# Patient Record
Sex: FEMALE | Race: WHITE | Employment: FULL TIME | URBAN - NONMETROPOLITAN AREA
[De-identification: names, ages, dates, MRNs, and addresses within clinical notes are randomized per-mention and may not be internally consistent; named-entity substitution may affect disease eponyms.]

---

## 2017-06-12 ENCOUNTER — OFFICE VISIT (OUTPATIENT)
Dept: FAMILY MEDICINE CLINIC | Age: 57
End: 2017-06-12
Payer: COMMERCIAL

## 2017-06-12 DIAGNOSIS — Z79.4 TYPE 2 DIABETES MELLITUS WITH COMPLICATION, WITH LONG-TERM CURRENT USE OF INSULIN (HCC): Primary | ICD-10-CM

## 2017-06-12 DIAGNOSIS — J30.89 ENVIRONMENTAL AND SEASONAL ALLERGIES: ICD-10-CM

## 2017-06-12 DIAGNOSIS — E11.8 TYPE 2 DIABETES MELLITUS WITH COMPLICATION, WITH LONG-TERM CURRENT USE OF INSULIN (HCC): Primary | ICD-10-CM

## 2017-06-12 DIAGNOSIS — G62.9 NEUROPATHY: ICD-10-CM

## 2017-06-12 DIAGNOSIS — I10 ESSENTIAL HYPERTENSION: ICD-10-CM

## 2017-06-12 DIAGNOSIS — F32.A DEPRESSION, UNSPECIFIED DEPRESSION TYPE: ICD-10-CM

## 2017-06-12 PROCEDURE — 99204 OFFICE O/P NEW MOD 45 MIN: CPT | Performed by: NURSE PRACTITIONER

## 2017-06-12 RX ORDER — METOPROLOL TARTRATE 50 MG/1
50 TABLET, FILM COATED ORAL DAILY
Refills: 5 | COMMUNITY
Start: 2017-05-31 | End: 2017-07-12 | Stop reason: SDUPTHER

## 2017-06-12 RX ORDER — PEN NEEDLE, DIABETIC 32GX 5/32"
32 NEEDLE, DISPOSABLE MISCELLANEOUS 2 TIMES DAILY
Refills: 5 | COMMUNITY
Start: 2017-05-08 | End: 2017-07-12 | Stop reason: SDUPTHER

## 2017-06-12 RX ORDER — SERTRALINE HYDROCHLORIDE 100 MG/1
100 TABLET, FILM COATED ORAL DAILY
Refills: 3 | COMMUNITY
Start: 2017-05-12 | End: 2017-07-12 | Stop reason: SDUPTHER

## 2017-06-12 RX ORDER — DULAGLUTIDE 1.5 MG/.5ML
1.5 INJECTION, SOLUTION SUBCUTANEOUS WEEKLY
Refills: 0 | COMMUNITY
Start: 2017-05-31 | End: 2017-07-12 | Stop reason: SDUPTHER

## 2017-06-12 RX ORDER — MECOBAL/LEVOMEFOLAT CA/B6 PHOS 2-3-35 MG
3 TABLET ORAL DAILY
Refills: 6 | COMMUNITY
Start: 2017-05-31 | End: 2017-07-12 | Stop reason: SDUPTHER

## 2017-06-12 RX ORDER — DULAGLUTIDE 1.5 MG/.5ML
1.5 INJECTION, SOLUTION SUBCUTANEOUS WEEKLY
Qty: 4 PEN | Refills: 0 | Status: CANCELLED | OUTPATIENT
Start: 2017-06-12

## 2017-06-12 RX ORDER — GABAPENTIN 600 MG/1
600 TABLET ORAL 2 TIMES DAILY
Refills: 2 | COMMUNITY
Start: 2017-05-31 | End: 2017-07-12 | Stop reason: SDUPTHER

## 2017-06-12 RX ORDER — INSULIN GLARGINE 100 [IU]/ML
200 INJECTION, SOLUTION SUBCUTANEOUS NIGHTLY
Refills: 0 | COMMUNITY
Start: 2017-05-10 | End: 2017-06-12 | Stop reason: SDUPTHER

## 2017-06-12 RX ORDER — CANAGLIFLOZIN AND METFORMIN HYDROCHLORIDE 150; 1000 MG/1; MG/1
150 TABLET, FILM COATED ORAL 2 TIMES DAILY
Refills: 3 | COMMUNITY
Start: 2017-05-08 | End: 2017-06-12 | Stop reason: ALTCHOICE

## 2017-06-12 RX ORDER — FLUCONAZOLE 200 MG/1
200 TABLET ORAL PRN
Refills: 3 | COMMUNITY
Start: 2017-03-27 | End: 2017-07-12 | Stop reason: ALTCHOICE

## 2017-06-12 RX ORDER — INSULIN GLARGINE 100 [IU]/ML
200 INJECTION, SOLUTION SUBCUTANEOUS NIGHTLY
Qty: 5 PEN | Refills: 2 | Status: SHIPPED | OUTPATIENT
Start: 2017-06-12 | End: 2017-07-12 | Stop reason: SDUPTHER

## 2017-06-12 RX ORDER — BLOOD SUGAR DIAGNOSTIC
STRIP MISCELLANEOUS
Refills: 5 | COMMUNITY
Start: 2017-04-04 | End: 2017-07-12 | Stop reason: SDUPTHER

## 2017-06-12 RX ORDER — LORATADINE 10 MG/1
10 TABLET ORAL DAILY
Refills: 3 | COMMUNITY
Start: 2017-05-31 | End: 2017-07-12 | Stop reason: SDUPTHER

## 2017-06-12 ASSESSMENT — PATIENT HEALTH QUESTIONNAIRE - PHQ9
1. LITTLE INTEREST OR PLEASURE IN DOING THINGS: 0
SUM OF ALL RESPONSES TO PHQ9 QUESTIONS 1 & 2: 0
2. FEELING DOWN, DEPRESSED OR HOPELESS: 0
SUM OF ALL RESPONSES TO PHQ QUESTIONS 1-9: 0

## 2017-06-30 VITALS
HEART RATE: 83 BPM | BODY MASS INDEX: 30.33 KG/M2 | OXYGEN SATURATION: 98 % | HEIGHT: 69 IN | DIASTOLIC BLOOD PRESSURE: 86 MMHG | WEIGHT: 204.8 LBS | RESPIRATION RATE: 20 BRPM | SYSTOLIC BLOOD PRESSURE: 138 MMHG

## 2017-06-30 PROBLEM — F32.A DEPRESSION: Status: ACTIVE | Noted: 2017-06-30

## 2017-06-30 PROBLEM — J30.89 ENVIRONMENTAL AND SEASONAL ALLERGIES: Status: ACTIVE | Noted: 2017-06-30

## 2017-06-30 PROBLEM — E11.8 TYPE 2 DIABETES MELLITUS WITH COMPLICATION, WITH LONG-TERM CURRENT USE OF INSULIN (HCC): Status: ACTIVE | Noted: 2017-06-30

## 2017-06-30 PROBLEM — Z79.4 TYPE 2 DIABETES MELLITUS WITH COMPLICATION, WITH LONG-TERM CURRENT USE OF INSULIN (HCC): Status: ACTIVE | Noted: 2017-06-30

## 2017-06-30 PROBLEM — I10 ESSENTIAL HYPERTENSION: Status: ACTIVE | Noted: 2017-06-30

## 2017-06-30 PROBLEM — G62.9 NEUROPATHY: Status: ACTIVE | Noted: 2017-06-30

## 2017-07-12 ENCOUNTER — HOSPITAL ENCOUNTER (OUTPATIENT)
Dept: OTHER | Age: 57
Discharge: HOME | End: 2017-07-12
Attending: NURSE PRACTITIONER | Admitting: NURSE PRACTITIONER

## 2017-07-12 ENCOUNTER — OFFICE VISIT (OUTPATIENT)
Dept: FAMILY MEDICINE CLINIC | Age: 57
End: 2017-07-12
Payer: COMMERCIAL

## 2017-07-12 VITALS
HEIGHT: 69 IN | OXYGEN SATURATION: 98 % | SYSTOLIC BLOOD PRESSURE: 130 MMHG | RESPIRATION RATE: 20 BRPM | DIASTOLIC BLOOD PRESSURE: 64 MMHG | BODY MASS INDEX: 30.33 KG/M2 | HEART RATE: 78 BPM | WEIGHT: 204.8 LBS

## 2017-07-12 DIAGNOSIS — G62.9 NEUROPATHY: ICD-10-CM

## 2017-07-12 DIAGNOSIS — R53.83 FATIGUE, UNSPECIFIED TYPE: ICD-10-CM

## 2017-07-12 DIAGNOSIS — Z79.4 TYPE 2 DIABETES MELLITUS WITH COMPLICATION, WITH LONG-TERM CURRENT USE OF INSULIN (HCC): Primary | ICD-10-CM

## 2017-07-12 DIAGNOSIS — F32.A DEPRESSION, UNSPECIFIED DEPRESSION TYPE: ICD-10-CM

## 2017-07-12 DIAGNOSIS — R63.5 WEIGHT GAIN: ICD-10-CM

## 2017-07-12 DIAGNOSIS — J30.89 ENVIRONMENTAL AND SEASONAL ALLERGIES: ICD-10-CM

## 2017-07-12 DIAGNOSIS — Z12.31 ENCOUNTER FOR SCREENING MAMMOGRAM FOR BREAST CANCER: ICD-10-CM

## 2017-07-12 DIAGNOSIS — E66.9 OBESITY, UNSPECIFIED OBESITY SEVERITY, UNSPECIFIED OBESITY TYPE: ICD-10-CM

## 2017-07-12 DIAGNOSIS — E11.8 TYPE 2 DIABETES MELLITUS WITH COMPLICATION, WITH LONG-TERM CURRENT USE OF INSULIN (HCC): Primary | ICD-10-CM

## 2017-07-12 DIAGNOSIS — K90.49 GASTROINTESTINAL INTOLERANCE TO FOODS: ICD-10-CM

## 2017-07-12 DIAGNOSIS — I10 ESSENTIAL HYPERTENSION: ICD-10-CM

## 2017-07-12 DIAGNOSIS — R14.0 ABDOMINAL BLOATING: ICD-10-CM

## 2017-07-12 LAB
BILIRUBIN, POC: ABNORMAL
BLOOD URINE, POC: ABNORMAL
CLARITY, POC: CLEAR
COLOR, POC: ABNORMAL
GLUCOSE URINE, POC: ABNORMAL
KETONES, POC: ABNORMAL
LEUKOCYTE EST, POC: ABNORMAL
NITRITE, POC: ABNORMAL
PH, POC: 5.5
PROTEIN, POC: ABNORMAL
SPECIFIC GRAVITY, POC: 1.02
UROBILINOGEN, POC: 1

## 2017-07-12 PROCEDURE — 99214 OFFICE O/P EST MOD 30 MIN: CPT | Performed by: NURSE PRACTITIONER

## 2017-07-12 PROCEDURE — 81002 URINALYSIS NONAUTO W/O SCOPE: CPT | Performed by: NURSE PRACTITIONER

## 2017-07-12 RX ORDER — OMEPRAZOLE 20 MG/1
20 CAPSULE, DELAYED RELEASE ORAL
Qty: 30 CAPSULE | Refills: 3 | Status: SHIPPED | OUTPATIENT
Start: 2017-07-12 | End: 2018-04-02 | Stop reason: ALTCHOICE

## 2017-07-12 RX ORDER — PHENTERMINE HYDROCHLORIDE 37.5 MG/1
37.5 TABLET ORAL
Qty: 30 TABLET | Refills: 0 | Status: SHIPPED | OUTPATIENT
Start: 2017-07-12 | End: 2017-09-07 | Stop reason: SDUPTHER

## 2017-07-12 RX ORDER — DULAGLUTIDE 1.5 MG/.5ML
1.5 INJECTION, SOLUTION SUBCUTANEOUS WEEKLY
Qty: 4 PEN | Refills: 3 | Status: SHIPPED | OUTPATIENT
Start: 2017-07-12 | End: 2017-10-26 | Stop reason: SDUPTHER

## 2017-07-12 RX ORDER — BLOOD SUGAR DIAGNOSTIC
1 STRIP MISCELLANEOUS 2 TIMES DAILY
Qty: 100 EACH | Refills: 5 | Status: SHIPPED | OUTPATIENT
Start: 2017-07-12 | End: 2017-10-26 | Stop reason: SDUPTHER

## 2017-07-12 RX ORDER — LORATADINE 10 MG/1
10 TABLET ORAL DAILY
Qty: 30 TABLET | Refills: 3 | Status: SHIPPED | OUTPATIENT
Start: 2017-07-12 | End: 2017-10-26 | Stop reason: SDUPTHER

## 2017-07-12 RX ORDER — GABAPENTIN 600 MG/1
600 TABLET ORAL 2 TIMES DAILY
Qty: 60 TABLET | Refills: 1 | Status: SHIPPED | OUTPATIENT
Start: 2017-07-12 | End: 2017-09-07 | Stop reason: SDUPTHER

## 2017-07-12 RX ORDER — METOPROLOL TARTRATE 50 MG/1
50 TABLET, FILM COATED ORAL DAILY
Qty: 60 TABLET | Refills: 5 | Status: SHIPPED | OUTPATIENT
Start: 2017-07-12 | End: 2017-08-10 | Stop reason: SDUPTHER

## 2017-07-12 RX ORDER — INSULIN GLARGINE 100 [IU]/ML
100 INJECTION, SOLUTION SUBCUTANEOUS NIGHTLY
Qty: 10 PEN | Refills: 2 | Status: SHIPPED | OUTPATIENT
Start: 2017-07-12 | End: 2017-09-21 | Stop reason: SDUPTHER

## 2017-07-12 RX ORDER — MECOBAL/LEVOMEFOLAT CA/B6 PHOS 2-3-35 MG
1 TABLET ORAL DAILY
Qty: 30 TABLET | Refills: 5 | Status: SHIPPED | OUTPATIENT
Start: 2017-07-12 | End: 2018-01-08

## 2017-07-12 RX ORDER — SERTRALINE HYDROCHLORIDE 100 MG/1
100 TABLET, FILM COATED ORAL DAILY
Qty: 30 TABLET | Refills: 3 | Status: SHIPPED | OUTPATIENT
Start: 2017-07-12 | End: 2017-10-26 | Stop reason: SDUPTHER

## 2017-07-12 RX ORDER — PEN NEEDLE, DIABETIC 32GX 5/32"
1 NEEDLE, DISPOSABLE MISCELLANEOUS 2 TIMES DAILY
Qty: 100 EACH | Refills: 5 | Status: SHIPPED | OUTPATIENT
Start: 2017-07-12 | End: 2018-01-08 | Stop reason: SDUPTHER

## 2017-07-12 ASSESSMENT — ENCOUNTER SYMPTOMS: ABDOMINAL DISTENTION: 1

## 2017-07-13 DIAGNOSIS — Z79.4 TYPE 2 DIABETES MELLITUS WITH COMPLICATION, WITH LONG-TERM CURRENT USE OF INSULIN (HCC): ICD-10-CM

## 2017-07-13 DIAGNOSIS — E11.8 TYPE 2 DIABETES MELLITUS WITH COMPLICATION, WITH LONG-TERM CURRENT USE OF INSULIN (HCC): ICD-10-CM

## 2017-07-13 DIAGNOSIS — I10 ESSENTIAL HYPERTENSION: ICD-10-CM

## 2017-07-13 DIAGNOSIS — F32.A DEPRESSION, UNSPECIFIED DEPRESSION TYPE: ICD-10-CM

## 2017-07-13 DIAGNOSIS — R53.83 FATIGUE, UNSPECIFIED TYPE: ICD-10-CM

## 2017-07-13 DIAGNOSIS — G62.9 NEUROPATHY: ICD-10-CM

## 2017-07-13 LAB
A/G RATIO: 1.4 (ref 0.8–2)
ALBUMIN SERPL-MCNC: 4.4 G/DL (ref 3.4–4.8)
ALP BLD-CCNC: 74 U/L (ref 25–100)
ALT SERPL-CCNC: 54 U/L (ref 4–36)
ANION GAP SERPL CALCULATED.3IONS-SCNC: 14 MMOL/L (ref 3–16)
AST SERPL-CCNC: 43 U/L (ref 8–33)
BASOPHILS ABSOLUTE: 0 K/UL (ref 0–0.1)
BASOPHILS RELATIVE PERCENT: 0.7 %
BILIRUB SERPL-MCNC: 0.7 MG/DL (ref 0.3–1.2)
BUN BLDV-MCNC: 17 MG/DL (ref 6–20)
CALCIUM SERPL-MCNC: 9.8 MG/DL (ref 8.5–10.5)
CHLORIDE BLD-SCNC: 103 MMOL/L (ref 98–107)
CHOLESTEROL, TOTAL: 239 MG/DL (ref 0–200)
CO2: 26 MMOL/L (ref 20–30)
CREAT SERPL-MCNC: 1.1 MG/DL (ref 0.4–1.2)
CREATININE URINE: 103.6 MG/DL (ref 1.5–300)
EOSINOPHILS ABSOLUTE: 0.1 K/UL (ref 0–0.4)
EOSINOPHILS RELATIVE PERCENT: 2.1 %
GFR AFRICAN AMERICAN: >59
GFR NON-AFRICAN AMERICAN: 51
GLOBULIN: 3.1 G/DL
GLUCOSE BLD-MCNC: 81 MG/DL (ref 74–106)
HBA1C MFR BLD: 7.1 %
HCT VFR BLD CALC: 41 % (ref 37–47)
HDLC SERPL-MCNC: 29 MG/DL (ref 40–60)
HEMOGLOBIN: 14 G/DL (ref 11.5–16.5)
LDL CHOLESTEROL CALCULATED: 144 MG/DL
LYMPHOCYTES ABSOLUTE: 1.4 K/UL (ref 1.5–4)
LYMPHOCYTES RELATIVE PERCENT: 32.9 % (ref 20–40)
MCH RBC QN AUTO: 29.4 PG (ref 27–32)
MCHC RBC AUTO-ENTMCNC: 34.1 G/DL (ref 31–35)
MCV RBC AUTO: 86 FL (ref 80–100)
MICROALBUMIN UR-MCNC: <1.2 MG/DL (ref 0–22)
MICROALBUMIN/CREAT UR-RTO: NORMAL MG/G (ref 0–30)
MONOCYTES ABSOLUTE: 0.3 K/UL (ref 0.2–0.8)
MONOCYTES RELATIVE PERCENT: 7.9 % (ref 3–10)
NEUTROPHILS ABSOLUTE: 2.4 K/UL (ref 2–7.5)
NEUTROPHILS RELATIVE PERCENT: 56.4 %
PDW BLD-RTO: 14.9 % (ref 11–16)
PLATELET # BLD: 61 K/UL (ref 150–400)
PMV BLD AUTO: 12.7 FL (ref 6–10)
POTASSIUM SERPL-SCNC: 3.9 MMOL/L (ref 3.4–5.1)
RBC # BLD: 4.77 M/UL (ref 3.8–5.8)
SODIUM BLD-SCNC: 143 MMOL/L (ref 136–145)
TOTAL PROTEIN: 7.5 G/DL (ref 6.4–8.3)
TRIGL SERPL-MCNC: 328 MG/DL (ref 0–249)
TSH SERPL DL<=0.05 MIU/L-ACNC: 2.54 UIU/ML (ref 0.35–5.5)
VITAMIN B-12: 1196 PG/ML (ref 211–911)
VITAMIN D 25-HYDROXY: 26.8 (ref 32–100)
VLDLC SERPL CALC-MCNC: 66 MG/DL
WBC # BLD: 4.2 K/UL (ref 4–11)

## 2017-07-14 ENCOUNTER — TELEPHONE (OUTPATIENT)
Dept: FAMILY MEDICINE CLINIC | Age: 57
End: 2017-07-14

## 2017-07-19 RX ORDER — PRAVASTATIN SODIUM 20 MG
20 TABLET ORAL EVERY EVENING
Qty: 30 TABLET | Refills: 3 | Status: SHIPPED | OUTPATIENT
Start: 2017-07-19 | End: 2017-10-26 | Stop reason: SDUPTHER

## 2017-07-19 RX ORDER — ERGOCALCIFEROL 1.25 MG/1
50000 CAPSULE ORAL WEEKLY
Qty: 4 CAPSULE | Refills: 2 | Status: SHIPPED | OUTPATIENT
Start: 2017-07-19 | End: 2018-01-08 | Stop reason: ALTCHOICE

## 2017-08-10 DIAGNOSIS — I10 ESSENTIAL HYPERTENSION: ICD-10-CM

## 2017-08-10 RX ORDER — METOPROLOL TARTRATE 50 MG/1
50 TABLET, FILM COATED ORAL DAILY
Qty: 30 TABLET | Refills: 5 | Status: SHIPPED | OUTPATIENT
Start: 2017-08-10 | End: 2018-04-02 | Stop reason: SDUPTHER

## 2017-09-07 ENCOUNTER — OFFICE VISIT (OUTPATIENT)
Dept: FAMILY MEDICINE CLINIC | Age: 57
End: 2017-09-07
Payer: COMMERCIAL

## 2017-09-07 VITALS
WEIGHT: 198.3 LBS | HEART RATE: 74 BPM | SYSTOLIC BLOOD PRESSURE: 132 MMHG | BODY MASS INDEX: 29.37 KG/M2 | HEIGHT: 69 IN | RESPIRATION RATE: 20 BRPM | OXYGEN SATURATION: 98 % | DIASTOLIC BLOOD PRESSURE: 82 MMHG

## 2017-09-07 DIAGNOSIS — G62.9 NEUROPATHY: ICD-10-CM

## 2017-09-07 DIAGNOSIS — E66.9 OBESITY, UNSPECIFIED OBESITY SEVERITY, UNSPECIFIED OBESITY TYPE: ICD-10-CM

## 2017-09-07 DIAGNOSIS — I10 ESSENTIAL HYPERTENSION: ICD-10-CM

## 2017-09-07 DIAGNOSIS — R63.5 WEIGHT GAIN: ICD-10-CM

## 2017-09-07 DIAGNOSIS — Z79.4 TYPE 2 DIABETES MELLITUS WITH COMPLICATION, WITH LONG-TERM CURRENT USE OF INSULIN (HCC): Primary | ICD-10-CM

## 2017-09-07 DIAGNOSIS — F32.A DEPRESSION, UNSPECIFIED DEPRESSION TYPE: ICD-10-CM

## 2017-09-07 DIAGNOSIS — E11.8 TYPE 2 DIABETES MELLITUS WITH COMPLICATION, WITH LONG-TERM CURRENT USE OF INSULIN (HCC): Primary | ICD-10-CM

## 2017-09-07 PROCEDURE — 99214 OFFICE O/P EST MOD 30 MIN: CPT | Performed by: NURSE PRACTITIONER

## 2017-09-07 RX ORDER — PHENTERMINE HYDROCHLORIDE 37.5 MG/1
37.5 TABLET ORAL
Qty: 30 TABLET | Refills: 1 | Status: SHIPPED | OUTPATIENT
Start: 2017-09-07 | End: 2017-10-07

## 2017-09-07 RX ORDER — GABAPENTIN 600 MG/1
600 TABLET ORAL 2 TIMES DAILY
Qty: 60 TABLET | Refills: 1 | Status: SHIPPED | OUTPATIENT
Start: 2017-09-07 | End: 2017-10-26 | Stop reason: SDUPTHER

## 2017-09-21 DIAGNOSIS — E11.8 TYPE 2 DIABETES MELLITUS WITH COMPLICATION, WITH LONG-TERM CURRENT USE OF INSULIN (HCC): ICD-10-CM

## 2017-09-21 DIAGNOSIS — Z79.4 TYPE 2 DIABETES MELLITUS WITH COMPLICATION, WITH LONG-TERM CURRENT USE OF INSULIN (HCC): ICD-10-CM

## 2017-09-21 RX ORDER — INSULIN GLARGINE 100 [IU]/ML
160 INJECTION, SOLUTION SUBCUTANEOUS NIGHTLY
Qty: 16 PEN | Refills: 2 | Status: SHIPPED | OUTPATIENT
Start: 2017-09-21 | End: 2017-10-26 | Stop reason: SDUPTHER

## 2017-09-22 ENCOUNTER — TELEPHONE (OUTPATIENT)
Dept: FAMILY MEDICINE CLINIC | Age: 57
End: 2017-09-22

## 2017-10-26 ENCOUNTER — OFFICE VISIT (OUTPATIENT)
Dept: FAMILY MEDICINE CLINIC | Age: 57
End: 2017-10-26
Payer: COMMERCIAL

## 2017-10-26 VITALS
RESPIRATION RATE: 16 BRPM | WEIGHT: 207 LBS | SYSTOLIC BLOOD PRESSURE: 120 MMHG | OXYGEN SATURATION: 98 % | HEART RATE: 75 BPM | HEIGHT: 69 IN | DIASTOLIC BLOOD PRESSURE: 78 MMHG | BODY MASS INDEX: 30.66 KG/M2

## 2017-10-26 DIAGNOSIS — G62.9 NEUROPATHY: ICD-10-CM

## 2017-10-26 DIAGNOSIS — E78.5 HYPERLIPIDEMIA, UNSPECIFIED HYPERLIPIDEMIA TYPE: ICD-10-CM

## 2017-10-26 DIAGNOSIS — J30.89 ENVIRONMENTAL AND SEASONAL ALLERGIES: ICD-10-CM

## 2017-10-26 DIAGNOSIS — I10 ESSENTIAL HYPERTENSION: ICD-10-CM

## 2017-10-26 DIAGNOSIS — E11.8 TYPE 2 DIABETES MELLITUS WITH COMPLICATION, WITH LONG-TERM CURRENT USE OF INSULIN (HCC): Primary | ICD-10-CM

## 2017-10-26 DIAGNOSIS — F32.A DEPRESSION, UNSPECIFIED DEPRESSION TYPE: ICD-10-CM

## 2017-10-26 DIAGNOSIS — Z23 INFLUENZA VACCINE NEEDED: ICD-10-CM

## 2017-10-26 DIAGNOSIS — G89.29 CHRONIC PAIN OF RIGHT KNEE: ICD-10-CM

## 2017-10-26 DIAGNOSIS — M17.11 OSTEOARTHRITIS OF RIGHT KNEE, UNSPECIFIED OSTEOARTHRITIS TYPE: ICD-10-CM

## 2017-10-26 DIAGNOSIS — M25.561 CHRONIC PAIN OF RIGHT KNEE: ICD-10-CM

## 2017-10-26 DIAGNOSIS — Z79.4 TYPE 2 DIABETES MELLITUS WITH COMPLICATION, WITH LONG-TERM CURRENT USE OF INSULIN (HCC): Primary | ICD-10-CM

## 2017-10-26 DIAGNOSIS — E66.9 CLASS 1 OBESITY WITH SERIOUS COMORBIDITY AND BODY MASS INDEX (BMI) OF 30.0 TO 30.9 IN ADULT, UNSPECIFIED OBESITY TYPE: ICD-10-CM

## 2017-10-26 LAB — HBA1C MFR BLD: 7.2 %

## 2017-10-26 PROCEDURE — 90471 IMMUNIZATION ADMIN: CPT | Performed by: NURSE PRACTITIONER

## 2017-10-26 PROCEDURE — 99214 OFFICE O/P EST MOD 30 MIN: CPT | Performed by: NURSE PRACTITIONER

## 2017-10-26 PROCEDURE — 90688 IIV4 VACCINE SPLT 0.5 ML IM: CPT | Performed by: NURSE PRACTITIONER

## 2017-10-26 PROCEDURE — 83036 HEMOGLOBIN GLYCOSYLATED A1C: CPT | Performed by: NURSE PRACTITIONER

## 2017-10-26 RX ORDER — BLOOD SUGAR DIAGNOSTIC
1 STRIP MISCELLANEOUS 2 TIMES DAILY
Qty: 100 EACH | Refills: 5 | Status: SHIPPED | OUTPATIENT
Start: 2017-10-26 | End: 2018-01-08 | Stop reason: SDUPTHER

## 2017-10-26 RX ORDER — GABAPENTIN 600 MG/1
600 TABLET ORAL 3 TIMES DAILY
Qty: 90 TABLET | Refills: 1 | Status: SHIPPED | OUTPATIENT
Start: 2017-10-26 | End: 2018-01-08 | Stop reason: SDUPTHER

## 2017-10-26 RX ORDER — DULAGLUTIDE 1.5 MG/.5ML
1.5 INJECTION, SOLUTION SUBCUTANEOUS WEEKLY
Qty: 4 PEN | Refills: 3 | Status: SHIPPED | OUTPATIENT
Start: 2017-10-26 | End: 2018-01-08 | Stop reason: SDUPTHER

## 2017-10-26 RX ORDER — PHENTERMINE HYDROCHLORIDE 37.5 MG/1
37.5 TABLET ORAL
Qty: 30 TABLET | Refills: 0 | Status: SHIPPED | OUTPATIENT
Start: 2017-10-26 | End: 2017-11-25

## 2017-10-26 RX ORDER — PRAVASTATIN SODIUM 20 MG
20 TABLET ORAL EVERY EVENING
Qty: 30 TABLET | Refills: 3 | Status: SHIPPED | OUTPATIENT
Start: 2017-10-26 | End: 2018-01-08 | Stop reason: SDUPTHER

## 2017-10-26 RX ORDER — SERTRALINE HYDROCHLORIDE 100 MG/1
100 TABLET, FILM COATED ORAL DAILY
Qty: 30 TABLET | Refills: 3 | Status: SHIPPED | OUTPATIENT
Start: 2017-10-26 | End: 2018-04-24

## 2017-10-26 RX ORDER — LORATADINE 10 MG/1
10 TABLET ORAL DAILY
Qty: 30 TABLET | Refills: 3 | Status: SHIPPED | OUTPATIENT
Start: 2017-10-26 | End: 2018-01-08 | Stop reason: SDUPTHER

## 2017-10-26 RX ORDER — INSULIN GLARGINE 100 [IU]/ML
160 INJECTION, SOLUTION SUBCUTANEOUS NIGHTLY
Qty: 16 PEN | Refills: 3 | Status: SHIPPED | OUTPATIENT
Start: 2017-10-26 | End: 2018-01-08 | Stop reason: SDUPTHER

## 2017-10-26 NOTE — PROGRESS NOTES
.Have you seen any other physician or provider since your last visit no    Have you had any other diagnostic tests since your last visit? no    Have you changed or stopped any medications since your last visit? no       Per KELSEY Jane orders Crystal Sensing was given 0.5mL  of Influenza IM, in the Left Deltoid. No complications or reactions were noted. Patient tolerated procedure well.

## 2017-10-26 NOTE — PROGRESS NOTES
SUBJECTIVE:  Belkis Lopez is a 62 y.o. female that presents with   Chief Complaint   Patient presents with    Diabetes     f/u    Depression    Hypertension   . HPI:    She presents for follow-up. She states that her blood sugars have been doing some better but she has been changing around her long-acting insulin dose depending on what she thinks she needs. Discussed the importance of staying on the same amount of long-acting insulin every day and using short acting insulin to lower blood sugar if needed. She voiced understanding. Her blood pressures have been stable with no problems. She's been having some issues with her allergies. She's been experiencing an increase in right knee pain. She previously saw [facility] surgeon Dr. Los Sawyer and was given injections that greatly helped her pain. She would like a referral to him for consult. She was also given an order for updated x-ray. Her depression has recently worsened due to not being able to lose weight. She recently went on a trip with friends and forgot her Adipex and was frequently eating out at restaurants. She had lost a small amount of weight in the gained this back on the trip. She is attempting to eat a well-balanced diet that is low in carbohydrates, sugar, and cholesterol. I discussed the risks and benefits of adipex again with the patient today including increases in HR and BP, N/V, insomnia and dry mouth. I also discussed the potential for abuse of this medication and that we will need to titrate off of it. We also discussed that adipex is considered a Category X medication in pregnancy and that if she finds out that she is pregnant, she needs to discontinue the medication immediately and inform me of this. We discussed that she and her partner needs to use condoms every time that they have sexual intercourse. She stated today that she understands these risks and would like to continue with a prescription for the medication.  She denies any and vitals reviewed. No results found for requested labs within last 30 days. Hemoglobin A1C (%)   Date Value   10/26/2017 7.2     MICROALBUMIN, RANDOM URINE (mg/dL)   Date Value   07/12/2017 <1.20     LDL Calculated (mg/dL)   Date Value   07/12/2017 144 (H)         Lab Results   Component Value Date    WBC 4.2 07/12/2017    NEUTROABS 2.4 07/12/2017    HGB 14.0 07/12/2017    HCT 41.0 07/12/2017    HCT 42.9 05/27/2012    MCV 86.0 07/12/2017    PLT 61 07/12/2017     05/27/2012       Lab Results   Component Value Date    TSH 2.54 07/12/2017         ASSESSMENT/PLAN:  1. Type 2 diabetes mellitus with complication, with long-term current use of insulin (HCC)  LANTUS SOLOSTAR 100 UNIT/ML injection pen    TRULICITY 1.5 ZL/9.6ET SOPN    metFORMIN (GLUCOPHAGE) 1000 MG tablet    SITagliptin (JANUVIA) 100 MG tablet    ACCU-CHEK ANIA PLUS strip    insulin lispro (HUMALOG KWIKPEN) 100 UNIT/ML pen    POCT glycosylated hemoglobin (Hb A1C)    CBC Auto Differential    Comprehensive Metabolic Panel    HEMOGLOBIN A1C   2. Essential hypertension  CBC Auto Differential    Comprehensive Metabolic Panel    Lipid Panel   3. Environmental and seasonal allergies  loratadine (CLARITIN) 10 MG tablet   4. Osteoarthritis of right knee, unspecified osteoarthritis type  External Referral To Orthopedic Surgery   5. Depression, unspecified depression type  sertraline (ZOLOFT) 100 MG tablet   6. Chronic pain of right knee  XR KNEE RIGHT (MIN 4 VIEWS)    External Referral To Orthopedic Surgery   7. Neuropathy (HCC)  gabapentin (NEURONTIN) 600 MG tablet   8. Influenza vaccine needed  INFLUENZA, QUADV, 3 YRS AND OLDER, IM, MDV, 0.5ML (FLUZONE QUADV)   9. Hyperlipidemia, unspecified hyperlipidemia type  pravastatin (PRAVACHOL) 20 MG tablet    Lipid Panel   10.  Class 1 obesity with serious comorbidity and body mass index (BMI) of 30.0 to 30.9 in adult, unspecified obesity type  phentermine (ADIPEX-P) 37.5 MG tablet        Orders Placed

## 2018-01-03 ENCOUNTER — HOSPITAL ENCOUNTER (OUTPATIENT)
Dept: OTHER | Age: 58
Discharge: HOME | End: 2018-01-03
Attending: NURSE PRACTITIONER | Admitting: NURSE PRACTITIONER

## 2018-01-03 DIAGNOSIS — I10 ESSENTIAL HYPERTENSION: ICD-10-CM

## 2018-01-03 DIAGNOSIS — E11.8 TYPE 2 DIABETES MELLITUS WITH COMPLICATION, WITH LONG-TERM CURRENT USE OF INSULIN (HCC): ICD-10-CM

## 2018-01-03 DIAGNOSIS — E78.5 HYPERLIPIDEMIA, UNSPECIFIED HYPERLIPIDEMIA TYPE: ICD-10-CM

## 2018-01-03 DIAGNOSIS — Z79.4 TYPE 2 DIABETES MELLITUS WITH COMPLICATION, WITH LONG-TERM CURRENT USE OF INSULIN (HCC): ICD-10-CM

## 2018-01-03 LAB
A/G RATIO: 1.4 (ref 0.8–2)
ALBUMIN SERPL-MCNC: 4.2 G/DL (ref 3.4–4.8)
ALP BLD-CCNC: 76 U/L (ref 25–100)
ALT SERPL-CCNC: 48 U/L (ref 4–36)
ANION GAP SERPL CALCULATED.3IONS-SCNC: 11 MMOL/L (ref 3–16)
AST SERPL-CCNC: 39 U/L (ref 8–33)
BASOPHILS ABSOLUTE: 0.1 K/UL (ref 0–0.1)
BASOPHILS RELATIVE PERCENT: 1.8 %
BILIRUB SERPL-MCNC: 0.5 MG/DL (ref 0.3–1.2)
BUN BLDV-MCNC: 15 MG/DL (ref 6–20)
CALCIUM SERPL-MCNC: 9.6 MG/DL (ref 8.5–10.5)
CHLORIDE BLD-SCNC: 106 MMOL/L (ref 98–107)
CHOLESTEROL, TOTAL: 224 MG/DL (ref 0–200)
CO2: 26 MMOL/L (ref 20–30)
CREAT SERPL-MCNC: 1 MG/DL (ref 0.4–1.2)
EOSINOPHILS ABSOLUTE: 0.3 K/UL (ref 0–0.4)
EOSINOPHILS RELATIVE PERCENT: 6.3 %
GFR AFRICAN AMERICAN: >59
GFR NON-AFRICAN AMERICAN: 57
GLOBULIN: 3.1 G/DL
GLUCOSE BLD-MCNC: 128 MG/DL (ref 74–106)
HBA1C MFR BLD: 7.3 %
HCT VFR BLD CALC: 41.1 % (ref 37–47)
HDLC SERPL-MCNC: 23 MG/DL (ref 40–60)
HEMOGLOBIN: 13.6 G/DL (ref 11.5–16.5)
IMMATURE GRANULOCYTES #: 0
IMMATURE GRANULOCYTES %: 0.3 % (ref 0–5)
LDL CHOLESTEROL CALCULATED: ABNORMAL MG/DL
LDL CHOLESTEROL DIRECT: 120 MG/DL
LYMPHOCYTES ABSOLUTE: 1.2 K/UL (ref 1.5–4)
LYMPHOCYTES RELATIVE PERCENT: 29.1 %
MCH RBC QN AUTO: 29.3 PG (ref 27–32)
MCHC RBC AUTO-ENTMCNC: 33.1 G/DL (ref 31–35)
MCV RBC AUTO: 88.6 FL (ref 80–100)
MONOCYTES ABSOLUTE: 0.4 K/UL (ref 0.2–0.8)
MONOCYTES RELATIVE PERCENT: 10.8 %
NEUTROPHILS ABSOLUTE: 2.1 K/UL (ref 2–7.5)
NEUTROPHILS RELATIVE PERCENT: 51.7 %
PDW BLD-RTO: 13.5 % (ref 11–16)
PLATELET # BLD: 52 K/UL (ref 150–400)
PMV BLD AUTO: 13.1 FL (ref 6–10)
POTASSIUM SERPL-SCNC: 4.2 MMOL/L (ref 3.4–5.1)
RBC # BLD: 4.64 M/UL (ref 3.8–5.8)
SODIUM BLD-SCNC: 143 MMOL/L (ref 136–145)
TOTAL PROTEIN: 7.3 G/DL (ref 6.4–8.3)
TRIGL SERPL-MCNC: 499 MG/DL (ref 0–249)
VLDLC SERPL CALC-MCNC: ABNORMAL MG/DL
WBC # BLD: 4 K/UL (ref 4–11)

## 2018-01-08 ENCOUNTER — OFFICE VISIT (OUTPATIENT)
Dept: FAMILY MEDICINE CLINIC | Age: 58
End: 2018-01-08
Payer: COMMERCIAL

## 2018-01-08 VITALS
SYSTOLIC BLOOD PRESSURE: 132 MMHG | BODY MASS INDEX: 31.98 KG/M2 | OXYGEN SATURATION: 98 % | RESPIRATION RATE: 18 BRPM | WEIGHT: 211 LBS | HEIGHT: 68 IN | DIASTOLIC BLOOD PRESSURE: 78 MMHG | HEART RATE: 78 BPM

## 2018-01-08 DIAGNOSIS — R39.9 URINARY TRACT INFECTION SYMPTOMS: ICD-10-CM

## 2018-01-08 DIAGNOSIS — Z79.4 TYPE 2 DIABETES MELLITUS WITH COMPLICATION, WITH LONG-TERM CURRENT USE OF INSULIN (HCC): Primary | ICD-10-CM

## 2018-01-08 DIAGNOSIS — R30.0 DYSURIA: ICD-10-CM

## 2018-01-08 DIAGNOSIS — G62.9 NEUROPATHY: ICD-10-CM

## 2018-01-08 DIAGNOSIS — E11.8 TYPE 2 DIABETES MELLITUS WITH COMPLICATION, WITH LONG-TERM CURRENT USE OF INSULIN (HCC): ICD-10-CM

## 2018-01-08 DIAGNOSIS — Z79.4 TYPE 2 DIABETES MELLITUS WITH COMPLICATION, WITH LONG-TERM CURRENT USE OF INSULIN (HCC): ICD-10-CM

## 2018-01-08 DIAGNOSIS — E78.5 HYPERLIPIDEMIA, UNSPECIFIED HYPERLIPIDEMIA TYPE: ICD-10-CM

## 2018-01-08 DIAGNOSIS — J30.89 ENVIRONMENTAL AND SEASONAL ALLERGIES: ICD-10-CM

## 2018-01-08 DIAGNOSIS — M79.605 PAIN IN BOTH LOWER EXTREMITIES: ICD-10-CM

## 2018-01-08 DIAGNOSIS — D69.6 THROMBOCYTOPENIA (HCC): ICD-10-CM

## 2018-01-08 DIAGNOSIS — E11.8 TYPE 2 DIABETES MELLITUS WITH COMPLICATION, WITH LONG-TERM CURRENT USE OF INSULIN (HCC): Primary | ICD-10-CM

## 2018-01-08 DIAGNOSIS — R10.84 GENERALIZED ABDOMINAL PAIN: ICD-10-CM

## 2018-01-08 DIAGNOSIS — R10.13 EPIGASTRIC PAIN: ICD-10-CM

## 2018-01-08 DIAGNOSIS — R74.8 ELEVATED LIVER ENZYMES: ICD-10-CM

## 2018-01-08 DIAGNOSIS — R10.9 LEFT FLANK PAIN: ICD-10-CM

## 2018-01-08 DIAGNOSIS — R10.2 PELVIC PAIN IN FEMALE: ICD-10-CM

## 2018-01-08 DIAGNOSIS — M79.604 PAIN IN BOTH LOWER EXTREMITIES: ICD-10-CM

## 2018-01-08 DIAGNOSIS — R14.0 ABDOMINAL DISTENTION: ICD-10-CM

## 2018-01-08 DIAGNOSIS — I10 ESSENTIAL HYPERTENSION: ICD-10-CM

## 2018-01-08 LAB
BILIRUBIN, POC: NEGATIVE
BLOOD URINE, POC: NEGATIVE
CLARITY, POC: CLEAR
COLOR, POC: YELLOW
GLUCOSE URINE, POC: NEGATIVE
KETONES, POC: NEGATIVE
LEUKOCYTE EST, POC: NEGATIVE
NITRITE, POC: NEGATIVE
PH, POC: 5
PROTEIN, POC: NEGATIVE
SPECIFIC GRAVITY, POC: 1.02
UROBILINOGEN, POC: 1

## 2018-01-08 PROCEDURE — 81002 URINALYSIS NONAUTO W/O SCOPE: CPT | Performed by: NURSE PRACTITIONER

## 2018-01-08 PROCEDURE — 99214 OFFICE O/P EST MOD 30 MIN: CPT | Performed by: NURSE PRACTITIONER

## 2018-01-08 RX ORDER — GABAPENTIN 600 MG/1
600 TABLET ORAL 3 TIMES DAILY
Qty: 90 TABLET | Refills: 1 | Status: SHIPPED | OUTPATIENT
Start: 2018-01-08 | End: 2018-04-02 | Stop reason: ALTCHOICE

## 2018-01-08 RX ORDER — DULAGLUTIDE 1.5 MG/.5ML
1.5 INJECTION, SOLUTION SUBCUTANEOUS WEEKLY
Qty: 4 PEN | Refills: 3 | Status: SHIPPED | OUTPATIENT
Start: 2018-01-08 | End: 2018-04-02 | Stop reason: SDUPTHER

## 2018-01-08 RX ORDER — PRAVASTATIN SODIUM 20 MG
20 TABLET ORAL EVERY EVENING
Qty: 30 TABLET | Refills: 3 | Status: SHIPPED | OUTPATIENT
Start: 2018-01-08 | End: 2018-01-08 | Stop reason: SDUPTHER

## 2018-01-08 RX ORDER — INSULIN GLARGINE 100 [IU]/ML
160 INJECTION, SOLUTION SUBCUTANEOUS NIGHTLY
Qty: 16 PEN | Refills: 3 | Status: SHIPPED | OUTPATIENT
Start: 2018-01-08 | End: 2018-01-08 | Stop reason: SDUPTHER

## 2018-01-08 RX ORDER — GABAPENTIN 600 MG/1
600 TABLET ORAL 3 TIMES DAILY
Qty: 90 TABLET | Refills: 1 | Status: SHIPPED | OUTPATIENT
Start: 2018-01-08 | End: 2018-01-08 | Stop reason: SDUPTHER

## 2018-01-08 RX ORDER — INSULIN GLARGINE 100 [IU]/ML
160 INJECTION, SOLUTION SUBCUTANEOUS NIGHTLY
Qty: 16 PEN | Refills: 3 | Status: SHIPPED | OUTPATIENT
Start: 2018-01-08 | End: 2018-04-02 | Stop reason: SDUPTHER

## 2018-01-08 RX ORDER — DULAGLUTIDE 1.5 MG/.5ML
1.5 INJECTION, SOLUTION SUBCUTANEOUS WEEKLY
Qty: 4 PEN | Refills: 3 | Status: SHIPPED | OUTPATIENT
Start: 2018-01-08 | End: 2018-01-08 | Stop reason: SDUPTHER

## 2018-01-08 RX ORDER — LORATADINE 10 MG/1
10 TABLET ORAL DAILY
Qty: 30 TABLET | Refills: 3 | Status: SHIPPED | OUTPATIENT
Start: 2018-01-08 | End: 2018-07-03 | Stop reason: SDUPTHER

## 2018-01-08 RX ORDER — PEN NEEDLE, DIABETIC 32GX 5/32"
1 NEEDLE, DISPOSABLE MISCELLANEOUS 2 TIMES DAILY
Qty: 100 EACH | Refills: 5 | Status: SHIPPED | OUTPATIENT
Start: 2018-01-08 | End: 2019-01-12 | Stop reason: SDUPTHER

## 2018-01-08 RX ORDER — BLOOD SUGAR DIAGNOSTIC
1 STRIP MISCELLANEOUS 2 TIMES DAILY
Qty: 100 EACH | Refills: 5 | Status: SHIPPED | OUTPATIENT
Start: 2018-01-08

## 2018-01-08 RX ORDER — PRAVASTATIN SODIUM 20 MG
20 TABLET ORAL EVERY EVENING
Qty: 30 TABLET | Refills: 3 | Status: SHIPPED | OUTPATIENT
Start: 2018-01-08 | End: 2018-04-02 | Stop reason: CLARIF

## 2018-01-08 RX ORDER — LORATADINE 10 MG/1
10 TABLET ORAL DAILY
Qty: 30 TABLET | Refills: 3 | Status: SHIPPED | OUTPATIENT
Start: 2018-01-08 | End: 2018-01-08 | Stop reason: SDUPTHER

## 2018-01-08 RX ORDER — BLOOD SUGAR DIAGNOSTIC
1 STRIP MISCELLANEOUS 2 TIMES DAILY
Qty: 100 EACH | Refills: 5 | Status: SHIPPED | OUTPATIENT
Start: 2018-01-08 | End: 2018-01-08 | Stop reason: SDUPTHER

## 2018-01-08 RX ORDER — PEN NEEDLE, DIABETIC 32GX 5/32"
1 NEEDLE, DISPOSABLE MISCELLANEOUS 2 TIMES DAILY
Qty: 100 EACH | Refills: 5 | Status: SHIPPED | OUTPATIENT
Start: 2018-01-08 | End: 2018-01-08 | Stop reason: SDUPTHER

## 2018-01-08 NOTE — PROGRESS NOTES
SUBJECTIVE:  Murali John is a 62 y.o. female that presents with   Chief Complaint   Patient presents with    Diabetes     follow up    Urinary Tract Infection     patient c/o pressure and urgency   . HPI:    She presents for follow up. Her blood sugars have been running higher recently especially during the holidays. Her blood sugars were ranging around 300 daily and she realized that she has not been taking the Trulicity injection. She has resumed this and sugars are better. She has been attempting to get her diet back under control with smaller portion sizes and healthier food choices. She began having some heart fluttering while taking the prescribed Adipex so she stopped the medication. Her blood pressures have been stable. She's been experiencing some epigastric and left flank pain that radiates around to her abdominal and pelvic area. She's been having some dysuria with urgency and frequency, but reports being very bloated like pressure is being applied to her bladder. UA was negative for any urine abnormalities. She is not having any sharp left flank pain that is similar to a kidney stone. She has an increase in reflux symptoms, but has not been taking her Prilosec daily. Her labs showed low platelets and elevated liver function. CT scan of abdomen and pelvis will be ordered to rule out any organomegaly or masses. She continues to have lower extremity pain especially in her feet with neuropathy bilaterally. The neurontin has helped in the past but she stopped this for a short time. She would like to start back on this at bedtime. Her allergies have been stable with Claritin. She denies any shortness of air or chest pain. Addendum 2/9/18: Patient's CT scan of abd/pelvis was denied due to criteria not met.  Due to patient's symptoms of abdominal pain specifically in epigastric area and distention with decreased platelet count and increased liver enzymes places her at high risk for organomegaly with liver or spleen dysfunction. She continues to experience urinary symptoms of dysuria, urgency, and frequency without any signs of infection in urinalysis. She has pelvic pressure and pain that radiates into abdomen. Her symptoms cause concern for mass. Abdominal and pelvic ultrasounds will be ordered to gather further information. Review of Systems   Constitutional: Positive for fatigue and unexpected weight change. Gastrointestinal: Positive for abdominal distention and abdominal pain. Genitourinary: Positive for dysuria, frequency, pelvic pain and urgency. Allergic/Immunologic: Positive for environmental allergies. Neurological: Positive for numbness. Bilateral foot neuropathy. Psychiatric/Behavioral: Positive for dysphoric mood. The patient is nervous/anxious. All other systems reviewed and are negative. OBJECTIVE:  /78 (Site: Left Arm, Position: Sitting, Cuff Size: Large Adult)   Pulse 78   Resp 18   Ht 5' 8\" (1.727 m)   Wt 211 lb (95.7 kg)   SpO2 98% Comment: room air  Breastfeeding? No   BMI 32.08 kg/m²    Physical Exam   Constitutional: She is oriented to person, place, and time. She appears well-developed and well-nourished. HENT:   Head: Normocephalic and atraumatic. Right Ear: External ear normal.   Left Ear: External ear normal.   Nose: Nose normal.   Mouth/Throat: Oropharynx is clear and moist.   Eyes: Conjunctivae are normal. Pupils are equal, round, and reactive to light. Neck: Normal range of motion. Neck supple. Cardiovascular: Normal rate, regular rhythm, normal heart sounds and intact distal pulses. Pulmonary/Chest: Effort normal and breath sounds normal.   Abdominal: Soft. Bowel sounds are normal. She exhibits distension. Musculoskeletal:        Right knee: She exhibits decreased range of motion. Tenderness found. Medial joint line and lateral joint line tenderness noted. Left knee: She exhibits decreased range of motion. Tenderness found. Medial joint line and lateral joint line tenderness noted. Neurological: She is alert and oriented to person, place, and time. Skin: Skin is warm and dry. Psychiatric: Her speech is normal and behavior is normal. Judgment and thought content normal. Her mood appears anxious. Cognition and memory are normal. She exhibits a depressed mood. Nursing note and vitals reviewed. No results found for requested labs within last 30 days. Hemoglobin A1C (%)   Date Value   01/03/2018 7.3 (H)     Microalbumin, Random Urine (mg/dL)   Date Value   07/12/2017 <1.20     LDL Calculated (mg/dL)   Date Value   01/03/2018 see below         Lab Results   Component Value Date    WBC 4.0 01/03/2018    NEUTROABS 2.1 01/03/2018    HGB 13.6 01/03/2018    HCT 41.1 01/03/2018    HCT 42.9 05/27/2012    MCV 88.6 01/03/2018    PLT 52 01/03/2018     05/27/2012       Lab Results   Component Value Date    TSH 2.54 07/12/2017         ASSESSMENT/PLAN:  1. Type 2 diabetes mellitus with complication, with long-term current use of insulin (HCC)  DISCONTINUED: SITagliptin (JANUVIA) 100 MG tablet    DISCONTINUED: LANTUS SOLOSTAR 100 UNIT/ML injection pen    DISCONTINUED: TRULICITY 1.5 KZ/4.6GI SOPN    DISCONTINUED: metFORMIN (GLUCOPHAGE) 1000 MG tablet    DISCONTINUED: ACCU-CHEK ANIA PLUS strip    DISCONTINUED: UNIFINE PENTIPS 32G X 4 MM MISC   2. Essential hypertension     3. Generalized abdominal pain  CT ABDOMEN PELVIS WO CONTRAST Additional Contrast? Radiologist Recommendation    US Abdomen Complete   4. Epigastric pain  CT ABDOMEN PELVIS WO CONTRAST Additional Contrast? Radiologist Recommendation    US Abdomen Complete   5. Left flank pain  CT ABDOMEN PELVIS WO CONTRAST Additional Contrast? Radiologist Recommendation   6. Abdominal distention  CT ABDOMEN PELVIS WO CONTRAST Additional Contrast? Radiologist Recommendation    US Abdomen Complete    US Pelvis Complete   7.  Pelvic pain in female  CT ABDOMEN PELVIS  [DISCONTINUED] metFORMIN (GLUCOPHAGE) 1000 MG tablet Take 1 tablet by mouth 2 times daily (with meals) 60 tablet 3    [DISCONTINUED] SITagliptin (JANUVIA) 100 MG tablet Take 1 tablet by mouth every morning (before breakfast) 30 tablet 3    [DISCONTINUED] ACCU-CHEK ANIA PLUS strip Inject 1 each into the skin 2 times daily 100 each 5    [DISCONTINUED] insulin lispro (HUMALOG KWIKPEN) 100 UNIT/ML pen Administer 3 times daily as needed based on sliding scale. Do not exceed 40 units in 24 hours. 5 Pen 3    [DISCONTINUED] vitamin D (ERGOCALCIFEROL) 56721 units CAPS capsule Take 1 capsule by mouth once a week 4 capsule 2    omeprazole (PRILOSEC) 20 MG delayed release capsule Take 1 capsule by mouth daily (with breakfast) 30 capsule 3    [DISCONTINUED] L-Methylfolate-B6-B12 (FOLTANX) 3-35-2 MG TABS Take 1 tablet by mouth daily 30 tablet 5    [DISCONTINUED] UNIFINE PENTIPS 32G X 4 MM MISC Inject 1 each as directed 2 times daily 100 each 5     No facility-administered encounter medications on file as of 1/8/2018. Return in about 3 months (around 4/8/2018), or if symptoms worsen or fail to improve. PATIENT COUNSELING    Counseling was provided today regarding the following topics: Healthy eating habits, Regular exercise, substance abuse and healthy sleep habits. Discussed use, benefit, and side effects of prescribed medications. Barriers to medication compliance addressed. All patient questions answered. Pt voiced understanding.

## 2018-01-24 ENCOUNTER — TELEPHONE (OUTPATIENT)
Dept: FAMILY MEDICINE CLINIC | Age: 58
End: 2018-01-24

## 2018-01-30 PROBLEM — M79.605 PAIN IN BOTH LOWER EXTREMITIES: Status: ACTIVE | Noted: 2018-01-30

## 2018-01-30 PROBLEM — M79.604 PAIN IN BOTH LOWER EXTREMITIES: Status: ACTIVE | Noted: 2018-01-30

## 2018-01-30 ASSESSMENT — ENCOUNTER SYMPTOMS
ABDOMINAL PAIN: 1
ABDOMINAL DISTENTION: 1

## 2018-02-09 ENCOUNTER — TELEPHONE (OUTPATIENT)
Dept: FAMILY MEDICINE CLINIC | Age: 58
End: 2018-02-09

## 2018-02-23 DIAGNOSIS — R10.84 GENERALIZED ABDOMINAL PAIN: ICD-10-CM

## 2018-02-23 DIAGNOSIS — R30.0 DYSURIA: ICD-10-CM

## 2018-02-23 DIAGNOSIS — R14.0 ABDOMINAL DISTENTION: ICD-10-CM

## 2018-02-23 DIAGNOSIS — R74.8 ELEVATED LIVER ENZYMES: ICD-10-CM

## 2018-02-23 DIAGNOSIS — R10.13 EPIGASTRIC PAIN: ICD-10-CM

## 2018-02-23 DIAGNOSIS — R10.2 PELVIC PAIN IN FEMALE: ICD-10-CM

## 2018-02-23 DIAGNOSIS — D69.6 THROMBOCYTOPENIA (HCC): ICD-10-CM

## 2018-02-23 DIAGNOSIS — R39.9 URINARY TRACT INFECTION SYMPTOMS: ICD-10-CM

## 2018-02-27 ENCOUNTER — HOSPITAL ENCOUNTER (OUTPATIENT)
Dept: OTHER | Age: 58
Discharge: HOME | End: 2018-02-27
Attending: NURSE PRACTITIONER | Admitting: NURSE PRACTITIONER

## 2018-02-27 ENCOUNTER — OFFICE VISIT (OUTPATIENT)
Dept: FAMILY MEDICINE CLINIC | Age: 58
End: 2018-02-27
Payer: COMMERCIAL

## 2018-02-27 VITALS
HEIGHT: 69 IN | OXYGEN SATURATION: 98 % | BODY MASS INDEX: 30.51 KG/M2 | SYSTOLIC BLOOD PRESSURE: 132 MMHG | DIASTOLIC BLOOD PRESSURE: 80 MMHG | RESPIRATION RATE: 18 BRPM | HEART RATE: 83 BPM | WEIGHT: 206 LBS

## 2018-02-27 DIAGNOSIS — D69.6 THROMBOCYTOPENIA (HCC): ICD-10-CM

## 2018-02-27 DIAGNOSIS — R10.84 GENERALIZED ABDOMINAL PAIN: ICD-10-CM

## 2018-02-27 DIAGNOSIS — Z79.4 TYPE 2 DIABETES MELLITUS WITH COMPLICATION, WITH LONG-TERM CURRENT USE OF INSULIN (HCC): ICD-10-CM

## 2018-02-27 DIAGNOSIS — E11.8 TYPE 2 DIABETES MELLITUS WITH COMPLICATION, WITH LONG-TERM CURRENT USE OF INSULIN (HCC): ICD-10-CM

## 2018-02-27 DIAGNOSIS — L29.9 PRURITUS: ICD-10-CM

## 2018-02-27 DIAGNOSIS — R16.1 SPLEEN ENLARGED: ICD-10-CM

## 2018-02-27 DIAGNOSIS — R14.0 ABDOMINAL DISTENSION: ICD-10-CM

## 2018-02-27 DIAGNOSIS — I87.9 DISORDER OF HEPATIC PORTAL VEIN: ICD-10-CM

## 2018-02-27 DIAGNOSIS — R93.5 ABNORMAL US (ULTRASOUND) OF ABDOMEN: ICD-10-CM

## 2018-02-27 DIAGNOSIS — R74.8 ELEVATED LIVER ENZYMES: ICD-10-CM

## 2018-02-27 DIAGNOSIS — R21 SKIN RASH: Primary | ICD-10-CM

## 2018-02-27 DIAGNOSIS — R16.0 ENLARGED LIVER: ICD-10-CM

## 2018-02-27 DIAGNOSIS — R19.7 DIARRHEA, UNSPECIFIED TYPE: ICD-10-CM

## 2018-02-27 DIAGNOSIS — D69.9 BLEEDS EASILY (HCC): ICD-10-CM

## 2018-02-27 PROCEDURE — 96372 THER/PROPH/DIAG INJ SC/IM: CPT | Performed by: NURSE PRACTITIONER

## 2018-02-27 PROCEDURE — 99214 OFFICE O/P EST MOD 30 MIN: CPT | Performed by: NURSE PRACTITIONER

## 2018-02-27 RX ORDER — TRIAMCINOLONE ACETONIDE 0.25 MG/G
CREAM TOPICAL
Qty: 80 G | Refills: 3 | Status: SHIPPED | OUTPATIENT
Start: 2018-02-27 | End: 2018-04-02 | Stop reason: ALTCHOICE

## 2018-02-27 RX ORDER — METHYLPREDNISOLONE SODIUM SUCCINATE 125 MG/2ML
125 INJECTION, POWDER, LYOPHILIZED, FOR SOLUTION INTRAMUSCULAR; INTRAVENOUS ONCE
Status: COMPLETED | OUTPATIENT
Start: 2018-02-27 | End: 2018-02-27

## 2018-02-27 RX ORDER — TOBRAMYCIN AND DEXAMETHASONE 3; 1 MG/ML; MG/ML
SUSPENSION/ DROPS OPHTHALMIC
COMMUNITY
Start: 2018-02-12 | End: 2018-04-24 | Stop reason: ALTCHOICE

## 2018-02-27 RX ORDER — LEVOCETIRIZINE DIHYDROCHLORIDE 5 MG/1
5 TABLET, FILM COATED ORAL NIGHTLY
Qty: 30 TABLET | Refills: 5 | Status: SHIPPED | OUTPATIENT
Start: 2018-02-27 | End: 2019-02-26 | Stop reason: SDUPTHER

## 2018-02-27 RX ADMIN — METHYLPREDNISOLONE SODIUM SUCCINATE 125 MG: 125 INJECTION, POWDER, LYOPHILIZED, FOR SOLUTION INTRAMUSCULAR; INTRAVENOUS at 15:01

## 2018-02-27 ASSESSMENT — ENCOUNTER SYMPTOMS
DIARRHEA: 1
ABDOMINAL DISTENTION: 1
ABDOMINAL PAIN: 1

## 2018-02-27 NOTE — PROGRESS NOTES
generalized tenderness. Musculoskeletal:        Right knee: She exhibits decreased range of motion. Tenderness found. Medial joint line and lateral joint line tenderness noted. Left knee: She exhibits decreased range of motion. Tenderness found. Medial joint line and lateral joint line tenderness noted. Neurological: She is alert and oriented to person, place, and time. Skin: Skin is warm and dry. Rash noted. Rash is urticarial.   Pruritus with urticarial rash. Psychiatric: Her speech is normal and behavior is normal. Judgment and thought content normal. Her mood appears anxious. Cognition and memory are normal. She exhibits a depressed mood. Nursing note and vitals reviewed. No results found for requested labs within last 30 days. Hemoglobin A1C (%)   Date Value   01/03/2018 7.3 (H)     Microalbumin, Random Urine (mg/dL)   Date Value   07/12/2017 <1.20     LDL Calculated (mg/dL)   Date Value   01/03/2018 see below         Lab Results   Component Value Date    WBC 4.3 02/27/2018    NEUTROABS 2.2 02/27/2018    HGB 14.2 02/27/2018    HCT 43.2 02/27/2018    HCT 42.9 05/27/2012    MCV 88.7 02/27/2018    PLT 65 02/27/2018     05/27/2012       Lab Results   Component Value Date    TSH 2.54 07/12/2017         ASSESSMENT/PLAN:  1. Skin rash  levocetirizine (XYZAL) 5 MG tablet    triamcinolone (KENALOG) 0.1 % ointment    triamcinolone (KENALOG) 0.025 % cream    methylPREDNISolone sodium (SOLU-MEDROL) injection 125 mg    HEPATITIS PANEL, ACUTE   2. Generalized abdominal pain  LIPASE    AMYLASE   3. Type 2 diabetes mellitus with complication, with long-term current use of insulin (HCC)  CBC WITH AUTO DIFFERENTIAL    COMPREHENSIVE METABOLIC PANEL   4. Pruritus  levocetirizine (XYZAL) 5 MG tablet    triamcinolone (KENALOG) 0.1 % ointment    triamcinolone (KENALOG) 0.025 % cream    methylPREDNISolone sodium (SOLU-MEDROL) injection 125 mg   5.  Thrombocytopenia (HCC)  CBC WITH AUTO DIFFERENTIAL

## 2018-02-28 ENCOUNTER — HOSPITAL ENCOUNTER (OUTPATIENT)
Dept: OTHER | Age: 58
Discharge: HOME | End: 2018-02-28
Attending: NURSE PRACTITIONER | Admitting: NURSE PRACTITIONER

## 2018-02-28 DIAGNOSIS — R21 SKIN RASH: ICD-10-CM

## 2018-02-28 DIAGNOSIS — I87.9 DISORDER OF HEPATIC PORTAL VEIN: ICD-10-CM

## 2018-02-28 DIAGNOSIS — D69.9 BLEEDS EASILY (HCC): ICD-10-CM

## 2018-02-28 DIAGNOSIS — R16.0 ENLARGED LIVER: ICD-10-CM

## 2018-02-28 DIAGNOSIS — R74.8 ELEVATED LIVER ENZYMES: ICD-10-CM

## 2018-02-28 DIAGNOSIS — D69.6 THROMBOCYTOPENIA (HCC): ICD-10-CM

## 2018-02-28 DIAGNOSIS — R10.84 GENERALIZED ABDOMINAL PAIN: ICD-10-CM

## 2018-02-28 DIAGNOSIS — R16.1 SPLEEN ENLARGED: ICD-10-CM

## 2018-02-28 DIAGNOSIS — R14.0 ABDOMINAL DISTENSION: ICD-10-CM

## 2018-02-28 LAB
A/G RATIO: 1.7 (ref 0.8–2)
ALBUMIN SERPL-MCNC: 4.8 G/DL (ref 3.4–4.8)
ALP BLD-CCNC: 78 U/L (ref 25–100)
ALT SERPL-CCNC: 53 U/L (ref 4–36)
AMYLASE: 40 U/L (ref 20–104)
ANION GAP SERPL CALCULATED.3IONS-SCNC: 14 MMOL/L (ref 3–16)
APTT: 24.2 SEC (ref 23–28.6)
AST SERPL-CCNC: 49 U/L (ref 8–33)
BASOPHILS ABSOLUTE: 0.1 K/UL (ref 0–0.1)
BASOPHILS RELATIVE PERCENT: 1.6 %
BILIRUB SERPL-MCNC: 0.8 MG/DL (ref 0.3–1.2)
BUN BLDV-MCNC: 13 MG/DL (ref 6–20)
CALCIUM SERPL-MCNC: 9.6 MG/DL (ref 8.5–10.5)
CHLORIDE BLD-SCNC: 102 MMOL/L (ref 98–107)
CO2: 26 MMOL/L (ref 20–30)
CREAT SERPL-MCNC: 1 MG/DL (ref 0.4–1.2)
EOSINOPHILS ABSOLUTE: 0.2 K/UL (ref 0–0.4)
EOSINOPHILS RELATIVE PERCENT: 5.6 %
GFR AFRICAN AMERICAN: >59
GFR NON-AFRICAN AMERICAN: 57
GLOBULIN: 2.8 G/DL
GLUCOSE BLD-MCNC: 153 MG/DL (ref 74–106)
HAV IGM SER IA-ACNC: NORMAL
HCT VFR BLD CALC: 43.2 % (ref 37–47)
HEMOGLOBIN: 14.2 G/DL (ref 11.5–16.5)
HEPATITIS B CORE IGM ANTIBODY: NORMAL
HEPATITIS B SURFACE ANTIGEN INTERPRETATION: NORMAL
HEPATITIS C ANTIBODY INTERPRETATION: NORMAL
IMMATURE GRANULOCYTES #: 0 K/UL
IMMATURE GRANULOCYTES %: 0.5 % (ref 0–5)
INR BLD: 1.1 (ref 0.86–1.14)
LIPASE: 66 U/L (ref 5.6–51.3)
LYMPHOCYTES ABSOLUTE: 1.3 K/UL (ref 1.5–4)
LYMPHOCYTES RELATIVE PERCENT: 31.2 %
MCH RBC QN AUTO: 29.2 PG (ref 27–32)
MCHC RBC AUTO-ENTMCNC: 32.9 G/DL (ref 31–35)
MCV RBC AUTO: 88.7 FL (ref 80–100)
MONOCYTES ABSOLUTE: 0.4 K/UL (ref 0.2–0.8)
MONOCYTES RELATIVE PERCENT: 10 %
NEUTROPHILS ABSOLUTE: 2.2 K/UL (ref 2–7.5)
NEUTROPHILS RELATIVE PERCENT: 51.1 %
PDW BLD-RTO: 14.6 % (ref 11–16)
PLATELET # BLD: 65 K/UL (ref 150–400)
PMV BLD AUTO: 13.5 FL (ref 6–10)
POTASSIUM SERPL-SCNC: 4.1 MMOL/L (ref 3.4–5.1)
PROTHROMBIN TIME: 11.5 SEC (ref 9.5–10.9)
RBC # BLD: 4.87 M/UL (ref 3.8–5.8)
SODIUM BLD-SCNC: 142 MMOL/L (ref 136–145)
TOTAL PROTEIN: 7.6 G/DL (ref 6.4–8.3)
WBC # BLD: 4.3 K/UL (ref 4–11)

## 2018-03-05 RX ORDER — PREDNISONE 10 MG/1
TABLET ORAL
Qty: 48 TABLET | Refills: 0 | OUTPATIENT
Start: 2018-03-05 | End: 2018-03-15

## 2018-03-07 DIAGNOSIS — D69.6 THROMBOCYTOPENIA (HCC): ICD-10-CM

## 2018-03-07 DIAGNOSIS — R16.1 SPLENOMEGALY: Primary | ICD-10-CM

## 2018-03-07 DIAGNOSIS — R10.84 GENERALIZED ABDOMINAL PAIN: ICD-10-CM

## 2018-03-07 DIAGNOSIS — R93.5 ABNORMAL US (ULTRASOUND) OF ABDOMEN: ICD-10-CM

## 2018-03-07 DIAGNOSIS — R16.0 ENLARGED LIVER: ICD-10-CM

## 2018-03-07 DIAGNOSIS — R14.0 ABDOMINAL DISTENSION: ICD-10-CM

## 2018-04-02 ENCOUNTER — OFFICE VISIT (OUTPATIENT)
Dept: FAMILY MEDICINE CLINIC | Age: 58
End: 2018-04-02
Payer: COMMERCIAL

## 2018-04-02 ENCOUNTER — HOSPITAL ENCOUNTER (OUTPATIENT)
Dept: OTHER | Age: 58
Discharge: HOME | End: 2018-04-02
Attending: NURSE PRACTITIONER | Admitting: NURSE PRACTITIONER

## 2018-04-02 VITALS
HEIGHT: 69 IN | BODY MASS INDEX: 29.33 KG/M2 | DIASTOLIC BLOOD PRESSURE: 64 MMHG | WEIGHT: 198 LBS | HEART RATE: 78 BPM | SYSTOLIC BLOOD PRESSURE: 108 MMHG | OXYGEN SATURATION: 98 % | RESPIRATION RATE: 18 BRPM

## 2018-04-02 DIAGNOSIS — Z09 HOSPITAL DISCHARGE FOLLOW-UP: ICD-10-CM

## 2018-04-02 DIAGNOSIS — R16.0 ENLARGED LIVER: ICD-10-CM

## 2018-04-02 DIAGNOSIS — Z79.4 TYPE 2 DIABETES MELLITUS WITH COMPLICATION, WITH LONG-TERM CURRENT USE OF INSULIN (HCC): ICD-10-CM

## 2018-04-02 DIAGNOSIS — L29.9 SKIN PRURITUS: ICD-10-CM

## 2018-04-02 DIAGNOSIS — E11.8 TYPE 2 DIABETES MELLITUS WITH COMPLICATION, WITH LONG-TERM CURRENT USE OF INSULIN (HCC): ICD-10-CM

## 2018-04-02 DIAGNOSIS — R16.1 SPLEEN ENLARGED: ICD-10-CM

## 2018-04-02 DIAGNOSIS — I21.4 NSTEMI (NON-ST ELEVATED MYOCARDIAL INFARCTION) (HCC): ICD-10-CM

## 2018-04-02 DIAGNOSIS — D69.6 THROMBOCYTOPENIA (HCC): ICD-10-CM

## 2018-04-02 DIAGNOSIS — I10 ESSENTIAL HYPERTENSION: ICD-10-CM

## 2018-04-02 DIAGNOSIS — I21.4 NSTEMI (NON-ST ELEVATED MYOCARDIAL INFARCTION) (HCC): Primary | ICD-10-CM

## 2018-04-02 LAB
A/G RATIO: 1.6 (ref 0.8–2)
ALBUMIN SERPL-MCNC: 4.6 G/DL (ref 3.4–4.8)
ALP BLD-CCNC: 75 U/L (ref 25–100)
ALT SERPL-CCNC: 40 U/L (ref 4–36)
ANION GAP SERPL CALCULATED.3IONS-SCNC: 15 MMOL/L (ref 3–16)
AST SERPL-CCNC: 43 U/L (ref 8–33)
BASOPHILS ABSOLUTE: 0.1 K/UL (ref 0–0.1)
BASOPHILS RELATIVE PERCENT: 1.2 %
BILIRUB SERPL-MCNC: 1.1 MG/DL (ref 0.3–1.2)
BUN BLDV-MCNC: 21 MG/DL (ref 6–20)
CALCIUM SERPL-MCNC: 9.7 MG/DL (ref 8.5–10.5)
CHLORIDE BLD-SCNC: 99 MMOL/L (ref 98–107)
CO2: 25 MMOL/L (ref 20–30)
CREAT SERPL-MCNC: 1.3 MG/DL (ref 0.4–1.2)
EOSINOPHILS ABSOLUTE: 0.2 K/UL (ref 0–0.4)
EOSINOPHILS RELATIVE PERCENT: 3.5 %
GFR AFRICAN AMERICAN: 51
GFR NON-AFRICAN AMERICAN: 42
GLOBULIN: 2.8 G/DL
GLUCOSE BLD-MCNC: 188 MG/DL (ref 74–106)
HBA1C MFR BLD: 7.9 %
HCT VFR BLD CALC: 39.1 % (ref 37–47)
HEMOGLOBIN: 12.5 G/DL (ref 11.5–16.5)
IMMATURE GRANULOCYTES #: 0 K/UL
IMMATURE GRANULOCYTES %: 0.4 % (ref 0–5)
LYMPHOCYTES ABSOLUTE: 1.4 K/UL (ref 1.5–4)
LYMPHOCYTES RELATIVE PERCENT: 28.4 %
MCH RBC QN AUTO: 28.8 PG (ref 27–32)
MCHC RBC AUTO-ENTMCNC: 32 G/DL (ref 31–35)
MCV RBC AUTO: 90.1 FL (ref 80–100)
MONOCYTES ABSOLUTE: 0.6 K/UL (ref 0.2–0.8)
MONOCYTES RELATIVE PERCENT: 12.1 %
NEUTROPHILS ABSOLUTE: 2.6 K/UL (ref 2–7.5)
NEUTROPHILS RELATIVE PERCENT: 54.4 %
PDW BLD-RTO: 14.1 % (ref 11–16)
PLATELET # BLD: 68 K/UL (ref 150–400)
PMV BLD AUTO: 12.7 FL (ref 6–10)
POTASSIUM SERPL-SCNC: 4.1 MMOL/L (ref 3.4–5.1)
RBC # BLD: 4.34 M/UL (ref 3.8–5.8)
SODIUM BLD-SCNC: 139 MMOL/L (ref 136–145)
TOTAL PROTEIN: 7.4 G/DL (ref 6.4–8.3)
WBC # BLD: 4.9 K/UL (ref 4–11)

## 2018-04-02 PROCEDURE — 99214 OFFICE O/P EST MOD 30 MIN: CPT | Performed by: NURSE PRACTITIONER

## 2018-04-02 RX ORDER — LOSARTAN POTASSIUM 25 MG/1
25 TABLET ORAL DAILY
Qty: 30 TABLET | Refills: 3 | Status: SHIPPED | OUTPATIENT
Start: 2018-04-02 | End: 2018-07-03 | Stop reason: SDUPTHER

## 2018-04-02 RX ORDER — INSULIN GLARGINE 100 [IU]/ML
160 INJECTION, SOLUTION SUBCUTANEOUS NIGHTLY
Qty: 16 PEN | Refills: 3 | Status: SHIPPED | OUTPATIENT
Start: 2018-04-02 | End: 2018-07-03 | Stop reason: SDUPTHER

## 2018-04-02 RX ORDER — CLOBETASOL PROPIONATE 0.5 MG/G
CREAM TOPICAL
Qty: 60 G | Refills: 5 | Status: SHIPPED | OUTPATIENT
Start: 2018-04-02 | End: 2019-07-09

## 2018-04-02 RX ORDER — CLOPIDOGREL BISULFATE 75 MG/1
75 TABLET ORAL DAILY
Qty: 30 TABLET | Refills: 3 | Status: SHIPPED | OUTPATIENT
Start: 2018-04-02 | End: 2018-07-03 | Stop reason: SDUPTHER

## 2018-04-02 RX ORDER — CLOBETASOL PROPIONATE 0.05 G/100ML
SHAMPOO TOPICAL
Qty: 118 ML | Refills: 5 | Status: SHIPPED | OUTPATIENT
Start: 2018-04-02 | End: 2018-04-03 | Stop reason: SDUPTHER

## 2018-04-02 RX ORDER — DULAGLUTIDE 1.5 MG/.5ML
1.5 INJECTION, SOLUTION SUBCUTANEOUS WEEKLY
Qty: 4 PEN | Refills: 3 | Status: SHIPPED | OUTPATIENT
Start: 2018-04-02 | End: 2018-07-03 | Stop reason: SDUPTHER

## 2018-04-02 RX ORDER — NITROGLYCERIN 0.3 MG/1
0.3 TABLET SUBLINGUAL EVERY 5 MIN PRN
COMMUNITY
End: 2019-01-30 | Stop reason: SDUPTHER

## 2018-04-02 RX ORDER — METOPROLOL SUCCINATE 25 MG/1
25 TABLET, EXTENDED RELEASE ORAL 2 TIMES DAILY
COMMUNITY
End: 2018-04-02 | Stop reason: SDUPTHER

## 2018-04-02 RX ORDER — CLOPIDOGREL BISULFATE 75 MG/1
1 TABLET ORAL DAILY
COMMUNITY
Start: 2018-03-22 | End: 2018-04-02 | Stop reason: SDUPTHER

## 2018-04-02 RX ORDER — METOPROLOL SUCCINATE 25 MG/1
25 TABLET, EXTENDED RELEASE ORAL 2 TIMES DAILY
Qty: 30 TABLET | Refills: 3 | Status: SHIPPED | OUTPATIENT
Start: 2018-04-02 | End: 2018-07-03 | Stop reason: SDUPTHER

## 2018-04-02 RX ORDER — ATORVASTATIN CALCIUM 80 MG/1
80 TABLET, FILM COATED ORAL NIGHTLY
Qty: 30 TABLET | Refills: 3 | Status: SHIPPED | OUTPATIENT
Start: 2018-04-02 | End: 2018-07-03 | Stop reason: SDUPTHER

## 2018-04-02 RX ORDER — ATORVASTATIN CALCIUM 80 MG/1
80 TABLET, FILM COATED ORAL NIGHTLY
COMMUNITY
End: 2018-04-02 | Stop reason: SDUPTHER

## 2018-04-03 RX ORDER — CLOBETASOL PROPIONATE 0.05 G/100ML
SHAMPOO TOPICAL
Qty: 118 ML | Refills: 5 | Status: SHIPPED | OUTPATIENT
Start: 2018-04-03 | End: 2019-07-09

## 2018-04-08 ASSESSMENT — ENCOUNTER SYMPTOMS
ABDOMINAL DISTENTION: 1
DIARRHEA: 1
ABDOMINAL PAIN: 1

## 2018-04-16 ENCOUNTER — OFFICE VISIT (OUTPATIENT)
Dept: FAMILY MEDICINE CLINIC | Age: 58
End: 2018-04-16
Payer: COMMERCIAL

## 2018-04-16 ENCOUNTER — HOSPITAL ENCOUNTER (OUTPATIENT)
Dept: OTHER | Age: 58
Discharge: HOME | End: 2018-04-16
Attending: NURSE PRACTITIONER | Admitting: NURSE PRACTITIONER

## 2018-04-16 VITALS
BODY MASS INDEX: 29.33 KG/M2 | DIASTOLIC BLOOD PRESSURE: 74 MMHG | WEIGHT: 198 LBS | SYSTOLIC BLOOD PRESSURE: 110 MMHG | RESPIRATION RATE: 18 BRPM | HEIGHT: 69 IN | HEART RATE: 79 BPM | OXYGEN SATURATION: 99 %

## 2018-04-16 DIAGNOSIS — D69.6 THROMBOCYTOPENIA (HCC): ICD-10-CM

## 2018-04-16 DIAGNOSIS — Z79.4 TYPE 2 DIABETES MELLITUS WITH COMPLICATION, WITH LONG-TERM CURRENT USE OF INSULIN (HCC): Primary | ICD-10-CM

## 2018-04-16 DIAGNOSIS — R16.0 ENLARGED LIVER: ICD-10-CM

## 2018-04-16 DIAGNOSIS — R53.82 CHRONIC FATIGUE: ICD-10-CM

## 2018-04-16 DIAGNOSIS — R23.3 ABNORMAL BRUISING: ICD-10-CM

## 2018-04-16 DIAGNOSIS — I10 ESSENTIAL HYPERTENSION: ICD-10-CM

## 2018-04-16 DIAGNOSIS — R31.9 HEMATURIA, UNSPECIFIED TYPE: ICD-10-CM

## 2018-04-16 DIAGNOSIS — R16.1 SPLEEN ENLARGED: ICD-10-CM

## 2018-04-16 DIAGNOSIS — F32.A DEPRESSION, UNSPECIFIED DEPRESSION TYPE: ICD-10-CM

## 2018-04-16 DIAGNOSIS — N20.0 KIDNEY STONE: ICD-10-CM

## 2018-04-16 DIAGNOSIS — D69.9 BLEEDS EASILY (HCC): ICD-10-CM

## 2018-04-16 DIAGNOSIS — E11.8 TYPE 2 DIABETES MELLITUS WITH COMPLICATION, WITH LONG-TERM CURRENT USE OF INSULIN (HCC): Primary | ICD-10-CM

## 2018-04-16 PROCEDURE — 99214 OFFICE O/P EST MOD 30 MIN: CPT | Performed by: NURSE PRACTITIONER

## 2018-04-16 RX ORDER — TAMSULOSIN HYDROCHLORIDE 0.4 MG/1
0.4 CAPSULE ORAL DAILY
Qty: 30 CAPSULE | Refills: 3 | Status: SHIPPED | OUTPATIENT
Start: 2018-04-16 | End: 2018-07-03 | Stop reason: SDUPTHER

## 2018-04-16 RX ORDER — ICOSAPENT ETHYL 1000 MG/1
2 CAPSULE ORAL DAILY
COMMUNITY
End: 2018-08-27 | Stop reason: ALTCHOICE

## 2018-04-16 RX ORDER — PROMETHAZINE HYDROCHLORIDE 25 MG/1
25 TABLET ORAL EVERY 6 HOURS PRN
Qty: 30 TABLET | Refills: 5 | Status: SHIPPED | OUTPATIENT
Start: 2018-04-16 | End: 2018-04-23

## 2018-04-16 RX ORDER — FLUCONAZOLE 150 MG/1
TABLET ORAL
Qty: 2 TABLET | Refills: 5 | Status: SHIPPED | OUTPATIENT
Start: 2018-04-16 | End: 2018-08-27 | Stop reason: ALTCHOICE

## 2018-04-16 RX ORDER — OXYCODONE HYDROCHLORIDE 5 MG/1
5 TABLET ORAL EVERY 4 HOURS PRN
Qty: 18 TABLET | Refills: 0 | Status: SHIPPED | OUTPATIENT
Start: 2018-04-16 | End: 2018-04-19

## 2018-04-17 LAB
A/G RATIO: 1.5 (ref 0.8–2)
ALBUMIN SERPL-MCNC: 4.3 G/DL (ref 3.4–4.8)
ALP BLD-CCNC: 72 U/L (ref 25–100)
ALT SERPL-CCNC: 40 U/L (ref 4–36)
ANION GAP SERPL CALCULATED.3IONS-SCNC: 12 MMOL/L (ref 3–16)
AST SERPL-CCNC: 34 U/L (ref 8–33)
BASOPHILS ABSOLUTE: 0.1 K/UL (ref 0–0.1)
BASOPHILS RELATIVE PERCENT: 1.5 %
BILIRUB SERPL-MCNC: 1 MG/DL (ref 0.3–1.2)
BUN BLDV-MCNC: 15 MG/DL (ref 6–20)
CALCIUM SERPL-MCNC: 9.9 MG/DL (ref 8.5–10.5)
CHLORIDE BLD-SCNC: 102 MMOL/L (ref 98–107)
CO2: 27 MMOL/L (ref 20–30)
CREAT SERPL-MCNC: 1 MG/DL (ref 0.4–1.2)
EOSINOPHILS ABSOLUTE: 0.3 K/UL (ref 0–0.4)
EOSINOPHILS RELATIVE PERCENT: 7.6 %
GFR AFRICAN AMERICAN: >59
GFR NON-AFRICAN AMERICAN: 57
GLOBULIN: 2.9 G/DL
GLUCOSE BLD-MCNC: 177 MG/DL (ref 74–106)
HCT VFR BLD CALC: 37 % (ref 37–47)
HEMOGLOBIN: 11.5 G/DL (ref 11.5–16.5)
IMMATURE GRANULOCYTES #: 0 K/UL
IMMATURE GRANULOCYTES %: 0.3 % (ref 0–5)
LYMPHOCYTES ABSOLUTE: 0.9 K/UL (ref 1.5–4)
LYMPHOCYTES RELATIVE PERCENT: 27.5 %
MCH RBC QN AUTO: 28.8 PG (ref 27–32)
MCHC RBC AUTO-ENTMCNC: 31.1 G/DL (ref 31–35)
MCV RBC AUTO: 92.7 FL (ref 80–100)
MONOCYTES ABSOLUTE: 0.3 K/UL (ref 0.2–0.8)
MONOCYTES RELATIVE PERCENT: 8.9 %
NEUTROPHILS ABSOLUTE: 1.8 K/UL (ref 2–7.5)
NEUTROPHILS RELATIVE PERCENT: 54.2 %
PDW BLD-RTO: 14.2 % (ref 11–16)
PLATELET # BLD: 54 K/UL (ref 150–400)
PMV BLD AUTO: 12.9 FL (ref 6–10)
POTASSIUM SERPL-SCNC: 4.1 MMOL/L (ref 3.4–5.1)
RBC # BLD: 3.99 M/UL (ref 3.8–5.8)
SODIUM BLD-SCNC: 141 MMOL/L (ref 136–145)
TOTAL PROTEIN: 7.2 G/DL (ref 6.4–8.3)
WBC # BLD: 3.3 K/UL (ref 4–11)

## 2018-04-19 ENCOUNTER — TELEPHONE (OUTPATIENT)
Dept: FAMILY MEDICINE CLINIC | Age: 58
End: 2018-04-19

## 2018-04-24 ENCOUNTER — OFFICE VISIT (OUTPATIENT)
Dept: FAMILY MEDICINE CLINIC | Age: 58
End: 2018-04-24
Payer: COMMERCIAL

## 2018-04-24 ENCOUNTER — HOSPITAL ENCOUNTER (OUTPATIENT)
Dept: OTHER | Age: 58
Discharge: HOME | End: 2018-04-24
Attending: NURSE PRACTITIONER | Admitting: NURSE PRACTITIONER

## 2018-04-24 ENCOUNTER — TELEPHONE (OUTPATIENT)
Dept: FAMILY MEDICINE CLINIC | Age: 58
End: 2018-04-24

## 2018-04-24 VITALS
HEIGHT: 69 IN | HEART RATE: 86 BPM | SYSTOLIC BLOOD PRESSURE: 120 MMHG | BODY MASS INDEX: 29.33 KG/M2 | OXYGEN SATURATION: 97 % | DIASTOLIC BLOOD PRESSURE: 54 MMHG | WEIGHT: 198 LBS | RESPIRATION RATE: 18 BRPM

## 2018-04-24 DIAGNOSIS — R19.7 DIARRHEA, UNSPECIFIED TYPE: ICD-10-CM

## 2018-04-24 DIAGNOSIS — K21.9 GASTROESOPHAGEAL REFLUX DISEASE WITHOUT ESOPHAGITIS: ICD-10-CM

## 2018-04-24 DIAGNOSIS — K92.1 BLOODY STOOLS: Primary | ICD-10-CM

## 2018-04-24 DIAGNOSIS — Z87.11 HISTORY OF GASTRIC ULCER: ICD-10-CM

## 2018-04-24 DIAGNOSIS — R11.0 NAUSEA: ICD-10-CM

## 2018-04-24 DIAGNOSIS — R16.0 ENLARGED LIVER: ICD-10-CM

## 2018-04-24 DIAGNOSIS — R16.1 SPLEEN ENLARGED: ICD-10-CM

## 2018-04-24 DIAGNOSIS — R10.9 ABDOMINAL CRAMPING: ICD-10-CM

## 2018-04-24 DIAGNOSIS — E53.8 VITAMIN B12 DEFICIENCY: ICD-10-CM

## 2018-04-24 DIAGNOSIS — D69.6 THROMBOCYTOPENIA (HCC): ICD-10-CM

## 2018-04-24 DIAGNOSIS — K92.1 BLOODY STOOLS: ICD-10-CM

## 2018-04-24 DIAGNOSIS — L29.9 PRURITUS: ICD-10-CM

## 2018-04-24 LAB
A/G RATIO: 1.5 (ref 0.8–2)
ALBUMIN SERPL-MCNC: 4.3 G/DL (ref 3.4–4.8)
ALP BLD-CCNC: 77 U/L (ref 25–100)
ALT SERPL-CCNC: 33 U/L (ref 4–36)
ANION GAP SERPL CALCULATED.3IONS-SCNC: 13 MMOL/L (ref 3–16)
AST SERPL-CCNC: 34 U/L (ref 8–33)
BASOPHILS ABSOLUTE: 0.1 K/UL (ref 0–0.1)
BASOPHILS RELATIVE PERCENT: 2 %
BILIRUB SERPL-MCNC: 1 MG/DL (ref 0.3–1.2)
BUN BLDV-MCNC: 15 MG/DL (ref 6–20)
CALCIUM SERPL-MCNC: 9.4 MG/DL (ref 8.5–10.5)
CHLORIDE BLD-SCNC: 103 MMOL/L (ref 98–107)
CO2: 25 MMOL/L (ref 20–30)
CREAT SERPL-MCNC: 1.1 MG/DL (ref 0.4–1.2)
EOSINOPHILS ABSOLUTE: 0.3 K/UL (ref 0–0.4)
EOSINOPHILS RELATIVE PERCENT: 9.2 %
GFR AFRICAN AMERICAN: >59
GFR NON-AFRICAN AMERICAN: 51
GLOBULIN: 2.9 G/DL
GLUCOSE BLD-MCNC: 190 MG/DL (ref 74–106)
HCT VFR BLD CALC: 37.6 % (ref 37–47)
HEMOGLOBIN: 11.7 G/DL (ref 11.5–16.5)
IMMATURE GRANULOCYTES #: 0 K/UL
IMMATURE GRANULOCYTES %: 0.3 % (ref 0–5)
LYMPHOCYTES ABSOLUTE: 0.9 K/UL (ref 1.5–4)
LYMPHOCYTES RELATIVE PERCENT: 25.8 %
MCH RBC QN AUTO: 28.7 PG (ref 27–32)
MCHC RBC AUTO-ENTMCNC: 31.1 G/DL (ref 31–35)
MCV RBC AUTO: 92.4 FL (ref 80–100)
MONOCYTES ABSOLUTE: 0.3 K/UL (ref 0.2–0.8)
MONOCYTES RELATIVE PERCENT: 9.5 %
NEUTROPHILS ABSOLUTE: 1.9 K/UL (ref 2–7.5)
NEUTROPHILS RELATIVE PERCENT: 53.2 %
PDW BLD-RTO: 14.2 % (ref 11–16)
PLATELET # BLD: 52 K/UL (ref 150–400)
PMV BLD AUTO: 13.2 FL (ref 6–10)
POTASSIUM SERPL-SCNC: 4 MMOL/L (ref 3.4–5.1)
RBC # BLD: 4.07 M/UL (ref 3.8–5.8)
SODIUM BLD-SCNC: 141 MMOL/L (ref 136–145)
TOTAL PROTEIN: 7.2 G/DL (ref 6.4–8.3)
VITAMIN B-12: 648 PG/ML (ref 211–911)
WBC # BLD: 3.6 K/UL (ref 4–11)

## 2018-04-24 PROCEDURE — 99214 OFFICE O/P EST MOD 30 MIN: CPT | Performed by: NURSE PRACTITIONER

## 2018-04-24 RX ORDER — OMEPRAZOLE 40 MG/1
40 CAPSULE, DELAYED RELEASE ORAL
Qty: 30 CAPSULE | Refills: 3 | Status: SHIPPED | OUTPATIENT
Start: 2018-04-24 | End: 2018-07-03 | Stop reason: SDUPTHER

## 2018-04-24 RX ORDER — SUCRALFATE 1 G/1
1 TABLET ORAL 4 TIMES DAILY
Qty: 120 TABLET | Refills: 3
Start: 2018-04-24 | End: 2018-07-03 | Stop reason: SDUPTHER

## 2018-04-24 RX ORDER — DICYCLOMINE HCL 20 MG
20 TABLET ORAL
Qty: 120 TABLET | Refills: 3 | Status: SHIPPED | OUTPATIENT
Start: 2018-04-24 | End: 2018-07-03 | Stop reason: SDUPTHER

## 2018-04-24 ASSESSMENT — ENCOUNTER SYMPTOMS
ABDOMINAL PAIN: 1
BLOOD IN STOOL: 1
ABDOMINAL DISTENTION: 1
DIARRHEA: 1

## 2018-04-30 PROBLEM — R23.3 ABNORMAL BRUISING: Status: ACTIVE | Noted: 2018-04-30

## 2018-05-08 ENCOUNTER — OFFICE VISIT (OUTPATIENT)
Dept: FAMILY MEDICINE CLINIC | Age: 58
End: 2018-05-08
Payer: COMMERCIAL

## 2018-05-08 ENCOUNTER — HOSPITAL ENCOUNTER (OUTPATIENT)
Dept: OTHER | Age: 58
Discharge: HOME | End: 2018-05-08
Attending: NURSE PRACTITIONER | Admitting: NURSE PRACTITIONER

## 2018-05-08 VITALS
RESPIRATION RATE: 18 BRPM | HEIGHT: 69 IN | DIASTOLIC BLOOD PRESSURE: 76 MMHG | HEART RATE: 75 BPM | BODY MASS INDEX: 29.03 KG/M2 | SYSTOLIC BLOOD PRESSURE: 114 MMHG | OXYGEN SATURATION: 98 % | WEIGHT: 196 LBS

## 2018-05-08 DIAGNOSIS — D69.6 THROMBOCYTOPENIA (HCC): ICD-10-CM

## 2018-05-08 DIAGNOSIS — R23.3 ABNORMAL BRUISING: ICD-10-CM

## 2018-05-08 DIAGNOSIS — E11.8 TYPE 2 DIABETES MELLITUS WITH COMPLICATION, WITH LONG-TERM CURRENT USE OF INSULIN (HCC): ICD-10-CM

## 2018-05-08 DIAGNOSIS — F32.A DEPRESSION, UNSPECIFIED DEPRESSION TYPE: ICD-10-CM

## 2018-05-08 DIAGNOSIS — Z79.4 TYPE 2 DIABETES MELLITUS WITH COMPLICATION, WITH LONG-TERM CURRENT USE OF INSULIN (HCC): ICD-10-CM

## 2018-05-08 DIAGNOSIS — I10 ESSENTIAL HYPERTENSION: ICD-10-CM

## 2018-05-08 DIAGNOSIS — R07.9 CHEST PAIN, UNSPECIFIED TYPE: Primary | ICD-10-CM

## 2018-05-08 DIAGNOSIS — R16.1 SPLEEN ENLARGED: ICD-10-CM

## 2018-05-08 DIAGNOSIS — R16.0 ENLARGED LIVER: ICD-10-CM

## 2018-05-08 PROCEDURE — 99214 OFFICE O/P EST MOD 30 MIN: CPT | Performed by: NURSE PRACTITIONER

## 2018-05-09 LAB
A/G RATIO: 1.5 (ref 0.8–2)
ALBUMIN SERPL-MCNC: 4.2 G/DL (ref 3.4–4.8)
ALP BLD-CCNC: 77 U/L (ref 25–100)
ALT SERPL-CCNC: 32 U/L (ref 4–36)
ANION GAP SERPL CALCULATED.3IONS-SCNC: 13 MMOL/L (ref 3–16)
AST SERPL-CCNC: 30 U/L (ref 8–33)
BASOPHILS ABSOLUTE: 0.1 K/UL (ref 0–0.1)
BASOPHILS RELATIVE PERCENT: 1.7 %
BILIRUB SERPL-MCNC: 0.8 MG/DL (ref 0.3–1.2)
BUN BLDV-MCNC: 14 MG/DL (ref 6–20)
CALCIUM SERPL-MCNC: 9.6 MG/DL (ref 8.5–10.5)
CHLORIDE BLD-SCNC: 99 MMOL/L (ref 98–107)
CO2: 25 MMOL/L (ref 20–30)
CREAT SERPL-MCNC: 1 MG/DL (ref 0.4–1.2)
EOSINOPHILS ABSOLUTE: 0.2 K/UL (ref 0–0.4)
EOSINOPHILS RELATIVE PERCENT: 5.6 %
GFR AFRICAN AMERICAN: >59
GFR NON-AFRICAN AMERICAN: 57
GLOBULIN: 2.8 G/DL
GLUCOSE BLD-MCNC: 396 MG/DL (ref 74–106)
HCT VFR BLD CALC: 34.7 % (ref 37–47)
HEMOGLOBIN: 11.3 G/DL (ref 11.5–16.5)
IMMATURE GRANULOCYTES #: 0 K/UL
IMMATURE GRANULOCYTES %: 0.3 % (ref 0–5)
LYMPHOCYTES ABSOLUTE: 0.8 K/UL (ref 1.5–4)
LYMPHOCYTES RELATIVE PERCENT: 27.2 %
MCH RBC QN AUTO: 29.4 PG (ref 27–32)
MCHC RBC AUTO-ENTMCNC: 32.6 G/DL (ref 31–35)
MCV RBC AUTO: 90.1 FL (ref 80–100)
MONOCYTES ABSOLUTE: 0.4 K/UL (ref 0.2–0.8)
MONOCYTES RELATIVE PERCENT: 12 %
NEUTROPHILS ABSOLUTE: 1.6 K/UL (ref 2–7.5)
NEUTROPHILS RELATIVE PERCENT: 53.2 %
PDW BLD-RTO: 14 % (ref 11–16)
PLATELET # BLD: 44 K/UL (ref 150–400)
PMV BLD AUTO: 14.8 FL (ref 6–10)
POTASSIUM SERPL-SCNC: 4.2 MMOL/L (ref 3.4–5.1)
RBC # BLD: 3.85 M/UL (ref 3.8–5.8)
SODIUM BLD-SCNC: 137 MMOL/L (ref 136–145)
TOTAL PROTEIN: 7 G/DL (ref 6.4–8.3)
WBC # BLD: 3 K/UL (ref 4–11)

## 2018-05-14 ENCOUNTER — TELEPHONE (OUTPATIENT)
Dept: FAMILY MEDICINE CLINIC | Age: 58
End: 2018-05-14

## 2018-05-14 DIAGNOSIS — R16.1 SPLEEN ENLARGED: ICD-10-CM

## 2018-05-14 DIAGNOSIS — Z91.89 AT HIGH RISK FOR BLEEDING: ICD-10-CM

## 2018-05-14 DIAGNOSIS — D69.6 THROMBOCYTOPENIA (HCC): Primary | ICD-10-CM

## 2018-05-17 ENCOUNTER — TELEPHONE (OUTPATIENT)
Dept: FAMILY MEDICINE CLINIC | Age: 58
End: 2018-05-17

## 2018-05-24 DIAGNOSIS — R10.84 GENERALIZED ABDOMINAL PAIN: ICD-10-CM

## 2018-05-24 DIAGNOSIS — D69.6 THROMBOCYTOPENIA (HCC): Primary | ICD-10-CM

## 2018-05-24 DIAGNOSIS — R14.0 ABDOMINAL DISTENTION: ICD-10-CM

## 2018-05-24 DIAGNOSIS — R23.3 ABNORMAL BRUISING: ICD-10-CM

## 2018-05-24 DIAGNOSIS — R16.1 SPLEEN ENLARGED: ICD-10-CM

## 2018-05-24 DIAGNOSIS — R16.0 ENLARGED LIVER: ICD-10-CM

## 2018-05-25 ENCOUNTER — HOSPITAL ENCOUNTER (OUTPATIENT)
Dept: OTHER | Age: 58
Discharge: HOME | End: 2018-05-25
Attending: NURSE PRACTITIONER | Admitting: NURSE PRACTITIONER

## 2018-05-25 DIAGNOSIS — R23.3 ABNORMAL BRUISING: ICD-10-CM

## 2018-05-25 DIAGNOSIS — R16.0 ENLARGED LIVER: ICD-10-CM

## 2018-05-25 DIAGNOSIS — R10.84 GENERALIZED ABDOMINAL PAIN: ICD-10-CM

## 2018-05-25 DIAGNOSIS — R16.1 SPLEEN ENLARGED: ICD-10-CM

## 2018-05-25 DIAGNOSIS — D69.6 THROMBOCYTOPENIA (HCC): ICD-10-CM

## 2018-05-25 DIAGNOSIS — R14.0 ABDOMINAL DISTENTION: ICD-10-CM

## 2018-05-25 LAB
A/G RATIO: 1.5 (ref 0.8–2)
ALBUMIN SERPL-MCNC: 4.1 G/DL (ref 3.4–4.8)
ALP BLD-CCNC: 83 U/L (ref 25–100)
ALT SERPL-CCNC: 28 U/L (ref 4–36)
AMYLASE: 44 U/L (ref 20–104)
ANION GAP SERPL CALCULATED.3IONS-SCNC: 12 MMOL/L (ref 3–16)
AST SERPL-CCNC: 33 U/L (ref 8–33)
BASOPHILS ABSOLUTE: 0 K/UL (ref 0–0.1)
BASOPHILS RELATIVE PERCENT: 1.4 %
BILIRUB SERPL-MCNC: 0.8 MG/DL (ref 0.3–1.2)
BUN BLDV-MCNC: 14 MG/DL (ref 6–20)
CALCIUM SERPL-MCNC: 10 MG/DL (ref 8.5–10.5)
CHLORIDE BLD-SCNC: 102 MMOL/L (ref 98–107)
CO2: 26 MMOL/L (ref 20–30)
CREAT SERPL-MCNC: 1.1 MG/DL (ref 0.4–1.2)
EOSINOPHILS ABSOLUTE: 0.2 K/UL (ref 0–0.4)
EOSINOPHILS RELATIVE PERCENT: 5.4 %
GFR AFRICAN AMERICAN: >59
GFR NON-AFRICAN AMERICAN: 51
GLOBULIN: 2.8 G/DL
GLUCOSE BLD-MCNC: 219 MG/DL (ref 74–106)
HCT VFR BLD CALC: 37.5 % (ref 37–47)
HEMOGLOBIN: 11.6 G/DL (ref 11.5–16.5)
IMMATURE GRANULOCYTES #: 0 K/UL
IMMATURE GRANULOCYTES %: 0.3 % (ref 0–5)
LIPASE: 62 U/L (ref 5.6–51.3)
LYMPHOCYTES ABSOLUTE: 0.9 K/UL (ref 1.5–4)
LYMPHOCYTES RELATIVE PERCENT: 31.8 %
MCH RBC QN AUTO: 28.1 PG (ref 27–32)
MCHC RBC AUTO-ENTMCNC: 30.9 G/DL (ref 31–35)
MCV RBC AUTO: 90.8 FL (ref 80–100)
MONOCYTES ABSOLUTE: 0.3 K/UL (ref 0.2–0.8)
MONOCYTES RELATIVE PERCENT: 11.5 %
NEUTROPHILS ABSOLUTE: 1.5 K/UL (ref 2–7.5)
NEUTROPHILS RELATIVE PERCENT: 49.6 %
PDW BLD-RTO: 14 % (ref 11–16)
PLATELET # BLD: 46 K/UL (ref 150–400)
PMV BLD AUTO: 12.7 FL (ref 6–10)
POTASSIUM SERPL-SCNC: 4.3 MMOL/L (ref 3.4–5.1)
RBC # BLD: 4.13 M/UL (ref 3.8–5.8)
SODIUM BLD-SCNC: 140 MMOL/L (ref 136–145)
TOTAL PROTEIN: 6.9 G/DL (ref 6.4–8.3)
WBC # BLD: 3 K/UL (ref 4–11)

## 2018-06-04 ENCOUNTER — OFFICE VISIT (OUTPATIENT)
Dept: FAMILY MEDICINE CLINIC | Age: 58
End: 2018-06-04
Payer: COMMERCIAL

## 2018-06-04 VITALS
WEIGHT: 197 LBS | SYSTOLIC BLOOD PRESSURE: 134 MMHG | HEART RATE: 70 BPM | OXYGEN SATURATION: 99 % | RESPIRATION RATE: 18 BRPM | BODY MASS INDEX: 29.18 KG/M2 | HEIGHT: 69 IN | DIASTOLIC BLOOD PRESSURE: 74 MMHG

## 2018-06-04 DIAGNOSIS — D69.6 THROMBOCYTOPENIA (HCC): ICD-10-CM

## 2018-06-04 DIAGNOSIS — R16.0 ENLARGED LIVER: ICD-10-CM

## 2018-06-04 DIAGNOSIS — K76.89 LIVER DYSFUNCTION: ICD-10-CM

## 2018-06-04 DIAGNOSIS — K76.0 FATTY LIVER: ICD-10-CM

## 2018-06-04 DIAGNOSIS — Z79.4 TYPE 2 DIABETES MELLITUS WITH COMPLICATION, WITH LONG-TERM CURRENT USE OF INSULIN (HCC): Primary | ICD-10-CM

## 2018-06-04 DIAGNOSIS — E11.8 TYPE 2 DIABETES MELLITUS WITH COMPLICATION, WITH LONG-TERM CURRENT USE OF INSULIN (HCC): Primary | ICD-10-CM

## 2018-06-04 DIAGNOSIS — R23.3 ABNORMAL BRUISING: ICD-10-CM

## 2018-06-04 DIAGNOSIS — M19.90 OSTEOARTHRITIS, UNSPECIFIED OSTEOARTHRITIS TYPE, UNSPECIFIED SITE: ICD-10-CM

## 2018-06-04 DIAGNOSIS — R16.1 SPLEEN ENLARGED: ICD-10-CM

## 2018-06-04 PROCEDURE — 99214 OFFICE O/P EST MOD 30 MIN: CPT | Performed by: NURSE PRACTITIONER

## 2018-06-04 RX ORDER — RIFAXIMIN 550 MG/1
1 TABLET ORAL 3 TIMES DAILY
Refills: 2 | COMMUNITY
Start: 2018-06-01 | End: 2018-08-27 | Stop reason: ALTCHOICE

## 2018-06-04 ASSESSMENT — ENCOUNTER SYMPTOMS
DIARRHEA: 1
ABDOMINAL DISTENTION: 1
ABDOMINAL PAIN: 1

## 2018-06-08 ENCOUNTER — HOSPITAL ENCOUNTER (OUTPATIENT)
Dept: OTHER | Age: 58
Discharge: HOME | End: 2018-06-08
Attending: NURSE PRACTITIONER | Admitting: NURSE PRACTITIONER

## 2018-06-08 DIAGNOSIS — E11.8 TYPE 2 DIABETES MELLITUS WITH COMPLICATION, WITH LONG-TERM CURRENT USE OF INSULIN (HCC): Primary | ICD-10-CM

## 2018-06-08 DIAGNOSIS — F32.A DEPRESSION, UNSPECIFIED DEPRESSION TYPE: ICD-10-CM

## 2018-06-08 DIAGNOSIS — Z79.4 TYPE 2 DIABETES MELLITUS WITH COMPLICATION, WITH LONG-TERM CURRENT USE OF INSULIN (HCC): Primary | ICD-10-CM

## 2018-06-08 DIAGNOSIS — I10 ESSENTIAL HYPERTENSION: ICD-10-CM

## 2018-06-08 LAB
A/G RATIO: 1.4 (ref 0.8–2)
ALBUMIN SERPL-MCNC: 3.8 G/DL (ref 3.4–4.8)
ALP BLD-CCNC: 83 U/L (ref 25–100)
ALT SERPL-CCNC: 27 U/L (ref 4–36)
ANION GAP SERPL CALCULATED.3IONS-SCNC: 13 MMOL/L (ref 3–16)
AST SERPL-CCNC: 28 U/L (ref 8–33)
BASOPHILS ABSOLUTE: 0 K/UL (ref 0–0.1)
BASOPHILS RELATIVE PERCENT: 1 %
BILIRUB SERPL-MCNC: 0.8 MG/DL (ref 0.3–1.2)
BUN BLDV-MCNC: 12 MG/DL (ref 6–20)
CALCIUM SERPL-MCNC: 9.5 MG/DL (ref 8.5–10.5)
CHLORIDE BLD-SCNC: 104 MMOL/L (ref 98–107)
CO2: 27 MMOL/L (ref 20–30)
CREAT SERPL-MCNC: 1.1 MG/DL (ref 0.4–1.2)
EOSINOPHILS ABSOLUTE: 0.2 K/UL (ref 0–0.4)
EOSINOPHILS RELATIVE PERCENT: 7.7 %
GFR AFRICAN AMERICAN: >59
GFR NON-AFRICAN AMERICAN: 51
GLOBULIN: 2.7 G/DL
GLUCOSE BLD-MCNC: 113 MG/DL (ref 74–106)
HCT VFR BLD CALC: 34.8 % (ref 37–47)
HEMOGLOBIN: 10.8 G/DL (ref 11.5–16.5)
IMMATURE GRANULOCYTES #: 0 K/UL
IMMATURE GRANULOCYTES %: 0.3 % (ref 0–5)
LYMPHOCYTES ABSOLUTE: 0.8 K/UL (ref 1.5–4)
LYMPHOCYTES RELATIVE PERCENT: 26.8 %
MCH RBC QN AUTO: 28 PG (ref 27–32)
MCHC RBC AUTO-ENTMCNC: 31 G/DL (ref 31–35)
MCV RBC AUTO: 90.2 FL (ref 80–100)
MONOCYTES ABSOLUTE: 0.3 K/UL (ref 0.2–0.8)
MONOCYTES RELATIVE PERCENT: 9.1 %
NEUTROPHILS ABSOLUTE: 1.6 K/UL (ref 2–7.5)
NEUTROPHILS RELATIVE PERCENT: 55.1 %
PDW BLD-RTO: 14.5 % (ref 11–16)
PLATELET # BLD: 39 K/UL (ref 150–400)
PMV BLD AUTO: 12.7 FL (ref 6–10)
POTASSIUM SERPL-SCNC: 3.8 MMOL/L (ref 3.4–5.1)
RBC # BLD: 3.86 M/UL (ref 3.8–5.8)
SODIUM BLD-SCNC: 144 MMOL/L (ref 136–145)
TOTAL PROTEIN: 6.5 G/DL (ref 6.4–8.3)
WBC # BLD: 2.9 K/UL (ref 4–11)

## 2018-06-14 DIAGNOSIS — R07.9 CHEST PAIN, UNSPECIFIED TYPE: ICD-10-CM

## 2018-06-14 PROCEDURE — 93000 ELECTROCARDIOGRAM COMPLETE: CPT | Performed by: NURSE PRACTITIONER

## 2018-06-15 DIAGNOSIS — R23.3 ABNORMAL BRUISING: ICD-10-CM

## 2018-06-15 DIAGNOSIS — D69.6 THROMBOCYTOPENIA (HCC): Primary | ICD-10-CM

## 2018-06-15 DIAGNOSIS — R16.1 SPLEEN ENLARGED: ICD-10-CM

## 2018-06-15 DIAGNOSIS — R16.0 ENLARGED LIVER: ICD-10-CM

## 2018-06-15 DIAGNOSIS — N18.30 STAGE 3 CHRONIC KIDNEY DISEASE (HCC): ICD-10-CM

## 2018-06-18 ENCOUNTER — HOSPITAL ENCOUNTER (OUTPATIENT)
Dept: OTHER | Age: 58
Discharge: HOME | End: 2018-06-18
Attending: NURSE PRACTITIONER | Admitting: NURSE PRACTITIONER

## 2018-06-18 DIAGNOSIS — E11.8 TYPE 2 DIABETES MELLITUS WITH COMPLICATION, WITH LONG-TERM CURRENT USE OF INSULIN (HCC): ICD-10-CM

## 2018-06-18 DIAGNOSIS — F32.A DEPRESSION, UNSPECIFIED DEPRESSION TYPE: ICD-10-CM

## 2018-06-18 DIAGNOSIS — I10 ESSENTIAL HYPERTENSION: ICD-10-CM

## 2018-06-18 DIAGNOSIS — Z79.4 TYPE 2 DIABETES MELLITUS WITH COMPLICATION, WITH LONG-TERM CURRENT USE OF INSULIN (HCC): ICD-10-CM

## 2018-06-18 LAB
A/G RATIO: 1.4 (ref 0.8–2)
ALBUMIN SERPL-MCNC: 4.3 G/DL (ref 3.4–4.8)
ALP BLD-CCNC: 86 U/L (ref 25–100)
ALT SERPL-CCNC: 30 U/L (ref 4–36)
ANION GAP SERPL CALCULATED.3IONS-SCNC: 15 MMOL/L (ref 3–16)
AST SERPL-CCNC: 32 U/L (ref 8–33)
BASOPHILS ABSOLUTE: 0 K/UL (ref 0–0.1)
BASOPHILS RELATIVE PERCENT: 1.5 %
BILIRUB SERPL-MCNC: 0.9 MG/DL (ref 0.3–1.2)
BUN BLDV-MCNC: 12 MG/DL (ref 6–20)
CALCIUM SERPL-MCNC: 9.9 MG/DL (ref 8.5–10.5)
CHLORIDE BLD-SCNC: 99 MMOL/L (ref 98–107)
CO2: 26 MMOL/L (ref 20–30)
CREAT SERPL-MCNC: 1.1 MG/DL (ref 0.4–1.2)
EOSINOPHILS ABSOLUTE: 0.2 K/UL (ref 0–0.4)
EOSINOPHILS RELATIVE PERCENT: 6.6 %
GFR AFRICAN AMERICAN: >59
GFR NON-AFRICAN AMERICAN: 51
GLOBULIN: 3.1 G/DL
GLUCOSE BLD-MCNC: 303 MG/DL (ref 74–106)
HCT VFR BLD CALC: 37.1 % (ref 37–47)
HEMOGLOBIN: 11.4 G/DL (ref 11.5–16.5)
IMMATURE GRANULOCYTES #: 0 K/UL
IMMATURE GRANULOCYTES %: 0.7 % (ref 0–5)
LYMPHOCYTES ABSOLUTE: 0.8 K/UL (ref 1.5–4)
LYMPHOCYTES RELATIVE PERCENT: 29.5 %
MCH RBC QN AUTO: 27.3 PG (ref 27–32)
MCHC RBC AUTO-ENTMCNC: 30.7 G/DL (ref 31–35)
MCV RBC AUTO: 89 FL (ref 80–100)
MONOCYTES ABSOLUTE: 0.3 K/UL (ref 0.2–0.8)
MONOCYTES RELATIVE PERCENT: 10.3 %
NEUTROPHILS ABSOLUTE: 1.4 K/UL (ref 2–7.5)
NEUTROPHILS RELATIVE PERCENT: 51.4 %
PDW BLD-RTO: 14.5 % (ref 11–16)
PLATELET # BLD: 46 K/UL (ref 150–400)
PMV BLD AUTO: 13.1 FL (ref 6–10)
POTASSIUM SERPL-SCNC: 4.2 MMOL/L (ref 3.4–5.1)
RBC # BLD: 4.17 M/UL (ref 3.8–5.8)
SODIUM BLD-SCNC: 140 MMOL/L (ref 136–145)
TOTAL PROTEIN: 7.4 G/DL (ref 6.4–8.3)
WBC # BLD: 2.7 K/UL (ref 4–11)

## 2018-06-24 ASSESSMENT — ENCOUNTER SYMPTOMS
ABDOMINAL DISTENTION: 1
EYE DISCHARGE: 1
DIARRHEA: 1
ABDOMINAL PAIN: 1

## 2018-07-03 ENCOUNTER — OFFICE VISIT (OUTPATIENT)
Dept: FAMILY MEDICINE CLINIC | Age: 58
End: 2018-07-03
Payer: COMMERCIAL

## 2018-07-03 ENCOUNTER — HOSPITAL ENCOUNTER (OUTPATIENT)
Dept: OTHER | Age: 58
Discharge: HOME | End: 2018-07-03
Attending: NURSE PRACTITIONER | Admitting: NURSE PRACTITIONER

## 2018-07-03 VITALS
SYSTOLIC BLOOD PRESSURE: 132 MMHG | RESPIRATION RATE: 18 BRPM | WEIGHT: 194 LBS | BODY MASS INDEX: 28.73 KG/M2 | OXYGEN SATURATION: 99 % | HEIGHT: 69 IN | HEART RATE: 83 BPM | DIASTOLIC BLOOD PRESSURE: 76 MMHG

## 2018-07-03 DIAGNOSIS — J30.89 ENVIRONMENTAL AND SEASONAL ALLERGIES: ICD-10-CM

## 2018-07-03 DIAGNOSIS — I10 ESSENTIAL HYPERTENSION: ICD-10-CM

## 2018-07-03 DIAGNOSIS — E11.8 TYPE 2 DIABETES MELLITUS WITH COMPLICATION, WITH LONG-TERM CURRENT USE OF INSULIN (HCC): Primary | ICD-10-CM

## 2018-07-03 DIAGNOSIS — K21.9 GASTROESOPHAGEAL REFLUX DISEASE WITHOUT ESOPHAGITIS: ICD-10-CM

## 2018-07-03 DIAGNOSIS — N20.0 KIDNEY STONE: ICD-10-CM

## 2018-07-03 DIAGNOSIS — D69.6 THROMBOCYTOPENIA (HCC): ICD-10-CM

## 2018-07-03 DIAGNOSIS — F41.9 ANXIETY: ICD-10-CM

## 2018-07-03 DIAGNOSIS — R74.8 ELEVATED LIPASE: ICD-10-CM

## 2018-07-03 DIAGNOSIS — R10.84 GENERALIZED ABDOMINAL PAIN: ICD-10-CM

## 2018-07-03 DIAGNOSIS — L91.8 SKIN TAG: ICD-10-CM

## 2018-07-03 DIAGNOSIS — R16.1 SPLEEN ENLARGED: ICD-10-CM

## 2018-07-03 DIAGNOSIS — Z87.11 HISTORY OF GASTRIC ULCER: ICD-10-CM

## 2018-07-03 DIAGNOSIS — K76.89 LIVER DYSFUNCTION: ICD-10-CM

## 2018-07-03 DIAGNOSIS — I21.4 NSTEMI (NON-ST ELEVATED MYOCARDIAL INFARCTION) (HCC): ICD-10-CM

## 2018-07-03 DIAGNOSIS — Z79.4 TYPE 2 DIABETES MELLITUS WITH COMPLICATION, WITH LONG-TERM CURRENT USE OF INSULIN (HCC): Primary | ICD-10-CM

## 2018-07-03 DIAGNOSIS — R19.7 DIARRHEA, UNSPECIFIED TYPE: ICD-10-CM

## 2018-07-03 DIAGNOSIS — R11.0 NAUSEA: ICD-10-CM

## 2018-07-03 DIAGNOSIS — F32.9 REACTIVE DEPRESSION: ICD-10-CM

## 2018-07-03 DIAGNOSIS — R21 SKIN RASH: ICD-10-CM

## 2018-07-03 LAB
BASOPHILS ABSOLUTE: 0 K/UL (ref 0–0.1)
BASOPHILS RELATIVE PERCENT: 1.3 %
EOSINOPHILS ABSOLUTE: 0.1 K/UL (ref 0–0.4)
EOSINOPHILS RELATIVE PERCENT: 3.7 %
HCT VFR BLD CALC: 35.5 % (ref 37–47)
HEMOGLOBIN: 11 G/DL (ref 11.5–16.5)
IMMATURE GRANULOCYTES #: 0 K/UL
IMMATURE GRANULOCYTES %: 0.3 % (ref 0–5)
LYMPHOCYTES ABSOLUTE: 0.8 K/UL (ref 1.5–4)
LYMPHOCYTES RELATIVE PERCENT: 26.3 %
MCH RBC QN AUTO: 27.1 PG (ref 27–32)
MCHC RBC AUTO-ENTMCNC: 31 G/DL (ref 31–35)
MCV RBC AUTO: 87.4 FL (ref 80–100)
MONOCYTES ABSOLUTE: 0.4 K/UL (ref 0.2–0.8)
MONOCYTES RELATIVE PERCENT: 13.7 %
NEUTROPHILS ABSOLUTE: 1.6 K/UL (ref 2–7.5)
NEUTROPHILS RELATIVE PERCENT: 54.7 %
PDW BLD-RTO: 14.6 % (ref 11–16)
PLATELET # BLD: 48 K/UL (ref 150–400)
PMV BLD AUTO: 13.2 FL (ref 6–10)
RBC # BLD: 4.06 M/UL (ref 3.8–5.8)
WBC # BLD: 3 K/UL (ref 4–11)

## 2018-07-03 PROCEDURE — 99214 OFFICE O/P EST MOD 30 MIN: CPT | Performed by: NURSE PRACTITIONER

## 2018-07-03 RX ORDER — OMEPRAZOLE 40 MG/1
40 CAPSULE, DELAYED RELEASE ORAL
Qty: 30 CAPSULE | Refills: 3 | Status: SHIPPED | OUTPATIENT
Start: 2018-07-03 | End: 2018-07-06 | Stop reason: SDUPTHER

## 2018-07-03 RX ORDER — DULAGLUTIDE 1.5 MG/.5ML
1.5 INJECTION, SOLUTION SUBCUTANEOUS WEEKLY
Qty: 4 PEN | Refills: 3 | Status: SHIPPED | OUTPATIENT
Start: 2018-07-03 | End: 2018-07-06 | Stop reason: SDUPTHER

## 2018-07-03 RX ORDER — LORATADINE 10 MG/1
10 TABLET ORAL DAILY
Qty: 30 TABLET | Refills: 3 | Status: SHIPPED | OUTPATIENT
Start: 2018-07-03 | End: 2018-07-06 | Stop reason: SDUPTHER

## 2018-07-03 RX ORDER — CLOPIDOGREL BISULFATE 75 MG/1
75 TABLET ORAL DAILY
Qty: 30 TABLET | Refills: 3 | Status: SHIPPED | OUTPATIENT
Start: 2018-07-03 | End: 2018-07-06 | Stop reason: SDUPTHER

## 2018-07-03 RX ORDER — ATORVASTATIN CALCIUM 80 MG/1
80 TABLET, FILM COATED ORAL NIGHTLY
Qty: 30 TABLET | Refills: 3 | Status: SHIPPED | OUTPATIENT
Start: 2018-07-03 | End: 2018-07-06 | Stop reason: SDUPTHER

## 2018-07-03 RX ORDER — METOPROLOL SUCCINATE 25 MG/1
25 TABLET, EXTENDED RELEASE ORAL 2 TIMES DAILY
Qty: 30 TABLET | Refills: 3 | Status: SHIPPED | OUTPATIENT
Start: 2018-07-03 | End: 2018-07-06 | Stop reason: SDUPTHER

## 2018-07-03 RX ORDER — LORAZEPAM 0.5 MG/1
0.5 TABLET ORAL 2 TIMES DAILY PRN
Qty: 60 TABLET | Refills: 0 | Status: SHIPPED | OUTPATIENT
Start: 2018-07-03 | End: 2018-07-06 | Stop reason: SDUPTHER

## 2018-07-03 RX ORDER — TAMSULOSIN HYDROCHLORIDE 0.4 MG/1
0.4 CAPSULE ORAL DAILY
Qty: 30 CAPSULE | Refills: 3 | Status: SHIPPED | OUTPATIENT
Start: 2018-07-03 | End: 2018-07-06 | Stop reason: SDUPTHER

## 2018-07-03 RX ORDER — INSULIN GLARGINE 100 [IU]/ML
160 INJECTION, SOLUTION SUBCUTANEOUS NIGHTLY
Qty: 16 PEN | Refills: 3 | Status: SHIPPED | OUTPATIENT
Start: 2018-07-03 | End: 2018-07-06 | Stop reason: SDUPTHER

## 2018-07-03 RX ORDER — SUCRALFATE 1 G/1
1 TABLET ORAL 4 TIMES DAILY
Qty: 120 TABLET | Refills: 3 | Status: SHIPPED | OUTPATIENT
Start: 2018-07-03 | End: 2018-07-06 | Stop reason: SDUPTHER

## 2018-07-03 RX ORDER — DICYCLOMINE HCL 20 MG
20 TABLET ORAL
Qty: 120 TABLET | Refills: 3 | Status: SHIPPED | OUTPATIENT
Start: 2018-07-03 | End: 2018-07-06 | Stop reason: SDUPTHER

## 2018-07-03 RX ORDER — LOSARTAN POTASSIUM 25 MG/1
25 TABLET ORAL DAILY
Qty: 30 TABLET | Refills: 3 | Status: SHIPPED | OUTPATIENT
Start: 2018-07-03 | End: 2018-07-06 | Stop reason: SDUPTHER

## 2018-07-03 ASSESSMENT — ENCOUNTER SYMPTOMS
VOMITING: 1
NAUSEA: 1
BLOOD IN STOOL: 1
ABDOMINAL PAIN: 1
ABDOMINAL DISTENTION: 1
DIARRHEA: 1

## 2018-07-03 NOTE — PROGRESS NOTES
Have you seen any other physician or provider since your last visit?  Yes, Hematology at [facility]    Have you had any other diagnostic tests since your last visit? no    Have you changed or stopped any medications since your last visit? no

## 2018-07-03 NOTE — PROGRESS NOTES
Psychiatric/Behavioral: Positive for dysphoric mood. The patient is nervous/anxious. All other systems reviewed and are negative. OBJECTIVE:  Wt Readings from Last 2 Encounters:   07/03/18 194 lb (88 kg)   06/04/18 197 lb (89.4 kg)     BP Readings from Last 2 Encounters:   07/03/18 132/76   06/04/18 134/74      Pulse Readings from Last 2 Encounters:   07/03/18 83   06/04/18 70     Body mass index is 29.07 kg/m². @DBVOUFT5(8)@  Resp Readings from Last 2 Encounters:   07/03/18 18   06/04/18 18       Physical Exam   Constitutional: She is oriented to person, place, and time. She appears well-developed and well-nourished. HENT:   Head: Normocephalic and atraumatic. Right Ear: External ear normal.   Left Ear: External ear normal.   Nose: Nose normal.   Mouth/Throat: Oropharynx is clear and moist.   Eyes: Conjunctivae are normal. Pupils are equal, round, and reactive to light. Neck: Normal range of motion. Neck supple. Cardiovascular: Normal rate, regular rhythm, normal heart sounds and intact distal pulses. Pulmonary/Chest: Effort normal and breath sounds normal.   Abdominal: Soft. Bowel sounds are normal. She exhibits distension. There is hepatosplenomegaly. There is generalized tenderness. Musculoskeletal:        Right knee: She exhibits decreased range of motion. Tenderness found. Medial joint line and lateral joint line tenderness noted. Left knee: She exhibits decreased range of motion. Tenderness found. Medial joint line and lateral joint line tenderness noted. Neurological: She is alert and oriented to person, place, and time. Skin: Skin is warm and dry. Rash noted. Rash is urticarial.   Pruritus with urticarial rash on neck and scalp. Small skin tag on left upper eyelid. Psychiatric: Her speech is normal and behavior is normal. Judgment and thought content normal. Her mood appears anxious. Cognition and memory are normal. She exhibits a depressed mood.    Nursing note and vitals reviewed. Results in Past 30 Days  Result Component Current Result Ref Range Previous Result Ref Range   Alb 4.3 (6/18/2018) 3.4 - 4.8 g/dL 3.8 (6/8/2018) 3.4 - 4.8 g/dL   Albumin/Globulin Ratio 1.4 (6/18/2018) 0.8 - 2.0 1.4 (6/8/2018) 0.8 - 2.0   Alkaline Phosphatase 86 (6/18/2018) 25 - 100 U/L 83 (6/8/2018) 25 - 100 U/L   ALT 30 (6/18/2018) 4 - 36 U/L 27 (6/8/2018) 4 - 36 U/L   AST 32 (6/18/2018) 8 - 33 U/L 28 (6/8/2018) 8 - 33 U/L   BUN 12 (6/18/2018) 6 - 20 mg/dL 12 (6/8/2018) 6 - 20 mg/dL   Calcium 9.9 (6/18/2018) 8.5 - 10.5 mg/dL 9.5 (6/8/2018) 8.5 - 10.5 mg/dL   Chloride 99 (6/18/2018) 98 - 107 mmol/L 104 (6/8/2018) 98 - 107 mmol/L   CO2 26 (6/18/2018) 20 - 30 mmol/L 27 (6/8/2018) 20 - 30 mmol/L   CREATININE 1.1 (6/18/2018) 0.4 - 1.2 mg/dL 1.1 (6/8/2018) 0.4 - 1.2 mg/dL   GFR  >59 (6/18/2018) >59 >59 (6/8/2018) >59   GFR Non- 51 (L) (6/18/2018) >59 51 (L) (6/8/2018) >59   Globulin 3.1 (6/18/2018) g/dL 2.7 (6/8/2018) g/dL   Glucose 303 (H) (6/18/2018) 74 - 106 mg/dL 113 (H) (6/8/2018) 74 - 106 mg/dL   Potassium 4.2 (6/18/2018) 3.4 - 5.1 mmol/L 3.8 (6/8/2018) 3.4 - 5.1 mmol/L   Sodium 140 (6/18/2018) 136 - 145 mmol/L 144 (6/8/2018) 136 - 145 mmol/L   Total Bilirubin 0.9 (6/18/2018) 0.3 - 1.2 mg/dL 0.8 (6/8/2018) 0.3 - 1.2 mg/dL   Total Protein 7.4 (6/18/2018) 6.4 - 8.3 g/dL 6.5 (6/8/2018) 6.4 - 8.3 g/dL     Hemoglobin A1C (%)   Date Value   04/02/2018 7.9 (H)     Microalbumin, Random Urine (mg/dL)   Date Value   07/12/2017 <1.20     LDL Calculated (mg/dL)   Date Value   01/03/2018 see below       Lab Results   Component Value Date    WBC 2.7 06/18/2018    NEUTROABS 1.4 06/18/2018    HGB 11.4 06/18/2018    HCT 37.1 06/18/2018    HCT 42.9 05/27/2012    MCV 89.0 06/18/2018    PLT 46 06/18/2018     05/27/2012     Lab Results   Component Value Date    TSH 2.54 07/12/2017         ASSESSMENT/PLAN:  Fermín Iverson was seen today for diabetes and skin tag.     Diagnoses and all (FLOMAX) 0.4 MG capsule; Take 1 capsule by mouth daily    Environmental and seasonal allergies  -     loratadine (CLARITIN) 10 MG tablet; Take 1 tablet by mouth daily    Spleen enlarged  -     CBC Auto Differential; Future  -     CBC; Future    NSTEMI (non-ST elevated myocardial infarction) (HCC)  -     clopidogrel (PLAVIX) 75 MG tablet; Take 1 tablet by mouth daily  -     atorvastatin (LIPITOR) 80 MG tablet; Take 1 tablet by mouth nightly  -     metoprolol succinate (TOPROL XL) 25 MG extended release tablet; Take 1 tablet by mouth 2 times daily  -     aspirin 81 MG tablet; Take 1 tablet by mouth daily  -     losartan (COZAAR) 25 MG tablet; Take 1 tablet by mouth daily    History of gastric ulcer  -     omeprazole (PRILOSEC) 40 MG delayed release capsule; Take 1 capsule by mouth daily (with breakfast)  -     sucralfate (CARAFATE) 1 GM tablet; Take 1 tablet by mouth 4 times daily    Elevated lipase  -     AMYLASE; Future  -     LIPASE; Future    Liver dysfunction  -     COMPREHENSIVE METABOLIC PANEL; Future        Medications Discontinued During This Encounter   Medication Reason    loratadine (CLARITIN) 10 MG tablet REORDER    omeprazole (PRILOSEC) 40 MG delayed release capsule REORDER    sucralfate (CARAFATE) 1 GM tablet REORDER    dicyclomine (BENTYL) 20 MG tablet REORDER    tamsulosin (FLOMAX) 0.4 MG capsule REORDER    clopidogrel (PLAVIX) 75 MG tablet REORDER    atorvastatin (LIPITOR) 80 MG tablet REORDER    metoprolol succinate (TOPROL XL) 25 MG extended release tablet REORDER    aspirin 81 MG tablet REORDER    losartan (COZAAR) 25 MG tablet REORDER    SITagliptin (JANUVIA) 100 MG tablet REORDER    LANTUS SOLOSTAR 100 UNIT/ML injection pen REORDER    TRULICITY 1.5 BL/8.4YY SOPN REORDER    metFORMIN (GLUCOPHAGE) 1000 MG tablet REORDER       Return in about 1 month (around 8/3/2018), or if symptoms worsen or fail to improve.       PATIENT COUNSELING    Counseling was provided today regarding the

## 2018-07-06 DIAGNOSIS — Z87.11 HISTORY OF GASTRIC ULCER: ICD-10-CM

## 2018-07-06 DIAGNOSIS — I21.4 NSTEMI (NON-ST ELEVATED MYOCARDIAL INFARCTION) (HCC): ICD-10-CM

## 2018-07-06 DIAGNOSIS — F32.9 REACTIVE DEPRESSION: ICD-10-CM

## 2018-07-06 DIAGNOSIS — Z79.4 TYPE 2 DIABETES MELLITUS WITH COMPLICATION, WITH LONG-TERM CURRENT USE OF INSULIN (HCC): ICD-10-CM

## 2018-07-06 DIAGNOSIS — R11.0 NAUSEA: ICD-10-CM

## 2018-07-06 DIAGNOSIS — J30.89 ENVIRONMENTAL AND SEASONAL ALLERGIES: ICD-10-CM

## 2018-07-06 DIAGNOSIS — K21.9 GASTROESOPHAGEAL REFLUX DISEASE WITHOUT ESOPHAGITIS: ICD-10-CM

## 2018-07-06 DIAGNOSIS — F41.9 ANXIETY: ICD-10-CM

## 2018-07-06 DIAGNOSIS — E11.8 TYPE 2 DIABETES MELLITUS WITH COMPLICATION, WITH LONG-TERM CURRENT USE OF INSULIN (HCC): ICD-10-CM

## 2018-07-06 DIAGNOSIS — N20.0 KIDNEY STONE: ICD-10-CM

## 2018-07-06 DIAGNOSIS — I10 ESSENTIAL HYPERTENSION: ICD-10-CM

## 2018-07-06 DIAGNOSIS — R19.7 DIARRHEA, UNSPECIFIED TYPE: ICD-10-CM

## 2018-07-06 RX ORDER — ATORVASTATIN CALCIUM 80 MG/1
80 TABLET, FILM COATED ORAL NIGHTLY
Qty: 30 TABLET | Refills: 3 | Status: SHIPPED | OUTPATIENT
Start: 2018-07-06 | End: 2018-12-11 | Stop reason: SDUPTHER

## 2018-07-06 RX ORDER — LORATADINE 10 MG/1
10 TABLET ORAL DAILY
Qty: 30 TABLET | Refills: 3 | Status: SHIPPED | OUTPATIENT
Start: 2018-07-06 | End: 2019-01-30 | Stop reason: SDUPTHER

## 2018-07-06 RX ORDER — METOPROLOL SUCCINATE 25 MG/1
25 TABLET, EXTENDED RELEASE ORAL 2 TIMES DAILY
Qty: 30 TABLET | Refills: 3 | Status: SHIPPED | OUTPATIENT
Start: 2018-07-06 | End: 2018-08-20 | Stop reason: SDUPTHER

## 2018-07-06 RX ORDER — CLOPIDOGREL BISULFATE 75 MG/1
75 TABLET ORAL DAILY
Qty: 30 TABLET | Refills: 3 | Status: SHIPPED | OUTPATIENT
Start: 2018-07-06 | End: 2019-01-30 | Stop reason: SDUPTHER

## 2018-07-06 RX ORDER — LOSARTAN POTASSIUM 25 MG/1
25 TABLET ORAL DAILY
Qty: 30 TABLET | Refills: 3 | Status: SHIPPED | OUTPATIENT
Start: 2018-07-06 | End: 2019-01-30 | Stop reason: SDUPTHER

## 2018-07-06 RX ORDER — TAMSULOSIN HYDROCHLORIDE 0.4 MG/1
0.4 CAPSULE ORAL DAILY
Qty: 30 CAPSULE | Refills: 3 | Status: SHIPPED | OUTPATIENT
Start: 2018-07-06 | End: 2019-02-26 | Stop reason: SDUPTHER

## 2018-07-06 RX ORDER — DICYCLOMINE HCL 20 MG
20 TABLET ORAL
Qty: 120 TABLET | Refills: 3 | Status: SHIPPED | OUTPATIENT
Start: 2018-07-06 | End: 2019-02-26 | Stop reason: SDUPTHER

## 2018-07-06 RX ORDER — SUCRALFATE 1 G/1
1 TABLET ORAL 4 TIMES DAILY
Qty: 120 TABLET | Refills: 3 | Status: SHIPPED | OUTPATIENT
Start: 2018-07-06 | End: 2019-02-26 | Stop reason: SDUPTHER

## 2018-07-06 RX ORDER — OMEPRAZOLE 40 MG/1
40 CAPSULE, DELAYED RELEASE ORAL
Qty: 30 CAPSULE | Refills: 3 | Status: SHIPPED | OUTPATIENT
Start: 2018-07-06 | End: 2019-02-26 | Stop reason: SDUPTHER

## 2018-07-06 RX ORDER — DULAGLUTIDE 1.5 MG/.5ML
1.5 INJECTION, SOLUTION SUBCUTANEOUS WEEKLY
Qty: 4 PEN | Refills: 3 | Status: SHIPPED | OUTPATIENT
Start: 2018-07-06 | End: 2019-01-30 | Stop reason: SDUPTHER

## 2018-07-06 RX ORDER — INSULIN GLARGINE 100 [IU]/ML
160 INJECTION, SOLUTION SUBCUTANEOUS NIGHTLY
Qty: 16 PEN | Refills: 3 | Status: SHIPPED | OUTPATIENT
Start: 2018-07-06 | End: 2019-01-30 | Stop reason: SDUPTHER

## 2018-07-06 RX ORDER — LORAZEPAM 0.5 MG/1
0.5 TABLET ORAL 2 TIMES DAILY PRN
Qty: 60 TABLET | Refills: 0 | Status: SHIPPED | OUTPATIENT
Start: 2018-07-06 | End: 2018-08-05

## 2018-08-20 ENCOUNTER — TELEPHONE (OUTPATIENT)
Dept: FAMILY MEDICINE CLINIC | Age: 58
End: 2018-08-20

## 2018-08-20 DIAGNOSIS — I21.4 NSTEMI (NON-ST ELEVATED MYOCARDIAL INFARCTION) (HCC): ICD-10-CM

## 2018-08-20 DIAGNOSIS — I10 ESSENTIAL HYPERTENSION: ICD-10-CM

## 2018-08-20 RX ORDER — METOPROLOL SUCCINATE 25 MG/1
25 TABLET, EXTENDED RELEASE ORAL DAILY
Qty: 30 TABLET | Refills: 3 | Status: SHIPPED | OUTPATIENT
Start: 2018-08-20 | End: 2018-11-12 | Stop reason: SDUPTHER

## 2018-08-27 ENCOUNTER — OFFICE VISIT (OUTPATIENT)
Dept: FAMILY MEDICINE CLINIC | Age: 58
End: 2018-08-27
Payer: COMMERCIAL

## 2018-08-27 VITALS
RESPIRATION RATE: 18 BRPM | SYSTOLIC BLOOD PRESSURE: 114 MMHG | HEIGHT: 69 IN | OXYGEN SATURATION: 99 % | BODY MASS INDEX: 28.23 KG/M2 | WEIGHT: 190.6 LBS | DIASTOLIC BLOOD PRESSURE: 72 MMHG | HEART RATE: 79 BPM

## 2018-08-27 DIAGNOSIS — R16.1 SPLEEN ENLARGED: ICD-10-CM

## 2018-08-27 DIAGNOSIS — E11.8 TYPE 2 DIABETES MELLITUS WITH COMPLICATION, WITH LONG-TERM CURRENT USE OF INSULIN (HCC): Primary | ICD-10-CM

## 2018-08-27 DIAGNOSIS — R74.8 ELEVATED LIVER ENZYMES: ICD-10-CM

## 2018-08-27 DIAGNOSIS — K92.1 BLOODY STOOLS: ICD-10-CM

## 2018-08-27 DIAGNOSIS — R14.0 ABDOMINAL DISTENSION: ICD-10-CM

## 2018-08-27 DIAGNOSIS — Z79.4 TYPE 2 DIABETES MELLITUS WITH COMPLICATION, WITH LONG-TERM CURRENT USE OF INSULIN (HCC): Primary | ICD-10-CM

## 2018-08-27 DIAGNOSIS — I10 ESSENTIAL HYPERTENSION: ICD-10-CM

## 2018-08-27 DIAGNOSIS — R10.84 GENERALIZED ABDOMINAL PAIN: ICD-10-CM

## 2018-08-27 DIAGNOSIS — D69.6 THROMBOCYTOPENIA (HCC): ICD-10-CM

## 2018-08-27 DIAGNOSIS — R21 SKIN RASH: ICD-10-CM

## 2018-08-27 DIAGNOSIS — R16.0 ENLARGED LIVER: ICD-10-CM

## 2018-08-27 DIAGNOSIS — R23.3 ABNORMAL BRUISING: ICD-10-CM

## 2018-08-27 PROCEDURE — 99214 OFFICE O/P EST MOD 30 MIN: CPT | Performed by: NURSE PRACTITIONER

## 2018-08-27 RX ORDER — LANCETS 30 GAUGE
EACH MISCELLANEOUS
Qty: 100 EACH | Refills: 5 | Status: SHIPPED | OUTPATIENT
Start: 2018-08-27

## 2018-08-27 RX ORDER — GLUCOSAMINE HCL/CHONDROITIN SU 500-400 MG
CAPSULE ORAL
Qty: 100 STRIP | Refills: 5 | Status: SHIPPED | OUTPATIENT
Start: 2018-08-27

## 2018-08-27 ASSESSMENT — ENCOUNTER SYMPTOMS
ABDOMINAL PAIN: 1
VOMITING: 1
ABDOMINAL DISTENTION: 1
BLOOD IN STOOL: 1
NAUSEA: 1
DIARRHEA: 1

## 2018-08-27 NOTE — PROGRESS NOTES
SUBJECTIVE:  Viky Valentine is a 62 y.o. female that presents with   Chief Complaint   Patient presents with    Diabetes     f/u    Hypertension     f/u   . HPI:    She continues to have severe abdominal tenderness, distention, and pain. She had updated abdominal US and will be getting blood counts completed every 2 weeks. She will contact Dr. Elizabeth Montgomery office to schedule her follow up. Her blood sugars have been up and down ranging 180-240. She has stopped drinking Johana-8s and feels that sugars are doing some better. She has been feeling better by increasing her water intake. Her blood pressures have been stable. She has nephrology consult in November. She follow up with [facility] and will contact them to schedule follow up appt. She was educated on how her low blood and platelet counts affect bleeding and advised of bleeding precautions. She is still having bloody stools when constipated. She will start using miralax daily. She denies any shortness of air or chest pain. Review of Systems   Constitutional: Positive for fatigue and unexpected weight change. Gastrointestinal: Positive for abdominal distention, abdominal pain, blood in stool, diarrhea, nausea and vomiting. Skin: Positive for rash. Left eyelid skin tag. Allergic/Immunologic: Positive for environmental allergies. Neurological: Positive for weakness, light-headedness and numbness. Hematological: Bruises/bleeds easily. Psychiatric/Behavioral: Positive for dysphoric mood. The patient is nervous/anxious. All other systems reviewed and are negative. OBJECTIVE:  Wt Readings from Last 2 Encounters:   08/27/18 190 lb 9.6 oz (86.5 kg)   07/03/18 194 lb (88 kg)     BP Readings from Last 2 Encounters:   08/27/18 114/72   07/03/18 132/76      Pulse Readings from Last 2 Encounters:   08/27/18 79   07/03/18 83     Body mass index is 28.56 kg/m².    @CTDMPBS6(7)@  Resp Readings from Last 2 Encounters:   08/27/18 18   07/03/18 18 Physical Exam   Constitutional: She is oriented to person, place, and time. She appears well-developed and well-nourished. HENT:   Head: Normocephalic and atraumatic. Right Ear: External ear normal.   Left Ear: External ear normal.   Nose: Nose normal.   Mouth/Throat: Oropharynx is clear and moist.   Eyes: Pupils are equal, round, and reactive to light. Conjunctivae are normal.   Neck: Normal range of motion. Neck supple. Cardiovascular: Normal rate, regular rhythm, normal heart sounds and intact distal pulses. Pulmonary/Chest: Effort normal and breath sounds normal.   Abdominal: Soft. Bowel sounds are normal. She exhibits distension. There is hepatosplenomegaly. There is generalized tenderness. Musculoskeletal:        Right knee: She exhibits decreased range of motion. Tenderness found. Medial joint line and lateral joint line tenderness noted. Left knee: She exhibits decreased range of motion. Tenderness found. Medial joint line and lateral joint line tenderness noted. Neurological: She is alert and oriented to person, place, and time. Skin: Skin is warm and dry. Rash noted. Rash is urticarial.   Pruritus with urticarial rash on neck and scalp. Small skin tag on left upper eyelid. Psychiatric: Her speech is normal and behavior is normal. Judgment and thought content normal. Her mood appears anxious. Cognition and memory are normal. She exhibits a depressed mood. Nursing note and vitals reviewed.       Results in Past 30 Days  Result Component Current Result Ref Range Previous Result Ref Range   Alb 4.3 (9/10/2018) 3.4 - 4.8 g/dL Not in Time Range    Albumin/Globulin Ratio 1.5 (9/10/2018) 0.8 - 2.0 Not in Time Range    Alkaline Phosphatase 79 (9/10/2018) 25 - 100 U/L Not in Time Range    ALT 37 (H) (9/10/2018) 4 - 36 U/L Not in Time Range    AST 37 (H) (9/10/2018) 8 - 33 U/L Not in Time Range    BUN 17 (9/10/2018) 6 - 20 mg/dL Not in Time Range    Calcium 9.7 (9/10/2018) 8.5 - 10.5 Comprehensive Metabolic Panel; Standing    Abnormal bruising  -     CBC Auto Differential; Standing    Elevated liver enzymes  -     Comprehensive Metabolic Panel; Standing        Medications Discontinued During This Encounter   Medication Reason    fluconazole (DIFLUCAN) 150 MG tablet Therapy completed    Icosapent Ethyl (VASCEPA) 1 g CAPS capsule Therapy completed    XIFAXAN 550 MG tablet Therapy completed       Return in about 2 months (around 10/27/2018), or if symptoms worsen or fail to improve. PATIENT COUNSELING    Counseling was provided today regarding the following topics: Healthy eating habits, Regular exercise, substance abuse and healthy sleep habits. Discussed use, benefit, and side effects of prescribed medications. Barriers to medication compliance addressed. All patient questions answered. Pt voiced understanding.      Controlled Substances Monitoring:

## 2018-09-10 ENCOUNTER — HOSPITAL ENCOUNTER (OUTPATIENT)
Facility: HOSPITAL | Age: 58
Discharge: HOME | End: 2018-09-10
Payer: COMMERCIAL

## 2018-09-10 DIAGNOSIS — R10.84 GENERALIZED ABDOMINAL PAIN: ICD-10-CM

## 2018-09-10 DIAGNOSIS — R74.8 ELEVATED LIPASE: ICD-10-CM

## 2018-09-10 DIAGNOSIS — R23.3 ABNORMAL BRUISING: ICD-10-CM

## 2018-09-10 DIAGNOSIS — R16.0 ENLARGED LIVER: ICD-10-CM

## 2018-09-10 DIAGNOSIS — R74.8 ELEVATED LIVER ENZYMES: ICD-10-CM

## 2018-09-10 DIAGNOSIS — R16.1 SPLEEN ENLARGED: ICD-10-CM

## 2018-09-10 DIAGNOSIS — D69.6 THROMBOCYTOPENIA (HCC): ICD-10-CM

## 2018-09-10 LAB
A/G RATIO: 1.5 (ref 0.8–2)
ALBUMIN SERPL-MCNC: 4.3 G/DL (ref 3.4–4.8)
ALP BLD-CCNC: 79 U/L (ref 25–100)
ALT SERPL-CCNC: 37 U/L (ref 4–36)
AMYLASE: 79 U/L (ref 20–104)
ANION GAP SERPL CALCULATED.3IONS-SCNC: 11 MMOL/L (ref 3–16)
AST SERPL-CCNC: 37 U/L (ref 8–33)
BASOPHILS ABSOLUTE: 0 K/UL (ref 0–0.1)
BASOPHILS RELATIVE PERCENT: 1.1 %
BILIRUB SERPL-MCNC: 0.8 MG/DL (ref 0.3–1.2)
BUN BLDV-MCNC: 17 MG/DL (ref 6–20)
CALCIUM SERPL-MCNC: 9.7 MG/DL (ref 8.5–10.5)
CHLORIDE BLD-SCNC: 107 MMOL/L (ref 98–107)
CO2: 24 MMOL/L (ref 20–30)
CREAT SERPL-MCNC: 1 MG/DL (ref 0.4–1.2)
EOSINOPHILS ABSOLUTE: 0.2 K/UL (ref 0–0.4)
EOSINOPHILS RELATIVE PERCENT: 5.6 %
GFR AFRICAN AMERICAN: >59
GFR NON-AFRICAN AMERICAN: 57
GLOBULIN: 2.9 G/DL
GLUCOSE BLD-MCNC: 179 MG/DL (ref 74–106)
HCT VFR BLD CALC: 34.1 % (ref 37–47)
HEMOGLOBIN: 10.7 G/DL (ref 11.5–16.5)
IMMATURE GRANULOCYTES #: 0 K/UL
IMMATURE GRANULOCYTES %: 0.4 % (ref 0–5)
LIPASE: 142 U/L (ref 5.6–51.3)
LYMPHOCYTES ABSOLUTE: 0.7 K/UL (ref 1.5–4)
LYMPHOCYTES RELATIVE PERCENT: 27.5 %
MCH RBC QN AUTO: 26.6 PG (ref 27–32)
MCHC RBC AUTO-ENTMCNC: 31.4 G/DL (ref 31–35)
MCV RBC AUTO: 84.6 FL (ref 80–100)
MONOCYTES ABSOLUTE: 0.3 K/UL (ref 0.2–0.8)
MONOCYTES RELATIVE PERCENT: 12.6 %
NEUTROPHILS ABSOLUTE: 1.4 K/UL (ref 2–7.5)
NEUTROPHILS RELATIVE PERCENT: 52.8 %
PDW BLD-RTO: 15.4 % (ref 11–16)
PLATELET # BLD: 43 K/UL (ref 150–400)
PMV BLD AUTO: 13.5 FL (ref 6–10)
POTASSIUM SERPL-SCNC: 4.3 MMOL/L (ref 3.4–5.1)
RBC # BLD: 4.03 M/UL (ref 3.8–5.8)
SODIUM BLD-SCNC: 142 MMOL/L (ref 136–145)
TOTAL PROTEIN: 7.2 G/DL (ref 6.4–8.3)
WBC # BLD: 2.7 K/UL (ref 4–11)

## 2018-09-10 PROCEDURE — 82150 ASSAY OF AMYLASE: CPT

## 2018-09-10 PROCEDURE — 80053 COMPREHEN METABOLIC PANEL: CPT

## 2018-09-10 PROCEDURE — 83690 ASSAY OF LIPASE: CPT

## 2018-09-10 PROCEDURE — 36415 COLL VENOUS BLD VENIPUNCTURE: CPT

## 2018-09-10 PROCEDURE — 85025 COMPLETE CBC W/AUTO DIFF WBC: CPT

## 2018-10-22 ENCOUNTER — HOSPITAL ENCOUNTER (OUTPATIENT)
Facility: HOSPITAL | Age: 58
Discharge: HOME | End: 2018-10-22
Payer: COMMERCIAL

## 2018-10-22 DIAGNOSIS — R16.0 ENLARGED LIVER: ICD-10-CM

## 2018-10-22 DIAGNOSIS — R16.1 SPLEEN ENLARGED: ICD-10-CM

## 2018-10-22 DIAGNOSIS — D69.6 THROMBOCYTOPENIA (HCC): Primary | ICD-10-CM

## 2018-10-22 DIAGNOSIS — R23.3 ABNORMAL BRUISING: ICD-10-CM

## 2018-10-22 DIAGNOSIS — D69.6 THROMBOCYTOPENIA (HCC): ICD-10-CM

## 2018-10-22 DIAGNOSIS — N18.30 STAGE 3 CHRONIC KIDNEY DISEASE (HCC): ICD-10-CM

## 2018-10-22 LAB
A/G RATIO: 1.5 (ref 0.8–2)
ALBUMIN SERPL-MCNC: 4.2 G/DL (ref 3.4–4.8)
ALP BLD-CCNC: 91 U/L (ref 25–100)
ALT SERPL-CCNC: 38 U/L (ref 4–36)
ANION GAP SERPL CALCULATED.3IONS-SCNC: 13 MMOL/L (ref 3–16)
AST SERPL-CCNC: 32 U/L (ref 8–33)
BASOPHILS ABSOLUTE: 0 K/UL (ref 0–0.1)
BASOPHILS RELATIVE PERCENT: 1 %
BILIRUB SERPL-MCNC: 0.6 MG/DL (ref 0.3–1.2)
BUN BLDV-MCNC: 19 MG/DL (ref 6–20)
CALCIUM SERPL-MCNC: 10.6 MG/DL (ref 8.5–10.5)
CHLORIDE BLD-SCNC: 105 MMOL/L (ref 98–107)
CO2: 23 MMOL/L (ref 20–30)
CREAT SERPL-MCNC: 1.1 MG/DL (ref 0.4–1.2)
EOSINOPHILS ABSOLUTE: 0.1 K/UL (ref 0–0.4)
EOSINOPHILS RELATIVE PERCENT: 3.6 %
GFR AFRICAN AMERICAN: >59
GFR NON-AFRICAN AMERICAN: 51
GLOBULIN: 2.8 G/DL
GLUCOSE BLD-MCNC: 209 MG/DL (ref 74–106)
HCT VFR BLD CALC: 33.7 % (ref 37–47)
HEMOGLOBIN: 10.1 G/DL (ref 11.5–16.5)
IMMATURE GRANULOCYTES #: 0 K/UL
IMMATURE GRANULOCYTES %: 0.3 % (ref 0–5)
LYMPHOCYTES ABSOLUTE: 0.8 K/UL (ref 1.5–4)
LYMPHOCYTES RELATIVE PERCENT: 27.1 %
MCH RBC QN AUTO: 25.4 PG (ref 27–32)
MCHC RBC AUTO-ENTMCNC: 30 G/DL (ref 31–35)
MCV RBC AUTO: 84.9 FL (ref 80–100)
MONOCYTES ABSOLUTE: 0.3 K/UL (ref 0.2–0.8)
MONOCYTES RELATIVE PERCENT: 10.6 %
NEUTROPHILS ABSOLUTE: 1.7 K/UL (ref 2–7.5)
NEUTROPHILS RELATIVE PERCENT: 57.4 %
PDW BLD-RTO: 15.2 % (ref 11–16)
PLATELET # BLD: 40 K/UL (ref 150–400)
PMV BLD AUTO: 13.3 FL (ref 6–10)
POTASSIUM SERPL-SCNC: 4.4 MMOL/L (ref 3.4–5.1)
RBC # BLD: 3.97 M/UL (ref 3.8–5.8)
SODIUM BLD-SCNC: 141 MMOL/L (ref 136–145)
TOTAL PROTEIN: 7 G/DL (ref 6.4–8.3)
WBC # BLD: 3 K/UL (ref 4–11)

## 2018-10-22 PROCEDURE — 36415 COLL VENOUS BLD VENIPUNCTURE: CPT

## 2018-10-22 PROCEDURE — 85025 COMPLETE CBC W/AUTO DIFF WBC: CPT

## 2018-10-22 PROCEDURE — 80053 COMPREHEN METABOLIC PANEL: CPT

## 2018-11-01 ENCOUNTER — NURSE ONLY (OUTPATIENT)
Dept: FAMILY MEDICINE CLINIC | Age: 58
End: 2018-11-01
Payer: COMMERCIAL

## 2018-11-01 DIAGNOSIS — Z23 INFLUENZA VACCINE NEEDED: Primary | ICD-10-CM

## 2018-11-01 PROCEDURE — 90688 IIV4 VACCINE SPLT 0.5 ML IM: CPT | Performed by: NURSE PRACTITIONER

## 2018-11-01 PROCEDURE — 90471 IMMUNIZATION ADMIN: CPT | Performed by: NURSE PRACTITIONER

## 2018-11-01 NOTE — PROGRESS NOTES
Immunizations     Name Date Dose Route    Influenza, Katelyn Quarry, 3 Years and older, IM (Fluzone 3 yrs and older or Afluria 5 yrs and older) 11/1/2018 0.5 mL Intramuscular    Site: Deltoid- Left    LotCristal Karine    NDC: 69957-232-11

## 2018-11-07 ENCOUNTER — HOSPITAL ENCOUNTER (OUTPATIENT)
Facility: HOSPITAL | Age: 58
Discharge: HOME | End: 2018-11-07
Payer: COMMERCIAL

## 2018-11-07 DIAGNOSIS — R16.0 ENLARGED LIVER: ICD-10-CM

## 2018-11-07 DIAGNOSIS — D69.6 THROMBOCYTOPENIA (HCC): ICD-10-CM

## 2018-11-07 DIAGNOSIS — R23.3 ABNORMAL BRUISING: ICD-10-CM

## 2018-11-07 DIAGNOSIS — N18.30 STAGE 3 CHRONIC KIDNEY DISEASE (HCC): ICD-10-CM

## 2018-11-07 DIAGNOSIS — R16.1 SPLEEN ENLARGED: ICD-10-CM

## 2018-11-07 LAB
A/G RATIO: 1.5 (ref 0.8–2)
ALBUMIN SERPL-MCNC: 4.1 G/DL (ref 3.4–4.8)
ALP BLD-CCNC: 94 U/L (ref 25–100)
ALT SERPL-CCNC: 27 U/L (ref 4–36)
ANION GAP SERPL CALCULATED.3IONS-SCNC: 13 MMOL/L (ref 3–16)
AST SERPL-CCNC: 30 U/L (ref 8–33)
BASOPHILS ABSOLUTE: 0 K/UL (ref 0–0.1)
BASOPHILS RELATIVE PERCENT: 0.8 %
BILIRUB SERPL-MCNC: 0.8 MG/DL (ref 0.3–1.2)
BUN BLDV-MCNC: 14 MG/DL (ref 6–20)
CALCIUM SERPL-MCNC: 9.5 MG/DL (ref 8.5–10.5)
CHLORIDE BLD-SCNC: 107 MMOL/L (ref 98–107)
CO2: 23 MMOL/L (ref 20–30)
CREAT SERPL-MCNC: 1 MG/DL (ref 0.4–1.2)
EOSINOPHILS ABSOLUTE: 0.1 K/UL (ref 0–0.4)
EOSINOPHILS RELATIVE PERCENT: 3 %
GFR AFRICAN AMERICAN: >59
GFR NON-AFRICAN AMERICAN: 57
GLOBULIN: 2.8 G/DL
GLUCOSE BLD-MCNC: 188 MG/DL (ref 74–106)
HCT VFR BLD CALC: 32.8 % (ref 37–47)
HEMOGLOBIN: 9.9 G/DL (ref 11.5–16.5)
IMMATURE GRANULOCYTES #: 0 K/UL
IMMATURE GRANULOCYTES %: 0.4 % (ref 0–5)
LYMPHOCYTES ABSOLUTE: 0.6 K/UL (ref 1.5–4)
LYMPHOCYTES RELATIVE PERCENT: 24.9 %
MCH RBC QN AUTO: 24.8 PG (ref 27–32)
MCHC RBC AUTO-ENTMCNC: 30.2 G/DL (ref 31–35)
MCV RBC AUTO: 82 FL (ref 80–100)
MONOCYTES ABSOLUTE: 0.3 K/UL (ref 0.2–0.8)
MONOCYTES RELATIVE PERCENT: 12.2 %
NEUTROPHILS ABSOLUTE: 1.4 K/UL (ref 2–7.5)
NEUTROPHILS RELATIVE PERCENT: 58.7 %
PDW BLD-RTO: 15.1 % (ref 11–16)
PLATELET # BLD: 41 K/UL (ref 150–400)
PMV BLD AUTO: 12.3 FL (ref 6–10)
POTASSIUM SERPL-SCNC: 3.5 MMOL/L (ref 3.4–5.1)
RBC # BLD: 4 M/UL (ref 3.8–5.8)
SODIUM BLD-SCNC: 143 MMOL/L (ref 136–145)
TOTAL PROTEIN: 6.9 G/DL (ref 6.4–8.3)
WBC # BLD: 2.4 K/UL (ref 4–11)

## 2018-11-07 PROCEDURE — 80053 COMPREHEN METABOLIC PANEL: CPT

## 2018-11-07 PROCEDURE — 36415 COLL VENOUS BLD VENIPUNCTURE: CPT

## 2018-11-07 PROCEDURE — 85025 COMPLETE CBC W/AUTO DIFF WBC: CPT

## 2018-11-12 DIAGNOSIS — I21.4 NSTEMI (NON-ST ELEVATED MYOCARDIAL INFARCTION) (HCC): ICD-10-CM

## 2018-11-12 DIAGNOSIS — I10 ESSENTIAL HYPERTENSION: ICD-10-CM

## 2018-11-12 RX ORDER — METOPROLOL SUCCINATE 25 MG/1
TABLET, EXTENDED RELEASE ORAL
Qty: 30 TABLET | Refills: 2 | Status: SHIPPED | OUTPATIENT
Start: 2018-11-12 | End: 2019-01-28 | Stop reason: SDUPTHER

## 2018-11-15 ENCOUNTER — HOSPITAL ENCOUNTER (OUTPATIENT)
Facility: HOSPITAL | Age: 58
Discharge: HOME | End: 2018-11-15
Payer: COMMERCIAL

## 2018-11-15 DIAGNOSIS — N18.30 STAGE 3 CHRONIC KIDNEY DISEASE (HCC): ICD-10-CM

## 2018-11-15 DIAGNOSIS — D69.6 THROMBOCYTOPENIA (HCC): ICD-10-CM

## 2018-11-15 DIAGNOSIS — R16.0 ENLARGED LIVER: ICD-10-CM

## 2018-11-15 DIAGNOSIS — R23.3 ABNORMAL BRUISING: ICD-10-CM

## 2018-11-15 DIAGNOSIS — R16.1 SPLEEN ENLARGED: ICD-10-CM

## 2018-11-15 LAB
A/G RATIO: 1.6 (ref 0.8–2)
ALBUMIN SERPL-MCNC: 4.2 G/DL (ref 3.4–4.8)
ALP BLD-CCNC: 94 U/L (ref 25–100)
ALT SERPL-CCNC: 28 U/L (ref 4–36)
ANION GAP SERPL CALCULATED.3IONS-SCNC: 13 MMOL/L (ref 3–16)
AST SERPL-CCNC: 32 U/L (ref 8–33)
BASOPHILS ABSOLUTE: 0 K/UL (ref 0–0.1)
BASOPHILS RELATIVE PERCENT: 1.3 %
BILIRUB SERPL-MCNC: 0.8 MG/DL (ref 0.3–1.2)
BILIRUBIN URINE: NEGATIVE
BLOOD, URINE: NEGATIVE
BUN BLDV-MCNC: 10 MG/DL (ref 6–20)
CALCIUM SERPL-MCNC: 9.5 MG/DL (ref 8.5–10.5)
CHLORIDE BLD-SCNC: 105 MMOL/L (ref 98–107)
CLARITY: CLEAR
CO2: 25 MMOL/L (ref 20–30)
COLOR: YELLOW
CREAT SERPL-MCNC: 1.2 MG/DL (ref 0.4–1.2)
CREATININE URINE: 133.4 MG/DL (ref 1.5–300)
EOSINOPHILS ABSOLUTE: 0.1 K/UL (ref 0–0.4)
EOSINOPHILS RELATIVE PERCENT: 4.2 %
EPITHELIAL CELLS, UA: ABNORMAL /HPF
GFR AFRICAN AMERICAN: 56
GFR NON-AFRICAN AMERICAN: 46
GLOBULIN: 2.7 G/DL
GLUCOSE BLD-MCNC: 249 MG/DL (ref 74–106)
GLUCOSE URINE: NEGATIVE MG/DL
HCT VFR BLD CALC: 34.4 % (ref 37–47)
HEMOGLOBIN: 10.2 G/DL (ref 11.5–16.5)
IMMATURE GRANULOCYTES #: 0 K/UL
IMMATURE GRANULOCYTES %: 0.4 % (ref 0–5)
KETONES, URINE: NEGATIVE MG/DL
LEUKOCYTE ESTERASE, URINE: NEGATIVE
LYMPHOCYTES ABSOLUTE: 0.7 K/UL (ref 1.5–4)
LYMPHOCYTES RELATIVE PERCENT: 29 %
MCH RBC QN AUTO: 24.9 PG (ref 27–32)
MCHC RBC AUTO-ENTMCNC: 29.7 G/DL (ref 31–35)
MCV RBC AUTO: 83.9 FL (ref 80–100)
MICROSCOPIC EXAMINATION: ABNORMAL
MONOCYTES ABSOLUTE: 0.2 K/UL (ref 0.2–0.8)
MONOCYTES RELATIVE PERCENT: 9.7 %
MUCUS: ABNORMAL /LPF
NEUTROPHILS ABSOLUTE: 1.3 K/UL (ref 2–7.5)
NEUTROPHILS RELATIVE PERCENT: 55.4 %
NITRITE, URINE: NEGATIVE
PDW BLD-RTO: 15.2 % (ref 11–16)
PH UA: 5.5
PHOSPHORUS: 3.3 MG/DL (ref 2.5–4.5)
PLATELET # BLD: 43 K/UL (ref 150–400)
PMV BLD AUTO: 12 FL (ref 6–10)
POTASSIUM SERPL-SCNC: 3.9 MMOL/L (ref 3.4–5.1)
PROTEIN PROTEIN: 20 MG/DL
PROTEIN UA: NEGATIVE MG/DL
RBC # BLD: 4.1 M/UL (ref 3.8–5.8)
RBC UA: ABNORMAL /HPF (ref 0–2)
SODIUM BLD-SCNC: 143 MMOL/L (ref 136–145)
SPECIFIC GRAVITY UA: 1.02
TOTAL PROTEIN: 6.9 G/DL (ref 6.4–8.3)
UROBILINOGEN, URINE: 0.2 E.U./DL
WBC # BLD: 2.4 K/UL (ref 4–11)
WBC UA: ABNORMAL /HPF (ref 0–5)
YEAST: PRESENT /HPF

## 2018-11-15 PROCEDURE — 80053 COMPREHEN METABOLIC PANEL: CPT

## 2018-11-15 PROCEDURE — 36415 COLL VENOUS BLD VENIPUNCTURE: CPT

## 2018-11-15 PROCEDURE — 84156 ASSAY OF PROTEIN URINE: CPT

## 2018-11-15 PROCEDURE — 81001 URINALYSIS AUTO W/SCOPE: CPT

## 2018-11-15 PROCEDURE — 84100 ASSAY OF PHOSPHORUS: CPT

## 2018-11-15 PROCEDURE — 85025 COMPLETE CBC W/AUTO DIFF WBC: CPT

## 2018-11-15 PROCEDURE — 82570 ASSAY OF URINE CREATININE: CPT

## 2018-12-10 DIAGNOSIS — I21.4 NSTEMI (NON-ST ELEVATED MYOCARDIAL INFARCTION) (HCC): ICD-10-CM

## 2019-01-12 DIAGNOSIS — Z79.4 TYPE 2 DIABETES MELLITUS WITH COMPLICATION, WITH LONG-TERM CURRENT USE OF INSULIN (HCC): ICD-10-CM

## 2019-01-12 DIAGNOSIS — E11.8 TYPE 2 DIABETES MELLITUS WITH COMPLICATION, WITH LONG-TERM CURRENT USE OF INSULIN (HCC): ICD-10-CM

## 2019-01-12 RX ORDER — PEN NEEDLE, DIABETIC 32GX 5/32"
NEEDLE, DISPOSABLE MISCELLANEOUS
Qty: 100 EACH | Refills: 5 | Status: SHIPPED | OUTPATIENT
Start: 2019-01-12

## 2019-01-12 RX ORDER — SITAGLIPTIN 100 MG/1
100 TABLET, FILM COATED ORAL
Qty: 30 TABLET | Refills: 3 | Status: SHIPPED | OUTPATIENT
Start: 2019-01-12 | End: 2019-01-30 | Stop reason: SDUPTHER

## 2019-01-30 ENCOUNTER — HOSPITAL ENCOUNTER (OUTPATIENT)
Facility: HOSPITAL | Age: 59
Discharge: HOME | End: 2019-01-30
Payer: COMMERCIAL

## 2019-01-30 ENCOUNTER — OFFICE VISIT (OUTPATIENT)
Dept: FAMILY MEDICINE CLINIC | Age: 59
End: 2019-01-30
Payer: COMMERCIAL

## 2019-01-30 ENCOUNTER — TELEPHONE (OUTPATIENT)
Dept: PRIMARY CARE CLINIC | Age: 59
End: 2019-01-30

## 2019-01-30 VITALS
SYSTOLIC BLOOD PRESSURE: 118 MMHG | RESPIRATION RATE: 18 BRPM | OXYGEN SATURATION: 98 % | BODY MASS INDEX: 29.18 KG/M2 | WEIGHT: 197 LBS | HEART RATE: 78 BPM | HEIGHT: 69 IN | DIASTOLIC BLOOD PRESSURE: 78 MMHG

## 2019-01-30 DIAGNOSIS — R23.3 ABNORMAL BRUISING: ICD-10-CM

## 2019-01-30 DIAGNOSIS — Z13.29 THYROID DISORDER SCREENING: ICD-10-CM

## 2019-01-30 DIAGNOSIS — I21.4 NSTEMI (NON-ST ELEVATED MYOCARDIAL INFARCTION) (HCC): ICD-10-CM

## 2019-01-30 DIAGNOSIS — N18.30 STAGE 3 CHRONIC KIDNEY DISEASE (HCC): ICD-10-CM

## 2019-01-30 DIAGNOSIS — R16.1 SPLEEN ENLARGED: ICD-10-CM

## 2019-01-30 DIAGNOSIS — R16.0 ENLARGED LIVER: ICD-10-CM

## 2019-01-30 DIAGNOSIS — D69.6 THROMBOCYTOPENIA (HCC): ICD-10-CM

## 2019-01-30 DIAGNOSIS — Z79.4 TYPE 2 DIABETES MELLITUS WITH COMPLICATION, WITH LONG-TERM CURRENT USE OF INSULIN (HCC): Primary | ICD-10-CM

## 2019-01-30 DIAGNOSIS — R14.0 ABDOMINAL DISTENTION: ICD-10-CM

## 2019-01-30 DIAGNOSIS — R93.5 ABNORMAL US (ULTRASOUND) OF ABDOMEN: ICD-10-CM

## 2019-01-30 DIAGNOSIS — J30.89 ENVIRONMENTAL AND SEASONAL ALLERGIES: ICD-10-CM

## 2019-01-30 DIAGNOSIS — Z79.4 TYPE 2 DIABETES MELLITUS WITH COMPLICATION, WITH LONG-TERM CURRENT USE OF INSULIN (HCC): ICD-10-CM

## 2019-01-30 DIAGNOSIS — I10 ESSENTIAL HYPERTENSION: ICD-10-CM

## 2019-01-30 DIAGNOSIS — E11.8 TYPE 2 DIABETES MELLITUS WITH COMPLICATION, WITH LONG-TERM CURRENT USE OF INSULIN (HCC): ICD-10-CM

## 2019-01-30 DIAGNOSIS — J01.90 ACUTE SINUSITIS, RECURRENCE NOT SPECIFIED, UNSPECIFIED LOCATION: ICD-10-CM

## 2019-01-30 DIAGNOSIS — E11.8 TYPE 2 DIABETES MELLITUS WITH COMPLICATION, WITH LONG-TERM CURRENT USE OF INSULIN (HCC): Primary | ICD-10-CM

## 2019-01-30 PROCEDURE — 83036 HEMOGLOBIN GLYCOSYLATED A1C: CPT

## 2019-01-30 PROCEDURE — 99214 OFFICE O/P EST MOD 30 MIN: CPT | Performed by: NURSE PRACTITIONER

## 2019-01-30 PROCEDURE — 85025 COMPLETE CBC W/AUTO DIFF WBC: CPT

## 2019-01-30 PROCEDURE — 80053 COMPREHEN METABOLIC PANEL: CPT

## 2019-01-30 PROCEDURE — 84443 ASSAY THYROID STIM HORMONE: CPT

## 2019-01-30 PROCEDURE — 36415 COLL VENOUS BLD VENIPUNCTURE: CPT

## 2019-01-30 RX ORDER — NITROGLYCERIN 0.3 MG/1
0.3 TABLET SUBLINGUAL EVERY 5 MIN PRN
Qty: 30 TABLET | Refills: 2 | Status: SHIPPED | OUTPATIENT
Start: 2019-01-30 | End: 2019-01-30 | Stop reason: DRUGHIGH

## 2019-01-30 RX ORDER — METOPROLOL SUCCINATE 25 MG/1
TABLET, EXTENDED RELEASE ORAL
Qty: 30 TABLET | Refills: 5 | Status: SHIPPED | OUTPATIENT
Start: 2019-01-30 | End: 2019-06-10 | Stop reason: SDUPTHER

## 2019-01-30 RX ORDER — LORATADINE 10 MG/1
10 TABLET ORAL DAILY
Qty: 30 TABLET | Refills: 5 | Status: SHIPPED | OUTPATIENT
Start: 2019-01-30 | End: 2019-07-01 | Stop reason: SDUPTHER

## 2019-01-30 RX ORDER — NITROGLYCERIN 0.4 MG/1
TABLET SUBLINGUAL
Qty: 25 TABLET | Refills: 3 | Status: SHIPPED | OUTPATIENT
Start: 2019-01-30

## 2019-01-30 RX ORDER — INSULIN GLARGINE 100 [IU]/ML
160 INJECTION, SOLUTION SUBCUTANEOUS NIGHTLY
Qty: 16 PEN | Refills: 5 | Status: SHIPPED | OUTPATIENT
Start: 2019-01-30 | End: 2019-03-05 | Stop reason: SDUPTHER

## 2019-01-30 RX ORDER — LOSARTAN POTASSIUM 25 MG/1
25 TABLET ORAL DAILY
Qty: 30 TABLET | Refills: 5 | Status: SHIPPED | OUTPATIENT
Start: 2019-01-30 | End: 2019-06-10 | Stop reason: ALTCHOICE

## 2019-01-30 RX ORDER — CLOPIDOGREL BISULFATE 75 MG/1
75 TABLET ORAL DAILY
Qty: 30 TABLET | Refills: 5 | Status: SHIPPED | OUTPATIENT
Start: 2019-01-30 | End: 2019-07-01 | Stop reason: SDUPTHER

## 2019-01-30 RX ORDER — DULAGLUTIDE 1.5 MG/.5ML
1.5 INJECTION, SOLUTION SUBCUTANEOUS WEEKLY
Qty: 4 PEN | Refills: 5 | Status: SHIPPED | OUTPATIENT
Start: 2019-01-30 | End: 2019-03-05

## 2019-01-30 RX ORDER — AZITHROMYCIN 500 MG/1
500 TABLET, FILM COATED ORAL DAILY
Qty: 5 TABLET | Refills: 0 | Status: SHIPPED | OUTPATIENT
Start: 2019-01-30 | End: 2019-02-04

## 2019-01-30 ASSESSMENT — ENCOUNTER SYMPTOMS
SINUS PRESSURE: 1
COUGH: 1
SINUS PAIN: 1
ABDOMINAL DISTENTION: 1
ABDOMINAL PAIN: 1
RHINORRHEA: 1
DIARRHEA: 1
NAUSEA: 1
SORE THROAT: 1
CHEST TIGHTNESS: 1
EYES NEGATIVE: 1

## 2019-01-31 LAB
A/G RATIO: 1.3 (ref 0.8–2)
ALBUMIN SERPL-MCNC: 3.9 G/DL (ref 3.4–4.8)
ALP BLD-CCNC: 96 U/L (ref 25–100)
ALT SERPL-CCNC: 26 U/L (ref 4–36)
ANION GAP SERPL CALCULATED.3IONS-SCNC: 12 MMOL/L (ref 3–16)
AST SERPL-CCNC: 28 U/L (ref 8–33)
BASOPHILS ABSOLUTE: 0 K/UL (ref 0–0.1)
BASOPHILS RELATIVE PERCENT: 1.3 %
BILIRUB SERPL-MCNC: 0.8 MG/DL (ref 0.3–1.2)
BUN BLDV-MCNC: 14 MG/DL (ref 6–20)
CALCIUM SERPL-MCNC: 9.4 MG/DL (ref 8.5–10.5)
CHLORIDE BLD-SCNC: 101 MMOL/L (ref 98–107)
CO2: 23 MMOL/L (ref 20–30)
CREAT SERPL-MCNC: 1.4 MG/DL (ref 0.4–1.2)
EOSINOPHILS ABSOLUTE: 0.1 K/UL (ref 0–0.4)
EOSINOPHILS RELATIVE PERCENT: 4.3 %
GFR AFRICAN AMERICAN: 47
GFR NON-AFRICAN AMERICAN: 39
GLOBULIN: 3 G/DL
GLUCOSE BLD-MCNC: 430 MG/DL (ref 74–106)
HBA1C MFR BLD: 9.8 %
HCT VFR BLD CALC: 33.5 % (ref 37–47)
HEMOGLOBIN: 9.9 G/DL (ref 11.5–16.5)
IMMATURE GRANULOCYTES #: 0 K/UL
IMMATURE GRANULOCYTES %: 0.9 % (ref 0–5)
LYMPHOCYTES ABSOLUTE: 0.7 K/UL (ref 1.5–4)
LYMPHOCYTES RELATIVE PERCENT: 28.1 %
MCH RBC QN AUTO: 24.1 PG (ref 27–32)
MCHC RBC AUTO-ENTMCNC: 29.6 G/DL (ref 31–35)
MCV RBC AUTO: 81.5 FL (ref 80–100)
MONOCYTES ABSOLUTE: 0.3 K/UL (ref 0.2–0.8)
MONOCYTES RELATIVE PERCENT: 11.9 %
NEUTROPHILS ABSOLUTE: 1.3 K/UL (ref 2–7.5)
NEUTROPHILS RELATIVE PERCENT: 53.5 %
PDW BLD-RTO: 16.1 % (ref 11–16)
PLATELET # BLD: 39 K/UL (ref 150–400)
PMV BLD AUTO: ABNORMAL FL (ref 6–10)
POTASSIUM SERPL-SCNC: 4.4 MMOL/L (ref 3.4–5.1)
RBC # BLD: 4.11 M/UL (ref 3.8–5.8)
SODIUM BLD-SCNC: 136 MMOL/L (ref 136–145)
TOTAL PROTEIN: 6.9 G/DL (ref 6.4–8.3)
TSH REFLEX: 1.46 UIU/ML (ref 0.35–5.5)
WBC # BLD: 2.4 K/UL (ref 4–11)

## 2019-02-01 ASSESSMENT — ENCOUNTER SYMPTOMS
ABDOMINAL PAIN: 1
DIARRHEA: 1
ABDOMINAL DISTENTION: 1

## 2019-02-05 ENCOUNTER — TELEPHONE (OUTPATIENT)
Dept: FAMILY MEDICINE CLINIC | Age: 59
End: 2019-02-05

## 2019-02-26 ENCOUNTER — OFFICE VISIT (OUTPATIENT)
Dept: FAMILY MEDICINE CLINIC | Age: 59
End: 2019-02-26
Payer: COMMERCIAL

## 2019-02-26 VITALS
BODY MASS INDEX: 29.47 KG/M2 | RESPIRATION RATE: 18 BRPM | OXYGEN SATURATION: 98 % | WEIGHT: 199 LBS | HEART RATE: 83 BPM | HEIGHT: 69 IN | DIASTOLIC BLOOD PRESSURE: 76 MMHG | SYSTOLIC BLOOD PRESSURE: 116 MMHG

## 2019-02-26 DIAGNOSIS — F32.9 REACTIVE DEPRESSION: ICD-10-CM

## 2019-02-26 DIAGNOSIS — L29.9 PRURITUS: ICD-10-CM

## 2019-02-26 DIAGNOSIS — Z79.4 TYPE 2 DIABETES MELLITUS WITH COMPLICATION, WITH LONG-TERM CURRENT USE OF INSULIN (HCC): Primary | ICD-10-CM

## 2019-02-26 DIAGNOSIS — M19.90 OSTEOARTHRITIS, UNSPECIFIED OSTEOARTHRITIS TYPE, UNSPECIFIED SITE: ICD-10-CM

## 2019-02-26 DIAGNOSIS — E11.8 TYPE 2 DIABETES MELLITUS WITH COMPLICATION, WITH LONG-TERM CURRENT USE OF INSULIN (HCC): Primary | ICD-10-CM

## 2019-02-26 DIAGNOSIS — J30.2 SEASONAL ALLERGIES: ICD-10-CM

## 2019-02-26 DIAGNOSIS — R11.0 NAUSEA: ICD-10-CM

## 2019-02-26 DIAGNOSIS — F41.9 ANXIETY: ICD-10-CM

## 2019-02-26 DIAGNOSIS — N20.0 KIDNEY STONE: ICD-10-CM

## 2019-02-26 DIAGNOSIS — K21.9 GASTROESOPHAGEAL REFLUX DISEASE WITHOUT ESOPHAGITIS: ICD-10-CM

## 2019-02-26 DIAGNOSIS — R21 SKIN RASH: ICD-10-CM

## 2019-02-26 DIAGNOSIS — R19.7 DIARRHEA, UNSPECIFIED TYPE: ICD-10-CM

## 2019-02-26 DIAGNOSIS — Z87.11 HISTORY OF GASTRIC ULCER: ICD-10-CM

## 2019-02-26 PROCEDURE — 99214 OFFICE O/P EST MOD 30 MIN: CPT | Performed by: NURSE PRACTITIONER

## 2019-02-26 ASSESSMENT — ENCOUNTER SYMPTOMS
COUGH: 0
VOMITING: 0
SORE THROAT: 0
SHORTNESS OF BREATH: 0
EYE PAIN: 0
ABDOMINAL PAIN: 0
NAUSEA: 0
BACK PAIN: 0
CHEST TIGHTNESS: 0
COLOR CHANGE: 0
TROUBLE SWALLOWING: 0
EYE REDNESS: 0
DIARRHEA: 0

## 2019-02-27 RX ORDER — SUCRALFATE 1 G/1
1 TABLET ORAL 4 TIMES DAILY
Qty: 120 TABLET | Refills: 3 | Status: SHIPPED | OUTPATIENT
Start: 2019-02-27 | End: 2019-07-01 | Stop reason: SDUPTHER

## 2019-02-27 RX ORDER — TAMSULOSIN HYDROCHLORIDE 0.4 MG/1
0.4 CAPSULE ORAL DAILY
Qty: 30 CAPSULE | Refills: 3 | Status: SHIPPED | OUTPATIENT
Start: 2019-02-27 | End: 2019-07-01 | Stop reason: SDUPTHER

## 2019-02-27 RX ORDER — OMEPRAZOLE 40 MG/1
40 CAPSULE, DELAYED RELEASE ORAL
Qty: 30 CAPSULE | Refills: 3 | Status: SHIPPED | OUTPATIENT
Start: 2019-02-27 | End: 2019-07-01 | Stop reason: SDUPTHER

## 2019-02-27 RX ORDER — DICYCLOMINE HCL 20 MG
20 TABLET ORAL
Qty: 120 TABLET | Refills: 3 | Status: SHIPPED | OUTPATIENT
Start: 2019-02-27 | End: 2019-07-01 | Stop reason: SDUPTHER

## 2019-02-27 RX ORDER — LEVOCETIRIZINE DIHYDROCHLORIDE 5 MG/1
5 TABLET, FILM COATED ORAL NIGHTLY
Qty: 30 TABLET | Refills: 5 | Status: SHIPPED | OUTPATIENT
Start: 2019-02-27 | End: 2019-06-10 | Stop reason: SDUPTHER

## 2019-03-05 DIAGNOSIS — E11.8 TYPE 2 DIABETES MELLITUS WITH COMPLICATION, WITH LONG-TERM CURRENT USE OF INSULIN (HCC): ICD-10-CM

## 2019-03-05 DIAGNOSIS — K21.9 GASTROESOPHAGEAL REFLUX DISEASE WITHOUT ESOPHAGITIS: ICD-10-CM

## 2019-03-05 DIAGNOSIS — Z79.4 TYPE 2 DIABETES MELLITUS WITH COMPLICATION, WITH LONG-TERM CURRENT USE OF INSULIN (HCC): ICD-10-CM

## 2019-03-05 RX ORDER — INSULIN GLARGINE 100 [IU]/ML
0.9 INJECTION, SOLUTION SUBCUTANEOUS NIGHTLY
Qty: 16 PEN | Refills: 2 | Status: SHIPPED | OUTPATIENT
Start: 2019-03-05 | End: 2019-06-10 | Stop reason: SDUPTHER

## 2019-06-10 ENCOUNTER — OFFICE VISIT (OUTPATIENT)
Dept: FAMILY MEDICINE CLINIC | Age: 59
End: 2019-06-10
Payer: COMMERCIAL

## 2019-06-10 VITALS
WEIGHT: 193 LBS | BODY MASS INDEX: 28.58 KG/M2 | RESPIRATION RATE: 18 BRPM | SYSTOLIC BLOOD PRESSURE: 122 MMHG | OXYGEN SATURATION: 98 % | DIASTOLIC BLOOD PRESSURE: 74 MMHG | HEART RATE: 72 BPM | HEIGHT: 69 IN

## 2019-06-10 DIAGNOSIS — E11.8 TYPE 2 DIABETES MELLITUS WITH COMPLICATION, WITH LONG-TERM CURRENT USE OF INSULIN (HCC): ICD-10-CM

## 2019-06-10 DIAGNOSIS — M19.90 OSTEOARTHRITIS, UNSPECIFIED OSTEOARTHRITIS TYPE, UNSPECIFIED SITE: Primary | ICD-10-CM

## 2019-06-10 DIAGNOSIS — B37.9 YEAST INFECTION: ICD-10-CM

## 2019-06-10 DIAGNOSIS — Z79.4 TYPE 2 DIABETES MELLITUS WITH COMPLICATION, WITH LONG-TERM CURRENT USE OF INSULIN (HCC): ICD-10-CM

## 2019-06-10 DIAGNOSIS — I10 ESSENTIAL HYPERTENSION: ICD-10-CM

## 2019-06-10 DIAGNOSIS — J30.2 SEASONAL ALLERGIES: ICD-10-CM

## 2019-06-10 DIAGNOSIS — I21.4 NSTEMI (NON-ST ELEVATED MYOCARDIAL INFARCTION) (HCC): ICD-10-CM

## 2019-06-10 PROCEDURE — 99214 OFFICE O/P EST MOD 30 MIN: CPT | Performed by: NURSE PRACTITIONER

## 2019-06-10 RX ORDER — ATORVASTATIN CALCIUM 80 MG/1
80 TABLET, FILM COATED ORAL NIGHTLY
Qty: 30 TABLET | Refills: 5 | Status: SHIPPED | OUTPATIENT
Start: 2019-06-10 | End: 2019-11-26 | Stop reason: SDUPTHER

## 2019-06-10 RX ORDER — INSULIN GLARGINE 100 [IU]/ML
100 INJECTION, SOLUTION SUBCUTANEOUS NIGHTLY
Qty: 16 PEN | Refills: 2 | Status: SHIPPED | OUTPATIENT
Start: 2019-06-10 | End: 2019-07-12

## 2019-06-10 RX ORDER — DULOXETIN HYDROCHLORIDE 30 MG/1
30 CAPSULE, DELAYED RELEASE ORAL DAILY
Qty: 90 CAPSULE | Refills: 1 | Status: SHIPPED | OUTPATIENT
Start: 2019-06-10 | End: 2019-07-01 | Stop reason: SDUPTHER

## 2019-06-10 RX ORDER — LEVOCETIRIZINE DIHYDROCHLORIDE 5 MG/1
5 TABLET, FILM COATED ORAL NIGHTLY
Qty: 30 TABLET | Refills: 5 | Status: SHIPPED | OUTPATIENT
Start: 2019-06-10 | End: 2019-11-26

## 2019-06-10 RX ORDER — FLUCONAZOLE 150 MG/1
150 TABLET ORAL DAILY
Qty: 3 TABLET | Refills: 0 | Status: SHIPPED | OUTPATIENT
Start: 2019-06-10 | End: 2019-06-13

## 2019-06-10 RX ORDER — METOPROLOL SUCCINATE 25 MG/1
TABLET, EXTENDED RELEASE ORAL
Qty: 30 TABLET | Refills: 5 | Status: SHIPPED | OUTPATIENT
Start: 2019-06-10 | End: 2019-11-26 | Stop reason: SDUPTHER

## 2019-06-10 ASSESSMENT — ENCOUNTER SYMPTOMS
SORE THROAT: 0
ABDOMINAL PAIN: 0
BACK PAIN: 0
NAUSEA: 0
TROUBLE SWALLOWING: 0
CHEST TIGHTNESS: 0
COLOR CHANGE: 0
EYE PAIN: 0
DIARRHEA: 0
EYE REDNESS: 0
SHORTNESS OF BREATH: 0
COUGH: 0
VOMITING: 0

## 2019-06-10 NOTE — PROGRESS NOTES
SUBJECTIVE:    Patient ID: Ismael Sharp is a 62 y.o. female. Chief Complaint   Patient presents with    Diabetes     f/u       40-year-old  female presents to the clinic today for a follow-up on her type II diabetes. See below form for details. Patient is still uninsured at work and is using our 340B plan at [facility] for assistance with medications. Patient unable to have routine labs checked at this. POC hgbA1c will be checked today. Patient continues to eat unhealthy and drinks regular soda. Patient will be educated on proper diet and care for maintaining better glycemic control and overall health. Patient would like to have routine labs ordered next month when her new insurance starts and possibly be referred back to endocrinology. Diabetes   She presents for her follow-up diabetic visit. She has type 2 diabetes mellitus. MedicAlert identification noted. Her disease course has been fluctuating. There are no hypoglycemic associated symptoms. Pertinent negatives for hypoglycemia include no dizziness, headaches, nervousness/anxiousness or pallor. Associated symptoms include fatigue, polydipsia and polyphagia. Pertinent negatives for diabetes include no chest pain. There are no hypoglycemic complications. Symptoms are stable. There are no diabetic complications. Risk factors for coronary artery disease include diabetes mellitus, dyslipidemia, family history, obesity, hypertension, stress, post-menopausal and sedentary lifestyle. Current diabetic treatment includes insulin injections and oral agent (dual therapy). She is compliant with treatment most of the time. Her weight is stable. She is following a diabetic and generally unhealthy diet. Meal planning includes avoidance of concentrated sweets. She has had a previous visit with a dietitian. She participates in exercise intermittently. Her home blood glucose trend is fluctuating minimally. Her overall blood glucose range is >200 mg/dl.  An ACE inhibitor/angiotensin II receptor blocker is being taken. She does not see a podiatrist.Eye exam is current. Active Ambulatory Problems     Diagnosis Date Noted    Type 2 diabetes mellitus with complication, with long-term current use of insulin ([address] 06/30/2017    Depression 06/30/2017    Environmental and seasonal allergies 06/30/2017    Neuropathy 06/30/2017    Essential hypertension 06/30/2017    Gastrointestinal intolerance to foods 07/12/2017    Pain in both lower extremities 01/30/2018    Abnormal US (ultrasound) of abdomen 02/27/2018    Spleen enlarged 02/27/2018    Enlarged liver 02/27/2018    Thrombocytopenia (HCC) 02/27/2018    Abnormal bruising 04/30/2018    Stage 3 chronic kidney disease ([address] 06/15/2018     Resolved Ambulatory Problems     Diagnosis Date Noted    No Resolved Ambulatory Problems     Past Medical History:   Diagnosis Date    Arthritis     Hypertension     Ulcer       Allergies   Allergen Reactions    Pcn [Penicillins] Other (See Comments)     Not sure was a child      Past Surgical History:   Procedure Laterality Date    ENDOMETRIAL ABLATION  2011    FRACTURE SURGERY Left     KIDNEY STONE SURGERY        Family History   Problem Relation Age of Onset    Breast Cancer Mother     Heart Attack Father     Kidney Disease Sister     Diabetes Sister     Diabetes Brother        Patient's medications, allergies, past medical, surgical, social and family histories were reviewed and updated as appropriate.     Current Outpatient Medications on File Prior to Visit   Medication Sig Dispense Refill    omeprazole (PRILOSEC) 40 MG delayed release capsule Take 1 capsule by mouth daily (with breakfast) 30 capsule 3    sucralfate (CARAFATE) 1 GM tablet Take 1 tablet by mouth 4 times daily 120 tablet 3    dicyclomine (BENTYL) 20 MG tablet Take 1 tablet by mouth 4 times daily (before meals and nightly) 120 tablet 3    sertraline (ZOLOFT) 50 MG tablet Take 1 tablet by mouth daily 30 tablet 3    tamsulosin (FLOMAX) 0.4 MG capsule Take 1 capsule by mouth daily 30 capsule 3    diclofenac sodium 1 % GEL Apply 2 g topically 2 times daily 1 Tube 3    aspirin 81 MG tablet Take 1 tablet by mouth daily 30 tablet 5    loratadine (CLARITIN) 10 MG tablet Take 1 tablet by mouth daily 30 tablet 5    clopidogrel (PLAVIX) 75 MG tablet Take 1 tablet by mouth daily 30 tablet 5    nitroGLYCERIN (NITROSTAT) 0.4 MG SL tablet up to max of 3 total doses. If no relief after 1 dose, call 911. 25 tablet 3    UNIFINE PENTIPS 32G X 4 MM MISC USE AS DIRECTED TWO TIMES A  each 5    blood glucose monitor kit and supplies Test blood glucose four times daily Dx E11.9 1 kit 0    blood glucose monitor strips Test blood glucose 4 times daily. Dx E11.9 100 strip 5    Lancets MISC Check blood glucose levels twice daily. Dx E11.9 100 each 5    Clobetasol Propionate 0.05 % SHAM Apply small amount topically to scalp as needed for rash and itching. 118 mL 5    clobetasol (TEMOVATE) 0.05 % cream Apply topically to skin 2-3 times daily as needed for rash and itching. 60 g 5    ACCU-CHEK ANIA PLUS strip Inject 1 each into the skin 2 times daily 100 each 5     No current facility-administered medications on file prior to visit. Review of Systems   Constitutional: Positive for fatigue. Negative for activity change, appetite change, chills and fever. HENT: Negative for congestion, ear pain, nosebleeds, sore throat and trouble swallowing. Eyes: Negative for pain and redness. Respiratory: Negative for cough, chest tightness and shortness of breath. Cardiovascular: Negative for chest pain, palpitations and leg swelling. Gastrointestinal: Negative for abdominal pain, diarrhea, nausea and vomiting. Endocrine: Positive for cold intolerance, heat intolerance, polydipsia and polyphagia. Genitourinary: Negative for difficulty urinating, dysuria and flank pain.    Musculoskeletal: Negative for arthralgias, back pain and gait problem. Skin: Positive for rash (vaginal itching). Negative for color change, pallor and wound. Allergic/Immunologic: Negative for environmental allergies and food allergies. Neurological: Negative for dizziness, numbness and headaches. Hematological: Negative for adenopathy. Does not bruise/bleed easily. Psychiatric/Behavioral: Negative for agitation and sleep disturbance. The patient is not nervous/anxious. OBJECTIVE:  /74   Pulse 72   Resp 18   Ht 5' 8.5\" (1.74 m)   Wt 193 lb (87.5 kg)   SpO2 98% Comment: room air  Breastfeeding? No   BMI 28.92 kg/m²       Physical Exam   Constitutional: She is oriented to person, place, and time. Vital signs are normal. She appears well-developed and well-nourished. She is active. Non-toxic appearance. She does not have a sickly appearance. She does not appear ill. No distress. HENT:   Head: Normocephalic and atraumatic. Right Ear: Hearing, tympanic membrane, external ear and ear canal normal.   Left Ear: Hearing, tympanic membrane, external ear and ear canal normal.   Nose: Nose normal. No mucosal edema, rhinorrhea or nose lacerations. Right sinus exhibits no maxillary sinus tenderness and no frontal sinus tenderness. Left sinus exhibits no maxillary sinus tenderness and no frontal sinus tenderness. Mouth/Throat: Uvula is midline, oropharynx is clear and moist and mucous membranes are normal. Normal dentition. No dental caries. No tonsillar exudate. Eyes: Pupils are equal, round, and reactive to light. Conjunctivae, EOM and lids are normal. Right eye exhibits no discharge. Left eye exhibits no discharge. No scleral icterus. Neck: Trachea normal, normal range of motion, full passive range of motion without pain and phonation normal. Neck supple. Normal carotid pulses, no hepatojugular reflux and no JVD present. No tracheal tenderness present. Carotid bruit is not present. No tracheal deviation present.  No thyroid mass and no thyromegaly present. Cardiovascular: Normal rate, regular rhythm, S1 normal, S2 normal, normal heart sounds, intact distal pulses and normal pulses. Exam reveals no gallop, no distant heart sounds and no friction rub. No murmur heard. Pulses:       Dorsalis pedis pulses are 2+ on the right side, and 2+ on the left side. Posterior tibial pulses are 2+ on the right side, and 2+ on the left side. Pulmonary/Chest: Effort normal and breath sounds normal. No accessory muscle usage or stridor. No apnea, no tachypnea and no bradypnea. No respiratory distress. She has no decreased breath sounds. She has no wheezes. She has no rhonchi. She has no rales. She exhibits no tenderness. Abdominal: Soft. Normal appearance and bowel sounds are normal. She exhibits no distension and no mass. There is no hepatosplenomegaly, splenomegaly or hepatomegaly. There is no tenderness. There is no rebound and no guarding. No hernia. Musculoskeletal: Normal range of motion. She exhibits no edema, tenderness or deformity. Feet:   Right Foot:   Skin Integrity: Positive for dry skin. Left Foot:   Skin Integrity: Positive for dry skin. Lymphadenopathy:     She has no cervical adenopathy. She has no axillary adenopathy. Neurological: She is alert and oriented to person, place, and time. She has normal strength. She is not disoriented. She displays normal reflexes. A sensory deficit (lower extremities bilaterally) is present. No cranial nerve deficit. She exhibits normal muscle tone. Coordination normal. GCS eye subscore is 4. GCS verbal subscore is 5. GCS motor subscore is 6. Skin: Skin is warm, dry and intact. Capillary refill takes less than 2 seconds. No bruising and no rash noted. She is not diaphoretic. No erythema. No pallor. Psychiatric: She has a normal mood and affect.  Her speech is normal and behavior is normal. Judgment and thought content normal. Cognition and memory are normal. Nursing note and vitals reviewed. No results found for requested labs within last 30 days. Hemoglobin A1C (%)   Date Value   01/30/2019 9.8 (H)     Microscopic Examination (no units)   Date Value   11/15/2018 Not Indicated     LDL Calculated (mg/dL)   Date Value   01/03/2018 see below       Lab Results   Component Value Date    WBC 2.4 01/30/2019    NEUTROABS 1.3 01/30/2019    HGB 9.9 01/30/2019    HCT 33.5 01/30/2019    HCT 42.9 05/27/2012    MCV 81.5 01/30/2019    PLT 39 01/30/2019     05/27/2012     Lab Results   Component Value Date    TSH 2.54 07/12/2017         ASSESSMENT/PLAN:     Tameka Fernández was seen today for diabetes. Diagnoses and all orders for this visit:    Osteoarthritis, unspecified osteoarthritis type, unspecified site  -     DULoxetine (CYMBALTA) 30 MG extended release capsule; Take 1 capsule by mouth daily    Type 2 diabetes mellitus with complication, with long-term current use of insulin ([facility])  -     SITagliptin (JANUVIA) 100 MG tablet; Take 1 tablet by mouth every morning (before breakfast)  -     exenatide (BYETTA 10 MCG PEN) 10 MCG/0.04ML injection; Inject 0.04 mLs into the skin 2 times daily (with meals)  -     LANTUS SOLOSTAR 100 UNIT/ML injection pen; Inject 100 Units into the skin nightly  -     metFORMIN (GLUCOPHAGE) 1000 MG tablet; Take 1 tablet by mouth 2 times daily (with meals)    Hemoglobin A1C 9.8High   See comment % Final 01/30/2019  2:55 PM - [facility]       Seasonal allergies  -     levocetirizine (XYZAL) 5 MG tablet; Take 1 tablet by mouth nightly    NSTEMI (non-ST elevated myocardial infarction) ([facility])  -     atorvastatin (LIPITOR) 80 MG tablet;  Take 1 tablet by mouth nightly  -     metoprolol succinate (TOPROL XL) 25 MG extended release tablet; TAKE ONE TABLET BY MOUTH EVERY DAY    Essential hypertension  -     metoprolol succinate (TOPROL XL) 25 MG extended release tablet; TAKE ONE TABLET BY MOUTH EVERY DAY    Yeast infection  -     fluconazole (DIFLUCAN) 150 MG tablet; Take 1 tablet by mouth daily for 3 days               Controlled Substances Monitoring:     RX Monitoring 5/8/2018   Attestation The Prescription Monitoring Report for this patient was reviewed today. Periodic Controlled Substance Monitoring No signs of potential drug abuse or diversion identified. Chronic Pain > 80 MEDD -        PATIENT COUNSELING     Counseling was provided today regarding the following topics: Healthy eating habits, Regular exercise, substance abuse and healthy sleep habits. Discussed use, benefit, and side effects of prescribed medications. Barriers to medication compliance addressed. All patient questions answered. Patient voiced understanding. Medications Discontinued During This Encounter   Medication Reason    losartan (COZAAR) 25 MG tablet DISCONTINUED BY ANOTHER CLINICIAN    SITagliptin (JANUVIA) 100 MG tablet REORDER    exenatide (BYETTA 10 MCG PEN) 10 MCG/0.04ML injection REORDER    LANTUS SOLOSTAR 100 UNIT/ML injection pen REORDER    levocetirizine (XYZAL) 5 MG tablet REORDER    atorvastatin (LIPITOR) 80 MG tablet REORDER    metoprolol succinate (TOPROL XL) 25 MG extended release tablet REORDER    metFORMIN (GLUCOPHAGE) 1000 MG tablet REORDER       Return in about 1 month (around 7/10/2019) for blood work, diabetes. KELSEY Trujillo - CNP     Education was provided for discussed topics. Call the office with worsening complaints or any side effects to any medications. If an emergency please call 911.

## 2019-06-10 NOTE — PATIENT INSTRUCTIONS
We are committed to providing you with the best care possible. In order to help us achieve these goals please remember to bring all medications, herbal products, and over the counter supplements with you to each visit. If your provider has ordered testing for you, please be sure to follow up with our office if you have not received results within 7 days after the testing took place. *If you receive a survey after visiting one of our offices, please take time to share your experience concerning your physician office visit. These surveys are confidential and no health information about you is shared. We are eager to improve for you and we are counting on your feedback to help make that happen. \    · Keep a list of your medicines with you. List all of the prescription medicines, nonprescription medicines, supplements, natural remedies, and vitamins that you take. Tell your healthcare providers who treat you about all of the products you are taking. Your provider can provide you with a form to keep track of them. Just ask. · Follow the directions that come with your medicine, including information about food or alcohol. Make sure you know how and when to take your medicine. Do not take more or less than you are supposed to take. · Keep all medicines out of the reach of children. · Store medicines according to the directions on the label. · Monitor yourself. Learn to know how your body reacts to your new medicine and keep track of how it makes you feel before attempting (If your provider has allowed you to do so) to drive or go to work. · Seek emergency medical attention if you think you have used too much of this medicine. An overdose of any prescription medicine can be fatal. Overdose symptoms may include extreme drowsiness, muscle weakness, confusion, cold and clammy skin, pinpoint pupils, shallow breathing, slow heart rate, fainting, or coma.   · Don't share prescription medicines with others, even when they seem to have the same symptoms. What may be good for you may be harmful to others. · If you are no longer taking a prescribed medication and you have pills left please take your pills out of their original containers. Mix crushed pills with an undesirable substance, such as cat litter or used coffee grounds. Put the mixture into a disposable container with a lid, such as an empty margarine tub, or into a sealable bag. Cover up or remove any of your personal information on the empty containers by covering it with black permanent marker or duct tape. Place the sealed container with the mixture, and the empty drug containers, in the trash. · If you use a medication that is in the form of a patch, dispose of used patches by folding them in half so that the sticky sides meet, and then flushing them down a toilet. They should not be placed in the household trash where children or pets can find them. · If you have any questions, ask your provider or pharmacist for more information. · Be sure to keep all appointments for provider visits or tests.

## 2019-07-01 DIAGNOSIS — I21.4 NSTEMI (NON-ST ELEVATED MYOCARDIAL INFARCTION) (HCC): ICD-10-CM

## 2019-07-01 DIAGNOSIS — K21.9 GASTROESOPHAGEAL REFLUX DISEASE WITHOUT ESOPHAGITIS: ICD-10-CM

## 2019-07-01 DIAGNOSIS — J30.89 ENVIRONMENTAL AND SEASONAL ALLERGIES: ICD-10-CM

## 2019-07-01 DIAGNOSIS — F32.9 REACTIVE DEPRESSION: ICD-10-CM

## 2019-07-01 DIAGNOSIS — F41.9 ANXIETY: ICD-10-CM

## 2019-07-01 DIAGNOSIS — M19.90 OSTEOARTHRITIS, UNSPECIFIED OSTEOARTHRITIS TYPE, UNSPECIFIED SITE: ICD-10-CM

## 2019-07-01 RX ORDER — DICYCLOMINE HCL 20 MG
20 TABLET ORAL
Qty: 120 TABLET | Refills: 3 | Status: SHIPPED | OUTPATIENT
Start: 2019-07-01 | End: 2019-11-26

## 2019-07-01 RX ORDER — DULOXETIN HYDROCHLORIDE 30 MG/1
30 CAPSULE, DELAYED RELEASE ORAL DAILY
Qty: 90 CAPSULE | Refills: 1 | Status: SHIPPED | OUTPATIENT
Start: 2019-07-01 | End: 2019-07-09 | Stop reason: SINTOL

## 2019-07-01 RX ORDER — CLOPIDOGREL BISULFATE 75 MG/1
75 TABLET ORAL DAILY
Qty: 30 TABLET | Refills: 5 | Status: SHIPPED | OUTPATIENT
Start: 2019-07-01 | End: 2019-11-26

## 2019-07-01 RX ORDER — LORATADINE 10 MG/1
10 TABLET ORAL DAILY
Qty: 30 TABLET | Refills: 5 | Status: SHIPPED | OUTPATIENT
Start: 2019-07-01 | End: 2019-11-26

## 2019-07-01 RX ORDER — TAMSULOSIN HYDROCHLORIDE 0.4 MG/1
0.4 CAPSULE ORAL DAILY
Qty: 30 CAPSULE | Refills: 3 | Status: SHIPPED | OUTPATIENT
Start: 2019-07-01 | End: 2019-11-26

## 2019-07-01 RX ORDER — SUCRALFATE 1 G/1
1 TABLET ORAL 4 TIMES DAILY
Qty: 120 TABLET | Refills: 3 | Status: SHIPPED | OUTPATIENT
Start: 2019-07-01 | End: 2019-11-26

## 2019-07-01 RX ORDER — OMEPRAZOLE 40 MG/1
40 CAPSULE, DELAYED RELEASE ORAL
Qty: 30 CAPSULE | Refills: 3 | Status: SHIPPED
Start: 2019-07-01 | End: 2020-03-10 | Stop reason: ALTCHOICE

## 2019-07-09 ENCOUNTER — OFFICE VISIT (OUTPATIENT)
Dept: FAMILY MEDICINE CLINIC | Age: 59
End: 2019-07-09
Payer: COMMERCIAL

## 2019-07-09 ENCOUNTER — HOSPITAL ENCOUNTER (OUTPATIENT)
Facility: HOSPITAL | Age: 59
Discharge: HOME | End: 2019-07-09
Payer: COMMERCIAL

## 2019-07-09 VITALS
HEART RATE: 79 BPM | SYSTOLIC BLOOD PRESSURE: 130 MMHG | WEIGHT: 191 LBS | RESPIRATION RATE: 18 BRPM | HEIGHT: 69 IN | OXYGEN SATURATION: 97 % | BODY MASS INDEX: 28.29 KG/M2 | DIASTOLIC BLOOD PRESSURE: 76 MMHG

## 2019-07-09 DIAGNOSIS — Z79.4 TYPE 2 DIABETES MELLITUS WITH COMPLICATION, WITH LONG-TERM CURRENT USE OF INSULIN (HCC): Primary | ICD-10-CM

## 2019-07-09 DIAGNOSIS — I10 ESSENTIAL HYPERTENSION: ICD-10-CM

## 2019-07-09 DIAGNOSIS — R16.0 ENLARGED LIVER: ICD-10-CM

## 2019-07-09 DIAGNOSIS — E11.8 TYPE 2 DIABETES MELLITUS WITH COMPLICATION, WITH LONG-TERM CURRENT USE OF INSULIN (HCC): ICD-10-CM

## 2019-07-09 DIAGNOSIS — Z79.4 TYPE 2 DIABETES MELLITUS WITH COMPLICATION, WITH LONG-TERM CURRENT USE OF INSULIN (HCC): ICD-10-CM

## 2019-07-09 DIAGNOSIS — N18.30 STAGE 3 CHRONIC KIDNEY DISEASE (HCC): ICD-10-CM

## 2019-07-09 DIAGNOSIS — D69.6 THROMBOCYTOPENIA (HCC): ICD-10-CM

## 2019-07-09 DIAGNOSIS — R16.1 SPLEEN ENLARGED: ICD-10-CM

## 2019-07-09 DIAGNOSIS — E11.8 TYPE 2 DIABETES MELLITUS WITH COMPLICATION, WITH LONG-TERM CURRENT USE OF INSULIN (HCC): Primary | ICD-10-CM

## 2019-07-09 LAB
A/G RATIO: 1.4 (ref 0.8–2)
ALBUMIN SERPL-MCNC: 4.2 G/DL (ref 3.4–4.8)
ALP BLD-CCNC: 93 U/L (ref 25–100)
ALT SERPL-CCNC: 36 U/L (ref 4–36)
ANION GAP SERPL CALCULATED.3IONS-SCNC: 13 MMOL/L (ref 3–16)
AST SERPL-CCNC: 35 U/L (ref 8–33)
BASOPHILS ABSOLUTE: 0 K/UL (ref 0–0.1)
BASOPHILS RELATIVE PERCENT: 1.4 %
BILIRUB SERPL-MCNC: 0.8 MG/DL (ref 0.3–1.2)
BUN BLDV-MCNC: 14 MG/DL (ref 6–20)
CALCIUM SERPL-MCNC: 9.8 MG/DL (ref 8.5–10.5)
CHLORIDE BLD-SCNC: 103 MMOL/L (ref 98–107)
CHOLESTEROL, TOTAL: 140 MG/DL (ref 0–200)
CO2: 24 MMOL/L (ref 20–30)
CREAT SERPL-MCNC: 1 MG/DL (ref 0.4–1.2)
CREATININE URINE: 136.3 MG/DL (ref 1.5–300)
EOSINOPHILS ABSOLUTE: 0.1 K/UL (ref 0–0.4)
EOSINOPHILS RELATIVE PERCENT: 4.3 %
FOLATE: 11.62 NG/ML
GFR AFRICAN AMERICAN: >59
GFR NON-AFRICAN AMERICAN: 57
GLOBULIN: 3 G/DL
GLUCOSE BLD-MCNC: 252 MG/DL (ref 74–106)
HBA1C MFR BLD: 11.5 %
HCT VFR BLD CALC: 34.3 % (ref 37–47)
HDLC SERPL-MCNC: 28 MG/DL (ref 40–60)
HEMOGLOBIN: 10.1 G/DL (ref 11.5–16.5)
IMMATURE GRANULOCYTES #: 0 K/UL
IMMATURE GRANULOCYTES %: 0.5 % (ref 0–5)
LDL CHOLESTEROL CALCULATED: 58 MG/DL
LYMPHOCYTES ABSOLUTE: 0.7 K/UL (ref 1.5–4)
LYMPHOCYTES RELATIVE PERCENT: 31.4 %
MAGNESIUM: 2 MG/DL (ref 1.7–2.4)
MCH RBC QN AUTO: 23.5 PG (ref 27–32)
MCHC RBC AUTO-ENTMCNC: 29.4 G/DL (ref 31–35)
MCV RBC AUTO: 80 FL (ref 80–100)
MICROALBUMIN UR-MCNC: 1.4 MG/DL (ref 0–22)
MICROALBUMIN/CREAT UR-RTO: 10.3 MG/G (ref 0–30)
MONOCYTES ABSOLUTE: 0.2 K/UL (ref 0.2–0.8)
MONOCYTES RELATIVE PERCENT: 11.1 %
NEUTROPHILS ABSOLUTE: 1.1 K/UL (ref 2–7.5)
NEUTROPHILS RELATIVE PERCENT: 51.3 %
PARATHYROID HORMONE INTACT: 31.3 PG/ML (ref 14–72)
PDW BLD-RTO: 18.2 % (ref 11–16)
PHOSPHORUS: 2.8 MG/DL (ref 2.5–4.5)
PLATELET # BLD: 46 K/UL (ref 150–400)
POTASSIUM SERPL-SCNC: 4.1 MMOL/L (ref 3.4–5.1)
RBC # BLD: 4.29 M/UL (ref 3.8–5.8)
SODIUM BLD-SCNC: 140 MMOL/L (ref 136–145)
TOTAL PROTEIN: 7.2 G/DL (ref 6.4–8.3)
TRIGL SERPL-MCNC: 269 MG/DL (ref 0–249)
TSH REFLEX: 3.07 UIU/ML (ref 0.35–5.5)
VITAMIN B-12: 365 PG/ML (ref 211–911)
VITAMIN D 25-HYDROXY: 24.4 (ref 32–100)
VLDLC SERPL CALC-MCNC: 54 MG/DL
WBC # BLD: 2.1 K/UL (ref 4–11)

## 2019-07-09 PROCEDURE — 96372 THER/PROPH/DIAG INJ SC/IM: CPT | Performed by: NURSE PRACTITIONER

## 2019-07-09 PROCEDURE — 84443 ASSAY THYROID STIM HORMONE: CPT

## 2019-07-09 PROCEDURE — 85025 COMPLETE CBC W/AUTO DIFF WBC: CPT

## 2019-07-09 PROCEDURE — 80053 COMPREHEN METABOLIC PANEL: CPT

## 2019-07-09 PROCEDURE — 83735 ASSAY OF MAGNESIUM: CPT

## 2019-07-09 PROCEDURE — 80061 LIPID PANEL: CPT

## 2019-07-09 PROCEDURE — 83036 HEMOGLOBIN GLYCOSYLATED A1C: CPT

## 2019-07-09 PROCEDURE — 82746 ASSAY OF FOLIC ACID SERUM: CPT

## 2019-07-09 PROCEDURE — 99214 OFFICE O/P EST MOD 30 MIN: CPT | Performed by: NURSE PRACTITIONER

## 2019-07-09 PROCEDURE — 82607 VITAMIN B-12: CPT

## 2019-07-09 PROCEDURE — 82306 VITAMIN D 25 HYDROXY: CPT

## 2019-07-09 PROCEDURE — 84100 ASSAY OF PHOSPHORUS: CPT

## 2019-07-09 PROCEDURE — 83970 ASSAY OF PARATHORMONE: CPT

## 2019-07-09 PROCEDURE — 82043 UR ALBUMIN QUANTITATIVE: CPT

## 2019-07-09 PROCEDURE — 82570 ASSAY OF URINE CREATININE: CPT

## 2019-07-09 PROCEDURE — 36415 COLL VENOUS BLD VENIPUNCTURE: CPT

## 2019-07-09 RX ORDER — CYANOCOBALAMIN 1000 UG/ML
1000 INJECTION INTRAMUSCULAR; SUBCUTANEOUS ONCE
Status: COMPLETED | OUTPATIENT
Start: 2019-07-09 | End: 2019-07-09

## 2019-07-09 RX ADMIN — CYANOCOBALAMIN 1000 MCG: 1000 INJECTION INTRAMUSCULAR; SUBCUTANEOUS at 09:32

## 2019-07-09 NOTE — PROGRESS NOTES
Administrations This Visit     cyanocobalamin injection 1,000 mcg     Admin Date  07/09/2019  09:32 Action  Given Dose  1000 mcg Route  Intramuscular Site  Deltoid Left Administered By  Valentino Knack, MA    Ordering Provider:  KELSEY Land CNP    NDC:  5436-8261-28    Lot#:  [id number]    :  [employer]    Patient Supplied?:  No

## 2019-07-12 ENCOUNTER — OFFICE VISIT (OUTPATIENT)
Dept: FAMILY MEDICINE CLINIC | Age: 59
End: 2019-07-12
Payer: COMMERCIAL

## 2019-07-12 VITALS
RESPIRATION RATE: 18 BRPM | BODY MASS INDEX: 28.29 KG/M2 | OXYGEN SATURATION: 98 % | HEIGHT: 69 IN | DIASTOLIC BLOOD PRESSURE: 78 MMHG | WEIGHT: 191 LBS | SYSTOLIC BLOOD PRESSURE: 132 MMHG

## 2019-07-12 DIAGNOSIS — Z79.4 TYPE 2 DIABETES MELLITUS WITH COMPLICATION, WITH LONG-TERM CURRENT USE OF INSULIN (HCC): ICD-10-CM

## 2019-07-12 DIAGNOSIS — E53.8 B12 DEFICIENCY: ICD-10-CM

## 2019-07-12 DIAGNOSIS — E11.8 TYPE 2 DIABETES MELLITUS WITH COMPLICATION, WITH LONG-TERM CURRENT USE OF INSULIN (HCC): ICD-10-CM

## 2019-07-12 DIAGNOSIS — E55.9 VITAMIN D DEFICIENCY: ICD-10-CM

## 2019-07-12 DIAGNOSIS — E78.1 HIGH TRIGLYCERIDES: Primary | ICD-10-CM

## 2019-07-12 DIAGNOSIS — R53.83 FATIGUE, UNSPECIFIED TYPE: ICD-10-CM

## 2019-07-12 DIAGNOSIS — N18.30 STAGE 3 CHRONIC KIDNEY DISEASE (HCC): ICD-10-CM

## 2019-07-12 PROCEDURE — 99214 OFFICE O/P EST MOD 30 MIN: CPT | Performed by: NURSE PRACTITIONER

## 2019-07-12 RX ORDER — CYANOCOBALAMIN 1000 UG/ML
1000 INJECTION INTRAMUSCULAR; SUBCUTANEOUS
Qty: 10 ML | Refills: 0 | Status: SHIPPED | OUTPATIENT
Start: 2019-07-12 | End: 2019-11-26 | Stop reason: SDUPTHER

## 2019-07-12 RX ORDER — ERGOCALCIFEROL 1.25 MG/1
50000 CAPSULE ORAL WEEKLY
Qty: 12 CAPSULE | Refills: 1 | Status: SHIPPED | OUTPATIENT
Start: 2019-07-12 | End: 2019-11-26

## 2019-07-12 RX ORDER — ICOSAPENT ETHYL 1000 MG/1
2 CAPSULE ORAL 2 TIMES DAILY
Qty: 60 CAPSULE | Refills: 3 | Status: SHIPPED | OUTPATIENT
Start: 2019-07-12 | End: 2019-11-26

## 2019-07-12 RX ORDER — INSULIN GLARGINE 100 [IU]/ML
INJECTION, SOLUTION SUBCUTANEOUS
Qty: 16 PEN | Refills: 2 | Status: SHIPPED | OUTPATIENT
Start: 2019-07-12 | End: 2019-09-19 | Stop reason: SDUPTHER

## 2019-07-12 NOTE — PROGRESS NOTES
SUBJECTIVE:    Patient ID: Dina Garcia is a 62 y.o. female. Chief Complaint   Patient presents with   Isabelle Holder presents to the clinic today to discuss labs results. Patient called back into the clinic due to abnormal results found in some of her blood test. Patient is a noncompliant diabetic and will attempt to further educated patient on the need to better control her glucose and comply with diabetic diet. Patient will also be educated on elevated triglycerides, low vit D levels and to discuss options for better managing her glucose levels daily. Patient currently on insulin therapy and perhaps will increased and adjust some of these medications. Otherwise patient is stable today.  Vital signs are normal.        Active Ambulatory Problems     Diagnosis Date Noted    Type 2 diabetes mellitus with complication, with long-term current use of insulin ([facility] [address] 06/30/2017    Depression 06/30/2017    Environmental and seasonal allergies 06/30/2017    Neuropathy 06/30/2017    Essential hypertension 06/30/2017    Gastrointestinal intolerance to foods 07/12/2017    Pain in both lower extremities 01/30/2018    Abnormal US (ultrasound) of abdomen 02/27/2018    Spleen enlarged 02/27/2018    Enlarged liver 02/27/2018    Thrombocytopenia (HCC) 02/27/2018    Abnormal bruising 04/30/2018    Stage 3 chronic kidney disease ([address] 06/15/2018     Resolved Ambulatory Problems     Diagnosis Date Noted    No Resolved Ambulatory Problems     Past Medical History:   Diagnosis Date    Arthritis     Hypertension     Ulcer       Allergies   Allergen Reactions    Pcn [Penicillins] Other (See Comments)     Not sure was a child      Past Surgical History:   Procedure Laterality Date    ENDOMETRIAL ABLATION  2011    FRACTURE SURGERY Left     KIDNEY STONE SURGERY        Family History   Problem Relation Age of Onset    Breast Cancer Mother     Heart Attack Father     Kidney Disease Sister    Daria Win kit 0    blood glucose monitor strips Test blood glucose 4 times daily. Dx E11.9 100 strip 5    Lancets MISC Check blood glucose levels twice daily. Dx E11.9 100 each 5    ACCU-CHEK ANIA PLUS strip Inject 1 each into the skin 2 times daily 100 each 5     No current facility-administered medications on file prior to visit. Review of Systems   Constitutional: Positive for fatigue. Negative for activity change, appetite change, chills and fever. HENT: Negative for congestion, ear pain, nosebleeds, sore throat and trouble swallowing. Eyes: Negative for pain and redness. Respiratory: Negative for cough, chest tightness and shortness of breath. Cardiovascular: Negative for chest pain, palpitations and leg swelling. Gastrointestinal: Negative for abdominal pain, diarrhea, nausea and vomiting. Endocrine: Positive for cold intolerance and heat intolerance. Genitourinary: Negative for difficulty urinating, dysuria and flank pain. Musculoskeletal: Positive for arthralgias. Negative for back pain and gait problem. Skin: Negative for color change, pallor, rash and wound. Allergic/Immunologic: Negative for environmental allergies and food allergies. Neurological: Negative for dizziness, numbness and headaches. Hematological: Negative for adenopathy. Does not bruise/bleed easily. Psychiatric/Behavioral: Negative for agitation and sleep disturbance. The patient is not nervous/anxious. OBJECTIVE:  /78   Resp 18   Ht 5' 8.5\" (1.74 m)   Wt 191 lb (86.6 kg)   SpO2 98% Comment: room air  BMI 28.62 kg/m²       Physical Exam   Constitutional: She is oriented to person, place, and time. Vital signs are normal. She appears well-developed and well-nourished. She is active. Non-toxic appearance. She does not have a sickly appearance. She does not appear ill. No distress. HENT:   Head: Normocephalic and atraumatic.    Nose: Nose normal.   Mouth/Throat: Uvula is midline, oropharynx is

## 2019-07-21 ASSESSMENT — ENCOUNTER SYMPTOMS
COUGH: 0
CHEST TIGHTNESS: 0
COLOR CHANGE: 0
ABDOMINAL PAIN: 0
VOMITING: 0
SORE THROAT: 0
EYE REDNESS: 0
SHORTNESS OF BREATH: 0
DIARRHEA: 0
BACK PAIN: 0
NAUSEA: 0
EYE PAIN: 0
TROUBLE SWALLOWING: 0

## 2019-07-22 ASSESSMENT — ENCOUNTER SYMPTOMS
EYE PAIN: 0
ABDOMINAL PAIN: 0
DIARRHEA: 0
SHORTNESS OF BREATH: 0
COLOR CHANGE: 0
BACK PAIN: 1
COUGH: 0
EYE REDNESS: 0
SORE THROAT: 0
NAUSEA: 0
VOMITING: 0
TROUBLE SWALLOWING: 0
CHEST TIGHTNESS: 0

## 2019-09-19 DIAGNOSIS — E11.8 TYPE 2 DIABETES MELLITUS WITH COMPLICATION, WITH LONG-TERM CURRENT USE OF INSULIN (HCC): ICD-10-CM

## 2019-09-19 DIAGNOSIS — Z79.4 TYPE 2 DIABETES MELLITUS WITH COMPLICATION, WITH LONG-TERM CURRENT USE OF INSULIN (HCC): ICD-10-CM

## 2019-09-19 RX ORDER — INSULIN GLARGINE 100 [IU]/ML
INJECTION, SOLUTION SUBCUTANEOUS
Qty: 16 PEN | Refills: 2 | Status: SHIPPED | OUTPATIENT
Start: 2019-09-19 | End: 2019-11-26 | Stop reason: SDUPTHER

## 2019-10-21 DIAGNOSIS — R30.0 DYSURIA: Primary | ICD-10-CM

## 2019-10-21 RX ORDER — SULFAMETHOXAZOLE AND TRIMETHOPRIM 800; 160 MG/1; MG/1
1 TABLET ORAL 2 TIMES DAILY
Qty: 14 TABLET | Refills: 0 | Status: SHIPPED | OUTPATIENT
Start: 2019-10-21 | End: 2020-11-11 | Stop reason: SDUPTHER

## 2019-11-26 ENCOUNTER — OFFICE VISIT (OUTPATIENT)
Dept: FAMILY MEDICINE CLINIC | Age: 59
End: 2019-11-26

## 2019-11-26 VITALS
DIASTOLIC BLOOD PRESSURE: 76 MMHG | OXYGEN SATURATION: 98 % | BODY MASS INDEX: 29.18 KG/M2 | HEART RATE: 81 BPM | WEIGHT: 197 LBS | RESPIRATION RATE: 18 BRPM | HEIGHT: 69 IN | SYSTOLIC BLOOD PRESSURE: 122 MMHG

## 2019-11-26 DIAGNOSIS — M25.561 CHRONIC PAIN OF RIGHT KNEE: Primary | ICD-10-CM

## 2019-11-26 DIAGNOSIS — R53.83 FATIGUE, UNSPECIFIED TYPE: ICD-10-CM

## 2019-11-26 DIAGNOSIS — I21.4 NSTEMI (NON-ST ELEVATED MYOCARDIAL INFARCTION) (HCC): ICD-10-CM

## 2019-11-26 DIAGNOSIS — Z79.4 TYPE 2 DIABETES MELLITUS WITH COMPLICATION, WITH LONG-TERM CURRENT USE OF INSULIN (HCC): ICD-10-CM

## 2019-11-26 DIAGNOSIS — E11.8 TYPE 2 DIABETES MELLITUS WITH COMPLICATION, WITH LONG-TERM CURRENT USE OF INSULIN (HCC): ICD-10-CM

## 2019-11-26 DIAGNOSIS — N30.00 ACUTE CYSTITIS WITHOUT HEMATURIA: ICD-10-CM

## 2019-11-26 DIAGNOSIS — I10 ESSENTIAL HYPERTENSION: ICD-10-CM

## 2019-11-26 DIAGNOSIS — G89.29 CHRONIC PAIN OF RIGHT KNEE: Primary | ICD-10-CM

## 2019-11-26 DIAGNOSIS — E53.8 B12 DEFICIENCY: ICD-10-CM

## 2019-11-26 LAB — HBA1C MFR BLD: 9.4 %

## 2019-11-26 PROCEDURE — 99213 OFFICE O/P EST LOW 20 MIN: CPT | Performed by: NURSE PRACTITIONER

## 2019-11-26 PROCEDURE — 83036 HEMOGLOBIN GLYCOSYLATED A1C: CPT | Performed by: NURSE PRACTITIONER

## 2019-11-26 RX ORDER — INSULIN GLARGINE 100 [IU]/ML
INJECTION, SOLUTION SUBCUTANEOUS
Qty: 16 PEN | Refills: 2 | Status: SHIPPED | OUTPATIENT
Start: 2019-11-26 | End: 2020-02-07 | Stop reason: SDUPTHER

## 2019-11-26 RX ORDER — FLUCONAZOLE 150 MG/1
150 TABLET ORAL DAILY
Qty: 3 TABLET | Refills: 0 | Status: SHIPPED | OUTPATIENT
Start: 2019-11-26 | End: 2020-11-11 | Stop reason: SDUPTHER

## 2019-11-26 RX ORDER — NITROFURANTOIN 25; 75 MG/1; MG/1
100 CAPSULE ORAL 2 TIMES DAILY
Qty: 20 CAPSULE | Refills: 0 | Status: SHIPPED | OUTPATIENT
Start: 2019-11-26 | End: 2019-12-06

## 2019-11-26 RX ORDER — MELOXICAM 15 MG/1
15 TABLET ORAL DAILY
Qty: 90 TABLET | Refills: 1 | Status: SHIPPED
Start: 2019-11-26 | End: 2020-03-10 | Stop reason: ALTCHOICE

## 2019-11-26 RX ORDER — CYANOCOBALAMIN 1000 UG/ML
1000 INJECTION INTRAMUSCULAR; SUBCUTANEOUS
Qty: 10 ML | Refills: 0 | Status: SHIPPED | OUTPATIENT
Start: 2019-11-26

## 2019-11-26 RX ORDER — ATORVASTATIN CALCIUM 80 MG/1
80 TABLET, FILM COATED ORAL NIGHTLY
Qty: 90 TABLET | Refills: 5 | Status: SHIPPED | OUTPATIENT
Start: 2019-11-26

## 2019-11-26 RX ORDER — METOPROLOL SUCCINATE 25 MG/1
TABLET, EXTENDED RELEASE ORAL
Qty: 90 TABLET | Refills: 5 | Status: SHIPPED
Start: 2019-11-26 | End: 2020-02-20 | Stop reason: ALTCHOICE

## 2019-12-02 ASSESSMENT — ENCOUNTER SYMPTOMS
VOMITING: 0
TROUBLE SWALLOWING: 0
NAUSEA: 0
COLOR CHANGE: 0
BACK PAIN: 0
CHEST TIGHTNESS: 0
COUGH: 0
EYE PAIN: 0
ABDOMINAL PAIN: 0
RHINORRHEA: 1
SHORTNESS OF BREATH: 0
EYE REDNESS: 0
SORE THROAT: 0
DIARRHEA: 0

## 2019-12-12 ENCOUNTER — TELEPHONE (OUTPATIENT)
Dept: PRIMARY CARE CLINIC | Age: 59
End: 2019-12-12

## 2019-12-12 DIAGNOSIS — Z79.4 TYPE 2 DIABETES MELLITUS WITH COMPLICATION, WITH LONG-TERM CURRENT USE OF INSULIN (HCC): ICD-10-CM

## 2019-12-12 DIAGNOSIS — E11.8 TYPE 2 DIABETES MELLITUS WITH COMPLICATION, WITH LONG-TERM CURRENT USE OF INSULIN (HCC): ICD-10-CM

## 2020-02-07 RX ORDER — INSULIN GLARGINE 100 [IU]/ML
INJECTION, SOLUTION SUBCUTANEOUS
Qty: 16 PEN | Refills: 2 | Status: SHIPPED | OUTPATIENT
Start: 2020-02-07 | End: 2020-03-23 | Stop reason: SDUPTHER

## 2020-02-14 ENCOUNTER — HOSPITAL ENCOUNTER (INPATIENT)
Facility: HOSPITAL | Age: 60
LOS: 5 days | Discharge: HOME | End: 2020-02-19
Attending: EMERGENCY MEDICINE | Admitting: INTERNAL MEDICINE

## 2020-02-14 ENCOUNTER — APPOINTMENT (OUTPATIENT)
Dept: GENERAL RADIOLOGY | Facility: HOSPITAL | Age: 60
End: 2020-02-14

## 2020-02-14 ENCOUNTER — ANESTHESIA EVENT (OUTPATIENT)
Dept: GASTROENTEROLOGY | Facility: HOSPITAL | Age: 60
End: 2020-02-14

## 2020-02-14 ENCOUNTER — APPOINTMENT (OUTPATIENT)
Dept: CT IMAGING | Facility: HOSPITAL | Age: 60
End: 2020-02-14

## 2020-02-14 ENCOUNTER — ANESTHESIA (OUTPATIENT)
Dept: GASTROENTEROLOGY | Facility: HOSPITAL | Age: 60
End: 2020-02-14

## 2020-02-14 DIAGNOSIS — E11.69 TYPE 2 DIABETES MELLITUS WITH OTHER SPECIFIED COMPLICATION, WITH LONG-TERM CURRENT USE OF INSULIN (HCC): ICD-10-CM

## 2020-02-14 DIAGNOSIS — Z87.898 HISTORY OF ULCER DISEASE: ICD-10-CM

## 2020-02-14 DIAGNOSIS — N17.9 AKI (ACUTE KIDNEY INJURY) (HCC): ICD-10-CM

## 2020-02-14 DIAGNOSIS — Z79.4 TYPE 2 DIABETES MELLITUS WITH OTHER SPECIFIED COMPLICATION, WITH LONG-TERM CURRENT USE OF INSULIN (HCC): ICD-10-CM

## 2020-02-14 DIAGNOSIS — I85.11 SECONDARY ESOPHAGEAL VARICES WITH BLEEDING (HCC): ICD-10-CM

## 2020-02-14 DIAGNOSIS — K92.2 ACUTE UPPER GI BLEED: Primary | ICD-10-CM

## 2020-02-14 DIAGNOSIS — K76.6 PORTAL HYPERTENSION (HCC): ICD-10-CM

## 2020-02-14 DIAGNOSIS — D64.9 SYMPTOMATIC ANEMIA: ICD-10-CM

## 2020-02-14 DIAGNOSIS — R16.1 SPLENOMEGALY: ICD-10-CM

## 2020-02-14 PROBLEM — E11.9 TYPE 2 DIABETES MELLITUS, WITHOUT LONG-TERM CURRENT USE OF INSULIN: Chronic | Status: ACTIVE | Noted: 2020-02-14

## 2020-02-14 PROBLEM — I25.10 CAD (CORONARY ARTERY DISEASE): Chronic | Status: ACTIVE | Noted: 2020-02-14

## 2020-02-14 LAB
ABO GROUP BLD: NORMAL
ABO GROUP BLD: NORMAL
ALBUMIN SERPL-MCNC: 3.6 G/DL (ref 3.5–5.2)
ALBUMIN/GLOB SERPL: 1.2 G/DL
ALP SERPL-CCNC: 92 U/L (ref 39–117)
ALT SERPL W P-5'-P-CCNC: 21 U/L (ref 1–33)
ANION GAP SERPL CALCULATED.3IONS-SCNC: 7 MMOL/L (ref 5–15)
AST SERPL-CCNC: 21 U/L (ref 1–32)
BASOPHILS # BLD AUTO: 0.04 10*3/MM3 (ref 0–0.2)
BASOPHILS NFR BLD AUTO: 0.9 % (ref 0–1.5)
BILIRUB SERPL-MCNC: 1 MG/DL (ref 0.2–1.2)
BLD GP AB SCN SERPL QL: NEGATIVE
BUN BLD-MCNC: 22 MG/DL (ref 6–20)
BUN/CREAT SERPL: 21.6 (ref 7–25)
CALCIUM SPEC-SCNC: 8.8 MG/DL (ref 8.6–10.5)
CHLORIDE SERPL-SCNC: 109 MMOL/L (ref 98–107)
CO2 SERPL-SCNC: 25 MMOL/L (ref 22–29)
CREAT BLD-MCNC: 1.02 MG/DL (ref 0.57–1)
D-LACTATE SERPL-SCNC: 1.5 MMOL/L (ref 0.5–2)
DEPRECATED RDW RBC AUTO: 47.4 FL (ref 37–54)
DEVELOPER EXPIRATION DATE: ABNORMAL
DEVELOPER LOT NUMBER: ABNORMAL
EOSINOPHIL # BLD AUTO: 0.09 10*3/MM3 (ref 0–0.4)
EOSINOPHIL NFR BLD AUTO: 2.1 % (ref 0.3–6.2)
ERYTHROCYTE [DISTWIDTH] IN BLOOD BY AUTOMATED COUNT: 16.7 % (ref 12.3–15.4)
EXPIRATION DATE: ABNORMAL
FECAL OCCULT BLOOD SCREEN, POC: POSITIVE
FERRITIN SERPL-MCNC: 11.65 NG/ML (ref 13–150)
GFR SERPL CREATININE-BSD FRML MDRD: 55 ML/MIN/1.73
GFR SERPL CREATININE-BSD FRML MDRD: 67 ML/MIN/1.73
GLOBULIN UR ELPH-MCNC: 3 GM/DL
GLUCOSE BLD-MCNC: 167 MG/DL (ref 65–99)
GLUCOSE BLDC GLUCOMTR-MCNC: 101 MG/DL (ref 70–130)
GLUCOSE BLDC GLUCOMTR-MCNC: 131 MG/DL (ref 70–130)
GLUCOSE BLDC GLUCOMTR-MCNC: 207 MG/DL (ref 70–130)
HCT VFR BLD AUTO: 24.3 % (ref 34–46.6)
HCT VFR BLD AUTO: 27.7 % (ref 34–46.6)
HCV AB SER DONR QL: NORMAL
HGB BLD-MCNC: 7.3 G/DL (ref 12–15.9)
HGB BLD-MCNC: 8.4 G/DL (ref 12–15.9)
HOLD SPECIMEN: NORMAL
HOLD SPECIMEN: NORMAL
IMM GRANULOCYTES # BLD AUTO: 0.02 10*3/MM3 (ref 0–0.05)
IMM GRANULOCYTES NFR BLD AUTO: 0.5 % (ref 0–0.5)
INR PPP: 1.42 (ref 0.85–1.16)
IRON 24H UR-MRATE: 44 MCG/DL (ref 37–145)
IRON SATN MFR SERPL: 12 % (ref 20–50)
LYMPHOCYTES # BLD AUTO: 0.55 10*3/MM3 (ref 0.7–3.1)
LYMPHOCYTES NFR BLD AUTO: 13 % (ref 19.6–45.3)
Lab: ABNORMAL
MCH RBC QN AUTO: 23.7 PG (ref 26.6–33)
MCHC RBC AUTO-ENTMCNC: 30.3 G/DL (ref 31.5–35.7)
MCV RBC AUTO: 78.2 FL (ref 79–97)
MONOCYTES # BLD AUTO: 0.34 10*3/MM3 (ref 0.1–0.9)
MONOCYTES NFR BLD AUTO: 8 % (ref 5–12)
NEGATIVE CONTROL: NEGATIVE
NEUTROPHILS # BLD AUTO: 3.19 10*3/MM3 (ref 1.7–7)
NEUTROPHILS NFR BLD AUTO: 75.5 % (ref 42.7–76)
NRBC BLD AUTO-RTO: 0 /100 WBC (ref 0–0.2)
PLATELET # BLD AUTO: 56 10*3/MM3 (ref 140–450)
PMV BLD AUTO: 12.3 FL (ref 6–12)
POSITIVE CONTROL: POSITIVE
POTASSIUM BLD-SCNC: 4 MMOL/L (ref 3.5–5.2)
PROT SERPL-MCNC: 6.6 G/DL (ref 6–8.5)
PROTHROMBIN TIME: 16.7 SECONDS (ref 11.2–14.3)
RBC # BLD AUTO: 3.54 10*6/MM3 (ref 3.77–5.28)
RETICS # AUTO: 0.08 10*6/MM3 (ref 0.02–0.13)
RETICS/RBC NFR AUTO: 2.18 % (ref 0.7–1.9)
RH BLD: POSITIVE
RH BLD: POSITIVE
SODIUM BLD-SCNC: 141 MMOL/L (ref 136–145)
T&S EXPIRATION DATE: NORMAL
TIBC SERPL-MCNC: 367 MCG/DL (ref 298–536)
TRANSFERRIN SERPL-MCNC: 246 MG/DL (ref 200–360)
WBC NRBC COR # BLD: 4.23 10*3/MM3 (ref 3.4–10.8)
WHOLE BLOOD HOLD SPECIMEN: NORMAL
WHOLE BLOOD HOLD SPECIMEN: NORMAL

## 2020-02-14 PROCEDURE — 83540 ASSAY OF IRON: CPT | Performed by: INTERNAL MEDICINE

## 2020-02-14 PROCEDURE — 25010000002 PROPOFOL 10 MG/ML EMULSION: Performed by: NURSE ANESTHETIST, CERTIFIED REGISTERED

## 2020-02-14 PROCEDURE — 99253 IP/OBS CNSLTJ NEW/EST LOW 45: CPT | Performed by: INTERNAL MEDICINE

## 2020-02-14 PROCEDURE — 25010000002 FENTANYL CITRATE (PF) 100 MCG/2ML SOLUTION: Performed by: NURSE ANESTHETIST, CERTIFIED REGISTERED

## 2020-02-14 PROCEDURE — 85025 COMPLETE CBC W/AUTO DIFF WBC: CPT | Performed by: EMERGENCY MEDICINE

## 2020-02-14 PROCEDURE — 85018 HEMOGLOBIN: CPT | Performed by: INTERNAL MEDICINE

## 2020-02-14 PROCEDURE — 80053 COMPREHEN METABOLIC PANEL: CPT | Performed by: EMERGENCY MEDICINE

## 2020-02-14 PROCEDURE — 86803 HEPATITIS C AB TEST: CPT | Performed by: INTERNAL MEDICINE

## 2020-02-14 PROCEDURE — 25010000002 SUCCINYLCHOLINE PER 20 MG: Performed by: NURSE ANESTHETIST, CERTIFIED REGISTERED

## 2020-02-14 PROCEDURE — 25010000002 DEXAMETHASONE PER 1 MG: Performed by: NURSE ANESTHETIST, CERTIFIED REGISTERED

## 2020-02-14 PROCEDURE — 86901 BLOOD TYPING SEROLOGIC RH(D): CPT | Performed by: EMERGENCY MEDICINE

## 2020-02-14 PROCEDURE — 63710000001 INSULIN LISPRO (HUMAN) PER 5 UNITS: Performed by: INTERNAL MEDICINE

## 2020-02-14 PROCEDURE — 82746 ASSAY OF FOLIC ACID SERUM: CPT | Performed by: INTERNAL MEDICINE

## 2020-02-14 PROCEDURE — 99223 1ST HOSP IP/OBS HIGH 75: CPT | Performed by: INTERNAL MEDICINE

## 2020-02-14 PROCEDURE — 25010000002 MORPHINE PER 10 MG: Performed by: INTERNAL MEDICINE

## 2020-02-14 PROCEDURE — 74177 CT ABD & PELVIS W/CONTRAST: CPT

## 2020-02-14 PROCEDURE — 25010000002 IOPAMIDOL 61 % SOLUTION: Performed by: EMERGENCY MEDICINE

## 2020-02-14 PROCEDURE — 25010000002 CEFTRIAXONE PER 250 MG: Performed by: PHYSICIAN ASSISTANT

## 2020-02-14 PROCEDURE — 25010000002 OCTREOTIDE PER 25 MCG: Performed by: INTERNAL MEDICINE

## 2020-02-14 PROCEDURE — 85014 HEMATOCRIT: CPT | Performed by: INTERNAL MEDICINE

## 2020-02-14 PROCEDURE — 82962 GLUCOSE BLOOD TEST: CPT

## 2020-02-14 PROCEDURE — 82728 ASSAY OF FERRITIN: CPT | Performed by: INTERNAL MEDICINE

## 2020-02-14 PROCEDURE — 63710000001 INSULIN DETEMIR PER 5 UNITS: Performed by: INTERNAL MEDICINE

## 2020-02-14 PROCEDURE — 84466 ASSAY OF TRANSFERRIN: CPT | Performed by: INTERNAL MEDICINE

## 2020-02-14 PROCEDURE — 86923 COMPATIBILITY TEST ELECTRIC: CPT

## 2020-02-14 PROCEDURE — 06L38CZ OCCLUSION OF ESOPHAGEAL VEIN WITH EXTRALUMINAL DEVICE, VIA NATURAL OR ARTIFICIAL OPENING ENDOSCOPIC: ICD-10-PCS | Performed by: INTERNAL MEDICINE

## 2020-02-14 PROCEDURE — 86850 RBC ANTIBODY SCREEN: CPT | Performed by: EMERGENCY MEDICINE

## 2020-02-14 PROCEDURE — 71045 X-RAY EXAM CHEST 1 VIEW: CPT

## 2020-02-14 PROCEDURE — 86900 BLOOD TYPING SEROLOGIC ABO: CPT | Performed by: EMERGENCY MEDICINE

## 2020-02-14 PROCEDURE — 86038 ANTINUCLEAR ANTIBODIES: CPT | Performed by: INTERNAL MEDICINE

## 2020-02-14 PROCEDURE — 82105 ALPHA-FETOPROTEIN SERUM: CPT | Performed by: INTERNAL MEDICINE

## 2020-02-14 PROCEDURE — 25010000002 ONDANSETRON PER 1 MG: Performed by: NURSE ANESTHETIST, CERTIFIED REGISTERED

## 2020-02-14 PROCEDURE — 82270 OCCULT BLOOD FECES: CPT | Performed by: EMERGENCY MEDICINE

## 2020-02-14 PROCEDURE — 86901 BLOOD TYPING SEROLOGIC RH(D): CPT

## 2020-02-14 PROCEDURE — 83605 ASSAY OF LACTIC ACID: CPT | Performed by: EMERGENCY MEDICINE

## 2020-02-14 PROCEDURE — 99285 EMERGENCY DEPT VISIT HI MDM: CPT

## 2020-02-14 PROCEDURE — 86900 BLOOD TYPING SEROLOGIC ABO: CPT

## 2020-02-14 PROCEDURE — 86225 DNA ANTIBODY NATIVE: CPT | Performed by: INTERNAL MEDICINE

## 2020-02-14 PROCEDURE — 85045 AUTOMATED RETICULOCYTE COUNT: CPT | Performed by: INTERNAL MEDICINE

## 2020-02-14 PROCEDURE — 25010000002 HYDROMORPHONE 1 MG/ML SOLUTION: Performed by: INTERNAL MEDICINE

## 2020-02-14 PROCEDURE — 93005 ELECTROCARDIOGRAM TRACING: CPT | Performed by: EMERGENCY MEDICINE

## 2020-02-14 PROCEDURE — 82607 VITAMIN B-12: CPT | Performed by: INTERNAL MEDICINE

## 2020-02-14 PROCEDURE — 85610 PROTHROMBIN TIME: CPT | Performed by: INTERNAL MEDICINE

## 2020-02-14 RX ORDER — NALOXONE HCL 0.4 MG/ML
0.4 VIAL (ML) INJECTION AS NEEDED
Status: DISCONTINUED | OUTPATIENT
Start: 2020-02-14 | End: 2020-02-14 | Stop reason: HOSPADM

## 2020-02-14 RX ORDER — ACETAMINOPHEN 325 MG/1
650 TABLET ORAL EVERY 4 HOURS PRN
Status: DISCONTINUED | OUTPATIENT
Start: 2020-02-14 | End: 2020-02-19 | Stop reason: HOSPADM

## 2020-02-14 RX ORDER — MELOXICAM 15 MG/1
15 TABLET ORAL DAILY
Status: ON HOLD | COMMUNITY
End: 2020-02-14

## 2020-02-14 RX ORDER — LIDOCAINE HYDROCHLORIDE 10 MG/ML
INJECTION, SOLUTION EPIDURAL; INFILTRATION; INTRACAUDAL; PERINEURAL AS NEEDED
Status: DISCONTINUED | OUTPATIENT
Start: 2020-02-14 | End: 2020-02-14 | Stop reason: SURG

## 2020-02-14 RX ORDER — ONDANSETRON 2 MG/ML
4 INJECTION INTRAMUSCULAR; INTRAVENOUS ONCE AS NEEDED
Status: COMPLETED | OUTPATIENT
Start: 2020-02-14 | End: 2020-02-14

## 2020-02-14 RX ORDER — ONDANSETRON 2 MG/ML
4 INJECTION INTRAMUSCULAR; INTRAVENOUS EVERY 6 HOURS PRN
Status: DISCONTINUED | OUTPATIENT
Start: 2020-02-14 | End: 2020-02-19 | Stop reason: HOSPADM

## 2020-02-14 RX ORDER — DEXTROSE MONOHYDRATE 25 G/50ML
25 INJECTION, SOLUTION INTRAVENOUS
Status: DISCONTINUED | OUTPATIENT
Start: 2020-02-14 | End: 2020-02-19 | Stop reason: HOSPADM

## 2020-02-14 RX ORDER — DEXAMETHASONE SODIUM PHOSPHATE 4 MG/ML
INJECTION, SOLUTION INTRA-ARTICULAR; INTRALESIONAL; INTRAMUSCULAR; INTRAVENOUS; SOFT TISSUE AS NEEDED
Status: DISCONTINUED | OUTPATIENT
Start: 2020-02-14 | End: 2020-02-14 | Stop reason: SURG

## 2020-02-14 RX ORDER — ACETAMINOPHEN 160 MG/5ML
650 SOLUTION ORAL EVERY 4 HOURS PRN
Status: DISCONTINUED | OUTPATIENT
Start: 2020-02-14 | End: 2020-02-19 | Stop reason: HOSPADM

## 2020-02-14 RX ORDER — OCTREOTIDE ACETATE 50 UG/ML
100 INJECTION, SOLUTION INTRAVENOUS; SUBCUTANEOUS ONCE
Status: COMPLETED | OUTPATIENT
Start: 2020-02-14 | End: 2020-02-14

## 2020-02-14 RX ORDER — PROPOFOL 10 MG/ML
VIAL (ML) INTRAVENOUS AS NEEDED
Status: DISCONTINUED | OUTPATIENT
Start: 2020-02-14 | End: 2020-02-14 | Stop reason: SURG

## 2020-02-14 RX ORDER — METOPROLOL SUCCINATE 25 MG/1
25 TABLET, EXTENDED RELEASE ORAL EVERY EVENING
Status: DISCONTINUED | OUTPATIENT
Start: 2020-02-14 | End: 2020-02-15

## 2020-02-14 RX ORDER — SODIUM CHLORIDE 0.9 % (FLUSH) 0.9 %
10 SYRINGE (ML) INJECTION AS NEEDED
Status: DISCONTINUED | OUTPATIENT
Start: 2020-02-14 | End: 2020-02-19 | Stop reason: HOSPADM

## 2020-02-14 RX ORDER — LABETALOL HYDROCHLORIDE 5 MG/ML
5 INJECTION, SOLUTION INTRAVENOUS
Status: DISCONTINUED | OUTPATIENT
Start: 2020-02-14 | End: 2020-02-14 | Stop reason: HOSPADM

## 2020-02-14 RX ORDER — SODIUM CHLORIDE 9 MG/ML
50 INJECTION, SOLUTION INTRAVENOUS CONTINUOUS
Status: DISCONTINUED | OUTPATIENT
Start: 2020-02-14 | End: 2020-02-18

## 2020-02-14 RX ORDER — PANTOPRAZOLE SODIUM 40 MG/10ML
80 INJECTION, POWDER, LYOPHILIZED, FOR SOLUTION INTRAVENOUS ONCE
Status: COMPLETED | OUTPATIENT
Start: 2020-02-14 | End: 2020-02-14

## 2020-02-14 RX ORDER — METOPROLOL SUCCINATE 25 MG/1
25 TABLET, EXTENDED RELEASE ORAL DAILY
COMMUNITY
End: 2020-02-19 | Stop reason: HOSPADM

## 2020-02-14 RX ORDER — PANTOPRAZOLE SODIUM 40 MG/10ML
40 INJECTION, POWDER, LYOPHILIZED, FOR SOLUTION INTRAVENOUS EVERY 12 HOURS SCHEDULED
Status: DISCONTINUED | OUTPATIENT
Start: 2020-02-14 | End: 2020-02-19 | Stop reason: HOSPADM

## 2020-02-14 RX ORDER — SUCCINYLCHOLINE CHLORIDE 20 MG/ML
INJECTION INTRAMUSCULAR; INTRAVENOUS AS NEEDED
Status: DISCONTINUED | OUTPATIENT
Start: 2020-02-14 | End: 2020-02-14 | Stop reason: SURG

## 2020-02-14 RX ORDER — SODIUM CHLORIDE 0.9 % (FLUSH) 0.9 %
10 SYRINGE (ML) INJECTION EVERY 12 HOURS SCHEDULED
Status: DISCONTINUED | OUTPATIENT
Start: 2020-02-14 | End: 2020-02-19 | Stop reason: HOSPADM

## 2020-02-14 RX ORDER — SODIUM CHLORIDE, SODIUM LACTATE, POTASSIUM CHLORIDE, CALCIUM CHLORIDE 600; 310; 30; 20 MG/100ML; MG/100ML; MG/100ML; MG/100ML
100 INJECTION, SOLUTION INTRAVENOUS CONTINUOUS
Status: DISCONTINUED | OUTPATIENT
Start: 2020-02-14 | End: 2020-02-17

## 2020-02-14 RX ORDER — NICOTINE POLACRILEX 4 MG
15 LOZENGE BUCCAL
Status: DISCONTINUED | OUTPATIENT
Start: 2020-02-14 | End: 2020-02-19 | Stop reason: HOSPADM

## 2020-02-14 RX ORDER — ROCURONIUM BROMIDE 10 MG/ML
INJECTION, SOLUTION INTRAVENOUS AS NEEDED
Status: DISCONTINUED | OUTPATIENT
Start: 2020-02-14 | End: 2020-02-14 | Stop reason: SURG

## 2020-02-14 RX ORDER — ONDANSETRON 4 MG/1
4 TABLET, FILM COATED ORAL EVERY 6 HOURS PRN
Status: DISCONTINUED | OUTPATIENT
Start: 2020-02-14 | End: 2020-02-19 | Stop reason: HOSPADM

## 2020-02-14 RX ORDER — SUCRALFATE 1 G/1
1 TABLET ORAL 4 TIMES DAILY
COMMUNITY
End: 2020-02-19 | Stop reason: HOSPADM

## 2020-02-14 RX ORDER — EPHEDRINE SULFATE 50 MG/ML
5 INJECTION, SOLUTION INTRAVENOUS ONCE AS NEEDED
Status: DISCONTINUED | OUTPATIENT
Start: 2020-02-14 | End: 2020-02-14 | Stop reason: HOSPADM

## 2020-02-14 RX ORDER — INSULIN GLARGINE 100 [IU]/ML
100 INJECTION, SOLUTION SUBCUTANEOUS NIGHTLY
COMMUNITY
End: 2021-03-26 | Stop reason: SDUPTHER

## 2020-02-14 RX ORDER — MORPHINE SULFATE 2 MG/ML
2 INJECTION, SOLUTION INTRAMUSCULAR; INTRAVENOUS ONCE
Status: COMPLETED | OUTPATIENT
Start: 2020-02-14 | End: 2020-02-14

## 2020-02-14 RX ORDER — ASPIRIN 81 MG/1
81 TABLET ORAL DAILY
COMMUNITY
End: 2021-07-27

## 2020-02-14 RX ORDER — LORATADINE 10 MG/1
10 TABLET ORAL DAILY
COMMUNITY
End: 2021-03-26

## 2020-02-14 RX ORDER — ATORVASTATIN CALCIUM 40 MG/1
80 TABLET, FILM COATED ORAL NIGHTLY
Status: DISCONTINUED | OUTPATIENT
Start: 2020-02-14 | End: 2020-02-19 | Stop reason: HOSPADM

## 2020-02-14 RX ORDER — FENTANYL CITRATE 50 UG/ML
50 INJECTION, SOLUTION INTRAMUSCULAR; INTRAVENOUS
Status: DISCONTINUED | OUTPATIENT
Start: 2020-02-14 | End: 2020-02-14 | Stop reason: HOSPADM

## 2020-02-14 RX ORDER — MEPERIDINE HYDROCHLORIDE 50 MG/ML
12.5 INJECTION INTRAMUSCULAR; INTRAVENOUS; SUBCUTANEOUS
Status: DISCONTINUED | OUTPATIENT
Start: 2020-02-14 | End: 2020-02-14 | Stop reason: HOSPADM

## 2020-02-14 RX ORDER — ACETAMINOPHEN 650 MG/1
650 SUPPOSITORY RECTAL EVERY 4 HOURS PRN
Status: DISCONTINUED | OUTPATIENT
Start: 2020-02-14 | End: 2020-02-19 | Stop reason: HOSPADM

## 2020-02-14 RX ORDER — ATORVASTATIN CALCIUM 80 MG/1
80 TABLET, FILM COATED ORAL DAILY
COMMUNITY
End: 2021-03-26 | Stop reason: SDUPTHER

## 2020-02-14 RX ADMIN — PANTOPRAZOLE SODIUM 40 MG: 40 INJECTION, POWDER, FOR SOLUTION INTRAVENOUS at 21:30

## 2020-02-14 RX ADMIN — INSULIN DETEMIR 20 UNITS: 100 INJECTION, SOLUTION SUBCUTANEOUS at 21:34

## 2020-02-14 RX ADMIN — CEFTRIAXONE 1 G: 1 INJECTION, POWDER, FOR SOLUTION INTRAMUSCULAR; INTRAVENOUS at 14:35

## 2020-02-14 RX ADMIN — DEXAMETHASONE SODIUM PHOSPHATE 4 MG: 4 INJECTION, SOLUTION INTRAMUSCULAR; INTRAVENOUS at 16:44

## 2020-02-14 RX ADMIN — ATORVASTATIN CALCIUM 80 MG: 40 TABLET, FILM COATED ORAL at 21:30

## 2020-02-14 RX ADMIN — SUCCINYLCHOLINE CHLORIDE 160 MG: 20 INJECTION, SOLUTION INTRAMUSCULAR; INTRAVENOUS; PARENTERAL at 16:39

## 2020-02-14 RX ADMIN — METOPROLOL SUCCINATE 25 MG: 25 TABLET, FILM COATED, EXTENDED RELEASE ORAL at 18:45

## 2020-02-14 RX ADMIN — SODIUM CHLORIDE 50 ML/HR: 9 INJECTION, SOLUTION INTRAVENOUS at 16:28

## 2020-02-14 RX ADMIN — ONDANSETRON 4 MG: 2 INJECTION INTRAMUSCULAR; INTRAVENOUS at 16:44

## 2020-02-14 RX ADMIN — SODIUM CHLORIDE, POTASSIUM CHLORIDE, SODIUM LACTATE AND CALCIUM CHLORIDE 1000 ML: 600; 310; 30; 20 INJECTION, SOLUTION INTRAVENOUS at 15:12

## 2020-02-14 RX ADMIN — PROPOFOL 150 MG: 10 INJECTION, EMULSION INTRAVENOUS at 16:39

## 2020-02-14 RX ADMIN — PANTOPRAZOLE SODIUM 80 MG: 40 INJECTION, POWDER, FOR SOLUTION INTRAVENOUS at 14:31

## 2020-02-14 RX ADMIN — ROCURONIUM BROMIDE 5 MG: 10 SOLUTION INTRAVENOUS at 16:39

## 2020-02-14 RX ADMIN — OCTREOTIDE ACETATE 100 MCG: 50 INJECTION, SOLUTION INTRAVENOUS; SUBCUTANEOUS at 15:31

## 2020-02-14 RX ADMIN — OCTREOTIDE ACETATE 50 MCG/HR: 500 INJECTION, SOLUTION INTRAVENOUS; SUBCUTANEOUS at 15:37

## 2020-02-14 RX ADMIN — ACETAMINOPHEN 650 MG: 325 TABLET ORAL at 18:45

## 2020-02-14 RX ADMIN — INSULIN LISPRO 3 UNITS: 100 INJECTION, SOLUTION INTRAVENOUS; SUBCUTANEOUS at 21:31

## 2020-02-14 RX ADMIN — LIDOCAINE HYDROCHLORIDE 50 MG: 10 INJECTION, SOLUTION EPIDURAL; INFILTRATION; INTRACAUDAL; PERINEURAL at 16:39

## 2020-02-14 RX ADMIN — HYDROMORPHONE HYDROCHLORIDE 0.5 MG: 1 INJECTION, SOLUTION INTRAMUSCULAR; INTRAVENOUS; SUBCUTANEOUS at 17:34

## 2020-02-14 RX ADMIN — FENTANYL CITRATE 50 MCG: 50 INJECTION INTRAMUSCULAR; INTRAVENOUS at 17:25

## 2020-02-14 RX ADMIN — FENTANYL CITRATE 50 MCG: 50 INJECTION INTRAMUSCULAR; INTRAVENOUS at 17:40

## 2020-02-14 RX ADMIN — SODIUM CHLORIDE, POTASSIUM CHLORIDE, SODIUM LACTATE AND CALCIUM CHLORIDE 1000 ML: 600; 310; 30; 20 INJECTION, SOLUTION INTRAVENOUS at 10:17

## 2020-02-14 RX ADMIN — MORPHINE SULFATE 2 MG: 2 INJECTION, SOLUTION INTRAMUSCULAR; INTRAVENOUS at 20:41

## 2020-02-14 RX ADMIN — IOPAMIDOL 95 ML: 612 INJECTION, SOLUTION INTRAVENOUS at 12:20

## 2020-02-14 RX ADMIN — SODIUM CHLORIDE, PRESERVATIVE FREE 10 ML: 5 INJECTION INTRAVENOUS at 21:35

## 2020-02-14 RX ADMIN — SODIUM CHLORIDE, POTASSIUM CHLORIDE, SODIUM LACTATE AND CALCIUM CHLORIDE: 600; 310; 30; 20 INJECTION, SOLUTION INTRAVENOUS at 16:32

## 2020-02-14 NOTE — ANESTHESIA POSTPROCEDURE EVALUATION
Patient: Lyndsey Luna    Procedure Summary     Date:  02/14/20 Room / Location:   [facility]    Anesthesia Start:  1632 Anesthesia Stop:      Procedure:  ESOPHAGOGASTRODUODENOSCOPY (N/A ) Diagnosis:       Acute upper GI bleed      Portal hypertension (CMS/HCC)      (Acute upper GI bleed [K92.2])      (Portal hypertension (CMS/HCC) [K76.6])    Surgeon:  Brant Finch MD Provider:  Eagle Martinez Jr., MD    Anesthesia Type:  general ASA Status:  3          Anesthesia Type: general    Vitals  Vitals Value Taken Time   BP     Temp     Pulse 82 2/14/2020  5:11 PM   Resp     SpO2 89 % 2/14/2020  5:11 PM   Vitals shown include unvalidated device data.        Post Anesthesia Care and Evaluation    Patient location during evaluation: PACU  Patient participation: complete - patient participated  Level of consciousness: awake and alert  Pain score: 0  Pain management: adequate  Airway patency: patent  Anesthetic complications: No anesthetic complications  PONV Status: none  Cardiovascular status: hemodynamically stable and acceptable  Respiratory status: nonlabored ventilation, acceptable and nasal cannula  Hydration status: acceptable

## 2020-02-14 NOTE — ANESTHESIA PROCEDURE NOTES
Airway  Urgency: elective    Date/Time: 2/14/2020 4:40 PM  Airway not difficult    General Information and Staff    Patient location during procedure: OR  CRNA: Je Zhang CRNA    Indications and Patient Condition  Indications for airway management: airway protection    Preoxygenated: yes  MILS not maintained throughout  Mask difficulty assessment: 1 - vent by mask    Final Airway Details  Final airway type: endotracheal airway      Successful airway: ETT  Cuffed: yes   Successful intubation technique: direct laryngoscopy  Endotracheal tube insertion site: oral  Blade: Zachary  Blade size: 3  ETT size (mm): 7.0  Cormack-Lehane Classification: grade I - full view of glottis  Placement verified by: chest auscultation and capnometry   Cuff volume (mL): 5  Measured from: lips  ETT/EBT  to lips (cm): 21  Number of attempts at approach: 1    Additional Comments  Negative epigastric sounds, Breath sound equal bilaterally with symmetric chest rise and fall

## 2020-02-14 NOTE — H&P
[facility] Medicine Services  HISTORY AND PHYSICAL    Patient Name: Lyndsey Luna  : 1960  MRN: [id number]  Primary Care Physician: Javid Lin APRN  Date of admission: 2020      Subjective   Subjective     Chief Complaint:  Vomiting blood    HPI:  Lyndsey Luna is a 59 y.o. female with Hx of splenomegaly, PUD, DM2, and prior MI with stenting 2 years ago who presents today with acute onset vomiting blood. She felt nauseous going to bed last night and this AM awoke and started vomiting several times. Quickly started vomiting bright red blood. She has been having on and off melanotic stools for several months, and BRB per rectum since before then, she saw GI in [facility] recently with planned RUQ US and EGD scheduled for next week. For years she has had difficulty with abdominal swelling, TOLENTINO, and known splenomegaly that has not been worked up as she had her MI around the same time. She has had trouble finding a primary provider that she likes in the interim. Approx 1-2 months ago she was started on mobic. She denies fevers, diarrhea, edema. She has no known Hx of liver disease. Workup in the ED shows a Hgb of 8.4 without known baseline and a CT of the abd/pel suggestive of esophageal varices. She has already been seen by GI and is planned for endoscopy.    Review of Systems   Gen- No fevers, chills  CV- No chest pain, palpitations  Resp- No cough, dyspnea  GI- No diarrhea, abd pain    All other systems reviewed and are negative.     Personal History     Past Medical History:   Diagnosis Date   • Arthritis    • Diabetes mellitus (CMS/HCC)    • Gastric ulcer    • Hypertension    • Kidney stone    • Spleen enlarged        Past Surgical History:   Procedure Laterality Date   • CARDIAC CATHETERIZATION     • FRACTURE SURGERY      LEFT ANKLE   • KIDNEY STONE SURGERY         Family History: family history includes Diabetes in her father. Otherwise pertinent FHx was  reviewed and unremarkable.     Social History:  reports that she has never smoked. She does not have any smokeless tobacco history on file. She reports that she does not drink alcohol or use drugs.  Social History     Social History Narrative   • Not on file       Medications:  Available home medication information reviewed.  Medications Prior to Admission   Medication Sig Dispense Refill Last Dose   • aspirin 81 MG EC tablet Take 81 mg by mouth Daily.      • atorvastatin (LIPITOR) 80 MG tablet Take 80 mg by mouth Daily.      • insulin glargine (LANTUS) 100 UNIT/ML injection Inject 100 Units under the skin into the appropriate area as directed Every Night.      • metFORMIN (GLUCOPHAGE) 1000 MG tablet Take 1,000 mg by mouth 2 (Two) Times a Day With Meals.      • metoprolol succinate XL (TOPROL-XL) 25 MG 24 hr tablet Take 25 mg by mouth Daily.      • SITagliptin (JANUVIA) 100 MG tablet Take 100 mg by mouth Daily.      • sucralfate (CARAFATE) 1 g tablet Take 1 g by mouth 4 (Four) Times a Day.          Allergies   Allergen Reactions   • Penicillins Itching       Objective   Objective     Vital Signs:   Temp:  [99.1 °F (37.3 °C)] 99.1 °F (37.3 °C)  Heart Rate:  [57-94] 92  Resp:  [22] 22  BP: (116-139)/(50-78) 136/57        Physical Exam   Constitutional: Awake, alert, NAD, sitting up in ED stretcher  Eyes: PERRL, sclerae anicteric, no conjunctival injection  HENT: NCAT, mucous membranes moist  Neck: Supple, trachea midline  Respiratory: Clear to auscultation bilaterally, nonlabored respirations   Cardiovascular: RRR, 2/6 systolic murmur, palpable pedal pulses bilaterally  Gastrointestinal: Positive bowel sounds, firm, nontender, distended  Musculoskeletal: No bilateral ankle edema, no clubbing or cyanosis to extremities  Psychiatric: Appropriate affect, cooperative  Neurologic: Oriented x 3, strength symmetric in all extremities, speech clear  Skin: No rashes      Results Reviewed:  I have personally reviewed current  lab and radiology data.    Results from last 7 days   Lab Units 02/14/20  1005   WBC 10*3/mm3 4.23   HEMOGLOBIN g/dL 8.4*   HEMATOCRIT % 27.7*   PLATELETS 10*3/mm3 56*     Results from last 7 days   Lab Units 02/14/20  1005   SODIUM mmol/L 141   POTASSIUM mmol/L 4.0   CHLORIDE mmol/L 109*   CO2 mmol/L 25.0   BUN mg/dL 22*   CREATININE mg/dL 1.02*   GLUCOSE mg/dL 167*   CALCIUM mg/dL 8.8   ALT (SGPT) U/L 21   AST (SGOT) U/L 21   LACTATE mmol/L 1.5     Estimated Creatinine Clearance: 68.6 mL/min (A) (by C-G formula based on SCr of 1.02 mg/dL (H)).  Brief Urine Lab Results     None        Imaging Results (Last 24 Hours)     Procedure Component Value Units Date/Time    CT Abdomen Pelvis With Contrast [[id number]] Collected:  02/14/20 1407     Updated:  02/14/20 1535    Narrative:       EXAMINATION: CT ABDOMEN PELVIS W CONTRAST- 02/14/2020     INDICATION: UGI bleed diabetic, metformin      TECHNIQUE: CT abdomen and pelvis without intravenous contrast     The radiation dose reduction device was turned on for each scan per the  ALARA (As Low as Reasonably Achievable) protocol.     COMPARISON: NONE     FINDINGS: Lung bases are clear. Liver demonstrates homogeneous  appearance of the parenchyma without focal liver lesion. Perihepatic and  paraspinal ascites noted. Gallbladder partially contracted and  unremarkable. No biliary dilatation. Pancreas unremarkable. Spleen is  enlarged to 20 cm in craniocaudal dimension consistent with sequela of  portal hypertension along with paraesophageal and perigastric varices as  well as mixed attenuation in the gastric lumen of likely blood products  or clot which is mildly distended. No mechanical obstructive process.  Adrenals without distinct nodule. Kidneys without hydronephrosis or  hydroureter despite bilateral nonobstructing nephrolithiasis. Renal  cortical scarring and atrophy greatest on the right. No bulky  retroperitoneal adenopathy. Patent nonaneurysmal  minimally  atherosclerotic abdominal aorta. Portal veins and IVC patent. GI tract  as detailed above. Small volume abdominopelvic ascites with mesenteric  edema. Pelvic viscera grossly unremarkable without bulky pelvic  adenopathy or free fluid. Degenerative changes of the spine without  aggressive osseous or soft tissue body wall lesions.       Impression:       Liver without focal lesion however enlarged spleen of likely  pleural hypertension sequela with small volume abdominopelvic ascites  and perigastric as well as paraesophageal varices with increased  attenuation of blood products in the mildly distended gastric lumen may  represent clot or hemorrhage from varices adjacent however no  intra-abdominal free air to suggest perforation.     D:  02/14/2020  E:  02/14/2020       XR Chest 1 View [[id number]] Collected:  02/14/20 1054     Updated:  02/14/20 1321    Narrative:       EXAMINATION: XR CHEST 1 VW- 02/14/2020     INDICATION: UGI bleed      COMPARISON: NONE     FINDINGS: Portable chest reveals cardiac and mediastinal silhouettes  within normal limits. The lung fields are clear. No focal parenchymal  opacification present.  No pleural effusion or pneumothorax. The bony  structures are unremarkable. Pulmonary vascularity is within normal  limits.           Impression:       Lung fields are grossly clear with no acute parenchymal  disease.     D:  02/14/2020  E:  02/14/2020     This report was finalized on 2/14/2020 11:59 AM by Dr. Millie Sinclair MD.                Assessment/Plan   Assessment & Plan     Active Hospital Problems    Diagnosis POA   • Acute upper GI bleed [K92.2] Yes   • Portal hypertension (CMS/HCC) [K76.6] Unknown     Added automatically from request for surgery [id number]     • CAD (coronary artery disease) [I25.10] Yes   • Type 2 diabetes mellitus, without long-term current use of insulin (CMS/HCC) [E11.9] Yes       Summary: This is a 58 y/o female with Hx PUD, DM2, MI s/p stenting (~2  years ago), with plans for workup outpatient for melanotic stools and abdominal distention who presents with acute hematemesis with microcytic anemia and CT evidence for ascites and likely esophageal varices being admitted for urgent EGD and evaluation    Assessment/Plan    Hematemesis with concerns for variceal bleeding  Hx PUD  Suspect underlying anemia  Thrombocytopenia, splenomegaly  - bili/Na normal, thrombocytopenic, splenomegaly, concern for cirrhosis/portal HTN, will also check INR  - Hgb 8.4 on arrival, no baseline, however she is microcytic which suggests some degree of chronicity, HR >90 on chronic BB suggests an acute component as well  - last EGD/colo >10 years ago  - Hx PUD and recently started on mobic, also with evidence for esophageal varices on CT - either could be contributing to current presentation  - cont octreotide  - PPI bolus given in the ED, will order BID IV PPI  - got 1g ceftriaxone in the ED, will order daily for 6 more days for GI ppx (transition to cipro if DC'ed prior to 7 days or recommended otherwise per GI)  - stop mobic/nsaids indefinitely, grupo with Hx CAD  - seen by GI already, anemia panel ordered and CT guided paracentesis ordered, and planned for EGD today  - serial H&H, if dropping will give blood, she has been type and crossed already  - agree with GI, lasix/aldactone prior to DC    DM type 2 with long term use of insulin  - appears to be on lantus 100U qHS per her med rec?  - start weight based levemir (20U HS) and SSI, titrate as necessary (starting low as she is NPO currently)  - check A1c in the AM    CAD s/p remote stenting  - had MI and stenting ~2 years ago  - hold ASA during acute bleeding, likely restart in 1-2 days following scope and stable H&H  - cont statin, BB  - could benefit from ACE/ARB prior to DC given Hx DM and CAD, will hold off at the moment given GI bleed and unknown if her current renal function is baseline or acutely decompensated    DVT prophylaxis:   mechanical    CODE STATUS:    Code Status and Medical Interventions:   Ordered at: 02/14/20 1555     Level Of Support Discussed With:    Patient    Next of Kin (If No Surrogate)     Code Status:    CPR     Medical Interventions (Level of Support Prior to Arrest):    Full       Admission Status:  I believe this patient meets INPATIENT status due to acute Gi bleeding with likely varices and portal HTN requiring urgent endoscopy, serial lab monitoring, addition of multiple medications requiring lab monitoring, and risk for life threatening hemorrhage if not addressed in an acute setting.  I feel patient’s risk for adverse outcomes and need for care warrant INPATIENT evaluation and I predict the patient’s care encounter to likely last beyond 2 midnights.      Electronically signed by Alli Watts DO, 02/14/20, 3:59 PM.

## 2020-02-14 NOTE — POST-PROCEDURE NOTE
EGD 4 cords of grade lll.   Stigmata at 31 cm   Red jana sign   EBL X 7.         REC cont PPI, Octreotide and Rochepin  Repeat banding in 4 weeks with Dr Moore

## 2020-02-14 NOTE — ED PROVIDER NOTES
"Subjective   59-year-old female with history of insulin-dependent type 2 diabetes, prior history of abdominal ulcer, kidney stones hypertension osteoarthritis and splenomegaly presents emergency department with 3 episodes of large-volume hematemesis.  She states has been feeling \"weak\" for the past 2 days.  Was seen by gastroenterologist in [place] who was scheduling her for outpatient endoscopy.  Reason for being seen by GI was due to black stool for several days prior.  Prior to that she did have several episodes of what she described as bright red stool.  She complains of increasing abdominal distention and generalized abdominal pain.  She does have some mild orthostasis and generalized weakness and lightheadedness.  No chest pain shortness of breath rapid heartbeat or irregular heartbeat.    She does have a prior history of peptic ulcer disease for which she was treated approximately 5 to 6 years ago.  Currently takes Carafate 4 times a day.  No PPI.    Review of her medications remarkable for meloxicam 15mg daily for osteoarthritis.  She does not know how long she has been taking meloxicam.    Allergic to penicillin.    Her primary care provider is Javid Lin.          Review of Systems   Constitutional: Positive for activity change, appetite change and fatigue.   HENT: Negative for congestion, nosebleeds, rhinorrhea and sore throat.    Eyes: Negative for photophobia and visual disturbance.   Respiratory: Negative for cough, shortness of breath and wheezing.    Cardiovascular: Negative for chest pain, palpitations and leg swelling.   Gastrointestinal: Positive for abdominal distention, abdominal pain, blood in stool, nausea and vomiting. Negative for diarrhea.        Hematemesis x3 this morning.  Recent history of black stool.   Genitourinary: Negative for difficulty urinating, dyspareunia, dysuria, flank pain and hematuria.   Musculoskeletal: Negative for arthralgias, back pain, myalgias and neck pain. "   Skin: Positive for color change and pallor. Negative for wound.   Neurological: Positive for light-headedness. Negative for dizziness, syncope and headaches.   All other systems reviewed and are negative.      Past Medical History:   Diagnosis Date   • Arthritis    • Diabetes mellitus (CMS/HCC)    • Hypertension    • Kidney stone    • Spleen enlarged    • Ulcer of abdomen wall (CMS/HCC)        Allergies   Allergen Reactions   • Penicillins Itching       Past Surgical History:   Procedure Laterality Date   • FRACTURE SURGERY      LEFT ANKLE   • KIDNEY STONE SURGERY         History reviewed. No pertinent family history.    Social History     Socioeconomic History   • Marital status:      Spouse name: Not on file   • Number of children: Not on file   • Years of education: Not on file   • Highest education level: Not on file   Tobacco Use   • Smoking status: Never Smoker   Substance and Sexual Activity   • Alcohol use: Never     Frequency: Never   • Drug use: Never   • Sexual activity: Defer           Objective   Physical Exam   Constitutional: She is oriented to person, place, and time. She appears well-developed and well-nourished. No distress.   Pleasant awake alert and oriented, general pallor noted.  Hemodynamically stable, heart rate 88, /60, oxygen saturation 95% on room air.   HENT:   Head: Normocephalic and atraumatic.   Right Ear: External ear normal.   Left Ear: External ear normal.   Nose: Nose normal.   Mouth/Throat: Oropharynx is clear and moist. No oropharyngeal exudate.   Eyes: Pupils are equal, round, and reactive to light. Conjunctivae and EOM are normal. Right eye exhibits no discharge. Left eye exhibits no discharge. No scleral icterus.   Neck: Normal range of motion. Neck supple. No JVD present. No tracheal deviation present. No thyromegaly present.   Cardiovascular: Normal rate, regular rhythm, normal heart sounds and intact distal pulses. Exam reveals no gallop and no friction rub.    No murmur heard.  Pulmonary/Chest: Effort normal and breath sounds normal. No stridor. No respiratory distress. She has no wheezes. She has no rales. She exhibits no tenderness.   Abdominal: Soft. Bowel sounds are normal. She exhibits distension. There is tenderness. There is no rebound and no guarding.   Protuberant, slightly tender diffusely, tender LUQ with palpable spleen tip   Musculoskeletal: Normal range of motion. She exhibits no edema, tenderness or deformity.   Neurological: She is alert and oriented to person, place, and time. No cranial nerve deficit. She exhibits normal muscle tone. Coordination normal.   Skin: Skin is warm and dry. No rash noted. She is not diaphoretic. No erythema. No pallor.   Psychiatric: She has a normal mood and affect. Her behavior is normal. Judgment and thought content normal.   Nursing note and vitals reviewed.      Procedures           ED Course  ED Course as of Feb 14 1518 Fri Feb 14, 2020   1114 Hemoglobin(!): 8.4 [TG]   1114 Hematocrit(!): 27.7 [TG]   1114 BUN(!): 22 [TG]   1114 Sodium: 141 [TG]   1336 Last ate around 1800 yesterday    [TG]   1411 Grossly positive stool    [TG]   1420 D/W Dr. Finch, will see in consult    [TG]   1420 IMPRESSION:  Liver without focal lesion however enlarged spleen likely  pleural hypertension sequela with small volume abdominopelvic ascites  and perigastric as well as paraesophageal varices with increased  attenuation of blood products in the mildly distended gastric lumen  represent clot or hemorrhage from varices adjacent however no  intra-abdominal free air to suggest perforation.    [TG]   1516 D/W Dr. Flores, states will discuss with Dr. Burnett..    [TG]      ED Course User Index  [TG] Teddy Nunez PA-C                                   Recent Results (from the past 24 hour(s))   Lactic Acid, Plasma    Collection Time: 02/14/20 10:05 AM   Result Value Ref Range    Lactate 1.5 0.5 - 2.0 mmol/L   Comprehensive Metabolic  Panel    Collection Time: 02/14/20 10:05 AM   Result Value Ref Range    Glucose 167 (H) 65 - 99 mg/dL    BUN 22 (H) 6 - 20 mg/dL    Creatinine 1.02 (H) 0.57 - 1.00 mg/dL    Sodium 141 136 - 145 mmol/L    Potassium 4.0 3.5 - 5.2 mmol/L    Chloride 109 (H) 98 - 107 mmol/L    CO2 25.0 22.0 - 29.0 mmol/L    Calcium 8.8 8.6 - 10.5 mg/dL    Total Protein 6.6 6.0 - 8.5 g/dL    Albumin 3.60 3.50 - 5.20 g/dL    ALT (SGPT) 21 1 - 33 U/L    AST (SGOT) 21 1 - 32 U/L    Alkaline Phosphatase 92 39 - 117 U/L    Total Bilirubin 1.0 0.2 - 1.2 mg/dL    eGFR Non African Amer 55 (L) >60 mL/min/1.73    eGFR  African Amer 67 >60 mL/min/1.73    Globulin 3.0 gm/dL    A/G Ratio 1.2 g/dL    BUN/Creatinine Ratio 21.6 7.0 - 25.0    Anion Gap 7.0 5.0 - 15.0 mmol/L   Type & Screen    Collection Time: 02/14/20 10:05 AM   Result Value Ref Range    ABO Type A     RH type Positive     Antibody Screen Negative     T&S Expiration Date 2/17/2020 11:59:59 PM    Light Blue Top    Collection Time: 02/14/20 10:05 AM   Result Value Ref Range    Extra Tube hold for add-on    Green Top (Gel)    Collection Time: 02/14/20 10:05 AM   Result Value Ref Range    Extra Tube Hold for add-ons.    Lavender Top    Collection Time: 02/14/20 10:05 AM   Result Value Ref Range    Extra Tube hold for add-on    Gold Top - SST    Collection Time: 02/14/20 10:05 AM   Result Value Ref Range    Extra Tube Hold for add-ons.    CBC Auto Differential    Collection Time: 02/14/20 10:05 AM   Result Value Ref Range    WBC 4.23 3.40 - 10.80 10*3/mm3    RBC 3.54 (L) 3.77 - 5.28 10*6/mm3    Hemoglobin 8.4 (L) 12.0 - 15.9 g/dL    Hematocrit 27.7 (L) 34.0 - 46.6 %    MCV 78.2 (L) 79.0 - 97.0 fL    MCH 23.7 (L) 26.6 - 33.0 pg    MCHC 30.3 (L) 31.5 - 35.7 g/dL    RDW 16.7 (H) 12.3 - 15.4 %    RDW-SD 47.4 37.0 - 54.0 fl    MPV 12.3 (H) 6.0 - 12.0 fL    Platelets 56 (L) 140 - 450 10*3/mm3    Neutrophil % 75.5 42.7 - 76.0 %    Lymphocyte % 13.0 (L) 19.6 - 45.3 %    Monocyte % 8.0 5.0 - 12.0 %     Eosinophil % 2.1 0.3 - 6.2 %    Basophil % 0.9 0.0 - 1.5 %    Immature Grans % 0.5 0.0 - 0.5 %    Neutrophils, Absolute 3.19 1.70 - 7.00 10*3/mm3    Lymphocytes, Absolute 0.55 (L) 0.70 - 3.10 10*3/mm3    Monocytes, Absolute 0.34 0.10 - 0.90 10*3/mm3    Eosinophils, Absolute 0.09 0.00 - 0.40 10*3/mm3    Basophils, Absolute 0.04 0.00 - 0.20 10*3/mm3    Immature Grans, Absolute 0.02 0.00 - 0.05 10*3/mm3    nRBC 0.0 0.0 - 0.2 /100 WBC   ABO RH Specimen Verification    Collection Time: 02/14/20 11:49 AM   Result Value Ref Range    ABO Type A     RH type Positive    POC Occult Blood Stool    Collection Time: 02/14/20  2:10 PM   Result Value Ref Range    Fecal Occult Blood Positive (A) Negative    Lot Number [id number]     Expiration Date 6-22     DEVELOPER LOT NUMBER [id number]     DEVELOPER EXPIRATION DATE 10-22     Positive Control Positive Positive    Negative Control Negative Negative     Note: In addition to lab results from this visit, the labs listed above may include labs taken at another facility or during a different encounter within the last 24 hours. Please correlate lab times with ED admission and discharge times for further clarification of the services performed during this visit.    CT Abdomen Pelvis With Contrast   ED Interpretation   Liver without focal lesion however enlarged spleen likely   pleural hypertension sequela with small volume abdominopelvic ascites   and perigastric as well as paraesophageal varices with increased   attenuation of blood products in the mildly distended gastric lumen   represent clot or hemorrhage from varices adjacent however no   intra-abdominal free air to suggest perforation.              Preliminary Result   Liver without focal lesion however enlarged spleen likely   pleural hypertension sequela with small volume abdominopelvic ascites   and perigastric as well as paraesophageal varices with increased   attenuation of blood products in the mildly distended gastric lumen    represent clot or hemorrhage from varices adjacent however no   intra-abdominal free air to suggest perforation.              XR Chest 1 View   ED Interpretation   Lung fields are grossly clear with no acute parenchymal   disease.       D:  02/14/2020   E:  02/14/2020       This report was finalized on 2/14/2020 11:59 AM by Dr. Millie Sinclair MD.          Final Result   Lung fields are grossly clear with no acute parenchymal   disease.       D:  02/14/2020   E:  02/14/2020       This report was finalized on 2/14/2020 11:59 AM by Dr. Millie Sinclair MD.            Vitals:    02/14/20 1200 02/14/20 1230 02/14/20 1330 02/14/20 1430   BP: 122/62 116/62 116/60 136/57   BP Location:       Patient Position:       Pulse: 90 90 92    Resp:       Temp:       TempSrc:       SpO2: 97% 98% 96%    Weight:       Height:         Medications   sodium chloride 0.9 % flush 10 mL (has no administration in time range)   sodium chloride 0.9 % flush 10 mL (has no administration in time range)   lactated ringers bolus 1,000 mL (1,000 mL Intravenous New Bag 2/14/20 1512)   octreotide (sandoSTATIN) injection 100 mcg (has no administration in time range)   octreotide (sandoSTATIN) 500 mcg in sodium chloride 0.9 % 100 mL (5 mcg/mL) infusion (has no administration in time range)   lactated ringers bolus 1,000 mL (0 mL Intravenous Stopped 2/14/20 1047)   iopamidol (ISOVUE-300) 61 % injection 100 mL (95 mL Intravenous Given 2/14/20 1220)   pantoprazole (PROTONIX) injection 80 mg (80 mg Intravenous Given 2/14/20 1431)   cefTRIAXone (ROCEPHIN) 1 g/100 mL 0.9% NS (MBP) (0 g Intravenous Stopped 2/14/20 1505)     ECG/EMG Results (last 24 hours)     Procedure Component Value Units Date/Time    ECG 12 Lead [[id number]] Collected:  02/14/20 1012     Updated:  02/14/20 1030        ECG 12 Lead                     MDM    Final diagnoses:   Acute upper GI bleed   Secondary esophageal varices with bleeding (CMS/HCC)   Portal hypertension (CMS/HCC)    Splenomegaly   Symptomatic anemia   ELIZA (acute kidney injury) (CMS/HCC)   History of ulcer disease   Type 2 diabetes mellitus with other specified complication, with long-term current use of insulin (CMS/[facility])            Teddy Nunez PA-C  02/14/20 2198

## 2020-02-14 NOTE — CONSULTS
[facility]    Referring Provider: No ref. provider found    Primary Care Provider: Javid Lin APRN    Reason for Consultation: Hematemesis    Chief complaint : Hematemesis    History of present illness:  Lyndsey Luna is a 59 y.o. female who is admitted with hematemesis.  She has known history of liver disease.  She has been found to have splenomegaly.  Has had intermittent melena   the last several weeks.  She has also had intermittent abdominal distention.  She denies any alcohol.  No tobacco.  Family liver disease.  She has a history of peptic ulcer disease in the past.  Last EGD was several years ago.  Saw Dr. Moore last week and was scheduled for an EGD following week.    States she has had her hepatitis A and B vaccine.  Allergies:  Penicillins    Scheduled Meds:    lactated ringers 1,000 mL Intravenous Once   octreotide 100 mcg Intravenous Once        Infusions:    octreotide (SandoSTATIN) infusion 50 mcg/hr       PRN Meds:  sodium chloride  •  [COMPLETED] Insert peripheral IV **AND** sodium chloride    Home Meds:    (Not in a hospital admission)    ROS: Review of Systems   Constitutional: Negative for appetite change, chills, diaphoresis and fever.   Respiratory: Positive for shortness of breath.    Cardiovascular: Positive for leg swelling. Negative for chest pain.   Gastrointestinal: Positive for abdominal distention and blood in stool. Negative for abdominal pain.   All other systems reviewed and are negative.      PAST MED HX: Pt  has a past medical history of Arthritis, Diabetes mellitus (CMS/HCC), Hypertension, Kidney stone, Spleen enlarged, and Ulcer of abdomen wall (CMS/HCC).  PAST SURG HX: Pt  has a past surgical history that includes Fracture surgery and Kidney stone surgery.  FAM HX: family history is not on file.  SOC HX: Pt  reports that she has never smoked. She does not have any smokeless tobacco history on file. She reports that she does not drink alcohol or use  "drugs.    /57   Pulse 92   Temp 99.1 °F (37.3 °C) (Oral)   Resp 22   Ht 170.2 cm (67\")   Wt 90.7 kg (200 lb)   SpO2 96%   BMI 31.32 kg/m²     Physical Exam  Wt Readings from Last 3 Encounters:   02/14/20 90.7 kg (200 lb)   ,body mass index is 31.32 kg/m².    General Well developed; well nourished; no acute distress.   ENT Good dentition.  Oral mucosa pink & moist without thrush or lesions.    Neck Neck supple; trachea midline. No thyromegaly  Resp CTA; no rhonchi, rales, or wheezes.  Respiration effort normal  CV RRR; ; no M/R/G. No lower extremity edema  GI Abd soft, NT, ND, normal active bowel sounds.  No HSM.  No abd hernia  Skin No rash; no lesions; no bruises.  Skin turgor normal angiectasia's on chest.  Musc No clubbing; no cyanosis.    Psych Oriented to time, place, and person.  Appropriate affect      Results Review:   I reviewed the patient's new clinical results.  I reviewed the patient's new imaging results and agree with the interpretation.    Lab Results   Component Value Date    WBC 4.23 02/14/2020    HGB 8.4 (L) 02/14/2020    HCT 27.7 (L) 02/14/2020    MCV 78.2 (L) 02/14/2020    PLT 56 (L) 02/14/2020       Lab Results   Component Value Date    GLUCOSE 167 (H) 02/14/2020    BUN 22 (H) 02/14/2020    CREATININE 1.02 (H) 02/14/2020    EGFRIFNONA 55 (L) 02/14/2020    EGFRIFAFRI 67 02/14/2020    BCR 21.6 02/14/2020    CO2 25.0 02/14/2020    CALCIUM 8.8 02/14/2020    ALBUMIN 3.60 02/14/2020    AST 21 02/14/2020    ALT 21 02/14/2020       ASSESSMENTS/PLANS    1.  Hematemesis  2.  Portal hypertension    She is already been started on Protonix, octreotide and Rocephin.    Will plan EGD with possible EGD EVL today if needed  Needs Vit D level  Will confirm immunity to Hep A and B  Baseline AFP  Will need to start lasix and aldactone prior to DC  Needs Diagnostic paracentesis  Anemia profile on labs prior to transfusion    I discussed the patients findings and my recommendations with patient, family, " nursing staff and primary care team    Brant Finch MD  02/14/20  3:12 PM

## 2020-02-14 NOTE — ANESTHESIA PREPROCEDURE EVALUATION
Anesthesia Evaluation     Patient summary reviewed and Nursing notes reviewed   no history of anesthetic complications:  NPO Solid Status: > 8 hours  NPO Liquid Status: > 6 hours           Airway   Mallampati: I  TM distance: >3 FB  Neck ROM: full  No difficulty expected  Dental - normal exam     Pulmonary - negative pulmonary ROS and normal exam    breath sounds clear to auscultation  (-) COPD, sleep apnea, rhonchi, wheezes, not a smoker, lung cancer  Cardiovascular - normal exam  Exercise tolerance: good (4-7 METS)    ECG reviewed  Rhythm: regular  Rate: normal    (+) hypertension, CAD, cardiac stents Drug eluting stent more than 12 months ago   (-) CHF, orthopnea, TOLENTINO, murmur      Neuro/Psych- negative ROS  (-) seizures, CVA  GI/Hepatic/Renal/Endo    (+)  PUD, GI bleeding upper active bleeding, diabetes mellitus type 2,   (-) no renal disease    Musculoskeletal     Abdominal    Substance History      OB/GYN          Other   arthritis,                      Anesthesia Plan    ASA 3     general   (PROPOFOL)  intravenous induction     Anesthetic plan, all risks, benefits, and alternatives have been provided, discussed and informed consent has been obtained with: patient.    Plan discussed with CRNA.

## 2020-02-14 NOTE — PAYOR COMM NOTE
"Lyndsey Luna (59 y.o. Female)     Date of Birth Social Security Number Address Home Phone MRN    1960  [id number] [facility] [address] [phone] [id number]    Church Marital Status          Quaker        Admission Date Admission Type Admitting Provider Attending Provider Department, Room/Bed    2/14/20 Emergency Watts, Alli Rios, [facility] ENDO SUITES, ENDO/NONE    Discharge Date Discharge Disposition Discharge Destination                       Attending Provider:  (none)   Allergies:  Penicillins    Isolation:  None   Infection:  None   Code Status:  CPR    Ht:  170.2 cm (67\")   Wt:  90.7 kg (200 lb)    Admission Cmt:  None   Principal Problem:  None                Active Insurance as of 2/14/2020     Primary Coverage     Payor Plan Insurance Group Employer/Plan Group    [employer] [id number]     Payor Plan Address Payor Plan Phone Number Payor Plan Fax Number Effective Dates    PO    2/14/2020 - None Entered    [facility] [address]       Subscriber Name Subscriber Birth Date Member ID       LYNDSEY LUNA 1960 [id number]           Secondary Coverage     Payor Plan Insurance Group Employer/Plan Group    [id number] PASSPORT [id number]     Payor Plan Address Payor Plan Phone Number Payor Plan Fax Number Effective Dates    [address] [phone]  1/1/2019 - None Entered    [facility] [address]       Subscriber Name Subscriber Birth Date Member ID       LYNDSEY LUNA 1960 [id number]                 Emergency Contacts      (Rel.) Home Phone Work Phone Mobile Phone    Otoniel Luna (Spouse) [phone] -- --    Morenita Jaramillo (Daughter) [phone] -- --            Insurance Information                [facility] [place] Phone:     Subscriber: Lyndsey Luna Subscriber#: [id number]    Group#: [id number] Precert#:         PASSPORT HEALTH PLAN/PASSPORT Phone: [phone]    " Subscriber: Lyndsey Luna Subscriber#: [id number]    Group#: [id number] Precert#:              History & Physical      Alli Watts DO at 20 1559              [facility] Medicine Services  HISTORY AND PHYSICAL    Patient Name: Lyndsey Luna  : 1960  MRN: [id number]  Primary Care Physician: Javid Lin APRN  Date of admission: 2020      Subjective   Subjective     Chief Complaint:  Vomiting blood    HPI:  Lyndsey Luna is a 59 y.o. female with Hx of splenomegaly, PUD, DM2, and prior MI with stenting 2 years ago who presents today with acute onset vomiting blood. She felt nauseous going to bed last night and this AM awoke and started vomiting several times. Quickly started vomiting bright red blood. She has been having on and off melanotic stools for several months, and BRB per rectum since before then, she saw GI in [facility] recently with planned RUQ US and EGD scheduled for next week. For years she has had difficulty with abdominal swelling, TOLENTINO, and known splenomegaly that has not been worked up as she had her MI around the same time. She has had trouble finding a primary provider that she likes in the interim. Approx 1-2 months ago she was started on mobic. She denies fevers, diarrhea, edema. She has no known Hx of liver disease. Workup in the ED shows a Hgb of 8.4 without known baseline and a CT of the abd/pel suggestive of esophageal varices. She has already been seen by GI and is planned for endoscopy.    Review of Systems   Gen- No fevers, chills  CV- No chest pain, palpitations  Resp- No cough, dyspnea  GI- No diarrhea, abd pain    All other systems reviewed and are negative.     Personal History     Past Medical History:   Diagnosis Date   • Arthritis    • Diabetes mellitus (CMS/HCC)    • Gastric ulcer    • Hypertension    • Kidney stone    • Spleen enlarged        Past Surgical History:   Procedure Laterality Date   • CARDIAC CATHETERIZATION      • FRACTURE SURGERY      LEFT ANKLE   • KIDNEY STONE SURGERY         Family History: family history includes Diabetes in her father. Otherwise pertinent FHx was reviewed and unremarkable.     Social History:  reports that she has never smoked. She does not have any smokeless tobacco history on file. She reports that she does not drink alcohol or use drugs.  Social History     Social History Narrative   • Not on file       Medications:  Available home medication information reviewed.  Medications Prior to Admission   Medication Sig Dispense Refill Last Dose   • aspirin 81 MG EC tablet Take 81 mg by mouth Daily.      • atorvastatin (LIPITOR) 80 MG tablet Take 80 mg by mouth Daily.      • insulin glargine (LANTUS) 100 UNIT/ML injection Inject 100 Units under the skin into the appropriate area as directed Every Night.      • metFORMIN (GLUCOPHAGE) 1000 MG tablet Take 1,000 mg by mouth 2 (Two) Times a Day With Meals.      • metoprolol succinate XL (TOPROL-XL) 25 MG 24 hr tablet Take 25 mg by mouth Daily.      • SITagliptin (JANUVIA) 100 MG tablet Take 100 mg by mouth Daily.      • sucralfate (CARAFATE) 1 g tablet Take 1 g by mouth 4 (Four) Times a Day.          Allergies   Allergen Reactions   • Penicillins Itching       Objective   Objective     Vital Signs:   Temp:  [99.1 °F (37.3 °C)] 99.1 °F (37.3 °C)  Heart Rate:  [57-94] 92  Resp:  [22] 22  BP: (116-139)/(50-78) 136/57        Physical Exam   Constitutional: Awake, alert, NAD, sitting up in ED stretcher  Eyes: PERRL, sclerae anicteric, no conjunctival injection  HENT: NCAT, mucous membranes moist  Neck: Supple, trachea midline  Respiratory: Clear to auscultation bilaterally, nonlabored respirations   Cardiovascular: RRR, 2/6 systolic murmur, palpable pedal pulses bilaterally  Gastrointestinal: Positive bowel sounds, firm, nontender, distended  Musculoskeletal: No bilateral ankle edema, no clubbing or cyanosis to extremities  Psychiatric: Appropriate affect,  cooperative  Neurologic: Oriented x 3, strength symmetric in all extremities, speech clear  Skin: No rashes      Results Reviewed:  I have personally reviewed current lab and radiology data.    Results from last 7 days   Lab Units 02/14/20  1005   WBC 10*3/mm3 4.23   HEMOGLOBIN g/dL 8.4*   HEMATOCRIT % 27.7*   PLATELETS 10*3/mm3 56*     Results from last 7 days   Lab Units 02/14/20  1005   SODIUM mmol/L 141   POTASSIUM mmol/L 4.0   CHLORIDE mmol/L 109*   CO2 mmol/L 25.0   BUN mg/dL 22*   CREATININE mg/dL 1.02*   GLUCOSE mg/dL 167*   CALCIUM mg/dL 8.8   ALT (SGPT) U/L 21   AST (SGOT) U/L 21   LACTATE mmol/L 1.5     Estimated Creatinine Clearance: 68.6 mL/min (A) (by C-G formula based on SCr of 1.02 mg/dL (H)).  Brief Urine Lab Results     None        Imaging Results (Last 24 Hours)     Procedure Component Value Units Date/Time    CT Abdomen Pelvis With Contrast [[id number]] Collected:  02/14/20 1407     Updated:  02/14/20 1535    Narrative:       EXAMINATION: CT ABDOMEN PELVIS W CONTRAST- 02/14/2020     INDICATION: UGI bleed diabetic, metformin      TECHNIQUE: CT abdomen and pelvis without intravenous contrast     The radiation dose reduction device was turned on for each scan per the  ALARA (As Low as Reasonably Achievable) protocol.     COMPARISON: NONE     FINDINGS: Lung bases are clear. Liver demonstrates homogeneous  appearance of the parenchyma without focal liver lesion. Perihepatic and  paraspinal ascites noted. Gallbladder partially contracted and  unremarkable. No biliary dilatation. Pancreas unremarkable. Spleen is  enlarged to 20 cm in craniocaudal dimension consistent with sequela of  portal hypertension along with paraesophageal and perigastric varices as  well as mixed attenuation in the gastric lumen of likely blood products  or clot which is mildly distended. No mechanical obstructive process.  Adrenals without distinct nodule. Kidneys without hydronephrosis or  hydroureter despite bilateral  nonobstructing nephrolithiasis. Renal  cortical scarring and atrophy greatest on the right. No bulky  retroperitoneal adenopathy. Patent nonaneurysmal minimally  atherosclerotic abdominal aorta. Portal veins and IVC patent. GI tract  as detailed above. Small volume abdominopelvic ascites with mesenteric  edema. Pelvic viscera grossly unremarkable without bulky pelvic  adenopathy or free fluid. Degenerative changes of the spine without  aggressive osseous or soft tissue body wall lesions.       Impression:       Liver without focal lesion however enlarged spleen of likely  pleural hypertension sequela with small volume abdominopelvic ascites  and perigastric as well as paraesophageal varices with increased  attenuation of blood products in the mildly distended gastric lumen may  represent clot or hemorrhage from varices adjacent however no  intra-abdominal free air to suggest perforation.     D:  02/14/2020  E:  02/14/2020       XR Chest 1 View [[id number]] Collected:  02/14/20 1054     Updated:  02/14/20 1321    Narrative:       EXAMINATION: XR CHEST 1 VW- 02/14/2020     INDICATION: UGI bleed      COMPARISON: NONE     FINDINGS: Portable chest reveals cardiac and mediastinal silhouettes  within normal limits. The lung fields are clear. No focal parenchymal  opacification present.  No pleural effusion or pneumothorax. The bony  structures are unremarkable. Pulmonary vascularity is within normal  limits.           Impression:       Lung fields are grossly clear with no acute parenchymal  disease.     D:  02/14/2020  E:  02/14/2020     This report was finalized on 2/14/2020 11:59 AM by Dr. Millie Sinclair MD.                Assessment/Plan   Assessment & Plan     Active Hospital Problems    Diagnosis POA   • Acute upper GI bleed [K92.2] Yes   • Portal hypertension (CMS/HCC) [K76.6] Unknown     Added automatically from request for surgery [id number]     • CAD (coronary artery disease) [I25.10] Yes   • Type 2 diabetes  mellitus, without long-term current use of insulin (CMS/HCC) [E11.9] Yes       Summary: This is a 60 y/o female with Hx PUD, DM2, MI s/p stenting (~2 years ago), with plans for workup outpatient for melanotic stools and abdominal distention who presents with acute hematemesis with microcytic anemia and CT evidence for ascites and likely esophageal varices being admitted for urgent EGD and evaluation    Assessment/Plan    Hematemesis with concerns for variceal bleeding  Hx PUD  Suspect underlying anemia  Thrombocytopenia, splenomegaly  - bili/Na normal, thrombocytopenic, splenomegaly, concern for cirrhosis/portal HTN, will also check INR  - Hgb 8.4 on arrival, no baseline, however she is microcytic which suggests some degree of chronicity, HR >90 on chronic BB suggests an acute component as well  - last EGD/colo >10 years ago  - Hx PUD and recently started on mobic, also with evidence for esophageal varices on CT - either could be contributing to current presentation  - cont octreotide  - PPI bolus given in the ED, will order BID IV PPI  - got 1g ceftriaxone in the ED, will order daily for 6 more days for GI ppx (transition to cipro if DC'ed prior to 7 days or recommended otherwise per GI)  - stop mobic/nsaids indefinitely, grupo with Hx CAD  - seen by GI already, anemia panel ordered and CT guided paracentesis ordered, and planned for EGD today  - serial H&H, if dropping will give blood, she has been type and crossed already  - agree with GI, lasix/aldactone prior to DC    DM type 2 with long term use of insulin  - appears to be on lantus 100U qHS per her med rec?  - start weight based levemir (20U HS) and SSI, titrate as necessary (starting low as she is NPO currently)  - check A1c in the AM    CAD s/p remote stenting  - had MI and stenting ~2 years ago  - hold ASA during acute bleeding, likely restart in 1-2 days following scope and stable H&H  - cont statin, BB  - could benefit from ACE/ARB prior to DC given Hx DM  "and CAD, will hold off at the moment given GI bleed and unknown if her current renal function is baseline or acutely decompensated    DVT prophylaxis:  mechanical    CODE STATUS:    Code Status and Medical Interventions:   Ordered at: 02/14/20 1555     Level Of Support Discussed With:    Patient    Next of Kin (If No Surrogate)     Code Status:    CPR     Medical Interventions (Level of Support Prior to Arrest):    Full       Admission Status:  I believe this patient meets INPATIENT status due to acute Gi bleeding with likely varices and portal HTN requiring urgent endoscopy, serial lab monitoring, addition of multiple medications requiring lab monitoring, and risk for life threatening hemorrhage if not addressed in an acute setting.  I feel patient’s risk for adverse outcomes and need for care warrant INPATIENT evaluation and I predict the patient’s care encounter to likely last beyond 2 midnights.      Electronically signed by Alli Watts DO, 02/14/20, 3:59 PM.      Electronically signed by Alli Watts DO at 02/14/20 1622          Emergency Department Notes      Teddy Nunez PA-C at 02/14/20 1115          Subjective   59-year-old female with history of insulin-dependent type 2 diabetes, prior history of abdominal ulcer, kidney stones hypertension osteoarthritis and splenomegaly presents emergency department with 3 episodes of large-volume hematemesis.  She states has been feeling \"weak\" for the past 2 days.  Was seen by gastroenterologist in [place] who was scheduling her for outpatient endoscopy.  Reason for being seen by GI was due to black stool for several days prior.  Prior to that she did have several episodes of what she described as bright red stool.  She complains of increasing abdominal distention and generalized abdominal pain.  She does have some mild orthostasis and generalized weakness and lightheadedness.  No chest pain shortness of breath rapid heartbeat or irregular " heartbeat.    She does have a prior history of peptic ulcer disease for which she was treated approximately 5 to 6 years ago.  Currently takes Carafate 4 times a day.  No PPI.    Review of her medications remarkable for meloxicam 15mg daily for osteoarthritis.  She does not know how long she has been taking meloxicam.    Allergic to penicillin.    Her primary care provider is Javid Lin.          Review of Systems   Constitutional: Positive for activity change, appetite change and fatigue.   HENT: Negative for congestion, nosebleeds, rhinorrhea and sore throat.    Eyes: Negative for photophobia and visual disturbance.   Respiratory: Negative for cough, shortness of breath and wheezing.    Cardiovascular: Negative for chest pain, palpitations and leg swelling.   Gastrointestinal: Positive for abdominal distention, abdominal pain, blood in stool, nausea and vomiting. Negative for diarrhea.        Hematemesis x3 this morning.  Recent history of black stool.   Genitourinary: Negative for difficulty urinating, dyspareunia, dysuria, flank pain and hematuria.   Musculoskeletal: Negative for arthralgias, back pain, myalgias and neck pain.   Skin: Positive for color change and pallor. Negative for wound.   Neurological: Positive for light-headedness. Negative for dizziness, syncope and headaches.   All other systems reviewed and are negative.      Past Medical History:   Diagnosis Date   • Arthritis    • Diabetes mellitus (CMS/HCC)    • Hypertension    • Kidney stone    • Spleen enlarged    • Ulcer of abdomen wall (CMS/HCC)        Allergies   Allergen Reactions   • Penicillins Itching       Past Surgical History:   Procedure Laterality Date   • FRACTURE SURGERY      LEFT ANKLE   • KIDNEY STONE SURGERY         History reviewed. No pertinent family history.    Social History     Socioeconomic History   • Marital status:      Spouse name: Not on file   • Number of children: Not on file   • Years of education: Not on  file   • Highest education level: Not on file   Tobacco Use   • Smoking status: Never Smoker   Substance and Sexual Activity   • Alcohol use: Never     Frequency: Never   • Drug use: Never   • Sexual activity: Defer           Objective   Physical Exam   Constitutional: She is oriented to person, place, and time. She appears well-developed and well-nourished. No distress.   Pleasant awake alert and oriented, general pallor noted.  Hemodynamically stable, heart rate 88, /60, oxygen saturation 95% on room air.   HENT:   Head: Normocephalic and atraumatic.   Right Ear: External ear normal.   Left Ear: External ear normal.   Nose: Nose normal.   Mouth/Throat: Oropharynx is clear and moist. No oropharyngeal exudate.   Eyes: Pupils are equal, round, and reactive to light. Conjunctivae and EOM are normal. Right eye exhibits no discharge. Left eye exhibits no discharge. No scleral icterus.   Neck: Normal range of motion. Neck supple. No JVD present. No tracheal deviation present. No thyromegaly present.   Cardiovascular: Normal rate, regular rhythm, normal heart sounds and intact distal pulses. Exam reveals no gallop and no friction rub.   No murmur heard.  Pulmonary/Chest: Effort normal and breath sounds normal. No stridor. No respiratory distress. She has no wheezes. She has no rales. She exhibits no tenderness.   Abdominal: Soft. Bowel sounds are normal. She exhibits distension. There is tenderness. There is no rebound and no guarding.   Protuberant, slightly tender diffusely, tender LUQ with palpable spleen tip   Musculoskeletal: Normal range of motion. She exhibits no edema, tenderness or deformity.   Neurological: She is alert and oriented to person, place, and time. No cranial nerve deficit. She exhibits normal muscle tone. Coordination normal.   Skin: Skin is warm and dry. No rash noted. She is not diaphoretic. No erythema. No pallor.   Psychiatric: She has a normal mood and affect. Her behavior is normal.  Judgment and thought content normal.   Nursing note and vitals reviewed.      Procedures          ED Course  ED Course as of Feb 14 1518 Fri Feb 14, 2020   1114 Hemoglobin(!): 8.4 [TG]   1114 Hematocrit(!): 27.7 [TG]   1114 BUN(!): 22 [TG]   1114 Sodium: 141 [TG]   1336 Last ate around 1800 yesterday    [TG]   1411 Grossly positive stool    [TG]   1420 D/W Dr. Finch, will see in consult    [TG]   1420 IMPRESSION:  Liver without focal lesion however enlarged spleen likely  pleural hypertension sequela with small volume abdominopelvic ascites  and perigastric as well as paraesophageal varices with increased  attenuation of blood products in the mildly distended gastric lumen  represent clot or hemorrhage from varices adjacent however no  intra-abdominal free air to suggest perforation.    [TG]   1516 D/W Dr. Flores, states will discuss with Dr. Burnett..    [TG]      ED Course User Index  [TG] Teddy Nunez PA-C                                   Recent Results (from the past 24 hour(s))   Lactic Acid, Plasma    Collection Time: 02/14/20 10:05 AM   Result Value Ref Range    Lactate 1.5 0.5 - 2.0 mmol/L   Comprehensive Metabolic Panel    Collection Time: 02/14/20 10:05 AM   Result Value Ref Range    Glucose 167 (H) 65 - 99 mg/dL    BUN 22 (H) 6 - 20 mg/dL    Creatinine 1.02 (H) 0.57 - 1.00 mg/dL    Sodium 141 136 - 145 mmol/L    Potassium 4.0 3.5 - 5.2 mmol/L    Chloride 109 (H) 98 - 107 mmol/L    CO2 25.0 22.0 - 29.0 mmol/L    Calcium 8.8 8.6 - 10.5 mg/dL    Total Protein 6.6 6.0 - 8.5 g/dL    Albumin 3.60 3.50 - 5.20 g/dL    ALT (SGPT) 21 1 - 33 U/L    AST (SGOT) 21 1 - 32 U/L    Alkaline Phosphatase 92 39 - 117 U/L    Total Bilirubin 1.0 0.2 - 1.2 mg/dL    eGFR Non African Amer 55 (L) >60 mL/min/1.73    eGFR  African Amer 67 >60 mL/min/1.73    Globulin 3.0 gm/dL    A/G Ratio 1.2 g/dL    BUN/Creatinine Ratio 21.6 7.0 - 25.0    Anion Gap 7.0 5.0 - 15.0 mmol/L   Type & Screen    Collection Time: 02/14/20  10:05 AM   Result Value Ref Range    ABO Type A     RH type Positive     Antibody Screen Negative     T&S Expiration Date 2/17/2020 11:59:59 PM    Light Blue Top    Collection Time: 02/14/20 10:05 AM   Result Value Ref Range    Extra Tube hold for add-on    Green Top (Gel)    Collection Time: 02/14/20 10:05 AM   Result Value Ref Range    Extra Tube Hold for add-ons.    Lavender Top    Collection Time: 02/14/20 10:05 AM   Result Value Ref Range    Extra Tube hold for add-on    Gold Top - SST    Collection Time: 02/14/20 10:05 AM   Result Value Ref Range    Extra Tube Hold for add-ons.    CBC Auto Differential    Collection Time: 02/14/20 10:05 AM   Result Value Ref Range    WBC 4.23 3.40 - 10.80 10*3/mm3    RBC 3.54 (L) 3.77 - 5.28 10*6/mm3    Hemoglobin 8.4 (L) 12.0 - 15.9 g/dL    Hematocrit 27.7 (L) 34.0 - 46.6 %    MCV 78.2 (L) 79.0 - 97.0 fL    MCH 23.7 (L) 26.6 - 33.0 pg    MCHC 30.3 (L) 31.5 - 35.7 g/dL    RDW 16.7 (H) 12.3 - 15.4 %    RDW-SD 47.4 37.0 - 54.0 fl    MPV 12.3 (H) 6.0 - 12.0 fL    Platelets 56 (L) 140 - 450 10*3/mm3    Neutrophil % 75.5 42.7 - 76.0 %    Lymphocyte % 13.0 (L) 19.6 - 45.3 %    Monocyte % 8.0 5.0 - 12.0 %    Eosinophil % 2.1 0.3 - 6.2 %    Basophil % 0.9 0.0 - 1.5 %    Immature Grans % 0.5 0.0 - 0.5 %    Neutrophils, Absolute 3.19 1.70 - 7.00 10*3/mm3    Lymphocytes, Absolute 0.55 (L) 0.70 - 3.10 10*3/mm3    Monocytes, Absolute 0.34 0.10 - 0.90 10*3/mm3    Eosinophils, Absolute 0.09 0.00 - 0.40 10*3/mm3    Basophils, Absolute 0.04 0.00 - 0.20 10*3/mm3    Immature Grans, Absolute 0.02 0.00 - 0.05 10*3/mm3    nRBC 0.0 0.0 - 0.2 /100 WBC   ABO RH Specimen Verification    Collection Time: 02/14/20 11:49 AM   Result Value Ref Range    ABO Type A     RH type Positive    POC Occult Blood Stool    Collection Time: 02/14/20  2:10 PM   Result Value Ref Range    Fecal Occult Blood Positive (A) Negative    Lot Number [id number]     Expiration Date 6-22     DEVELOPER LOT NUMBER [id number]     DEVELOPER  EXPIRATION DATE 10-22     Positive Control Positive Positive    Negative Control Negative Negative     Note: In addition to lab results from this visit, the labs listed above may include labs taken at another facility or during a different encounter within the last 24 hours. Please correlate lab times with ED admission and discharge times for further clarification of the services performed during this visit.    CT Abdomen Pelvis With Contrast   ED Interpretation   Liver without focal lesion however enlarged spleen likely   pleural hypertension sequela with small volume abdominopelvic ascites   and perigastric as well as paraesophageal varices with increased   attenuation of blood products in the mildly distended gastric lumen   represent clot or hemorrhage from varices adjacent however no   intra-abdominal free air to suggest perforation.              Preliminary Result   Liver without focal lesion however enlarged spleen likely   pleural hypertension sequela with small volume abdominopelvic ascites   and perigastric as well as paraesophageal varices with increased   attenuation of blood products in the mildly distended gastric lumen   represent clot or hemorrhage from varices adjacent however no   intra-abdominal free air to suggest perforation.              XR Chest 1 View   ED Interpretation   Lung fields are grossly clear with no acute parenchymal   disease.       D:  02/14/2020   E:  02/14/2020       This report was finalized on 2/14/2020 11:59 AM by Dr. Millie Sinclair MD.          Final Result   Lung fields are grossly clear with no acute parenchymal   disease.       D:  02/14/2020   E:  02/14/2020       This report was finalized on 2/14/2020 11:59 AM by Dr. Millie Sinclair MD.            Vitals:    02/14/20 1200 02/14/20 1230 02/14/20 1330 02/14/20 1430   BP: 122/62 116/62 116/60 136/57   BP Location:       Patient Position:       Pulse: 90 90 92    Resp:       Temp:       TempSrc:       SpO2: 97%  98% 96%    Weight:       Height:         Medications   sodium chloride 0.9 % flush 10 mL (has no administration in time range)   sodium chloride 0.9 % flush 10 mL (has no administration in time range)   lactated ringers bolus 1,000 mL (1,000 mL Intravenous New Bag 2/14/20 1512)   octreotide (sandoSTATIN) injection 100 mcg (has no administration in time range)   octreotide (sandoSTATIN) 500 mcg in sodium chloride 0.9 % 100 mL (5 mcg/mL) infusion (has no administration in time range)   lactated ringers bolus 1,000 mL (0 mL Intravenous Stopped 2/14/20 1047)   iopamidol (ISOVUE-300) 61 % injection 100 mL (95 mL Intravenous Given 2/14/20 1220)   pantoprazole (PROTONIX) injection 80 mg (80 mg Intravenous Given 2/14/20 1431)   cefTRIAXone (ROCEPHIN) 1 g/100 mL 0.9% NS (MBP) (0 g Intravenous Stopped 2/14/20 1505)     ECG/EMG Results (last 24 hours)     Procedure Component Value Units Date/Time    ECG 12 Lead [[id number]] Collected:  02/14/20 1012     Updated:  02/14/20 1030        ECG 12 Lead                     MDM    Final diagnoses:   Acute upper GI bleed   Secondary esophageal varices with bleeding (CMS/HCC)   Portal hypertension (CMS/HCC)   Splenomegaly   Symptomatic anemia   ELIZA (acute kidney injury) (CMS/HCC)   History of ulcer disease   Type 2 diabetes mellitus with other specified complication, with long-term current use of insulin (CMS/HCC)            Teddy Nunez PA-C  02/14/20 1518      Electronically signed by Teddy Nunez PA-C at 02/14/20 1518         Facility-Administered Medications as of 2/14/2020   Medication Dose Route Frequency Provider Last Rate Last Dose   • [COMPLETED] cefTRIAXone (ROCEPHIN) 1 g/100 mL 0.9% NS (MBP)  1 g Intravenous Once Teddy Nunez PA-C   Stopped at 02/14/20 1505   • ePHEDrine injection 5 mg  5 mg Intravenous Once PRN Je Zhang., CRNA       • fentaNYL citrate (PF) (SUBLIMAZE) injection 50 mcg  50 mcg Intravenous Q10 Min PRN Je Zhang., CRNA       •  [COMPLETED] iopamidol (ISOVUE-300) 61 % injection 100 mL  100 mL Intravenous Once in imaging Sanchez Patel MD   95 mL at 02/14/20 1220   • labetalol (NORMODYNE,TRANDATE) injection 5 mg  5 mg Intravenous Q5 Min PRN Je Zhang CRNA       • [COMPLETED] lactated ringers bolus 1,000 mL  1,000 mL Intravenous Once Teddy Nunez PA-C   Stopped at 02/14/20 1047   • [COMPLETED] lactated ringers bolus 1,000 mL  1,000 mL Intravenous Once Teddy Nunez PA-C   Stopped at 02/14/20 1531   • lactated ringers bolus 250 mL  250 mL Intravenous Once PRN Je Zhang CRNA       • lactated ringers bolus 500 mL  500 mL Intravenous Once PRN Eagle Martinez Jr., MD       • lactated ringers infusion  100 mL/hr Intravenous Continuous Je Zhang, SRAVANI       • meperidine (DEMEROL) injection 12.5 mg  12.5 mg Intravenous Q5 Min PRN Je Zhang CRNA       • naloxone (NARCAN) injection 0.4 mg  0.4 mg Intravenous PRN Je Zhang, CRNA       • octreotide (sandoSTATIN) 500 mcg in sodium chloride 0.9 % 100 mL (5 mcg/mL) infusion  50 mcg/hr Intravenous Continuous Brant Finch MD 10 mL/hr at 02/14/20 1537 50 mcg/hr at 02/14/20 1537   • [COMPLETED] octreotide (sandoSTATIN) injection 100 mcg  100 mcg Intravenous Once Brant Finch MD   100 mcg at 02/14/20 1531   • [COMPLETED] ondansetron (ZOFRAN) injection 4 mg  4 mg Intravenous Once PRN Je Zhang CRNA   4 mg at 02/14/20 1644   • [COMPLETED] pantoprazole (PROTONIX) injection 80 mg  80 mg Intravenous Once Teddy Nunez PA-C   80 mg at 02/14/20 1431   • sodium chloride 0.9 % flush 10 mL  10 mL Intravenous PRN Sanchez Patel MD       • sodium chloride 0.9 % flush 10 mL  10 mL Intravenous PRN Nunez, Teddy Rory, PA-C       • sodium chloride 0.9 % infusion  50 mL/hr Intravenous Continuous Eagle Martinez Jr., MD 50 mL/hr at 02/14/20 1628 50 mL/hr at 02/14/20 1628     Orders (last 24 hrs)      Start     Ordered    02/15/20 0600  Hepatitis A Antibody,  Total  Morning Draw      02/14/20 1528    02/15/20 0600  Hepatitis B Surf Antibody Quant  Morning Draw      02/14/20 1528    02/15/20 0000  CT Guided Paracentesis  1 Time Imaging      02/14/20 1528    02/14/20 1630  sodium chloride 0.9 % infusion  Continuous      02/14/20 1628    02/14/20 1626  lactated ringers infusion  Continuous      02/14/20 1624    02/14/20 1624  fentaNYL citrate (PF) (SUBLIMAZE) injection 50 mcg  Every 10 Minutes PRN      02/14/20 1624    02/14/20 1624  Vital signs every 5 minutes for 15 minutes, every 15 minutes thereafter.  Once      02/14/20 1624    02/14/20 1624  Call Anesthesiologist for additional IV Fluid bolus for Hypotension/Tachycardia  Until Discontinued      02/14/20 1624    02/14/20 1624  Notify Anesthesia of Any Acute Changes in Patient Condition  Until Discontinued      02/14/20 1624    02/14/20 1624  Notify Anesthesia for Unrelieved Pain  Until Discontinued      02/14/20 1624    02/14/20 1624  Oxygen Therapy- Blow by - Humidified; Titrate for SPO2: equal to or greater than, 96%, per policy  Continuous      02/14/20 1624    02/14/20 1624  Pulse Oximetry, Continuous  Continuous      02/14/20 1624    02/14/20 1624  Once DC criteria to floor met, follow surgeon's orders.  Until Discontinued      02/14/20 1624    02/14/20 1624  Discharge patient from PACU when discharge criteria is met.  Until Discontinued      02/14/20 1624    02/14/20 1623  meperidine (DEMEROL) injection 12.5 mg  Every 5 Minutes PRN      02/14/20 1624    02/14/20 1623  lactated ringers bolus 250 mL  Once As Needed      02/14/20 1624    02/14/20 1623  labetalol (NORMODYNE,TRANDATE) injection 5 mg  Every 5 Minutes PRN      02/14/20 1624    02/14/20 1623  ePHEDrine injection 5 mg  Once As Needed      02/14/20 1624    02/14/20 1623  naloxone (NARCAN) injection 0.4 mg  As Needed      02/14/20 1624    02/14/20 1623  ondansetron (ZOFRAN) injection 4 mg  Once As Needed      02/14/20 1624    02/14/20 1616  Protime-INR   STAT      02/14/20 1615    02/14/20 1616  POC Glucose Once  Once      02/14/20 1614    02/14/20 1613  Vital signs every 5 minutes for 15 minutes, every 15 minutes thereafter.  Once      02/14/20 1613    02/14/20 1613  Call Anesthesiologist for additional IV Fluid bolus for Hypotension/Tachycardia  Until Discontinued      02/14/20 1613    02/14/20 1613  Notify Anesthesia of Any Acute Changes in Patient Condition  Until Discontinued      02/14/20 1613    02/14/20 1613  Notify Anesthesia for Unrelieved Pain  Until Discontinued      02/14/20 1613    02/14/20 1613  Oxygen Therapy- Blow by - Humidified; Titrate for SPO2: equal to or greater than, 96%, per policy  Continuous,   Status:  Canceled      02/14/20 1613    02/14/20 1613  Pulse Oximetry, Continuous  Continuous,   Status:  Canceled      02/14/20 1613    02/14/20 1613  Once DC criteria to floor met, follow surgeon's orders.  Until Discontinued      02/14/20 1613    02/14/20 1613  Discharge patient from PACU when discharge criteria is met.  Until Discontinued      02/14/20 1613    02/14/20 1612  lactated ringers bolus 500 mL  Once As Needed      02/14/20 1613    02/14/20 1609  Implement Anesthesia Orders Day of Procedure  Once,   Status:  Canceled      02/14/20 1608    02/14/20 1609  Obtain Informed Consent  Once,   Status:  Canceled      02/14/20 1608    02/14/20 1600  octreotide (sandoSTATIN) injection 100 mcg  Once      02/14/20 1451    02/14/20 1600  octreotide (sandoSTATIN) 500 mcg in sodium chloride 0.9 % 100 mL (5 mcg/mL) infusion  Continuous      02/14/20 1451    02/14/20 1553  Code Status and Medical Interventions:  Continuous      02/14/20 1555    02/14/20 1528  Iron Profile  Once      02/14/20 1528    02/14/20 1528  Reticulocytes  Once      02/14/20 1528    02/14/20 1527  AFP Tumor Marker  Once      02/14/20 1528    02/14/20 1527  Vitamin B12  Once      02/14/20 1528    02/14/20 1527  Folate  Once      02/14/20 1528    02/14/20 1527  Ferritin  Once       02/14/20 1528    02/14/20 1519  Tele Bed Request  Once      02/14/20 1518    02/14/20 1454  Inpatient Gastroenterology Consult  Once     Specialty:  Gastroenterology  Provider:  Brant Finch MD    02/14/20 1453    02/14/20 1454  Inpatient Admission  Once      02/14/20 1454    02/14/20 1452  Case Request  Once      02/14/20 1451    02/14/20 1426  Initiate Observation Status  Once      02/14/20 1425    02/14/20 1423  lactated ringers bolus 1,000 mL  Once      02/14/20 1421    02/14/20 1420  cefTRIAXone (ROCEPHIN) 1 g/100 mL 0.9% NS (MBP)  Once      02/14/20 1419    02/14/20 1420  Insert peripheral IV  Once      02/14/20 1419    02/14/20 1419  sodium chloride 0.9 % flush 10 mL  As Needed      02/14/20 1419    02/14/20 1419  pantoprazole (PROTONIX) injection 80 mg  Once      02/14/20 1417    02/14/20 1411  POC Occult Blood Stool  Once      02/14/20 1410    02/14/20 1405  Occult Blood X 1, Stool - Stool, Per Rectum  Once,   Status:  Canceled      02/14/20 1404    02/14/20 1214  iopamidol (ISOVUE-300) 61 % injection 100 mL  Once in Imaging      02/14/20 1212    02/14/20 1112  ABO RH Specimen Verification  Once      02/14/20 1111    02/14/20 1013  lactated ringers bolus 1,000 mL  Once      02/14/20 1011    02/14/20 1011  XR Chest 1 View  1 Time Imaging      02/14/20 1011    02/14/20 1011  CT Abdomen Pelvis With Contrast  1 Time Imaging      02/14/20 1011    02/14/20 1008  ECG 12 Lead  Once      02/14/20 1008    02/14/20 1005  NPO Diet  Diet Effective Now      02/14/20 1004    02/14/20 1005  Undress and Gown  Once      02/14/20 1004    02/14/20 1005  Cardiac Monitoring  Per Hospital Policy      02/14/20 1004    02/14/20 1005  Pulse Oximetry  Per Hospital Policy      02/14/20 1004    02/14/20 1005  Measure Blood Pressure  Per Hospital Policy      02/14/20 1004    02/14/20 1005  Vital Signs  Per Hospital Policy      02/14/20 1004    02/14/20 1005  Orthostatic Blood Pressure  Once      02/14/20 1004    02/14/20 1005   Insert Peripheral IV  Once      02/14/20 1004    02/14/20 1005  [place] Draw  Once      02/14/20 1004    02/14/20 1005  CBC & Differential  Once      02/14/20 1004    02/14/20 1005  Comprehensive Metabolic Panel  Once      02/14/20 1004    02/14/20 1005  Type & Screen  Once      02/14/20 1004    02/14/20 1005  Light Blue Top  PROCEDURE ONCE      02/14/20 1004    02/14/20 1005  Green Top (Gel)  PROCEDURE ONCE      02/14/20 1004    02/14/20 1005  Lavender Top  PROCEDURE ONCE      02/14/20 1004    02/14/20 1005  Gold Top - SST  PROCEDURE ONCE      02/14/20 1004    02/14/20 1005  CBC Auto Differential  PROCEDURE ONCE      02/14/20 1004    02/14/20 1004  Oxygen Therapy- Nasal Cannula; 2 LPM; Titrate for SPO2: equal to or greater than, 92%  Continuous PRN,   Status:  Canceled      02/14/20 1004    02/14/20 1004  sodium chloride 0.9 % flush 10 mL  As Needed      02/14/20 1004    02/14/20 1004  Lactic Acid, Plasma  Once      02/14/20 1004    Unscheduled  POC Glucose PRN  As Needed     Comments:  PRN if diabetic. Notify MD on call if <60 or >250      02/14/20 1624    --  metFORMIN (GLUCOPHAGE) 1000 MG tablet  2 Times Daily With Meals      02/14/20 1011    --  SITagliptin (JANUVIA) 100 MG tablet  Daily      02/14/20 1011    --  insulin glargine (LANTUS) 100 UNIT/ML injection  Nightly      02/14/20 1011    --  metoprolol succinate XL (TOPROL-XL) 25 MG 24 hr tablet  Daily      02/14/20 1011    --  atorvastatin (LIPITOR) 80 MG tablet  Daily      02/14/20 1011    --  meloxicam (MOBIC) 15 MG tablet  Daily,   Status:  Discontinued      02/14/20 1011    --  aspirin 81 MG EC tablet  Daily      02/14/20 1011    --  sucralfate (CARAFATE) 1 g tablet  4 Times Daily      02/14/20 1011    Signed and Held  Body Fluid Cell Count With Differential - Body Fluid, Peritoneum  STAT      Signed and Held    Signed and Held  Obtain Informed Consent  Once      Signed and Held    Signed and Held  Albumin, Fluid - Body Fluid, Peritoneum  STAT       Signed and Held    Signed and Held  Protein, Body Fluid - Body Fluid, Peritoneum  STAT      Signed and Held    Signed and Held  Body Fluid Culture - Body Fluid, Peritoneum  STAT      Signed and Held    Signed and Held  atorvastatin (LIPITOR) tablet 80 mg  Daily      Signed and Held    Signed and Held  metoprolol succinate XL (TOPROL-XL) 24 hr tablet 25 mg  Daily      Signed and Held    Signed and Held  Vital Signs  Every 4 Hours      Signed and Held    Signed and Held  Intake & Output  Every Shift      Signed and Held    Signed and Held  Weigh Patient  Once      Signed and Held    Signed and Held  Insert Peripheral IV  Once      Signed and Held    Signed and Held  Saline Lock & Maintain IV Access  Continuous      Signed and Held    Signed and Held  sodium chloride 0.9 % flush 10 mL  Every 12 Hours Scheduled      Signed and Held    Signed and Held  sodium chloride 0.9 % flush 10 mL  As Needed      Signed and Held    Signed and Held  Place Sequential Compression Device  Once      Signed and Held    Signed and Held  Maintain Sequential Compression Device  Continuous      Signed and Held    Signed and Held  Up in Chair  As Needed      Signed and Held    Signed and Held  Notify Provider (With Default Parameters)  Until Discontinued      Signed and Held    Signed and Held  NPO Diet  Diet Effective Now      Signed and Held    Signed and Held  Hemoglobin & Hematocrit, Blood  Every 6 Hours      Signed and Held    Signed and Held  Basic Metabolic Panel  Morning Draw      Signed and Held    Signed and Held  CBC Auto Differential  Morning Draw      Signed and Held    Signed and Held  Hemoglobin A1c  Morning Draw      Signed and Held    Signed and Held  acetaminophen (TYLENOL) tablet 650 mg  Every 4 Hours PRN      Signed and Held    Signed and Held  acetaminophen (TYLENOL) 160 MG/5ML solution 650 mg  Every 4 Hours PRN      Signed and Held    Signed and Held  acetaminophen (TYLENOL) suppository 650 mg  Every 4 Hours PRN       Signed and Held    Signed and Held  ondansetron (ZOFRAN) tablet 4 mg  Every 6 Hours PRN      Signed and Held    Signed and Held  ondansetron (ZOFRAN) injection 4 mg  Every 6 Hours PRN      Signed and Held    Signed and Held  pantoprazole (PROTONIX) injection 40 mg  Every 12 Hours Scheduled      Signed and Held    Signed and Held  Do NOT Hold Basal or Correction Scale Insulin When Patient is NPO, Hold Scheduled Mealtime (Bolus) Insulin if NPO  Continuous      Signed and Held    Signed and Held  Follow [facility] Hypoglycemia Standing Orders For Blood Glucose Less Than 70 mg/dL  Until Discontinued      Signed and Held    Signed and Held  dextrose (GLUTOSE) oral gel 15 g  Every 15 Minutes PRN      Signed and Held    Signed and Held  dextrose (D50W) 25 g/ 50mL Intravenous Solution 25 g  Every 15 Minutes PRN      Signed and Held    Signed and Held  glucagon (human recombinant) (GLUCAGEN DIAGNOSTIC) injection 1 mg  Every 15 Minutes PRN      Signed and Held    Signed and Held  Hypoglycemia Treatment - Alert Patient That is Not NPO and Can Safely Swallow  Until Discontinued     Comments:  Administer 4 oz Fruit Juice OR 4 oz Regular Soda OR 8 oz Milk OR 15-30 grams (1 tube) of Glucose Gel.  Recheck Blood Glucose 15 Minutes After Ingestion, Repeat Treatment & Continue to Recheck Blood Sugar Every 15 Minutes Until Blood Glucose is 70 mg/dL or Higher.  Once Blood Glucose is 70 mg/dL or Higher and if It Will Be more than 60 Minutes Until the Next Meal, Provide Appropriate Snack (Including Carbohydrate Food) Based on Meal Plan Order. Give Meal Tray As Soon As Possible.    Signed and Held    Signed and Held  Hypoglycemia Treatment - Patient Has IV Access - Unresponsive, NPO or Unable To Safely Swallow  Until Discontinued     Comments:  Administer 25g (50ml) D50W IV Push.  Recheck Blood Glucose 15 Minutes After Administration, if Blood Glucose Remains Less Than 70 mg/dl, Repeat Treatment   Recheck Blood Glucose 15 Minutes After 2nd  Administration, if Blood Glucose Remains Less Than 70 mg/dL After 2nd Dose of D50W, Contact Provider for Further Treatment Orders & Consider Adding IVF With D5W for Maintenance    Signed and Held    Signed and Held  Hypoglycemia Treatment - Patient Without IV Access - Unresponsive, NPO or Unable To Safely Swallow  Until Discontinued     Comments:  Administer 1mg Glucagon SQ & Establish IV Access.  Turn Patient on Side - Nausea / Vomiting May Occur.  Recheck Blood Glucose 15 Minutes After Administration.  If Blood Glucose Remains Less Than 70, Administer 25g D50W IV Push (50ml).  Recheck Blood Glucose 15 Minutes After Administration of D50W, if Blood Glucose Remains Less Than 70 mg/dl, Contact Provider for Further Treatment Orders & Consider Adding IVF With D5 for Maintenance    Signed and Held    Signed and Held  Hypoglycemia Treatment - Document Event & Patient Response to Interventions EMR, Document Medications on MAR  Continuous      Signed and Held    Signed and Held  Notify Provider - Hypoglycemia Treatment  Until Discontinued      Signed and Held    Signed and Held  POC Glucose 4x Daily AC & at Bedtime  4 Times Daily Before Meals & at Bedtime      Signed and Held    Signed and Held  insulin lispro (humaLOG) injection 0-7 Units  4 Times Daily With Meals & Nightly      Signed and Held    Signed and Held  insulin detemir (LEVEMIR) injection 20 Units  Nightly      Signed and Held    --  loratadine (CLARITIN) 10 MG tablet  Daily      02/14/20 1606    Signed and Held  cefTRIAXone (ROCEPHIN) 1 g/100 mL 0.9% NS (MBP)  Every 24 Hours      Signed and Held    Signed and Held  Comprehensive Metabolic Panel  Morning Draw      Signed and Held    Signed and Held  CBC & Differential  Morning Draw      Signed and Held

## 2020-02-15 ENCOUNTER — APPOINTMENT (OUTPATIENT)
Dept: CT IMAGING | Facility: HOSPITAL | Age: 60
End: 2020-02-15

## 2020-02-15 LAB
ALBUMIN FLD-MCNC: 0.7 G/DL
ALBUMIN SERPL-MCNC: 3.4 G/DL (ref 3.5–5.2)
ALBUMIN/GLOB SERPL: 1.2 G/DL
ALP SERPL-CCNC: 75 U/L (ref 39–117)
ALPHA-FETOPROTEIN: 1.18 NG/ML (ref 0–8.3)
ALT SERPL W P-5'-P-CCNC: 18 U/L (ref 1–33)
ANION GAP SERPL CALCULATED.3IONS-SCNC: 12 MMOL/L (ref 5–15)
APPEARANCE FLD: CLEAR
AST SERPL-CCNC: 16 U/L (ref 1–32)
BASOPHILS # BLD AUTO: 0.01 10*3/MM3 (ref 0–0.2)
BASOPHILS NFR BLD AUTO: 0.5 % (ref 0–1.5)
BILIRUB SERPL-MCNC: 0.6 MG/DL (ref 0.2–1.2)
BUN BLD-MCNC: 38 MG/DL (ref 6–20)
BUN/CREAT SERPL: 30.9 (ref 7–25)
CALCIUM SPEC-SCNC: 8.3 MG/DL (ref 8.6–10.5)
CHLORIDE SERPL-SCNC: 110 MMOL/L (ref 98–107)
CO2 SERPL-SCNC: 21 MMOL/L (ref 22–29)
COLOR FLD: YELLOW
CREAT BLD-MCNC: 1.23 MG/DL (ref 0.57–1)
DEPRECATED RDW RBC AUTO: 47.6 FL (ref 37–54)
DEPRECATED RDW RBC AUTO: 48 FL (ref 37–54)
EOSINOPHIL # BLD AUTO: 0 10*3/MM3 (ref 0–0.4)
EOSINOPHIL NFR BLD AUTO: 0 % (ref 0.3–6.2)
ERYTHROCYTE [DISTWIDTH] IN BLOOD BY AUTOMATED COUNT: 16.8 % (ref 12.3–15.4)
ERYTHROCYTE [DISTWIDTH] IN BLOOD BY AUTOMATED COUNT: 16.9 % (ref 12.3–15.4)
FOLATE SERPL-MCNC: 8.51 NG/ML (ref 4.78–24.2)
GFR SERPL CREATININE-BSD FRML MDRD: 45 ML/MIN/1.73
GLOBULIN UR ELPH-MCNC: 2.8 GM/DL
GLUCOSE BLD-MCNC: 145 MG/DL (ref 65–99)
GLUCOSE BLDC GLUCOMTR-MCNC: 132 MG/DL (ref 70–130)
GLUCOSE BLDC GLUCOMTR-MCNC: 180 MG/DL (ref 70–130)
GLUCOSE BLDC GLUCOMTR-MCNC: 226 MG/DL (ref 70–130)
GLUCOSE BLDC GLUCOMTR-MCNC: 258 MG/DL (ref 70–130)
HBA1C MFR BLD: 7.3 % (ref 4.8–5.6)
HCT VFR BLD AUTO: 23.2 % (ref 34–46.6)
HCT VFR BLD AUTO: 23.5 % (ref 34–46.6)
HCT VFR BLD AUTO: 23.5 % (ref 34–46.6)
HCT VFR BLD AUTO: 24.1 % (ref 34–46.6)
HGB BLD-MCNC: 7 G/DL (ref 12–15.9)
HGB BLD-MCNC: 7.1 G/DL (ref 12–15.9)
HGB BLD-MCNC: 7.1 G/DL (ref 12–15.9)
HGB BLD-MCNC: 7.3 G/DL (ref 12–15.9)
IMM GRANULOCYTES # BLD AUTO: 0.01 10*3/MM3 (ref 0–0.05)
IMM GRANULOCYTES NFR BLD AUTO: 0.5 % (ref 0–0.5)
LYMPHOCYTES # BLD AUTO: 0.5 10*3/MM3 (ref 0.7–3.1)
LYMPHOCYTES NFR BLD AUTO: 26.3 % (ref 19.6–45.3)
LYMPHOCYTES NFR FLD MANUAL: 31 %
MACROPHAGE FLUID: 34 %
MCH RBC QN AUTO: 23.8 PG (ref 26.6–33)
MCH RBC QN AUTO: 23.8 PG (ref 26.6–33)
MCHC RBC AUTO-ENTMCNC: 30.2 G/DL (ref 31.5–35.7)
MCHC RBC AUTO-ENTMCNC: 30.6 G/DL (ref 31.5–35.7)
MCV RBC AUTO: 77.9 FL (ref 79–97)
MCV RBC AUTO: 78.9 FL (ref 79–97)
MESOTHL CELL NFR FLD MANUAL: 14 %
MONOCYTES # BLD AUTO: 0.17 10*3/MM3 (ref 0.1–0.9)
MONOCYTES NFR BLD AUTO: 8.9 % (ref 5–12)
MONOCYTES NFR FLD: 7 %
NEUTROPHILS # BLD AUTO: 1.21 10*3/MM3 (ref 1.7–7)
NEUTROPHILS NFR BLD AUTO: 63.8 % (ref 42.7–76)
NEUTROPHILS NFR FLD MANUAL: 14 %
NRBC BLD AUTO-RTO: 0 /100 WBC (ref 0–0.2)
PLATELET # BLD AUTO: 37 10*3/MM3 (ref 140–450)
PLATELET # BLD AUTO: 38 10*3/MM3 (ref 140–450)
POTASSIUM BLD-SCNC: 4.4 MMOL/L (ref 3.5–5.2)
PROT FLD-MCNC: 1.4 G/DL
PROT SERPL-MCNC: 6.2 G/DL (ref 6–8.5)
RBC # BLD AUTO: 2.98 10*6/MM3 (ref 3.77–5.28)
RBC # BLD AUTO: 2.98 10*6/MM3 (ref 3.77–5.28)
RBC # FLD AUTO: <2000 /MM3
SODIUM BLD-SCNC: 143 MMOL/L (ref 136–145)
VIT B12 BLD-MCNC: 294 PG/ML (ref 211–946)
WBC # FLD AUTO: 296 /MM3
WBC NRBC COR # BLD: 1.9 10*3/MM3 (ref 3.4–10.8)
WBC NRBC COR # BLD: 1.94 10*3/MM3 (ref 3.4–10.8)

## 2020-02-15 PROCEDURE — 87070 CULTURE OTHR SPECIMN AEROBIC: CPT | Performed by: INTERNAL MEDICINE

## 2020-02-15 PROCEDURE — 63710000001 INSULIN DETEMIR PER 5 UNITS: Performed by: INTERNAL MEDICINE

## 2020-02-15 PROCEDURE — 99232 SBSQ HOSP IP/OBS MODERATE 35: CPT | Performed by: INTERNAL MEDICINE

## 2020-02-15 PROCEDURE — 85014 HEMATOCRIT: CPT | Performed by: INTERNAL MEDICINE

## 2020-02-15 PROCEDURE — 85018 HEMOGLOBIN: CPT | Performed by: INTERNAL MEDICINE

## 2020-02-15 PROCEDURE — 82042 OTHER SOURCE ALBUMIN QUAN EA: CPT | Performed by: INTERNAL MEDICINE

## 2020-02-15 PROCEDURE — 25010000003 LIDOCAINE 1 % SOLUTION: Performed by: RADIOLOGY

## 2020-02-15 PROCEDURE — 86708 HEPATITIS A ANTIBODY: CPT | Performed by: INTERNAL MEDICINE

## 2020-02-15 PROCEDURE — 86317 IMMUNOASSAY INFECTIOUS AGENT: CPT | Performed by: INTERNAL MEDICINE

## 2020-02-15 PROCEDURE — 36430 TRANSFUSION BLD/BLD COMPNT: CPT

## 2020-02-15 PROCEDURE — 87205 SMEAR GRAM STAIN: CPT | Performed by: INTERNAL MEDICINE

## 2020-02-15 PROCEDURE — 85027 COMPLETE CBC AUTOMATED: CPT | Performed by: INTERNAL MEDICINE

## 2020-02-15 PROCEDURE — 0W9G3ZX DRAINAGE OF PERITONEAL CAVITY, PERCUTANEOUS APPROACH, DIAGNOSTIC: ICD-10-PCS | Performed by: INTERNAL MEDICINE

## 2020-02-15 PROCEDURE — 25010000002 OCTREOTIDE PER 25 MCG: Performed by: INTERNAL MEDICINE

## 2020-02-15 PROCEDURE — 75989 ABSCESS DRAINAGE UNDER X-RAY: CPT

## 2020-02-15 PROCEDURE — 80053 COMPREHEN METABOLIC PANEL: CPT | Performed by: INTERNAL MEDICINE

## 2020-02-15 PROCEDURE — 83036 HEMOGLOBIN GLYCOSYLATED A1C: CPT | Performed by: INTERNAL MEDICINE

## 2020-02-15 PROCEDURE — 25010000002 CEFTRIAXONE PER 250 MG: Performed by: INTERNAL MEDICINE

## 2020-02-15 PROCEDURE — P9037 PLATE PHERES LEUKOREDU IRRAD: HCPCS

## 2020-02-15 PROCEDURE — 89051 BODY FLUID CELL COUNT: CPT | Performed by: INTERNAL MEDICINE

## 2020-02-15 PROCEDURE — C1729 CATH, DRAINAGE: HCPCS

## 2020-02-15 PROCEDURE — 84157 ASSAY OF PROTEIN OTHER: CPT | Performed by: INTERNAL MEDICINE

## 2020-02-15 PROCEDURE — 82962 GLUCOSE BLOOD TEST: CPT

## 2020-02-15 PROCEDURE — 85025 COMPLETE CBC W/AUTO DIFF WBC: CPT | Performed by: INTERNAL MEDICINE

## 2020-02-15 RX ORDER — LIDOCAINE HYDROCHLORIDE 10 MG/ML
20 INJECTION, SOLUTION INFILTRATION; PERINEURAL ONCE
Status: COMPLETED | OUTPATIENT
Start: 2020-02-15 | End: 2020-02-15

## 2020-02-15 RX ORDER — FERROUS SULFATE 325(65) MG
325 TABLET ORAL
Status: DISCONTINUED | OUTPATIENT
Start: 2020-02-16 | End: 2020-02-19 | Stop reason: HOSPADM

## 2020-02-15 RX ORDER — TRAMADOL HYDROCHLORIDE 50 MG/1
25 TABLET ORAL EVERY 6 HOURS PRN
Status: DISCONTINUED | OUTPATIENT
Start: 2020-02-15 | End: 2020-02-19 | Stop reason: HOSPADM

## 2020-02-15 RX ORDER — MELATONIN
1000 DAILY
Status: DISCONTINUED | OUTPATIENT
Start: 2020-02-15 | End: 2020-02-19 | Stop reason: HOSPADM

## 2020-02-15 RX ORDER — CARVEDILOL 6.25 MG/1
6.25 TABLET ORAL 2 TIMES DAILY WITH MEALS
Status: DISCONTINUED | OUTPATIENT
Start: 2020-02-15 | End: 2020-02-19 | Stop reason: HOSPADM

## 2020-02-15 RX ORDER — LIDOCAINE HYDROCHLORIDE 10 MG/ML
20 INJECTION, SOLUTION EPIDURAL; INFILTRATION; INTRACAUDAL; PERINEURAL ONCE
Status: DISCONTINUED | OUTPATIENT
Start: 2020-02-15 | End: 2020-02-19 | Stop reason: HOSPADM

## 2020-02-15 RX ADMIN — SODIUM CHLORIDE, PRESERVATIVE FREE 10 ML: 5 INJECTION INTRAVENOUS at 21:08

## 2020-02-15 RX ADMIN — INSULIN LISPRO 2 UNITS: 100 INJECTION, SOLUTION INTRAVENOUS; SUBCUTANEOUS at 21:08

## 2020-02-15 RX ADMIN — OCTREOTIDE ACETATE 50 MCG/HR: 500 INJECTION, SOLUTION INTRAVENOUS; SUBCUTANEOUS at 12:28

## 2020-02-15 RX ADMIN — LIDOCAINE HYDROCHLORIDE 20 ML: 10 INJECTION, SOLUTION INFILTRATION; PERINEURAL at 12:00

## 2020-02-15 RX ADMIN — MELATONIN 1000 UNITS: at 18:01

## 2020-02-15 RX ADMIN — ATORVASTATIN CALCIUM 80 MG: 40 TABLET, FILM COATED ORAL at 21:07

## 2020-02-15 RX ADMIN — OCTREOTIDE ACETATE 50 MCG/HR: 500 INJECTION, SOLUTION INTRAVENOUS; SUBCUTANEOUS at 01:54

## 2020-02-15 RX ADMIN — INSULIN DETEMIR 20 UNITS: 100 INJECTION, SOLUTION SUBCUTANEOUS at 21:07

## 2020-02-15 RX ADMIN — CEFTRIAXONE SODIUM 1 G: 1 INJECTION, POWDER, FOR SOLUTION INTRAMUSCULAR; INTRAVENOUS at 15:26

## 2020-02-15 RX ADMIN — PANTOPRAZOLE SODIUM 40 MG: 40 INJECTION, POWDER, FOR SOLUTION INTRAVENOUS at 21:07

## 2020-02-15 RX ADMIN — OCTREOTIDE ACETATE 50 MCG/HR: 500 INJECTION, SOLUTION INTRAVENOUS; SUBCUTANEOUS at 23:42

## 2020-02-15 RX ADMIN — ACETAMINOPHEN 650 MG: 325 TABLET ORAL at 12:36

## 2020-02-15 RX ADMIN — SODIUM CHLORIDE 50 ML/HR: 9 INJECTION, SOLUTION INTRAVENOUS at 14:13

## 2020-02-15 RX ADMIN — INSULIN LISPRO 4 UNITS: 100 INJECTION, SOLUTION INTRAVENOUS; SUBCUTANEOUS at 17:51

## 2020-02-15 RX ADMIN — TRAMADOL HYDROCHLORIDE 25 MG: 50 TABLET, FILM COATED ORAL at 15:34

## 2020-02-15 RX ADMIN — TRAMADOL HYDROCHLORIDE 25 MG: 50 TABLET, FILM COATED ORAL at 21:08

## 2020-02-15 RX ADMIN — PANTOPRAZOLE SODIUM 40 MG: 40 INJECTION, POWDER, FOR SOLUTION INTRAVENOUS at 08:48

## 2020-02-15 RX ADMIN — CARVEDILOL 6.25 MG: 6.25 TABLET, FILM COATED ORAL at 17:45

## 2020-02-15 NOTE — NURSING NOTE
Patient placed on cardiac monitors, vitals stable. Images taken and reviewed by Dr. Adan. A total volume of 2050 ml serous fluid removed from the peritoneum. Patient tolerated well. Report to RN on 5H.

## 2020-02-15 NOTE — PLAN OF CARE
Problem: Patient Care Overview  Goal: Plan of Care Review  Outcome: Ongoing (interventions implemented as appropriate)  Flowsheets (Taken 2/15/2020 1830)  Progress: no change  Plan of Care Reviewed With: patient  Note:   Pt still c/o pain in abd, controlled by prn meds; diarrhea improving; gave 1 unit platelets today, eugenia well; vss; will c/m

## 2020-02-15 NOTE — POST-PROCEDURE NOTE
Radiology Procedure    Pre-procedure: Informed written consent was obtained prior to beginning.  Ms. Luna agrees to proceed with CT guided paracentesis.                 Procedure Performed: CT guided paracentesis.     IV Sedation and/or Anesthesia:  No    Complications: None    Preliminary Findings: 2,050 liters thin clear light yellow ascites.  Samples sent for labs ordered.     Specimen Removed: As above.     Estimated Blood Loss:  0ml    Post-Procedure Diagnosis: Ascites    Post-Procedure Plan: She has no c/o.  No leakage or bruising at stick site and no hematoma on post CT scan.     Standard Discharge Instructions Given:chano Adan MD  02/15/20  11:19 AM

## 2020-02-15 NOTE — PLAN OF CARE
VSS; NSR on montior; c/o pain; one time prn order obtained; pt NPO since MN for Paracentesis today; several episodes of incontinence of stool; pt's  at bedside; call light within pt's reach; will continue to monitor

## 2020-02-15 NOTE — PROGRESS NOTES
[facility] Medicine Services  PROGRESS NOTE    Patient Name: Lyndsey uLna  : 1960  MRN: [id number]    Date of Admission: 2020  Primary Care Physician: Javid Lin APRN    Subjective   Subjective     CC:  Follow up GI bleeding    HPI:  Had EGD yesterday, multiple varices with intervention and evidence for recent bleeding. Having some melanotic stools this AM. Mild abdominal discomfort. Just got back from IR paracentesis with ~2L removed.    Review of Systems  Gen- No fevers, chills  CV- No chest pain, palpitations  Resp- No cough, dyspnea  GI- No V/D    Objective   Objective     Vital Signs:   Temp:  [97 °F (36.1 °C)-98.7 °F (37.1 °C)] 97.8 °F (36.6 °C)  Heart Rate:  [71-89] 71  Resp:  [18-21] 18  BP: ()/(45-84) 123/62        Physical Exam:  Constitutional: Awake, alert, NAD, sitting up bed  HENT: NCAT, mucous membranes moist  Respiratory: Clear to auscultation bilaterally, nonlabored respirations   Cardiovascular: RRR, 2/6 systolic murmur, palpable pedal pulses bilaterally  Gastrointestinal: Positive bowel sounds, soft, nontender, nondistended  Musculoskeletal: No bilateral ankle edema  Psychiatric: Appropriate affect, cooperative  Neurologic: Strength symmetric in all extremities, speech clear  Skin: No rashes    Results Reviewed:  Results from last 7 days   Lab Units 02/15/20  0615 02/15/20  0548 20  2329 20  1840 20  1005   WBC 10*3/mm3 1.90* 1.94*  --   --  4.23   HEMOGLOBIN g/dL 7.1* 7.1* 7.3* 7.3* 8.4*   HEMATOCRIT % 23.5* 23.2* 24.1* 24.3* 27.7*   PLATELETS 10*3/mm3 38* 37*  --   --  56*   INR   --   --   --  1.42*  --      Results from last 7 days   Lab Units 02/15/20  0548 20  1005   SODIUM mmol/L 143 141   POTASSIUM mmol/L 4.4 4.0   CHLORIDE mmol/L 110* 109*   CO2 mmol/L 21.0* 25.0   BUN mg/dL 38* 22*   CREATININE mg/dL 1.23* 1.02*   GLUCOSE mg/dL 145* 167*   CALCIUM mg/dL 8.3* 8.8   ALT (SGPT) U/L 18 21   AST (SGOT) U/L 16 21          Estimated Creatinine Clearance: 56.9 mL/min (A) (by C-G formula based on SCr of 1.23 mg/dL (H)).    Microbiology Results Abnormal     Procedure Component Value - Date/Time    Body Fluid Culture - Body Fluid, Peritoneum [[id number]] Collected:  02/15/20 1122    Lab Status:  Preliminary result Specimen:  Body Fluid from Peritoneum Updated:  02/15/20 1417     Gram Stain No WBCs or organisms seen          Imaging Results (Last 24 Hours)     Procedure Component Value Units Date/Time    CT Guided Paracentesis [[id number]] Collected:  02/15/20 1114     Updated:  02/15/20 1327    Narrative:       EXAMINATION: CT GUIDED PARACENTESIS-      INDICATION: new onset ascites; K92.2-Gastrointestinal hemorrhage,  unspecified; I85.11-Secondary esophageal varices with bleeding;  K76.6-Portal hypertension; R16.1-Splenomegaly, not elsewhere classified;  D64.9-Anemia, unspecified; N17.9-Acute kidney failure, unspecified;  Z87.898-Personal history of other specified conditions; E11.69-Type 2  diabetes mellitus with other specified complication.     TECHNIQUE: Limited low-dose 5 mm axial images for paracentesis guidance.     The radiation dose reduction device was turned on for each scan per the  ALARA (As Low as Reasonably Achievable) protocol.     COMPARISON: 02/14/2020 abdomen and pelvis CT scan.     FINDINGS: There is ascites scattered fairly uniformly throughout the  abdomen and no large pocket of ascites in any one spot. The safest area  for potential paracentesis is localized in the right lower quadrant.  Informed written consent was obtained from the patient prior to  beginning. Ms. Luna indicates her  understanding and agrees to  proceed. The skin overlying the pocket of fluid in the right lower  quadrant was marked, prepped and draped in sterile fashion and 1%  lidocaine infiltrated into the skin and subcutaneous tissues as local  anesthesia. Subsequently, under CT guidance, a 6.5 [place] straight  catheter was advanced to  the abdominal wall musculature and met some  resistance. Inspection of this catheter showed some stripping of the  catheter, and a new 6.5 [place] straight catheter was gradually advanced  to the peritoneal margin and  careful peritoneal puncture performed with  return of thin clear light yellow ascites. 60 cc were removed and a  sterile syringe for labs previously ordered, distributed into labeled  containers appropriate for labs previous ordered and hand-carried by the  technologist to pathology. Subsequently, a total of 2.05 L of thin clear  yellow fluid was withdrawn. When no more fluid could be obtained,  catheter was withdrawn and pressure held at the stick site for three  minutes. Follow-up scanning shows no evidence of hemorrhage and mild  remaining ascites. There is no oozing at the skin site. Ms. Luna  tolerated the procedure very well and there were no complications.       Impression:       CT-guided diagnostic and therapeutic paracentesis without  complication. Removal of 2050 mL of thin clear yellow-tinged ascites.  Sample sent for labs ordered.     DICTATED:   02/15/2020  EDITED/ls :   02/15/2020           CT Abdomen Pelvis With Contrast [[id number]] Collected:  02/14/20 1407     Updated:  02/14/20 1916    Narrative:       EXAMINATION: CT ABDOMEN PELVIS W CONTRAST- 02/14/2020     INDICATION: UGI bleed diabetic, metformin      TECHNIQUE: CT abdomen and pelvis without intravenous contrast     The radiation dose reduction device was turned on for each scan per the  ALARA (As Low as Reasonably Achievable) protocol.     COMPARISON: NONE     FINDINGS: Lung bases are clear. Liver demonstrates homogeneous  appearance of the parenchyma without focal liver lesion. Perihepatic and  paraspinal ascites noted. Gallbladder partially contracted and  unremarkable. No biliary dilatation. Pancreas unremarkable. Spleen is  enlarged to 20 cm in craniocaudal dimension consistent with sequela of  portal hypertension  along with paraesophageal and perigastric varices as  well as mixed attenuation in the gastric lumen of likely blood products  or clot which is mildly distended. No mechanical obstructive process.  Adrenals without distinct nodule. Kidneys without hydronephrosis or  hydroureter despite bilateral nonobstructing nephrolithiasis. Renal  cortical scarring and atrophy greatest on the right. No bulky  retroperitoneal adenopathy. Patent nonaneurysmal minimally  atherosclerotic abdominal aorta. Portal veins and IVC patent. GI tract  as detailed above. Small volume abdominopelvic ascites with mesenteric  edema. Pelvic viscera grossly unremarkable without bulky pelvic  adenopathy or free fluid. Degenerative changes of the spine without  aggressive osseous or soft tissue body wall lesions.       Impression:       Liver without focal lesion however enlarged spleen of likely  pleural hypertension sequela with small volume abdominopelvic ascites  and perigastric as well as paraesophageal varices with increased  attenuation of blood products in the mildly distended gastric lumen may  represent clot or hemorrhage from varices adjacent however no  intra-abdominal free air to suggest perforation.     D:  02/14/2020  E:  02/14/2020     This report was finalized on 2/14/2020 7:13 PM by Dr. Link Baker.                  I have reviewed the medications:  Scheduled Meds:  atorvastatin 80 mg Oral Nightly   carvedilol 6.25 mg Oral BID With Meals   cefTRIAXone 1 g Intravenous Q24H   [START ON 2/16/2020] ferrous sulfate 325 mg Oral Daily With Breakfast   insulin detemir 20 Units Subcutaneous Nightly   insulin lispro 0-7 Units Subcutaneous 4x Daily With Meals & Nightly   lidocaine PF 1% 20 mL Subcutaneous Once   pantoprazole 40 mg Intravenous Q12H   sodium chloride 10 mL Intravenous Q12H     Continuous Infusions:  lactated ringers 100 mL/hr    octreotide (SandoSTATIN) infusion 50 mcg/hr Last Rate: 50 mcg/hr (02/15/20 1228)   sodium chloride  50 mL/hr Last Rate: 50 mL/hr (02/15/20 1413)     PRN Meds:.•  acetaminophen **OR** acetaminophen **OR** acetaminophen  •  dextrose  •  dextrose  •  glucagon (human recombinant)  •  ondansetron **OR** ondansetron  •  sodium chloride  •  [COMPLETED] Insert peripheral IV **AND** sodium chloride  •  sodium chloride  •  traMADol    Assessment/Plan   Assessment & Plan     Active Hospital Problems    Diagnosis  POA   • Acute upper GI bleed [K92.2]  Yes   • Portal hypertension (CMS/HCC) [K76.6]  Unknown   • CAD (coronary artery disease) [I25.10]  Yes   • Type 2 diabetes mellitus, with long-term current use of insulin (CMS/HCC) [E11.9, Z79.4]  Not Applicable      Resolved Hospital Problems   No resolved problems to display.        Brief Hospital Course to date:  Lyndsey Luna is a 59 y.o. female with Hx PUD, DM2, MI s/p stenting (~2 years ago), with plans for workup outpatient for melanotic stools and abdominal distention who presents with acute hematemesis with microcytic anemia and CT evidence for ascites now s/p EGD with variceal bleed and procedural intervention    Assessment/Plan    Hematemesis with variceal bleeding  Hx PUD  Suspect underlying chronic anemia  Thrombocytopenia, splenomegaly  - constellation of findings c/w portal HTN/cirrhosis  - Hgb 8.4 on arrival, no baseline, suspect underlying chronic anemia  - s/p EGD 2/14/20 with Dr. Finch, varices with stigmata of recent bleeding, s/p intraprocedural intervention  - repeat banding 4 weeks with Dr. Moore per GI recs  - cont octreotide today  - cont BID IV PPI  - cont IV rocephin, abx through 2/20 (can change to PO cipro at DC)  - paracentesis this AM with 2L removed, labs pending  - GI following  - lasix/aldactone at DC for ascites  - H&H stable, given bleeding and Plt 38k will xfuse 1U platelets    Leukopenia  - dropped to 1.9 today, ANC 1,210; etiology not clear, was normal on arrival, will recheck in the AM     DM type 2 with long term use of insulin,  a1c 7.3%  - med rec says lantus 100U HS at home  - cont levemir 20U HS and SSI, adequate control last 12 hours     CAD s/p remote stenting  - had MI and stenting ~2 years ago  - cont to hold ASA during acute bleeding, restart in a few days when H&H demonstrated to be stable  - cont statin, BB  - could benefit from ACE/ARB prior to DC given Hx DM and CAD, Cr slightly inc today so will hold off for now     DVT prophylaxis:  mechanical    Disposition: I expect the patient to be discharged in 1-3 days pending no further bleeding and clinical stability.    CODE STATUS:   Code Status and Medical Interventions:   Ordered at: 02/14/20 155     Level Of Support Discussed With:    Patient    Next of Kin (If No Surrogate)     Code Status:    CPR     Medical Interventions (Level of Support Prior to Arrest):    Full         Electronically signed by Alli Watts DO, 02/15/20, 3:49 PM.

## 2020-02-15 NOTE — PROGRESS NOTES
"GI Daily Progress Note  Subjective     Lyndsey Vergara is a 59 y.o. female who was admitted with .Variceal bleed.  No further bleeding post banding.  C/O chest pain.  Tolerating liquids   Had paracentesis this am with 2000 ml withdrawn.      Chief Complaint:: hematemsis  Objective     /62 (BP Location: Left arm, Patient Position: Lying)   Pulse 71   Temp 97.8 °F (36.6 °C)   Resp 18   Ht 170.2 cm (67\")   Wt 90.7 kg (200 lb)   SpO2 96%   BMI 31.32 kg/m²     Intake/Output last 3 shifts:  I/O last 3 completed shifts:  In: 2194 [I.V.:694; IV Piggyback:1500]  Out: -   Intake/Output this shift:  I/O this shift:  In: 639 [I.V.:424; Blood:215]  Out: -       Physical Exam  Wt Readings from Last 3 Encounters:   02/14/20 90.7 kg (200 lb)   ,body mass index is 31.32 kg/m².,@FLOWAMB(6)@,@FLOWAMB(5)@,@FLOWAMB(8)@   CONSTITUTIONAL:  Resp CTA; no rhonchi, rales, or wheezes.  Respiration effort normal  CV RRR; no M/R/G. No lower extremity edema  GI Abd soft, NT, ND, normal active bowel sounds.    Psych: Awake and alert    DATA:Results for LYNDSEY VERGARA (MRN [id number]) as of 2/15/2020 15:45   Ref. Range 2/15/2020 11:22   Color, Fluid Unknown Yellow   Appearance, Fluid Latest Ref Range: Clear  Clear   WBC, Fluid Latest Units: /mm3 296   RBC, Fluid Latest Ref Range:   /mm3 <2,000   Neutrophils, Fluid Latest Units: % 14   Results for LYNDSEY VERGARA (MRN [id number]) as of 2/15/2020 15:45   Ref. Range 2/14/2020 10:05 2/14/2020 18:40 2/14/2020 23:29 2/15/2020 05:48 2/15/2020 06:15   Hemoglobin Latest Ref Range: 12.0 - 15.9 g/dL 8.4 (L) 7.3 (L) 7.3 (L) 7.1 (L) 7.1 (L)   Results for LYNDSEY VERGARA (MRN [id number]) as of 2/15/2020 15:45   Ref. Range 2/14/2020 10:02   Hepatitis C Ab Latest Ref Range: Non-Reactive  Non-Reactive   Results for LYNDSEY VERGARA (MRN [id number]) as of 2/15/2020 15:45   Ref. Range 2/14/2020 10:05   ALPHA-FETOPROTEIN Latest Ref Range: 0 - 8.3 ng/mL 1.18   Results for LYNDSEY VERGARA (MRN " [id number]) as of 2/15/2020 15:45   Ref. Range 7/9/2019 09:30 2/14/2020 10:05   Vitamin B-12 Latest Ref Range: 211 - 946 pg/mL 365 ((NONE)) 294   25 Hydroxy, Vitamin D Latest Ref Range: 32.0 - 100.0  24.4 (L)        Assessment/Plan     1. Variceal bleed  2.portal HTN  3. Vit D def.    Pt  Stable  Maintain Hg around 7  Will change metoprolol to Coreg to lower destiny pressure  B 12 borderline low   Will check MMA  Begin Vit D        Acute upper GI bleed    Portal hypertension (CMS/HCC)    CAD (coronary artery disease)    Type 2 diabetes mellitus, with long-term current use of insulin (CMS/HCC)       LOS: 1 day     Brant Finch MD  02/15/20  3:44 PM

## 2020-02-16 LAB
ABO + RH BLD: NORMAL
ANION GAP SERPL CALCULATED.3IONS-SCNC: 10 MMOL/L (ref 5–15)
BASOPHILS # BLD AUTO: 0.03 10*3/MM3 (ref 0–0.2)
BASOPHILS NFR BLD AUTO: 0.9 % (ref 0–1.5)
BH BB BLOOD EXPIRATION DATE: NORMAL
BH BB BLOOD TYPE BARCODE: 5100
BH BB DISPENSE STATUS: NORMAL
BH BB PRODUCT CODE: NORMAL
BH BB UNIT NUMBER: NORMAL
BUN BLD-MCNC: 33 MG/DL (ref 6–20)
BUN/CREAT SERPL: 25.8 (ref 7–25)
CALCIUM SPEC-SCNC: 8.6 MG/DL (ref 8.6–10.5)
CHLORIDE SERPL-SCNC: 112 MMOL/L (ref 98–107)
CO2 SERPL-SCNC: 23 MMOL/L (ref 22–29)
CREAT BLD-MCNC: 1.28 MG/DL (ref 0.57–1)
DEPRECATED RDW RBC AUTO: 49.1 FL (ref 37–54)
EOSINOPHIL # BLD AUTO: 0.11 10*3/MM3 (ref 0–0.4)
EOSINOPHIL NFR BLD AUTO: 3.1 % (ref 0.3–6.2)
ERYTHROCYTE [DISTWIDTH] IN BLOOD BY AUTOMATED COUNT: 17.2 % (ref 12.3–15.4)
GFR SERPL CREATININE-BSD FRML MDRD: 43 ML/MIN/1.73
GLUCOSE BLD-MCNC: 76 MG/DL (ref 65–99)
GLUCOSE BLDC GLUCOMTR-MCNC: 106 MG/DL (ref 70–130)
GLUCOSE BLDC GLUCOMTR-MCNC: 199 MG/DL (ref 70–130)
GLUCOSE BLDC GLUCOMTR-MCNC: 202 MG/DL (ref 70–130)
GLUCOSE BLDC GLUCOMTR-MCNC: 236 MG/DL (ref 70–130)
HAV AB SER QL IA: POSITIVE
HBV SURFACE AB SER-ACNC: <3.1 MIU/ML
HCT VFR BLD AUTO: 21.6 % (ref 34–46.6)
HCT VFR BLD AUTO: 24.4 % (ref 34–46.6)
HGB BLD-MCNC: 6.4 G/DL (ref 12–15.9)
HGB BLD-MCNC: 7.1 G/DL (ref 12–15.9)
IMM GRANULOCYTES # BLD AUTO: 0.02 10*3/MM3 (ref 0–0.05)
IMM GRANULOCYTES NFR BLD AUTO: 0.6 % (ref 0–0.5)
LYMPHOCYTES # BLD AUTO: 1.12 10*3/MM3 (ref 0.7–3.1)
LYMPHOCYTES NFR BLD AUTO: 31.9 % (ref 19.6–45.3)
MCH RBC QN AUTO: 23.1 PG (ref 26.6–33)
MCHC RBC AUTO-ENTMCNC: 29.1 G/DL (ref 31.5–35.7)
MCV RBC AUTO: 79.5 FL (ref 79–97)
MONOCYTES # BLD AUTO: 0.33 10*3/MM3 (ref 0.1–0.9)
MONOCYTES NFR BLD AUTO: 9.4 % (ref 5–12)
NEUTROPHILS # BLD AUTO: 1.9 10*3/MM3 (ref 1.7–7)
NEUTROPHILS NFR BLD AUTO: 54.1 % (ref 42.7–76)
NRBC BLD AUTO-RTO: 0 /100 WBC (ref 0–0.2)
PLATELET # BLD AUTO: 53 10*3/MM3 (ref 140–450)
PMV BLD AUTO: 10.5 FL (ref 6–12)
POTASSIUM BLD-SCNC: 3.7 MMOL/L (ref 3.5–5.2)
RBC # BLD AUTO: 3.07 10*6/MM3 (ref 3.77–5.28)
SODIUM BLD-SCNC: 145 MMOL/L (ref 136–145)
UNIT  ABO: NORMAL
UNIT  RH: NORMAL
WBC NRBC COR # BLD: 3.51 10*3/MM3 (ref 3.4–10.8)

## 2020-02-16 PROCEDURE — 85014 HEMATOCRIT: CPT | Performed by: INTERNAL MEDICINE

## 2020-02-16 PROCEDURE — P9016 RBC LEUKOCYTES REDUCED: HCPCS

## 2020-02-16 PROCEDURE — 82962 GLUCOSE BLOOD TEST: CPT

## 2020-02-16 PROCEDURE — 86900 BLOOD TYPING SEROLOGIC ABO: CPT

## 2020-02-16 PROCEDURE — 85018 HEMOGLOBIN: CPT | Performed by: PHYSICIAN ASSISTANT

## 2020-02-16 PROCEDURE — 25010000002 CEFTRIAXONE PER 250 MG: Performed by: INTERNAL MEDICINE

## 2020-02-16 PROCEDURE — 85014 HEMATOCRIT: CPT | Performed by: PHYSICIAN ASSISTANT

## 2020-02-16 PROCEDURE — 25010000002 OCTREOTIDE PER 25 MCG: Performed by: INTERNAL MEDICINE

## 2020-02-16 PROCEDURE — 83921 ORGANIC ACID SINGLE QUANT: CPT | Performed by: INTERNAL MEDICINE

## 2020-02-16 PROCEDURE — 99232 SBSQ HOSP IP/OBS MODERATE 35: CPT | Performed by: INTERNAL MEDICINE

## 2020-02-16 PROCEDURE — 63710000001 INSULIN DETEMIR PER 5 UNITS: Performed by: INTERNAL MEDICINE

## 2020-02-16 PROCEDURE — 85025 COMPLETE CBC W/AUTO DIFF WBC: CPT | Performed by: INTERNAL MEDICINE

## 2020-02-16 PROCEDURE — 36430 TRANSFUSION BLD/BLD COMPNT: CPT

## 2020-02-16 PROCEDURE — 85018 HEMOGLOBIN: CPT | Performed by: INTERNAL MEDICINE

## 2020-02-16 PROCEDURE — 80048 BASIC METABOLIC PNL TOTAL CA: CPT | Performed by: INTERNAL MEDICINE

## 2020-02-16 RX ORDER — CALCIUM CARBONATE 750 MG/1
1500 TABLET, CHEWABLE ORAL DAILY
Status: DISCONTINUED | OUTPATIENT
Start: 2020-02-16 | End: 2020-02-19 | Stop reason: HOSPADM

## 2020-02-16 RX ADMIN — SODIUM CHLORIDE, PRESERVATIVE FREE 10 ML: 5 INJECTION INTRAVENOUS at 21:04

## 2020-02-16 RX ADMIN — ATORVASTATIN CALCIUM 80 MG: 40 TABLET, FILM COATED ORAL at 21:03

## 2020-02-16 RX ADMIN — CEFTRIAXONE SODIUM 1 G: 1 INJECTION, POWDER, FOR SOLUTION INTRAMUSCULAR; INTRAVENOUS at 13:01

## 2020-02-16 RX ADMIN — CARVEDILOL 6.25 MG: 6.25 TABLET, FILM COATED ORAL at 08:34

## 2020-02-16 RX ADMIN — PANTOPRAZOLE SODIUM 40 MG: 40 INJECTION, POWDER, FOR SOLUTION INTRAVENOUS at 08:34

## 2020-02-16 RX ADMIN — FERROUS SULFATE TAB 325 MG (65 MG ELEMENTAL FE) 325 MG: 325 (65 FE) TAB at 08:34

## 2020-02-16 RX ADMIN — ACETAMINOPHEN 650 MG: 325 TABLET ORAL at 17:12

## 2020-02-16 RX ADMIN — INSULIN LISPRO 2 UNITS: 100 INJECTION, SOLUTION INTRAVENOUS; SUBCUTANEOUS at 12:00

## 2020-02-16 RX ADMIN — MELATONIN 1000 UNITS: at 08:34

## 2020-02-16 RX ADMIN — TRAMADOL HYDROCHLORIDE 25 MG: 50 TABLET, FILM COATED ORAL at 12:05

## 2020-02-16 RX ADMIN — ACETAMINOPHEN 650 MG: 325 TABLET ORAL at 21:04

## 2020-02-16 RX ADMIN — SODIUM CHLORIDE 50 ML/HR: 9 INJECTION, SOLUTION INTRAVENOUS at 05:19

## 2020-02-16 RX ADMIN — ACETAMINOPHEN 650 MG: 325 TABLET ORAL at 10:16

## 2020-02-16 RX ADMIN — INSULIN DETEMIR 20 UNITS: 100 INJECTION, SOLUTION SUBCUTANEOUS at 21:04

## 2020-02-16 RX ADMIN — SODIUM CHLORIDE, PRESERVATIVE FREE 10 ML: 5 INJECTION INTRAVENOUS at 08:34

## 2020-02-16 RX ADMIN — INSULIN LISPRO 3 UNITS: 100 INJECTION, SOLUTION INTRAVENOUS; SUBCUTANEOUS at 17:13

## 2020-02-16 RX ADMIN — CALCIUM CARBONATE 1500 MG: 750 TABLET ORAL at 13:01

## 2020-02-16 RX ADMIN — OCTREOTIDE ACETATE 50 MCG/HR: 500 INJECTION, SOLUTION INTRAVENOUS; SUBCUTANEOUS at 10:14

## 2020-02-16 RX ADMIN — INSULIN LISPRO 3 UNITS: 100 INJECTION, SOLUTION INTRAVENOUS; SUBCUTANEOUS at 21:04

## 2020-02-16 RX ADMIN — ACETAMINOPHEN 650 MG: 325 TABLET ORAL at 05:19

## 2020-02-16 RX ADMIN — TRAMADOL HYDROCHLORIDE 25 MG: 50 TABLET, FILM COATED ORAL at 21:03

## 2020-02-16 RX ADMIN — CARVEDILOL 6.25 MG: 6.25 TABLET, FILM COATED ORAL at 17:13

## 2020-02-16 RX ADMIN — TRAMADOL HYDROCHLORIDE 25 MG: 50 TABLET, FILM COATED ORAL at 05:19

## 2020-02-16 NOTE — PROGRESS NOTES
"GI Daily Progress Note  Subjective     Lyndsey Vergara is a 59 y.o. female who was admitted with Variceal bleed.  Feels weak.  Had dark old stool this am..         Chief Complaint:  hematemesis     Objective     /48 (BP Location: Left arm, Patient Position: Lying)   Pulse 70   Temp 97.7 °F (36.5 °C) (Oral)   Resp 18   Ht 170.2 cm (67\")   Wt 90.7 kg (200 lb)   SpO2 96%   BMI 31.32 kg/m²     Intake/Output last 3 shifts:  I/O last 3 completed shifts:  In: 1467.1 [I.V.:1252.1; Blood:215]  Out: -   Intake/Output this shift:  No intake/output data recorded.      Physical Exam  Wt Readings from Last 3 Encounters:   02/14/20 90.7 kg (200 lb)   ,body mass index is 31.32 kg/m².,@FLOWAMB(6)@,@FLOWAMB(5)@,@FLOWAMB(8)@   CONSTITUTIONAL:  Resp CTA; no rhonchi, rales, or wheezes.  Respiration effort normal  CV RRR; no M/R/G. No lower extremity edema  GI Abd soft, NT, ND, normal active bowel sounds.    Psych: Awake and alert    DATA:Results for LYNDSEY VERGARA (MRN [id number]) as of 2/16/2020 12:29   Ref. Range 2/14/2020 23:29 2/15/2020 05:48 2/15/2020 06:15 2/15/2020 19:35 2/16/2020 04:02   Hemoglobin Latest Ref Range: 12.0 - 15.9 g/dL 7.3 (L) 7.1 (L) 7.1 (L) 7.0 (L) 7.1 (L)   Results for LYNDSEY VERGARA (MRN [id number]) as of 2/16/2020 12:29   Ref. Range 2/15/2020 11:20   Protein, Total, Fluid Latest Units: g/dL 1.4       Assessment/Plan     1. Variceal bleed  2.. Acute blood loss anemia  3. DM  4. CAD  5 Vit D def      Day 2 post banding.  Can DC octreotide tomorrow.  Needs to finish 7 days of abx but can switch to oral tomorrow  Needs repeat EGD EBL in 1 mo with Dr Moore  Continue Vit D and Calcium              Acute upper GI bleed    Portal hypertension (CMS/HCC)    CAD (coronary artery disease)    Type 2 diabetes mellitus, with long-term current use of insulin (CMS/HCC)       LOS: 2 days     Brant Finch MD  02/16/20  12:37 PM  "

## 2020-02-16 NOTE — PROGRESS NOTES
[facility] Medicine Services  PROGRESS NOTE    Patient Name: Lyndsey Luna  : 1960  MRN: [id number]    Date of Admission: 2020  Primary Care Physician: Javid Lin APRN    Subjective   Subjective     CC:  Follow up variceal bleed    HPI:  Having fewer melanotic stools. Occasional epigastric pain for 10-15 seconds then spontaneously resolves. Did not sleep well last night so tired.     Review of Systems  Gen- No fevers, chills  CV- No chest pain, palpitations  Resp- No cough, dyspnea  GI- No N/V/D    Objective   Objective     Vital Signs:   Temp:  [97.7 °F (36.5 °C)-98.7 °F (37.1 °C)] 97.7 °F (36.5 °C)  Heart Rate:  [62-79] 63  Resp:  [17-18] 18  BP: (112-123)/(48-64) 120/48        Physical Exam:  Constitutional: Awake, alert, NAD, laying in bed  HENT: NCAT, mucous membranes moist  Respiratory: Clear to auscultation bilaterally, nonlabored respirations   Cardiovascular: RRR, 2/6 systolic murmur, palpable pedal pulses bilaterally  Gastrointestinal: Positive bowel sounds, soft, nontender, nondistended  Musculoskeletal: No bilateral ankle edema  Psychiatric: Appropriate affect, cooperative  Neurologic: Speech clear  Skin: No rashes    Results Reviewed:  Results from last 7 days   Lab Units 20  0402 02/15/20  1935 02/15/20  0615 02/15/20  0548  20  1840   WBC 10*3/mm3 3.51  --  1.90* 1.94*  --   --    HEMOGLOBIN g/dL 7.1* 7.0* 7.1* 7.1*   < > 7.3*   HEMATOCRIT % 24.4* 23.5* 23.5* 23.2*   < > 24.3*   PLATELETS 10*3/mm3 53*  --  38* 37*  --   --    INR   --   --   --   --   --  1.42*    < > = values in this interval not displayed.     Results from last 7 days   Lab Units 20  0402 02/15/20  0548 20  1005   SODIUM mmol/L 145 143 141   POTASSIUM mmol/L 3.7 4.4 4.0   CHLORIDE mmol/L 112* 110* 109*   CO2 mmol/L 23.0 21.0* 25.0   BUN mg/dL 33* 38* 22*   CREATININE mg/dL 1.28* 1.23* 1.02*   GLUCOSE mg/dL 76 145* 167*   CALCIUM mg/dL 8.6 8.3* 8.8   ALT (SGPT)  U/L  --  18 21   AST (SGOT) U/L  --  16 21     Estimated Creatinine Clearance: 54.7 mL/min (A) (by C-G formula based on SCr of 1.28 mg/dL (H)).    Microbiology Results Abnormal     Procedure Component Value - Date/Time    Body Fluid Culture - Body Fluid, Peritoneum [[id number]] Collected:  02/15/20 1122    Lab Status:  Preliminary result Specimen:  Body Fluid from Peritoneum Updated:  02/16/20 0726     Body Fluid Culture No growth     Gram Stain No WBCs or organisms seen          Imaging Results (Last 24 Hours)     ** No results found for the last 24 hours. **               I have reviewed the medications:  Scheduled Meds:  atorvastatin 80 mg Oral Nightly   calcium carbonate EX 1,500 mg Oral Daily   carvedilol 6.25 mg Oral BID With Meals   cefTRIAXone 1 g Intravenous Q24H   cholecalciferol 1,000 Units Oral Daily   ferrous sulfate 325 mg Oral Daily With Breakfast   insulin detemir 20 Units Subcutaneous Nightly   insulin lispro 0-7 Units Subcutaneous 4x Daily With Meals & Nightly   lidocaine PF 1% 20 mL Subcutaneous Once   pantoprazole 40 mg Intravenous Q12H   sodium chloride 10 mL Intravenous Q12H     Continuous Infusions:  lactated ringers 100 mL/hr    octreotide (SandoSTATIN) infusion 50 mcg/hr Last Rate: 50 mcg/hr (02/16/20 1014)   sodium chloride 50 mL/hr Last Rate: 50 mL/hr (02/16/20 0519)     PRN Meds:.•  acetaminophen **OR** acetaminophen **OR** acetaminophen  •  dextrose  •  dextrose  •  glucagon (human recombinant)  •  ondansetron **OR** ondansetron  •  sodium chloride  •  [COMPLETED] Insert peripheral IV **AND** sodium chloride  •  sodium chloride  •  traMADol    Assessment/Plan   Assessment & Plan     Active Hospital Problems    Diagnosis  POA   • Acute upper GI bleed [K92.2]  Yes   • Portal hypertension (CMS/HCC) [K76.6]  Unknown   • CAD (coronary artery disease) [I25.10]  Yes   • Type 2 diabetes mellitus, with long-term current use of insulin (CMS/HCC) [E11.9, Z79.4]  Not Applicable      [facility]  Problems   No resolved problems to display.        Brief Hospital Course to date:  Lyndsey Luna is a 59 y.o. female with Hx PUD, DM2, MI s/p stenting (~2 years ago), with plans for workup outpatient for melanotic stools and abdominal distention who presents with acute hematemesis with microcytic anemia and CT evidence for ascites now s/p EGD with variceal bleed and procedural intervention     Assessment/Plan     Hematemesis with variceal bleeding  Hx PUD  Suspect underlying chronic anemia  Thrombocytopenia, splenomegaly  - Hgb 8.4 on arrival, no baseline, suspect underlying chronic anemia  - s/p EGD 2/14/20 with Dr. Finch, varices with stigmata of recent bleeding, s/p intraprocedural intervention  - s/p 1U Plt  - repeat banding 4 weeks with Dr. Moore per GI recs  - cont octreotide today and DC tomorrow  - cont BID IV PPI  - cont IV rocephin, abx through 2/20 (can change to PO cipro at DC)  - had paracentesis with 2L removed, lasix/aldactone at DC  - GI following  - H&H stable     Leukopenia, resolved  - spurious lab error? WBC normal today and normal the day before the drop     DM type 2 with long term use of insulin, a1c 7.3%  - med rec says lantus 100U HS at home  - cont levemir 20U HS and SSI, adequate control last 24 hours     CAD s/p remote stenting  - MI and stenting ~2 years ago  - cont to hold ASA during acute bleeding  - cont statin, BB  - could benefit from ACE/ARB at some point, though Cr did inc slightly and stay up the last 2 days so will defer until improved or demonstrated stable     DVT prophylaxis:  mechanical    Disposition: I expect the patient to be discharged in 1-2 days pending continued stability and improvement.    CODE STATUS:   Code Status and Medical Interventions:   Ordered at: 02/14/20 7959     Level Of Support Discussed With:    Patient    Next of Kin (If No Surrogate)     Code Status:    CPR     Medical Interventions (Level of Support Prior to Arrest):    Full                  Electronically signed by Alli Watts DO, 02/16/20, 4:20 PM.

## 2020-02-16 NOTE — PROGRESS NOTES
Discharge Planning Assessment  [facility]     Patient Name: Lyndsey Luna  MRN: [id number]  Today's Date: 2/16/2020    Admit Date: 2/14/2020    Discharge Needs Assessment     Row Name 02/16/20 1630       Living Environment    Lives With  spouse    Current Living Arrangements  home/apartment/condo    Primary Care Provided by  self    Provides Primary Care For  no one    Family Caregiver if Needed  spouse;child(constantin), adult    Quality of Family Relationships  helpful;involved;supportive    Able to Return to Prior Arrangements  yes       Resource/Environmental Concerns    Resource/Environmental Concerns  none    Transportation Concerns  car, none       Transition Planning    Patient/Family Anticipates Transition to  home with family    Patient/Family Anticipated Services at Transition  none    Transportation Anticipated  family or friend will provide       Discharge Needs Assessment    Readmission Within the Last 30 Days  no previous admission in last 30 days    Concerns to be Addressed  no discharge needs identified;denies needs/concerns at this time    Equipment Currently Used at Home  none    Anticipated Changes Related to Illness  none    Equipment Needed After Discharge  none        Discharge Plan     Row Name 02/16/20 1631       Plan    Plan  Home with family    Patient/Family in Agreement with Plan  yes    Plan Comments  Met with patient at bedside to initiate discharge planning. She lives with her  in a home in [facility]. She is independent with ADLs and does not use any DME or home health. Plans to return home at discharge and denies any DC planning needs at this time. CM will continue to follow. Ashley Campbell RN [phone]    Final Discharge Disposition Code  01 - home or self-care        Destination      Coordination has not been started for this encounter.      Durable Medical Equipment      Coordination has not been started for this encounter.      Dialysis/Infusion      Coordination has not been  started for this encounter.      Home Medical Care      Coordination has not been started for this encounter.      Therapy      Coordination has not been started for this encounter.      Community Resources      Coordination has not been started for this encounter.          Demographic Summary     Row Name 02/16/20 1627       General Information    Arrived From  emergency department    Referral Source  admission list    Preferred Language  English        Functional Status     Row Name 02/16/20 1630       Functional Status    Usual Activity Tolerance  good    Current Activity Tolerance  moderate       Functional Status, IADL    Medications  independent    Meal Preparation  independent    Housekeeping  independent    Laundry  independent    Shopping  independent       Employment/    Employment/ Comments  [employer] with Rx medication coverage        Psychosocial    No documentation.       Abuse/Neglect    No documentation.       Legal    No documentation.       Substance Abuse    No documentation.       Patient Forms    No documentation.           Ashley Campbell RN

## 2020-02-16 NOTE — PLAN OF CARE
Problem: Patient Care Overview  Goal: Plan of Care Review  Outcome: Ongoing (interventions implemented as appropriate)  Flowsheets  Taken 2/16/2020 0800  Plan of Care Reviewed With: patient  Taken 2/16/2020 1426  Outcome Summary: Pt resting quietly. Pt ambulates with standby assist. VSS. Pt  at bedside. Pt tolerating GI soft diet. Will continue to monitor.     Problem: Patient Care Overview  Goal: Individualization and Mutuality  Outcome: Ongoing (interventions implemented as appropriate)     Problem: Patient Care Overview  Goal: Discharge Needs Assessment  Outcome: Ongoing (interventions implemented as appropriate)     Problem: Patient Care Overview  Goal: Interprofessional Rounds/Family Conf  Outcome: Ongoing (interventions implemented as appropriate)     Problem: Pain, Chronic (Adult)  Goal: Identify Related Risk Factors and Signs and Symptoms  Outcome: Ongoing (interventions implemented as appropriate)  Flowsheets (Taken 2/16/2020 0301 by Aleta Ramirez LPN)  Related Risk Factors (Chronic Pain): pain control inadequate;procedures/treatments  Signs and Symptoms (Chronic Pain): fatigue/weakness;sleep pattern change;verbalization of pain/discomfort for a prolonged time period     Problem: Pain, Chronic (Adult)  Goal: Acceptable Pain/Comfort Level and Functional Ability  Outcome: Ongoing (interventions implemented as appropriate)  Flowsheets (Taken 2/16/2020 0301 by Aleta Ramirez LPN)  Acceptable Pain/Comfort Level and Functional Ability: making progress toward outcome     Problem: Gastrointestinal Bleeding (Adult)  Goal: Signs and Symptoms of Listed Potential Problems Will be Absent, Minimized or Managed (Gastrointestinal Bleeding)  Outcome: Ongoing (interventions implemented as appropriate)  Flowsheets (Taken 2/16/2020 0301 by Aleta Ramirez LPN)  Problems Assessed (GI Bleeding): hypoxia/hypoxemia;fluid imbalance;hemorrhage  Problems Present (GI Bleeding): fluid imbalance;hemorrhage

## 2020-02-16 NOTE — PLAN OF CARE
VSS; NSR on monitor; tolerating po intake, food and fluids without c/o N/V; continues to have dark tarry stool, denies pain or discomfort; abdomen remains distended and taut; pt's  remains at bedside; call light within pt's reach; will continue to montitor

## 2020-02-17 LAB
ANA SER QL: POSITIVE
ANION GAP SERPL CALCULATED.3IONS-SCNC: 9 MMOL/L (ref 5–15)
BASOPHILS # BLD AUTO: 0.02 10*3/MM3 (ref 0–0.2)
BASOPHILS NFR BLD AUTO: 0.7 % (ref 0–1.5)
BUN BLD-MCNC: 28 MG/DL (ref 6–20)
BUN/CREAT SERPL: 22 (ref 7–25)
CALCIUM SPEC-SCNC: 7.7 MG/DL (ref 8.6–10.5)
CHLORIDE SERPL-SCNC: 111 MMOL/L (ref 98–107)
CO2 SERPL-SCNC: 23 MMOL/L (ref 22–29)
CREAT BLD-MCNC: 1.27 MG/DL (ref 0.57–1)
DEPRECATED RDW RBC AUTO: 51.8 FL (ref 37–54)
DSDNA AB SER-ACNC: <1 IU/ML (ref 0–9)
EOSINOPHIL # BLD AUTO: 0.1 10*3/MM3 (ref 0–0.4)
EOSINOPHIL NFR BLD AUTO: 3.5 % (ref 0.3–6.2)
ERYTHROCYTE [DISTWIDTH] IN BLOOD BY AUTOMATED COUNT: 17.2 % (ref 12.3–15.4)
GFR SERPL CREATININE-BSD FRML MDRD: 43 ML/MIN/1.73
GLUCOSE BLD-MCNC: 79 MG/DL (ref 65–99)
GLUCOSE BLDC GLUCOMTR-MCNC: 147 MG/DL (ref 70–130)
GLUCOSE BLDC GLUCOMTR-MCNC: 171 MG/DL (ref 70–130)
GLUCOSE BLDC GLUCOMTR-MCNC: 202 MG/DL (ref 70–130)
GLUCOSE BLDC GLUCOMTR-MCNC: 293 MG/DL (ref 70–130)
HCT VFR BLD AUTO: 24.3 % (ref 34–46.6)
HCT VFR BLD AUTO: 25.6 % (ref 34–46.6)
HCT VFR BLD AUTO: 26.3 % (ref 34–46.6)
HCT VFR BLD AUTO: 26.7 % (ref 34–46.6)
HGB BLD-MCNC: 7.3 G/DL (ref 12–15.9)
HGB BLD-MCNC: 7.7 G/DL (ref 12–15.9)
HGB BLD-MCNC: 7.9 G/DL (ref 12–15.9)
HGB BLD-MCNC: 8 G/DL (ref 12–15.9)
IMM GRANULOCYTES # BLD AUTO: 0.02 10*3/MM3 (ref 0–0.05)
IMM GRANULOCYTES NFR BLD AUTO: 0.7 % (ref 0–0.5)
INR PPP: 1.32 (ref 0.85–1.16)
LYMPHOCYTES # BLD AUTO: 0.91 10*3/MM3 (ref 0.7–3.1)
LYMPHOCYTES NFR BLD AUTO: 32.3 % (ref 19.6–45.3)
Lab: NORMAL
MCH RBC QN AUTO: 24.7 PG (ref 26.6–33)
MCHC RBC AUTO-ENTMCNC: 30 G/DL (ref 31.5–35.7)
MCV RBC AUTO: 82.1 FL (ref 79–97)
MONOCYTES # BLD AUTO: 0.33 10*3/MM3 (ref 0.1–0.9)
MONOCYTES NFR BLD AUTO: 11.7 % (ref 5–12)
NEUTROPHILS # BLD AUTO: 1.44 10*3/MM3 (ref 1.7–7)
NEUTROPHILS NFR BLD AUTO: 51.1 % (ref 42.7–76)
NRBC BLD AUTO-RTO: 0 /100 WBC (ref 0–0.2)
PLATELET # BLD AUTO: 46 10*3/MM3 (ref 140–450)
PMV BLD AUTO: 12 FL (ref 6–12)
POTASSIUM BLD-SCNC: 3.7 MMOL/L (ref 3.5–5.2)
PROTHROMBIN TIME: 15.8 SECONDS (ref 11.2–14.3)
RBC # BLD AUTO: 2.96 10*6/MM3 (ref 3.77–5.28)
SODIUM BLD-SCNC: 143 MMOL/L (ref 136–145)
WBC NRBC COR # BLD: 2.82 10*3/MM3 (ref 3.4–10.8)

## 2020-02-17 PROCEDURE — 99232 SBSQ HOSP IP/OBS MODERATE 35: CPT | Performed by: PHYSICIAN ASSISTANT

## 2020-02-17 PROCEDURE — 80048 BASIC METABOLIC PNL TOTAL CA: CPT | Performed by: INTERNAL MEDICINE

## 2020-02-17 PROCEDURE — 85025 COMPLETE CBC W/AUTO DIFF WBC: CPT | Performed by: INTERNAL MEDICINE

## 2020-02-17 PROCEDURE — 63710000001 INSULIN DETEMIR PER 5 UNITS: Performed by: INTERNAL MEDICINE

## 2020-02-17 PROCEDURE — 99232 SBSQ HOSP IP/OBS MODERATE 35: CPT | Performed by: INTERNAL MEDICINE

## 2020-02-17 PROCEDURE — 85007 BL SMEAR W/DIFF WBC COUNT: CPT | Performed by: INTERNAL MEDICINE

## 2020-02-17 PROCEDURE — 25010000002 OCTREOTIDE PER 25 MCG: Performed by: INTERNAL MEDICINE

## 2020-02-17 PROCEDURE — 82962 GLUCOSE BLOOD TEST: CPT

## 2020-02-17 PROCEDURE — 85610 PROTHROMBIN TIME: CPT | Performed by: INTERNAL MEDICINE

## 2020-02-17 PROCEDURE — 25010000002 CEFTRIAXONE PER 250 MG: Performed by: INTERNAL MEDICINE

## 2020-02-17 PROCEDURE — 85018 HEMOGLOBIN: CPT | Performed by: INTERNAL MEDICINE

## 2020-02-17 PROCEDURE — 85014 HEMATOCRIT: CPT | Performed by: INTERNAL MEDICINE

## 2020-02-17 RX ORDER — FUROSEMIDE 20 MG/1
20 TABLET ORAL DAILY
Status: DISCONTINUED | OUTPATIENT
Start: 2020-02-17 | End: 2020-02-19 | Stop reason: HOSPADM

## 2020-02-17 RX ORDER — SPIRONOLACTONE 25 MG/1
50 TABLET ORAL DAILY
Status: DISCONTINUED | OUTPATIENT
Start: 2020-02-17 | End: 2020-02-19 | Stop reason: HOSPADM

## 2020-02-17 RX ADMIN — TRAMADOL HYDROCHLORIDE 25 MG: 50 TABLET, FILM COATED ORAL at 08:36

## 2020-02-17 RX ADMIN — ATORVASTATIN CALCIUM 80 MG: 40 TABLET, FILM COATED ORAL at 20:37

## 2020-02-17 RX ADMIN — CARVEDILOL 6.25 MG: 6.25 TABLET, FILM COATED ORAL at 18:00

## 2020-02-17 RX ADMIN — SODIUM CHLORIDE 50 ML/HR: 9 INJECTION, SOLUTION INTRAVENOUS at 08:30

## 2020-02-17 RX ADMIN — CEFTRIAXONE SODIUM 1 G: 1 INJECTION, POWDER, FOR SOLUTION INTRAMUSCULAR; INTRAVENOUS at 16:15

## 2020-02-17 RX ADMIN — INSULIN LISPRO 3 UNITS: 100 INJECTION, SOLUTION INTRAVENOUS; SUBCUTANEOUS at 18:00

## 2020-02-17 RX ADMIN — OCTREOTIDE ACETATE 50 MCG/HR: 500 INJECTION, SOLUTION INTRAVENOUS; SUBCUTANEOUS at 08:29

## 2020-02-17 RX ADMIN — MELATONIN 1000 UNITS: at 08:30

## 2020-02-17 RX ADMIN — SODIUM CHLORIDE, PRESERVATIVE FREE 10 ML: 5 INJECTION INTRAVENOUS at 20:37

## 2020-02-17 RX ADMIN — SODIUM CHLORIDE, PRESERVATIVE FREE 10 ML: 5 INJECTION INTRAVENOUS at 08:30

## 2020-02-17 RX ADMIN — CALCIUM CARBONATE 1500 MG: 750 TABLET ORAL at 08:29

## 2020-02-17 RX ADMIN — INSULIN DETEMIR 20 UNITS: 100 INJECTION, SOLUTION SUBCUTANEOUS at 20:37

## 2020-02-17 RX ADMIN — CARVEDILOL 6.25 MG: 6.25 TABLET, FILM COATED ORAL at 08:30

## 2020-02-17 RX ADMIN — INSULIN LISPRO 2 UNITS: 100 INJECTION, SOLUTION INTRAVENOUS; SUBCUTANEOUS at 12:26

## 2020-02-17 RX ADMIN — PANTOPRAZOLE SODIUM 40 MG: 40 INJECTION, POWDER, FOR SOLUTION INTRAVENOUS at 20:37

## 2020-02-17 RX ADMIN — FERROUS SULFATE TAB 325 MG (65 MG ELEMENTAL FE) 325 MG: 325 (65 FE) TAB at 08:29

## 2020-02-17 RX ADMIN — INSULIN LISPRO 4 UNITS: 100 INJECTION, SOLUTION INTRAVENOUS; SUBCUTANEOUS at 20:37

## 2020-02-17 RX ADMIN — PANTOPRAZOLE SODIUM 40 MG: 40 INJECTION, POWDER, FOR SOLUTION INTRAVENOUS at 08:29

## 2020-02-17 NOTE — PLAN OF CARE
Problem: Patient Care Overview  Goal: Plan of Care Review  Outcome: Ongoing (interventions implemented as appropriate)  Flowsheets  Taken 2/17/2020 0331  Progress: no change  Outcome Summary: Patient received 1 unit PRBCs due to hemoglobin at 6.4. Provider notified. Patient hemoglobin 7.9 after 1 unit. Pt  at bedside, attentive to patient. Pt given bath and tolerating diet. Will continue to monitor. VSS.  Taken 2/16/2020 2000  Plan of Care Reviewed With: patient;spouse

## 2020-02-17 NOTE — PROGRESS NOTES
Continued Stay Note  [facility]     Patient Name: Lyndsey Luna  MRN: [id number]  Today's Date: 2/17/2020    Admit Date: 2/14/2020    Discharge Plan     Row Name 02/17/20 1552       Plan    Plan  discharge plan    Patient/Family in Agreement with Plan  yes    Plan Comments  Plan is home with family.  Denies discharge needs. CM will cont to follow    Final Discharge Disposition Code  01 - home or self-care        Discharge Codes    No documentation.       Expected Discharge Date and Time     Expected Discharge Date Expected Discharge Time    Feb 20, 2020             Chantel Warner RN

## 2020-02-17 NOTE — PROGRESS NOTES
[facility] Medicine Services  PROGRESS NOTE    Patient Name: Lyndsey Luna  : 1960  MRN: [id number]    Date of Admission: 2020  Primary Care Physician: Javid Lin APRN    Subjective   Subjective     CC:  Follow up GI bleed    HPI:  Hgb was low overnight and got a unit of blood. Overall feeling worn out, no significant pain. Melena slowing down/stopped.     Review of Systems  Gen- No fevers, chills  CV- No chest pain, palpitations  Resp- No cough, dyspnea  GI- No N/V/D, abd pain    Objective   Objective     Vital Signs:   Temp:  [97.7 °F (36.5 °C)-98.2 °F (36.8 °C)] 97.7 °F (36.5 °C)  Heart Rate:  [65-71] 65  Resp:  [16-18] 18  BP: (102-138)/(50-78) 138/63        Physical Exam:  Constitutional: No acute distress, sleeping in bed but easily awoken, alert  HENT: NCAT, mucous membranes moist  Respiratory: Clear to auscultation bilaterally, nonlabored respirations   Cardiovascular: RRR, 2/6 systolic murmur, palpable radial pulses bilaterally  Gastrointestinal: Positive bowel sounds, soft, nontender, nondistended  Musculoskeletal: No bilateral ankle edema  Psychiatric: Appropriate affect, cooperative  Neurologic: Speech clear  Skin: No rashes    Results Reviewed:  Results from last 7 days   Lab Units 20  0745 20  0333 20  2330  20  0402  02/15/20  0615  20  1840   WBC 10*3/mm3  --  2.82*  --   --  3.51  --  1.90*   < >  --    HEMOGLOBIN g/dL 7.7* 7.3* 7.9*   < > 7.1*   < > 7.1*   < > 7.3*   HEMATOCRIT % 25.6* 24.3* 26.7*   < > 24.4*   < > 23.5*   < > 24.3*   PLATELETS 10*3/mm3  --  46*  --   --  53*  --  38*   < >  --    INR   --  1.32*  --   --   --   --   --   --  1.42*    < > = values in this interval not displayed.     Results from last 7 days   Lab Units 20  0333 20  0402 02/15/20  0548 20  1005   SODIUM mmol/L 143 145 143 141   POTASSIUM mmol/L 3.7 3.7 4.4 4.0   CHLORIDE mmol/L 111* 112* 110* 109*   CO2 mmol/L 23.0 23.0  21.0* 25.0   BUN mg/dL 28* 33* 38* 22*   CREATININE mg/dL 1.27* 1.28* 1.23* 1.02*   GLUCOSE mg/dL 79 76 145* 167*   CALCIUM mg/dL 7.7* 8.6 8.3* 8.8   ALT (SGPT) U/L  --   --  18 21   AST (SGOT) U/L  --   --  16 21     Estimated Creatinine Clearance: 55.1 mL/min (A) (by C-G formula based on SCr of 1.27 mg/dL (H)).    Microbiology Results Abnormal     Procedure Component Value - Date/Time    Body Fluid Culture - Body Fluid, Peritoneum [[id number]] Collected:  02/15/20 1122    Lab Status:  Preliminary result Specimen:  Body Fluid from Peritoneum Updated:  02/17/20 0723     Body Fluid Culture No growth at 2 days     Gram Stain No WBCs or organisms seen          Imaging Results (Last 24 Hours)     ** No results found for the last 24 hours. **               I have reviewed the medications:  Scheduled Meds:  atorvastatin 80 mg Oral Nightly   calcium carbonate EX 1,500 mg Oral Daily   carvedilol 6.25 mg Oral BID With Meals   cefTRIAXone 1 g Intravenous Q24H   cholecalciferol 1,000 Units Oral Daily   ferrous sulfate 325 mg Oral Daily With Breakfast   insulin detemir 20 Units Subcutaneous Nightly   insulin lispro 0-7 Units Subcutaneous 4x Daily With Meals & Nightly   lidocaine PF 1% 20 mL Subcutaneous Once   pantoprazole 40 mg Intravenous Q12H   sodium chloride 10 mL Intravenous Q12H     Continuous Infusions:  sodium chloride 50 mL/hr Last Rate: 50 mL/hr (02/17/20 0830)     PRN Meds:.•  acetaminophen **OR** acetaminophen **OR** acetaminophen  •  dextrose  •  dextrose  •  glucagon (human recombinant)  •  ondansetron **OR** ondansetron  •  sodium chloride  •  [COMPLETED] Insert peripheral IV **AND** sodium chloride  •  sodium chloride  •  traMADol    Assessment/Plan   Assessment & Plan     Active Hospital Problems    Diagnosis  POA   • Acute upper GI bleed [K92.2]  Yes   • Portal hypertension (CMS/HCC) [K76.6]  Unknown   • CAD (coronary artery disease) [I25.10]  Yes   • Type 2 diabetes mellitus, with long-term current use of  "insulin (CMS/[facility]) [E11.9, Z79.4]  Not Applicable      Resolved Hospital Problems   No resolved problems to display.        Brief Hospital Course to date:  Lyndsey Luna is a 59 y.o. female with Hx PUD, DM2, MI s/p stenting (~2 years ago), with plans for workup outpatient for melanotic stools and abdominal distention who presents with acute hematemesis with microcytic anemia and CT evidence for ascites now s/p EGD with variceal bleed and procedural intervention     Assessment/Plan     Hematemesis with variceal bleeding  Hx PUD  Acute blood loss on suspected underlying chronic anemia  Thrombocytopenia, splenomegaly  - Hgb 8.4 on arrival, no baseline, suspect underlying chronic anemia  - s/p EGD 2/14/20 with Dr. Finch, varices with stigmata of recent bleeding, s/p intraprocedural intervention  - s/p 1U Plt and 1u PRBCs  - repeat banding 4 weeks; initially planned with Dr. Moore but patient called the office today and they told her they would \"not follow up on another physician's work,\" anticipate referral to our GI at [facility]  - discontinue octreotide today  - cont BID IV PPI  - cont IV rocephin, abx through 2/20 (can change to PO cipro at [facility])  - had paracentesis with 2L removed  - start lasix/aldactone (20/50)  - GI following  - monitor H&H     DM type 2 with long term use of insulin, a1c 7.3%  - med rec says lantus 100U HS at home  - cont levemir 20U HS and SSI, adequate control last 24 hours     CAD s/p remote stenting  - MI and stenting ~2 years ago  - cont to hold ASA during acute bleeding  - cont statin, BB  - could benefit from ACE/ARB at some point, though Cr slightly inc on arrival and unclear baseline, recommend outpatient consideration    Leukopenia, resolved  - spurious lab error? WBC normal today and normal the day before the drop     DVT prophylaxis:  mechanical    Disposition: I expect the patient to be discharged tomorrow if her H&H remains stable.    CODE STATUS:   Code Status and Medical " Interventions:   Ordered at: 02/14/20 1555     Level Of Support Discussed With:    Patient    Next of Kin (If No Surrogate)     Code Status:    CPR     Medical Interventions (Level of Support Prior to Arrest):    Full         Electronically signed by Alli Watts DO, 02/17/20, 6:22 PM.

## 2020-02-17 NOTE — PAYOR COMM NOTE
"Jesusita Diaz RN   Phone [phone]  Fax [phone]      Lyndsey Vergara (59 y.o. Female)     Date of Birth Social Security Number Address Home Phone MRN    1960  [address] [facility] [address] [phone] [id number]    Sikhism Marital Status          Yarsanism        Admission Date Admission Type Admitting Provider Attending Provider Department, Room/Bed    2/14/20 Emergency Alli Watts DO Shields, Daniel Alan,  [address], S584/1    Discharge Date Discharge Disposition Discharge Destination                       Attending Provider:  Alli Watts DO    Allergies:  Penicillins    Isolation:  None   Infection:  None   Code Status:  CPR    Ht:  170.2 cm (67\")   Wt:  90.7 kg (200 lb)    Admission Cmt:  None   Principal Problem:  None                Active Insurance as of 2/14/2020     Primary Coverage     Payor Plan Insurance Group Employer/Plan Group    [facility] [place] [id number]     Payor Plan Address Payor Plan Phone Number Payor Plan Fax Number Effective Dates    PO    2/14/2020 - None Entered    [facility] [id number]       Subscriber Name Subscriber Birth Date Member ID       LYNDSEY VERGARA 1960 [id number]           Secondary Coverage     Payor Plan Insurance Group Employer/Plan Group    [id number]     Payor Plan Address Payor Plan Phone Number Payor Plan Fax Number Effective Dates    [address] [phone]  1/1/2019 - None Entered    [facility] [address]       Subscriber Name Subscriber Birth Date Member ID       LYNDSEY VERGARA 1960 [id number]                 Emergency Contacts      (Rel.) Home Phone Work Phone Mobile Phone    Otoniel Vergara (Spouse) [phone] -- --    Clint,April (Daughter) [phone] -- --            Vital Signs (last day)     Date/Time   Temp   Temp src   Pulse   Resp   BP   Patient Position   SpO2    02/17/20 0737   97.7 (36.5)   " Oral   65   18   138/63   Lying   --    02/16/20 2305   98.2 (36.8)   Oral   68   16   119/56   --   --    02/16/20 2050   98.1 (36.7)   Oral   70   16   123/55   --   96    02/16/20 2035   97.7 (36.5)   Oral   70   18   102/78   --   97    02/16/20 2000   97.8 (36.6)   Oral   71   18   117/66   --   96    02/16/20 1858   98.2 (36.8)   Oral   69   18   107/50   --   --    02/16/20 1712   --   --   63   --   114/58   --   --    02/16/20 1500   --   --   63   --   --   --   --    02/16/20 1300   --   --   62   --   --   --   --    02/16/20 1100   --   --   70   --   --   --   --    02/16/20 0900   --   --   64   --   --   --   --    02/16/20 0743   97.7 (36.5)   Oral   79   18   120/48   Lying   --    02/16/20 0700   --   --   64   --   --   --   --    02/16/20 0400   98.7 (37.1)   Oral   68   18   115/59   Lying   --    02/16/20 0116   98.6 (37)   Axillary   68   18   112/64   Lying   --              Current Facility-Administered Medications   Medication Dose Route Frequency Provider Last Rate Last Dose   • acetaminophen (TYLENOL) tablet 650 mg  650 mg Oral Q4H PRN Alli Watts DO   650 mg at 02/16/20 2104    Or   • acetaminophen (TYLENOL) 160 MG/5ML solution 650 mg  650 mg Oral Q4H PRN Alli Watts DO        Or   • acetaminophen (TYLENOL) suppository 650 mg  650 mg Rectal Q4H PRN Alli Watts DO       • atorvastatin (LIPITOR) tablet 80 mg  80 mg Oral Nightly Alli Watts DO   80 mg at 02/16/20 2103   • calcium carbonate EX (TUMS EX) chewable tablet 1,500 mg  1,500 mg Oral Daily Brant Finch MD   1,500 mg at 02/17/20 0829   • carvedilol (COREG) tablet 6.25 mg  6.25 mg Oral BID With Meals Brant Finch MD   6.25 mg at 02/17/20 0830   • cefTRIAXone (ROCEPHIN) 1 g/100 mL 0.9% NS (MBP)  1 g Intravenous Q24H Alli Watts DO   1 g at 02/16/20 1301   • cholecalciferol (VITAMIN D3) tablet 1,000 Units  1,000 Units Oral Daily Brant Finch MD   1,000 Units at 02/17/20 0830    • dextrose (D50W) 25 g/ 50mL Intravenous Solution 25 g  25 g Intravenous Q15 Min PRN Alli Watts DO       • dextrose (GLUTOSE) oral gel 15 g  15 g Oral Q15 Min PRN Alli Watts DO       • ferrous sulfate tablet 325 mg  325 mg Oral Daily With Breakfast Alli Watts DO   325 mg at 02/17/20 0829   • glucagon (human recombinant) (GLUCAGEN DIAGNOSTIC) injection 1 mg  1 mg Subcutaneous Q15 Min PRN Alli Watts DO       • insulin detemir (LEVEMIR) injection 20 Units  20 Units Subcutaneous Nightly Alli Watts DO   20 Units at 02/16/20 2104   • insulin lispro (humaLOG) injection 0-7 Units  0-7 Units Subcutaneous 4x Daily With Meals & Nightly Alli Watts DO   2 Units at 02/17/20 1226   • lidocaine PF 1% (XYLOCAINE) injection 20 mL  20 mL Subcutaneous Once Norbert Adan MD       • ondansetron (ZOFRAN) tablet 4 mg  4 mg Oral Q6H PRN Alli Watts DO        Or   • ondansetron (ZOFRAN) injection 4 mg  4 mg Intravenous Q6H PRN Alli Watts DO       • pantoprazole (PROTONIX) injection 40 mg  40 mg Intravenous Q12H Alli Watts DO   40 mg at 02/17/20 0829   • sodium chloride 0.9 % flush 10 mL  10 mL Intravenous PRN Brant Finch MD       • sodium chloride 0.9 % flush 10 mL  10 mL Intravenous PRN Brant Finch MD       • sodium chloride 0.9 % flush 10 mL  10 mL Intravenous Q12H Alli Watts DO   10 mL at 02/17/20 0830   • sodium chloride 0.9 % flush 10 mL  10 mL Intravenous PRN Alli Watts DO       • sodium chloride 0.9 % infusion  50 mL/hr Intravenous Continuous Brant Finch MD 50 mL/hr at 02/17/20 0830 50 mL/hr at 02/17/20 0830   • traMADol (ULTRAM) tablet 25 mg  25 mg Oral Q6H PRN Alli Watts DO   25 mg at 02/17/20 0836     Lab Results (last 24 hours)     Procedure Component Value Units Date/Time    DAVID [[id number]]  (Abnormal) Collected:  02/14/20 1799    Specimen:  Blood Updated:  02/17/20 1508     DAVID  Direct Positive    Narrative:       Performed at:   - [facility]  [id number]  : Jake Keith PhD, Phone:  [phone]    DNA / DS [[id number]] Collected:  20 7370    Specimen:  Blood Updated:  20 1448     Anti-DNA (DS) Ab Qn <1 IU/mL      Comment:                                    Negative      <5                                     Equivocal  5 - 9                                     Positive      >9        See below: Comment     Comment: Autoantibody                       Disease Association  ------------------------------------------------------------                          Condition                  Frequency  ---------------------   ------------------------   ---------  Antinuclear Antibody,    SLE, mixed connective  Direct (DAVID-D)           tissue diseases  ---------------------   ------------------------   ---------  dsDNA                    SLE                        40 - 60%  ---------------------   ------------------------   ---------  Chromatin                Drug induced SLE                90%                           SLE                        48 - 97%  ---------------------   ------------------------   ---------  SSA (Ro)                 SLE                        25 - 35%                           Sjogren's Syndrome         40 - 70%                            Lupus                 100%  ---------------------   ------------------------   ---------  SSB (La)                 SLE                             10%                           Sjogren's Syndrome              30%  ---------------------   -----------------------    ---------  Sm (anti-Smith)          SLE                        15 - 30%  ---------------------   -----------------------    ---------  RNP                      Mixed Connective Tissue                           Disease                         95%  (U1 nRNP,                SLE                        30 -  50%  anti-ribonucleoprotein)  Polymyositis and/or                           Dermatomyositis                 20%  ---------------------   ------------------------   ---------  Scl-70 (antiDNA          Scleroderma (diffuse)      20 - 35%  topoisomerase)           Crest                           13%  ---------------------   ------------------------   ---------  Hilary-1                     Polymyositis and/or                           Dermatomyositis            20 - 40%  ---------------------   ------------------------   ---------  Centromere B             Scleroderma - Crest                           variant                         80%       Narrative:       Performed at:  [address] [place] [address]  [id number]  : Jake Keith PhD, Phone:  [phone]    POC Glucose Once [[id number]]  (Abnormal) Collected:  02/17/20 1150    Specimen:  Blood Updated:  02/17/20 1202     Glucose 171 mg/dL     POC Glucose Once [[id number]]  (Abnormal) Collected:  02/17/20 0801    Specimen:  Blood Updated:  02/17/20 0823     Glucose 147 mg/dL     Hemoglobin & Hematocrit, Blood [[id number]]  (Abnormal) Collected:  02/17/20 0745    Specimen:  Blood Updated:  02/17/20 0800     Hemoglobin 7.7 g/dL      Hematocrit 25.6 %     Body Fluid Culture - Body Fluid, Peritoneum [[id number]] Collected:  02/15/20 1122    Specimen:  Body Fluid from Peritoneum Updated:  02/17/20 0723     Body Fluid Culture No growth at 2 days     Gram Stain No WBCs or organisms seen    CBC & Differential [[id number]] Collected:  02/17/20 0333    Specimen:  Blood Updated:  02/17/20 3094    Narrative:       The following orders were created for panel order CBC & Differential.  Procedure                               Abnormality         Status                     ---------                               -----------         ------                     CBC Auto Differential[[id number]]        Abnormal            Final result                 Please view  results for these tests on the individual orders.    CBC Auto Differential [[id number]]  (Abnormal) Collected:  02/17/20 0333    Specimen:  Blood Updated:  02/17/20 0526     WBC 2.82 10*3/mm3      RBC 2.96 10*6/mm3      Hemoglobin 7.3 g/dL      Hematocrit 24.3 %      MCV 82.1 fL      MCH 24.7 pg      MCHC 30.0 g/dL      RDW 17.2 %      RDW-SD 51.8 fl      MPV 12.0 fL      Platelets 46 10*3/mm3      Neutrophil % 51.1 %      Lymphocyte % 32.3 %      Monocyte % 11.7 %      Eosinophil % 3.5 %      Basophil % 0.7 %      Immature Grans % 0.7 %      Neutrophils, Absolute 1.44 10*3/mm3      Lymphocytes, Absolute 0.91 10*3/mm3      Monocytes, Absolute 0.33 10*3/mm3      Eosinophils, Absolute 0.10 10*3/mm3      Basophils, Absolute 0.02 10*3/mm3      Immature Grans, Absolute 0.02 10*3/mm3      nRBC 0.0 /100 WBC     Narrative:       Appended report. These results have been appended to a previously verified report.    Scan Slide [[id number]] Collected:  02/17/20 0333    Specimen:  Blood Updated:  02/17/20 0526    Basic Metabolic Panel [[id number]]  (Abnormal) Collected:  02/17/20 0333    Specimen:  Blood Updated:  02/17/20 0518     Glucose 79 mg/dL      BUN 28 mg/dL      Creatinine 1.27 mg/dL      Sodium 143 mmol/L      Potassium 3.7 mmol/L      Chloride 111 mmol/L      CO2 23.0 mmol/L      Calcium 7.7 mg/dL      eGFR Non African Amer 43 mL/min/1.73      BUN/Creatinine Ratio 22.0     Anion Gap 9.0 mmol/L     Narrative:       GFR Normal >60  Chronic Kidney Disease <60  Kidney Failure <15      Protime-INR [[id number]]  (Abnormal) Collected:  02/17/20 0333    Specimen:  Blood Updated:  02/17/20 0508     Protime 15.8 Seconds      INR 1.32    Hemoglobin & Hematocrit, Blood [[id number]]  (Abnormal) Collected:  02/16/20 2330    Specimen:  Blood Updated:  02/17/20 0009     Hemoglobin 7.9 g/dL      Hematocrit 26.7 %     POC Glucose Once [[id number]]  (Abnormal) Collected:  02/16/20 2013    Specimen:  Blood Updated:  02/16/20 2015     Glucose  236 mg/dL     Hemoglobin & Hematocrit, Blood [[id number]]  (Abnormal) Collected:  02/16/20 1952    Specimen:  Blood Updated:  02/16/20 2003     Hemoglobin 6.4 g/dL      Hematocrit 21.6 %     POC Glucose Once [[id number]]  (Abnormal) Collected:  02/16/20 1657    Specimen:  Blood Updated:  02/16/20 1659     Glucose 202 mg/dL         Imaging Results (Last 24 Hours)     Procedure Component Value Units Date/Time    CT Guided Paracentesis [[id number]] Collected:  02/15/20 1114     Updated:  02/16/20 1821    Narrative:       EXAMINATION: CT GUIDED PARACENTESIS-      INDICATION: new onset ascites; K92.2-Gastrointestinal hemorrhage,  unspecified; I85.11-Secondary esophageal varices with bleeding;  K76.6-Portal hypertension; R16.1-Splenomegaly, not elsewhere classified;  D64.9-Anemia, unspecified; N17.9-Acute kidney failure, unspecified;  Z87.898-Personal history of other specified conditions; E11.69-Type 2  diabetes mellitus with other specified complication.     TECHNIQUE: Limited low-dose 5 mm axial images for paracentesis guidance.     The radiation dose reduction device was turned on for each scan per the  ALARA (As Low as Reasonably Achievable) protocol.     COMPARISON: 02/14/2020 abdomen and pelvis CT scan.     FINDINGS: There is ascites scattered fairly uniformly throughout the  abdomen and no large pocket of ascites in any one spot. The safest area  for potential paracentesis is localized in the right lower quadrant.  Informed written consent was obtained from the patient prior to  beginning. Ms. Luna indicates her  understanding and agrees to  proceed. The skin overlying the pocket of fluid in the right lower  quadrant was marked, prepped and draped in sterile fashion and 1%  lidocaine infiltrated into the skin and subcutaneous tissues as local  anesthesia. Subsequently, under CT guidance, a 6.5 Gibraltarian straight  catheter was advanced to the abdominal wall musculature and met some  resistance. Inspection of this  "catheter showed some stripping of the  catheter, and a new 6.5 Slovenian straight catheter was gradually advanced  to the peritoneal margin and  careful peritoneal puncture performed with  return of thin clear light yellow ascites. 60 cc were removed and a  sterile syringe for labs previously ordered, distributed into labeled  containers appropriate for labs previous ordered and hand-carried by the  technologist to pathology. Subsequently, a total of 2.05 L of thin clear  yellow fluid was withdrawn. When no more fluid could be obtained,  catheter was withdrawn and pressure held at the stick site for three  minutes. Follow-up scanning shows no evidence of hemorrhage and mild  remaining ascites. There is no oozing at the skin site. Ms. Luna  tolerated the procedure very well and there were no complications.       Impression:       CT-guided diagnostic and therapeutic paracentesis without  complication. Removal of 2050 mL of thin clear yellow-tinged ascites.  Sample sent for labs ordered.     DICTATED:   02/15/2020  EDITED/ls :   02/15/2020         This report was finalized on 2/16/2020 6:18 PM by Dr. Norbert Adan MD.              Physician Progress Notes (last 24 hours) (Notes from 02/16/20 1612 through 02/17/20 1612)      Britany Young PA at 02/17/20 0970     Attestation signed by Brunner, Mark I, MD at 02/17/20 1142    I have reviewed the documentation above and agree.                    GI Daily Progress Note  Subjective:    Chief Complaint:  Follow up esophageal variceal hemorrhage     Feels some better today.  Did not sleep well last night.   Denies any melena or ongoing bleeding.       Objective:    /63 (BP Location: Left arm, Patient Position: Lying)   Pulse 65   Temp 97.7 °F (36.5 °C) (Oral)   Resp 18   Ht 170.2 cm (67\")   Wt 90.7 kg (200 lb)   SpO2 96%   BMI 31.32 kg/m²      Physical Exam   Constitutional: She is oriented to person, place, and time. She appears well-developed.   Appears older " than stated age    Cardiovascular: Normal rate and regular rhythm.   Pulmonary/Chest: Effort normal and breath sounds normal. No respiratory distress.   Abdominal: Soft. Bowel sounds are normal. She exhibits no distension. There is no tenderness.   Neurological: She is alert and oriented to person, place, and time.   Skin: Skin is warm and dry.   Psychiatric: Her behavior is normal.   Nursing note and vitals reviewed.      Lab  Lab Results   Component Value Date    WBC 2.82 (L) 02/17/2020    HGB 7.7 (L) 02/17/2020    HGB 7.3 (L) 02/17/2020    HGB 7.9 (L) 02/16/2020    MCV 82.1 02/17/2020    PLT 46 (C) 02/17/2020    INR 1.32 (H) 02/17/2020    INR 1.42 (H) 02/14/2020    INR 1.10 02/28/2018       Lab Results   Component Value Date    GLUCOSE 79 02/17/2020    BUN 28 (H) 02/17/2020    CREATININE 1.27 (H) 02/17/2020    EGFRIFNONA 43 (L) 02/17/2020    EGFRIFAFRI 67 02/14/2020    BCR 22.0 02/17/2020     02/17/2020    K 3.7 02/17/2020    CO2 23.0 02/17/2020    CALCIUM 7.7 (L) 02/17/2020    ALBUMIN 3.40 (L) 02/15/2020    ALKPHOS 75 02/15/2020    BILITOT 0.6 02/15/2020    ALT 18 02/15/2020    AST 16 02/15/2020     SAAG is 2.7     Assessment:    Esophageal varices with hemorrhage s/p band ligation x 7 on 2/14/20.    Hematemesis, resolved.  Acute blood loss anemia s/p 1 unit of PRBCs   Pancytopenia, s/p transfusion of 1 unit of platelets    Ascites s/p paracentesis with 2 L removed.    Portal hypertension     Plan:    >>> Discontinue IV Octreotide drip and IV LR   >>> Continue BID Protonix for one month, then q daily  >>> Complete 7 day course of antibiotics   >>> Outpatient follow up with Dr. Moore for repeat banding in 1 month   >>> Low sodium diet (< 2 gm per day)    Increase ambulation today.       GELA Camacho  02/17/20  9:45 AM      Electronically signed by Brunner, Mark I, MD at 02/17/20 1142     Alli Watts DO at 02/16/20 1620              [facility]  Services  PROGRESS NOTE    Patient Name: Lyndsey Luna  : 1960  MRN: [id number]    Date of Admission: 2020  Primary Care Physician: Javid Lin APRN    Subjective   Subjective     CC:  Follow up variceal bleed    HPI:  Having fewer melanotic stools. Occasional epigastric pain for 10-15 seconds then spontaneously resolves. Did not sleep well last night so tired.     Review of Systems  Gen- No fevers, chills  CV- No chest pain, palpitations  Resp- No cough, dyspnea  GI- No N/V/D    Objective   Objective     Vital Signs:   Temp:  [97.7 °F (36.5 °C)-98.7 °F (37.1 °C)] 97.7 °F (36.5 °C)  Heart Rate:  [62-79] 63  Resp:  [17-18] 18  BP: (112-123)/(48-64) 120/48        Physical Exam:  Constitutional: Awake, alert, NAD, laying in bed  HENT: NCAT, mucous membranes moist  Respiratory: Clear to auscultation bilaterally, nonlabored respirations   Cardiovascular: RRR, 2/6 systolic murmur, palpable pedal pulses bilaterally  Gastrointestinal: Positive bowel sounds, soft, nontender, nondistended  Musculoskeletal: No bilateral ankle edema  Psychiatric: Appropriate affect, cooperative  Neurologic: Speech clear  Skin: No rashes    Results Reviewed:  Results from last 7 days   Lab Units 20  0402 02/15/20  1935 02/15/20  0615 02/15/20  0548  20  1840   WBC 10*3/mm3 3.51  --  1.90* 1.94*  --   --    HEMOGLOBIN g/dL 7.1* 7.0* 7.1* 7.1*   < > 7.3*   HEMATOCRIT % 24.4* 23.5* 23.5* 23.2*   < > 24.3*   PLATELETS 10*3/mm3 53*  --  38* 37*  --   --    INR   --   --   --   --   --  1.42*    < > = values in this interval not displayed.     Results from last 7 days   Lab Units 20  0402 02/15/20  0548 20  1005   SODIUM mmol/L 145 143 141   POTASSIUM mmol/L 3.7 4.4 4.0   CHLORIDE mmol/L 112* 110* 109*   CO2 mmol/L 23.0 21.0* 25.0   BUN mg/dL 33* 38* 22*   CREATININE mg/dL 1.28* 1.23* 1.02*   GLUCOSE mg/dL 76 145* 167*   CALCIUM mg/dL 8.6 8.3* 8.8   ALT (SGPT) U/L  --  18 21   AST (SGOT) U/L  --  16 21      Estimated Creatinine Clearance: 54.7 mL/min (A) (by C-G formula based on SCr of 1.28 mg/dL (H)).    Microbiology Results Abnormal     Procedure Component Value - Date/Time    Body Fluid Culture - Body Fluid, Peritoneum [[id number]] Collected:  02/15/20 1122    Lab Status:  Preliminary result Specimen:  Body Fluid from Peritoneum Updated:  02/16/20 0726     Body Fluid Culture No growth     Gram Stain No WBCs or organisms seen          Imaging Results (Last 24 Hours)     ** No results found for the last 24 hours. **               I have reviewed the medications:  Scheduled Meds:  atorvastatin 80 mg Oral Nightly   calcium carbonate EX 1,500 mg Oral Daily   carvedilol 6.25 mg Oral BID With Meals   cefTRIAXone 1 g Intravenous Q24H   cholecalciferol 1,000 Units Oral Daily   ferrous sulfate 325 mg Oral Daily With Breakfast   insulin detemir 20 Units Subcutaneous Nightly   insulin lispro 0-7 Units Subcutaneous 4x Daily With Meals & Nightly   lidocaine PF 1% 20 mL Subcutaneous Once   pantoprazole 40 mg Intravenous Q12H   sodium chloride 10 mL Intravenous Q12H     Continuous Infusions:  lactated ringers 100 mL/hr    octreotide (SandoSTATIN) infusion 50 mcg/hr Last Rate: 50 mcg/hr (02/16/20 1014)   sodium chloride 50 mL/hr Last Rate: 50 mL/hr (02/16/20 0519)     PRN Meds:.•  acetaminophen **OR** acetaminophen **OR** acetaminophen  •  dextrose  •  dextrose  •  glucagon (human recombinant)  •  ondansetron **OR** ondansetron  •  sodium chloride  •  [COMPLETED] Insert peripheral IV **AND** sodium chloride  •  sodium chloride  •  traMADol    Assessment/Plan   Assessment & Plan     Active Hospital Problems    Diagnosis  POA   • Acute upper GI bleed [K92.2]  Yes   • Portal hypertension (CMS/HCC) [K76.6]  Unknown   • CAD (coronary artery disease) [I25.10]  Yes   • Type 2 diabetes mellitus, with long-term current use of insulin (CMS/HCC) [E11.9, Z79.4]  Not Applicable      Resolved Hospital Problems   No resolved problems to  display.        Brief Hospital Course to date:  Lyndsey Luna is a 59 y.o. female with Hx PUD, DM2, MI s/p stenting (~2 years ago), with plans for workup outpatient for melanotic stools and abdominal distention who presents with acute hematemesis with microcytic anemia and CT evidence for ascites now s/p EGD with variceal bleed and procedural intervention     Assessment/Plan     Hematemesis with variceal bleeding  Hx PUD  Suspect underlying chronic anemia  Thrombocytopenia, splenomegaly  - Hgb 8.4 on arrival, no baseline, suspect underlying chronic anemia  - s/p EGD 2/14/20 with Dr. Finch, varices with stigmata of recent bleeding, s/p intraprocedural intervention  - s/p 1U Plt  - repeat banding 4 weeks with Dr. Moore per GI recs  - cont octreotide today and DC tomorrow  - cont BID IV PPI  - cont IV rocephin, abx through 2/20 (can change to PO cipro at DC)  - had paracentesis with 2L removed, lasix/aldactone at DC  - GI following  - H&H stable     Leukopenia, resolved  - spurious lab error? WBC normal today and normal the day before the drop     DM type 2 with long term use of insulin, a1c 7.3%  - med rec says lantus 100U HS at home  - cont levemir 20U HS and SSI, adequate control last 24 hours     CAD s/p remote stenting  - MI and stenting ~2 years ago  - cont to hold ASA during acute bleeding  - cont statin, BB  - could benefit from ACE/ARB at some point, though Cr did inc slightly and stay up the last 2 days so will defer until improved or demonstrated stable     DVT prophylaxis:  mechanical    Disposition: I expect the patient to be discharged in 1-2 days pending continued stability and improvement.    CODE STATUS:   Code Status and Medical Interventions:   Ordered at: 02/14/20 0238     Level Of Support Discussed With:    Patient    Next of Kin (If No Surrogate)     Code Status:    CPR     Medical Interventions (Level of Support Prior to Arrest):    Full         Electronically signed by Alli Rios  DO Stefanie, 02/16/20, 4:20 PM.        Electronically signed by Alli Watts DO at 02/16/20 1971

## 2020-02-17 NOTE — PLAN OF CARE
Problem: Patient Care Overview  Goal: Plan of Care Review  Outcome: Ongoing (interventions implemented as appropriate)  Flowsheets  Taken 2/17/2020 0800  Plan of Care Reviewed With: patient  Taken 2/17/2020 1827  Outcome Summary: Pt feeling well, napped throughout day. Ambulated around room without issue. One prn tramadol given this am for sharp, intermittent abdominal pain. VSS.

## 2020-02-17 NOTE — PROGRESS NOTES
"GI Daily Progress Note  Subjective:    Chief Complaint:  Follow up esophageal variceal hemorrhage     Feels some better today.  Did not sleep well last night.   Denies any melena or ongoing bleeding.       Objective:    /63 (BP Location: Left arm, Patient Position: Lying)   Pulse 65   Temp 97.7 °F (36.5 °C) (Oral)   Resp 18   Ht 170.2 cm (67\")   Wt 90.7 kg (200 lb)   SpO2 96%   BMI 31.32 kg/m²     Physical Exam   Constitutional: She is oriented to person, place, and time. She appears well-developed.   Appears older than stated age    Cardiovascular: Normal rate and regular rhythm.   Pulmonary/Chest: Effort normal and breath sounds normal. No respiratory distress.   Abdominal: Soft. Bowel sounds are normal. She exhibits no distension. There is no tenderness.   Neurological: She is alert and oriented to person, place, and time.   Skin: Skin is warm and dry.   Psychiatric: Her behavior is normal.   Nursing note and vitals reviewed.      Lab  Lab Results   Component Value Date    WBC 2.82 (L) 02/17/2020    HGB 7.7 (L) 02/17/2020    HGB 7.3 (L) 02/17/2020    HGB 7.9 (L) 02/16/2020    MCV 82.1 02/17/2020    PLT 46 (C) 02/17/2020    INR 1.32 (H) 02/17/2020    INR 1.42 (H) 02/14/2020    INR 1.10 02/28/2018       Lab Results   Component Value Date    GLUCOSE 79 02/17/2020    BUN 28 (H) 02/17/2020    CREATININE 1.27 (H) 02/17/2020    EGFRIFNONA 43 (L) 02/17/2020    EGFRIFAFRI 67 02/14/2020    BCR 22.0 02/17/2020     02/17/2020    K 3.7 02/17/2020    CO2 23.0 02/17/2020    CALCIUM 7.7 (L) 02/17/2020    ALBUMIN 3.40 (L) 02/15/2020    ALKPHOS 75 02/15/2020    BILITOT 0.6 02/15/2020    ALT 18 02/15/2020    AST 16 02/15/2020     SAAG is 2.7     Assessment:    Esophageal varices with hemorrhage s/p band ligation x 7 on 2/14/20.    Hematemesis, resolved.  Acute blood loss anemia s/p 1 unit of PRBCs   Pancytopenia, s/p transfusion of 1 unit of platelets    Ascites s/p paracentesis with 2 L removed.    Portal " hypertension     Plan:    >>> Discontinue IV Octreotide drip and IV LR   >>> Continue BID Protonix for one month, then q daily  >>> Complete 7 day course of antibiotics   >>> Outpatient follow up with Dr. Moore for repeat banding in 1 month   >>> Low sodium diet (< 2 gm per day)    Increase ambulation today.       GELA Camacho  02/17/20  9:45 AM

## 2020-02-18 LAB
ABO + RH BLD: NORMAL
ABO + RH BLD: NORMAL
ALBUMIN SERPL-MCNC: 3.3 G/DL (ref 3.5–5.2)
ALP SERPL-CCNC: 69 U/L (ref 39–117)
ALT SERPL W P-5'-P-CCNC: 18 U/L (ref 1–33)
ANION GAP SERPL CALCULATED.3IONS-SCNC: 12 MMOL/L (ref 5–15)
AST SERPL-CCNC: 18 U/L (ref 1–32)
BACTERIA FLD CULT: NORMAL
BH BB BLOOD EXPIRATION DATE: NORMAL
BH BB BLOOD EXPIRATION DATE: NORMAL
BH BB BLOOD TYPE BARCODE: 6200
BH BB BLOOD TYPE BARCODE: 6200
BH BB DISPENSE STATUS: NORMAL
BH BB DISPENSE STATUS: NORMAL
BH BB PRODUCT CODE: NORMAL
BH BB PRODUCT CODE: NORMAL
BH BB UNIT NUMBER: NORMAL
BH BB UNIT NUMBER: NORMAL
BILIRUB CONJ SERPL-MCNC: 0.2 MG/DL (ref 0.2–0.3)
BILIRUB INDIRECT SERPL-MCNC: 0.2 MG/DL
BILIRUB SERPL-MCNC: 0.4 MG/DL (ref 0.2–1.2)
BUN BLD-MCNC: 16 MG/DL (ref 6–20)
BUN/CREAT SERPL: 15.4 (ref 7–25)
CALCIUM SPEC-SCNC: 7.6 MG/DL (ref 8.6–10.5)
CHLORIDE SERPL-SCNC: 109 MMOL/L (ref 98–107)
CO2 SERPL-SCNC: 22 MMOL/L (ref 22–29)
CREAT BLD-MCNC: 1.04 MG/DL (ref 0.57–1)
GFR SERPL CREATININE-BSD FRML MDRD: 54 ML/MIN/1.73
GLUCOSE BLD-MCNC: 207 MG/DL (ref 65–99)
GLUCOSE BLDC GLUCOMTR-MCNC: 206 MG/DL (ref 70–130)
GLUCOSE BLDC GLUCOMTR-MCNC: 213 MG/DL (ref 70–130)
GLUCOSE BLDC GLUCOMTR-MCNC: 264 MG/DL (ref 70–130)
GLUCOSE BLDC GLUCOMTR-MCNC: 338 MG/DL (ref 70–130)
GRAM STN SPEC: NORMAL
POTASSIUM BLD-SCNC: 3.8 MMOL/L (ref 3.5–5.2)
PROT SERPL-MCNC: 5.8 G/DL (ref 6–8.5)
SODIUM BLD-SCNC: 143 MMOL/L (ref 136–145)
UNIT  ABO: NORMAL
UNIT  ABO: NORMAL
UNIT  RH: NORMAL
UNIT  RH: NORMAL

## 2020-02-18 PROCEDURE — 80048 BASIC METABOLIC PNL TOTAL CA: CPT | Performed by: INTERNAL MEDICINE

## 2020-02-18 PROCEDURE — 99232 SBSQ HOSP IP/OBS MODERATE 35: CPT | Performed by: INTERNAL MEDICINE

## 2020-02-18 PROCEDURE — 63710000001 INSULIN DETEMIR PER 5 UNITS: Performed by: INTERNAL MEDICINE

## 2020-02-18 PROCEDURE — 82962 GLUCOSE BLOOD TEST: CPT

## 2020-02-18 PROCEDURE — 25010000002 CEFTRIAXONE PER 250 MG: Performed by: INTERNAL MEDICINE

## 2020-02-18 PROCEDURE — 80076 HEPATIC FUNCTION PANEL: CPT | Performed by: PHYSICIAN ASSISTANT

## 2020-02-18 PROCEDURE — 99232 SBSQ HOSP IP/OBS MODERATE 35: CPT | Performed by: PHYSICIAN ASSISTANT

## 2020-02-18 RX ADMIN — SODIUM CHLORIDE, PRESERVATIVE FREE 10 ML: 5 INJECTION INTRAVENOUS at 08:32

## 2020-02-18 RX ADMIN — PANTOPRAZOLE SODIUM 40 MG: 40 INJECTION, POWDER, FOR SOLUTION INTRAVENOUS at 19:53

## 2020-02-18 RX ADMIN — INSULIN DETEMIR 15 UNITS: 100 INJECTION, SOLUTION SUBCUTANEOUS at 12:11

## 2020-02-18 RX ADMIN — INSULIN DETEMIR 15 UNITS: 100 INJECTION, SOLUTION SUBCUTANEOUS at 20:20

## 2020-02-18 RX ADMIN — INSULIN LISPRO 4 UNITS: 100 INJECTION, SOLUTION INTRAVENOUS; SUBCUTANEOUS at 18:18

## 2020-02-18 RX ADMIN — CALCIUM CARBONATE 1500 MG: 750 TABLET ORAL at 08:31

## 2020-02-18 RX ADMIN — CARVEDILOL 6.25 MG: 6.25 TABLET, FILM COATED ORAL at 08:29

## 2020-02-18 RX ADMIN — PANTOPRAZOLE SODIUM 40 MG: 40 INJECTION, POWDER, FOR SOLUTION INTRAVENOUS at 08:31

## 2020-02-18 RX ADMIN — CEFTRIAXONE SODIUM 1 G: 1 INJECTION, POWDER, FOR SOLUTION INTRAMUSCULAR; INTRAVENOUS at 13:30

## 2020-02-18 RX ADMIN — INSULIN LISPRO 3 UNITS: 100 INJECTION, SOLUTION INTRAVENOUS; SUBCUTANEOUS at 08:31

## 2020-02-18 RX ADMIN — INSULIN LISPRO 5 UNITS: 100 INJECTION, SOLUTION INTRAVENOUS; SUBCUTANEOUS at 19:56

## 2020-02-18 RX ADMIN — TRAMADOL HYDROCHLORIDE 25 MG: 50 TABLET, FILM COATED ORAL at 12:18

## 2020-02-18 RX ADMIN — CARVEDILOL 6.25 MG: 6.25 TABLET, FILM COATED ORAL at 18:18

## 2020-02-18 RX ADMIN — FERROUS SULFATE TAB 325 MG (65 MG ELEMENTAL FE) 325 MG: 325 (65 FE) TAB at 08:30

## 2020-02-18 RX ADMIN — FUROSEMIDE 20 MG: 20 TABLET ORAL at 08:30

## 2020-02-18 RX ADMIN — INSULIN LISPRO 3 UNITS: 100 INJECTION, SOLUTION INTRAVENOUS; SUBCUTANEOUS at 12:11

## 2020-02-18 RX ADMIN — SODIUM CHLORIDE, PRESERVATIVE FREE 10 ML: 5 INJECTION INTRAVENOUS at 19:53

## 2020-02-18 RX ADMIN — SPIRONOLACTONE 50 MG: 25 TABLET ORAL at 08:30

## 2020-02-18 RX ADMIN — MELATONIN 1000 UNITS: at 08:30

## 2020-02-18 RX ADMIN — ATORVASTATIN CALCIUM 80 MG: 40 TABLET, FILM COATED ORAL at 19:53

## 2020-02-18 NOTE — PLAN OF CARE
Problem: Patient Care Overview  Goal: Plan of Care Review  Outcome: Ongoing (interventions implemented as appropriate)  Flowsheets  Taken 2/18/2020 0406  Progress: improving  Outcome Summary: Pt ambulating around room and to bathroom/bedside commode. UOP good. Pt denies pain and N/V. No PRN pain medication given. VSS. Will continue to monitor.  Taken 2/17/2020 2000  Plan of Care Reviewed With: patient

## 2020-02-18 NOTE — PAYOR COMM NOTE
"Jesusita Diaz RN   Phone [phone]  Fax [phone]      Lyndsey Vergara (59 y.o. Female)     Date of Birth Social Security Number Address Home Phone MRN    1960  [id number] [facility] [address] [phone] [id number]    Zoroastrianism Marital Status          Sabianist        Admission Date Admission Type Admitting Provider Attending Provider Department, Room/Bed    2/14/20 Emergency Nataly Carreon MD Opii, [place], MD [address], S584/1    Discharge Date Discharge Disposition Discharge Destination                       Attending Provider:  Nataly Carreon MD    Allergies:  Penicillins    Isolation:  None   Infection:  None   Code Status:  CPR    Ht:  170.2 cm (67\")   Wt:  90.7 kg (200 lb)    Admission Cmt:  None   Principal Problem:  None                Active Insurance as of 2/14/2020     Primary Coverage     Payor Plan Insurance Group Employer/Plan Group    [facility] [id number]     Payor Plan Address Payor Plan Phone Number Payor Plan Fax Number Effective Dates    PO    2/14/2020 - None Entered    [facility] [id number]       Subscriber Name Subscriber Birth Date Member ID       LYNDSEY VERGARA 1960 [id number]           Secondary Coverage     Payor Plan Insurance Group Employer/Plan Group    [id number]     Payor Plan Address Payor Plan Phone Number Payor Plan Fax Number Effective Dates    [address] [phone]  1/1/2019 - None Entered    [facility] [phone]       Subscriber Name Subscriber Birth Date Member ID       LYNDSEY VERGARA 1960 [id number]                 Emergency Contacts      (Rel.) Home Phone Work Phone Mobile Phone    Otoniel Vergara (Spouse) [phone] -- --    Clint,April (Daughter) [phone] -- --            Vital Signs (last day)     Date/Time   Temp   Temp src   Pulse   Resp   BP   Patient Position   SpO2    02/18/20 0812   97.8 (36.6)   Oral   70   18   " 125/51   Lying   --    02/17/20 1857   98.4 (36.9)   Oral   67   18   121/62   --   --    02/17/20 0737   97.7 (36.5)   Oral   65   18   138/63   Lying   --              Current Facility-Administered Medications   Medication Dose Route Frequency Provider Last Rate Last Dose   • acetaminophen (TYLENOL) tablet 650 mg  650 mg Oral Q4H PRN Alli Watts DO   650 mg at 02/16/20 2104    Or   • acetaminophen (TYLENOL) 160 MG/5ML solution 650 mg  650 mg Oral Q4H PRN Alli Watts DO        Or   • acetaminophen (TYLENOL) suppository 650 mg  650 mg Rectal Q4H PRN Alli Watts DO       • atorvastatin (LIPITOR) tablet 80 mg  80 mg Oral Nightly Alli Watts DO   80 mg at 02/17/20 2037   • calcium carbonate EX (TUMS EX) chewable tablet 1,500 mg  1,500 mg Oral Daily Brant Finch MD   1,500 mg at 02/18/20 0831   • carvedilol (COREG) tablet 6.25 mg  6.25 mg Oral BID With Meals Brant Finch MD   6.25 mg at 02/18/20 0829   • cefTRIAXone (ROCEPHIN) 1 g/100 mL 0.9% NS (MBP)  1 g Intravenous Q24H Alli Watts  mL/hr at 02/18/20 1330 1 g at 02/18/20 1330   • cholecalciferol (VITAMIN D3) tablet 1,000 Units  1,000 Units Oral Daily Brant Finch MD   1,000 Units at 02/18/20 0830   • dextrose (D50W) 25 g/ 50mL Intravenous Solution 25 g  25 g Intravenous Q15 Min PRN Alli Watts DO       • dextrose (GLUTOSE) oral gel 15 g  15 g Oral Q15 Min PRN Alli Watts DO       • ferrous sulfate tablet 325 mg  325 mg Oral Daily With Breakfast Alli Watts DO   325 mg at 02/18/20 0830   • furosemide (LASIX) tablet 20 mg  20 mg Oral Daily Alli Watts DO   20 mg at 02/18/20 0830   • glucagon (human recombinant) (GLUCAGEN DIAGNOSTIC) injection 1 mg  1 mg Subcutaneous Q15 Min Alli Anderson DO       • insulin detemir (LEVEMIR) injection 15 Units  15 Units Subcutaneous Q12H Nataly Carreon MD   15 Units at 02/18/20 1211   • insulin lispro (humaLOG)  injection 0-7 Units  0-7 Units Subcutaneous 4x Daily With Meals & Nightly Alli Watts DO   3 Units at 02/18/20 1211   • lidocaine PF 1% (XYLOCAINE) injection 20 mL  20 mL Subcutaneous Once Norbert Adan MD       • ondansetron (ZOFRAN) tablet 4 mg  4 mg Oral Q6H PRN Alli Watts DO        Or   • ondansetron (ZOFRAN) injection 4 mg  4 mg Intravenous Q6H PRN Alli Watts DO       • pantoprazole (PROTONIX) injection 40 mg  40 mg Intravenous Q12H Alli Watts DO   40 mg at 02/18/20 0831   • sodium chloride 0.9 % flush 10 mL  10 mL Intravenous PRN Brant Finch MD       • sodium chloride 0.9 % flush 10 mL  10 mL Intravenous PRN Brant Finch MD       • sodium chloride 0.9 % flush 10 mL  10 mL Intravenous Q12H Alli Watts DO   10 mL at 02/18/20 0832   • sodium chloride 0.9 % flush 10 mL  10 mL Intravenous PRN Alli Watts DO       • spironolactone (ALDACTONE) tablet 50 mg  50 mg Oral Daily Alli Watts DO   50 mg at 02/18/20 0830   • traMADol (ULTRAM) tablet 25 mg  25 mg Oral Q6H PRN Alli Watts DO   25 mg at 02/18/20 1218     Lab Results (last 24 hours)     Procedure Component Value Units Date/Time    Hepatic Function Panel [[id number]]  (Abnormal) Collected:  02/18/20 0319    Specimen:  Blood Updated:  02/18/20 1433     Total Protein 5.8 g/dL      Albumin 3.30 g/dL      ALT (SGPT) 18 U/L      AST (SGOT) 18 U/L      Alkaline Phosphatase 69 U/L      Total Bilirubin 0.4 mg/dL      Bilirubin, Direct 0.2 mg/dL      Bilirubin, Indirect 0.2 mg/dL     POC Glucose Once [[id number]]  (Abnormal) Collected:  02/18/20 1210    Specimen:  Blood Updated:  02/18/20 1223     Glucose 206 mg/dL     POC Glucose Once [[id number]]  (Abnormal) Collected:  02/18/20 0812    Specimen:  Blood Updated:  02/18/20 0822     Glucose 213 mg/dL     Body Fluid Culture - Body Fluid, Peritoneum [[id number]] Collected:  02/15/20 1122    Specimen:  Body Fluid from Peritoneum Updated:   "02/18/20 0636     Body Fluid Culture No growth at 3 days     Gram Stain No WBCs or organisms seen    Basic Metabolic Panel [[id number]]  (Abnormal) Collected:  02/18/20 0319    Specimen:  Blood Updated:  02/18/20 0442     Glucose 207 mg/dL      BUN 16 mg/dL      Creatinine 1.04 mg/dL      Sodium 143 mmol/L      Potassium 3.8 mmol/L      Chloride 109 mmol/L      CO2 22.0 mmol/L      Calcium 7.6 mg/dL      eGFR Non African Amer 54 mL/min/1.73      BUN/Creatinine Ratio 15.4     Anion Gap 12.0 mmol/L     Narrative:       GFR Normal >60  Chronic Kidney Disease <60  Kidney Failure <15      POC Glucose Once [[id number]]  (Abnormal) Collected:  02/17/20 2019    Specimen:  Blood Updated:  02/17/20 2020     Glucose 293 mg/dL     Hemoglobin & Hematocrit, Blood [[id number]]  (Abnormal) Collected:  02/17/20 1948    Specimen:  Blood Updated:  02/17/20 2019     Hemoglobin 8.0 g/dL      Hematocrit 26.3 %     POC Glucose Once [[id number]]  (Abnormal) Collected:  02/17/20 1657    Specimen:  Blood Updated:  02/17/20 1701     Glucose 202 mg/dL         Imaging Results (Last 24 Hours)     ** No results found for the last 24 hours. **           Physician Progress Notes (last 24 hours) (Notes from 02/17/20 1518 through 02/18/20 1518)      Britany Young PA at 02/18/20 1331          GI Daily Progress Note  Subjective:    Chief Complaint:  Follow up esophageal variceal hemorrhage     No further bleeding.   She however now complains of RUQ pain.   Rates it as a 3/10.       Objective:    /51 (BP Location: Left arm, Patient Position: Lying)   Pulse 70   Temp 97.8 °F (36.6 °C) (Oral)   Resp 18   Ht 170.2 cm (67\")   Wt 90.7 kg (200 lb)   SpO2 96%   BMI 31.32 kg/m²      Physical Exam   Constitutional: She is oriented to person, place, and time.   Appears older than stated age    Cardiovascular: Normal rate and regular rhythm.   Pulmonary/Chest: Effort normal and breath sounds normal.   Abdominal: Soft. Bowel sounds are normal. She " exhibits no distension.   Tender to palpation of the right upper quadrant, no guarding    Neurological: She is alert and oriented to person, place, and time.   Skin: Skin is warm and dry.   Nursing note and vitals reviewed.      Lab  Lab Results   Component Value Date    WBC 2.82 (L) 2020    HGB 8.0 (L) 2020    HGB 7.7 (L) 2020    HGB 7.3 (L) 2020    MCV 82.1 2020    PLT 46 (C) 2020    INR 1.32 (H) 2020    INR 1.42 (H) 2020    INR 1.10 2018       Lab Results   Component Value Date    GLUCOSE 207 (H) 2020    BUN 16 2020    CREATININE 1.04 (H) 2020    EGFRIFNONA 54 (L) 2020    EGFRIFAFRI 67 2020    BCR 15.4 2020     2020    K 3.8 2020    CO2 22.0 2020    CALCIUM 7.6 (L) 2020    ALBUMIN 3.40 (L) 02/15/2020    ALKPHOS 75 02/15/2020    BILITOT 0.6 02/15/2020    ALT 18 02/15/2020    AST 16 02/15/2020       Assessment:    Esophageal varices with hemorrhage s/p band ligation x 7 on 20.    Hematemesis, resolved.  Acute blood loss anemia s/p 1 unit of PRBCs   Pancytopenia, s/p transfusion of 1 unit of platelets    Ascites s/p paracentesis with 2 L removed.    Portal hypertension   RUQ pain     Plan:    >>> Repeat LFTs today and will order portal duplex   >>> Continue BID PPI  >>> CBC in the AM .       GELA Camacho  20  1:31 PM      Electronically signed by Britany Young PA at 20 3369     Nataly Carreon MD at 20 0855              [facility] Medicine Services  PROGRESS NOTE    Patient Name: Lyndsey Luna  : 1960  MRN: [id number]    Date of Admission: 2020  Primary Care Physician: Javid Lin APRN    Subjective   Subjective     CC:f/u GI bleed    HPI: No acute events overnight, patient says she slept well this morning she does endorse some abdominal discomfort, she did ambulate yesterday without any issues.  Denies any more  hematemesis    Review of Systems  Gen- No fevers, chills  CV- No chest pain, palpitations  Resp- No cough, dyspnea  GI- No N/V/D, +abd pain    All systems reviewed and negative except as mentioned in HPI  Objective   Objective     Vital Signs:   Temp:  [97.8 °F (36.6 °C)-98.4 °F (36.9 °C)] 97.8 °F (36.6 °C)  Heart Rate:  [67-70] 70  Resp:  [18] 18  BP: (121-125)/(51-62) 125/51        Physical Exam:  Constitutional: No acute distress, awake, alert  HENT: NCAT, mucous membranes moist  Respiratory: Clear to auscultation bilaterally, respiratory effort normal   Cardiovascular: RRR, no murmurs, rubs, or gallops, palpable pedal pulses bilaterally  Gastrointestinal: Positive bowel sounds, soft, nontender, distended   musculoskeletal: 1+ bilateral ankle edema  Psychiatric: Appropriate affect, cooperative  Neurologic: Oriented x 3, no focal deficits  Skin: No rashes    Results Reviewed:  Results from last 7 days   Lab Units 02/17/20  1948 02/17/20  0745 02/17/20 0333 02/16/20  0402  02/15/20  0615  02/14/20  1840   WBC 10*3/mm3  --   --  2.82*  --  3.51  --  1.90*   < >  --    HEMOGLOBIN g/dL 8.0* 7.7* 7.3*   < > 7.1*   < > 7.1*   < > 7.3*   HEMATOCRIT % 26.3* 25.6* 24.3*   < > 24.4*   < > 23.5*   < > 24.3*   PLATELETS 10*3/mm3  --   --  46*  --  53*  --  38*   < >  --    INR   --   --  1.32*  --   --   --   --   --  1.42*    < > = values in this interval not displayed.     Results from last 7 days   Lab Units 02/18/20 0319 02/17/20 0333 02/16/20  0402 02/15/20  0548 02/14/20  1005   SODIUM mmol/L 143 143 145 143 141   POTASSIUM mmol/L 3.8 3.7 3.7 4.4 4.0   CHLORIDE mmol/L 109* 111* 112* 110* 109*   CO2 mmol/L 22.0 23.0 23.0 21.0* 25.0   BUN mg/dL 16 28* 33* 38* 22*   CREATININE mg/dL 1.04* 1.27* 1.28* 1.23* 1.02*   GLUCOSE mg/dL 207* 79 76 145* 167*   CALCIUM mg/dL 7.6* 7.7* 8.6 8.3* 8.8   ALT (SGPT) U/L  --   --   --  18 21   AST (SGOT) U/L  --   --   --  16 21     Estimated Creatinine Clearance: 67.3 mL/min (A) (by  C-G formula based on SCr of 1.04 mg/dL (H)).    Microbiology Results Abnormal     Procedure Component Value - Date/Time    Body Fluid Culture - Body Fluid, Peritoneum [[id number]] Collected:  02/15/20 1122    Lab Status:  Final result Specimen:  Body Fluid from Peritoneum Updated:  02/18/20 0636     Body Fluid Culture No growth at 3 days     Gram Stain No WBCs or organisms seen          Imaging Results (Last 24 Hours)     ** No results found for the last 24 hours. **               I have reviewed the medications:  Scheduled Meds:    atorvastatin 80 mg Oral Nightly   calcium carbonate EX 1,500 mg Oral Daily   carvedilol 6.25 mg Oral BID With Meals   cefTRIAXone 1 g Intravenous Q24H   cholecalciferol 1,000 Units Oral Daily   ferrous sulfate 325 mg Oral Daily With Breakfast   furosemide 20 mg Oral Daily   insulin detemir 20 Units Subcutaneous Nightly   insulin lispro 0-7 Units Subcutaneous 4x Daily With Meals & Nightly   lidocaine PF 1% 20 mL Subcutaneous Once   pantoprazole 40 mg Intravenous Q12H   sodium chloride 10 mL Intravenous Q12H   spironolactone 50 mg Oral Daily     Continuous Infusions:   PRN Meds:.•  acetaminophen **OR** acetaminophen **OR** acetaminophen  •  dextrose  •  dextrose  •  glucagon (human recombinant)  •  ondansetron **OR** ondansetron  •  sodium chloride  •  [COMPLETED] Insert peripheral IV **AND** sodium chloride  •  sodium chloride  •  traMADol    Assessment/Plan   Assessment & Plan     Active Hospital Problems    Diagnosis  POA   • Acute upper GI bleed [K92.2]  Yes   • Portal hypertension (CMS/[facility]) [K76.6]  Unknown   • CAD (coronary artery disease) [I25.10]  Yes   • Type 2 diabetes mellitus, with long-term current use of insulin (CMS/[facility]) [E11.9, Z79.4]  Not Applicable      Resolved Hospital Problems   No resolved problems to display.        Brief Hospital Course to date:  Lyndsey Luna is a 59 y.o. female with past medical history of PAD, type 2 diabetes, MI status post stents patient  presented with acute hematemesis and microcytic anemia and CT evidence for ascites admitted with acute variceal bleed.  She is s/p EGD with banding.    Plan  Portal hypertension with ascites  Esophageal varices with hemorrhage, patient with no known history of liver disease  -She is s/p banding x7  -Continue BID Protonix for 1 month then daily  -She is s/p paracentesis with 2 L removed  -We will need outpatient follow-up for repeat banding in 1 month (initial plan was to follow-up with Dr. Moore however he declines to follow-up and as such we will refer to our [facility] [place] by [facility] at discharge)  -continue 7-day course of antibiotics, currently on Rocephin  -Continue Aldactone, Lasix    Acute blood loss anemia/thrombocytopenia  -Secondary to above, s/p 1 unit platelets and 1 unit PRBC  -H&H and platelets currently stable, continue to trend    Well-controlled type 2 diabetes with A1c 7.3%  -FSBG is reviewed and are appropriate  -Continue basal and SSI for now, adjust as appropriate  -Note: she will need adjustments of his home insulin regimen prior to discharge    History of CAD s/p stenting 2 years ago  -Continue statin and beta-blocker, holding ASA secondary to bleed  -Patient will benefit from ACE/ARB, will defer this to outpatient follow-up    DVT Prophylaxis: Mechanical    Disposition: TBD    CODE STATUS:   Code Status and Medical Interventions:   Ordered at: 20 9577     Level Of Support Discussed With:    Patient    Next of Kin (If No Surrogate)     Code Status:    CPR     Medical Interventions (Level of Support Prior to Arrest):    Full         Electronically signed by Nataly Carreon MD, 20, 8:49 AM.      Electronically signed by Nataly Carreon MD at 20 1236     Alli Watts DO at 20 1822              [facility] Medicine Services  PROGRESS NOTE    Patient Name: Lyndsey Luna  : 1960  MRN: [id number]    Date of Admission: 2020  Primary Care  Physician: Javid Lin APRN    Subjective   Subjective     CC:  Follow up GI bleed    HPI:  Hgb was low overnight and got a unit of blood. Overall feeling worn out, no significant pain. Melena slowing down/stopped.     Review of Systems  Gen- No fevers, chills  CV- No chest pain, palpitations  Resp- No cough, dyspnea  GI- No N/V/D, abd pain    Objective   Objective     Vital Signs:   Temp:  [97.7 °F (36.5 °C)-98.2 °F (36.8 °C)] 97.7 °F (36.5 °C)  Heart Rate:  [65-71] 65  Resp:  [16-18] 18  BP: (102-138)/(50-78) 138/63        Physical Exam:  Constitutional: No acute distress, sleeping in bed but easily awoken, alert  HENT: NCAT, mucous membranes moist  Respiratory: Clear to auscultation bilaterally, nonlabored respirations   Cardiovascular: RRR, 2/6 systolic murmur, palpable radial pulses bilaterally  Gastrointestinal: Positive bowel sounds, soft, nontender, nondistended  Musculoskeletal: No bilateral ankle edema  Psychiatric: Appropriate affect, cooperative  Neurologic: Speech clear  Skin: No rashes    Results Reviewed:  Results from last 7 days   Lab Units 02/17/20  0745 02/17/20  0333 02/16/20  2330  02/16/20  0402  02/15/20  0615  02/14/20  1840   WBC 10*3/mm3  --  2.82*  --   --  3.51  --  1.90*   < >  --    HEMOGLOBIN g/dL 7.7* 7.3* 7.9*   < > 7.1*   < > 7.1*   < > 7.3*   HEMATOCRIT % 25.6* 24.3* 26.7*   < > 24.4*   < > 23.5*   < > 24.3*   PLATELETS 10*3/mm3  --  46*  --   --  53*  --  38*   < >  --    INR   --  1.32*  --   --   --   --   --   --  1.42*    < > = values in this interval not displayed.     Results from last 7 days   Lab Units 02/17/20  0333 02/16/20  0402 02/15/20  0548 02/14/20  1005   SODIUM mmol/L 143 145 143 141   POTASSIUM mmol/L 3.7 3.7 4.4 4.0   CHLORIDE mmol/L 111* 112* 110* 109*   CO2 mmol/L 23.0 23.0 21.0* 25.0   BUN mg/dL 28* 33* 38* 22*   CREATININE mg/dL 1.27* 1.28* 1.23* 1.02*   GLUCOSE mg/dL 79 76 145* 167*   CALCIUM mg/dL 7.7* 8.6 8.3* 8.8   ALT (SGPT) U/L  --   --  18 21    AST (SGOT) U/L  --   --  16 21     Estimated Creatinine Clearance: 55.1 mL/min (A) (by C-G formula based on SCr of 1.27 mg/dL (H)).    Microbiology Results Abnormal     Procedure Component Value - Date/Time    Body Fluid Culture - Body Fluid, Peritoneum [[id number]] Collected:  02/15/20 1122    Lab Status:  Preliminary result Specimen:  Body Fluid from Peritoneum Updated:  02/17/20 0723     Body Fluid Culture No growth at 2 days     Gram Stain No WBCs or organisms seen          Imaging Results (Last 24 Hours)     ** No results found for the last 24 hours. **               I have reviewed the medications:  Scheduled Meds:  atorvastatin 80 mg Oral Nightly   calcium carbonate EX 1,500 mg Oral Daily   carvedilol 6.25 mg Oral BID With Meals   cefTRIAXone 1 g Intravenous Q24H   cholecalciferol 1,000 Units Oral Daily   ferrous sulfate 325 mg Oral Daily With Breakfast   insulin detemir 20 Units Subcutaneous Nightly   insulin lispro 0-7 Units Subcutaneous 4x Daily With Meals & Nightly   lidocaine PF 1% 20 mL Subcutaneous Once   pantoprazole 40 mg Intravenous Q12H   sodium chloride 10 mL Intravenous Q12H     Continuous Infusions:  sodium chloride 50 mL/hr Last Rate: 50 mL/hr (02/17/20 0830)     PRN Meds:.•  acetaminophen **OR** acetaminophen **OR** acetaminophen  •  dextrose  •  dextrose  •  glucagon (human recombinant)  •  ondansetron **OR** ondansetron  •  sodium chloride  •  [COMPLETED] Insert peripheral IV **AND** sodium chloride  •  sodium chloride  •  traMADol    Assessment/Plan   Assessment & Plan     Active Hospital Problems    Diagnosis  POA   • Acute upper GI bleed [K92.2]  Yes   • Portal hypertension (CMS/HCC) [K76.6]  Unknown   • CAD (coronary artery disease) [I25.10]  Yes   • Type 2 diabetes mellitus, with long-term current use of insulin (CMS/HCC) [E11.9, Z79.4]  Not Applicable      Resolved Hospital Problems   No resolved problems to display.        Brief Hospital Course to date:  Lyndsey Luna is a 59 y.o.  "female with Hx PUD, DM2, MI s/p stenting (~2 years ago), with plans for workup outpatient for melanotic stools and abdominal distention who presents with acute hematemesis with microcytic anemia and CT evidence for ascites now s/p EGD with variceal bleed and procedural intervention     Assessment/Plan     Hematemesis with variceal bleeding  Hx PUD  Acute blood loss on suspected underlying chronic anemia  Thrombocytopenia, splenomegaly  - Hgb 8.4 on arrival, no baseline, suspect underlying chronic anemia  - s/p EGD 2/14/20 with Dr. Finch, varices with stigmata of recent bleeding, s/p intraprocedural intervention  - s/p 1U Plt and 1u PRBCs  - repeat banding 4 weeks; initially planned with Dr. Moore but patient called the office today and they told her they would \"not follow up on another physician's work,\" anticipate referral to our GI at [place]  - discontinue octreotide today  - cont BID IV PPI  - cont IV rocephin, abx through 2/20 (can change to PO cipro at [place])  - had paracentesis with 2L removed  - start lasix/aldactone (20/50)  - GI following  - monitor H&H     DM type 2 with long term use of insulin, a1c 7.3%  - med rec says lantus 100U HS at home  - cont levemir 20U HS and SSI, adequate control last 24 hours     CAD s/p remote stenting  - MI and stenting ~2 years ago  - cont to hold ASA during acute bleeding  - cont statin, BB  - could benefit from ACE/ARB at some point, though Cr slightly inc on arrival and unclear baseline, recommend outpatient consideration    Leukopenia, resolved  - spurious lab error? WBC normal today and normal the day before the drop     DVT prophylaxis:  mechanical    Disposition: I expect the patient to be discharged tomorrow if her H&H remains stable.    CODE STATUS:   Code Status and Medical Interventions:   Ordered at: 02/14/20 9388     Level Of Support Discussed With:    Patient    Next of Kin (If No Surrogate)     Code Status:    CPR     Medical Interventions (Level of Support " Prior to Arrest):    Full         Electronically signed by Alli Watts DO, 02/17/20, 6:22 PM.        Electronically signed by Alli Watts DO at 02/17/20 7616

## 2020-02-18 NOTE — PROGRESS NOTES
"GI Daily Progress Note  Subjective:    Chief Complaint:  Follow up esophageal variceal hemorrhage     No further bleeding.   She however now complains of RUQ pain.   Rates it as a 3/10.       Objective:    /51 (BP Location: Left arm, Patient Position: Lying)   Pulse 70   Temp 97.8 °F (36.6 °C) (Oral)   Resp 18   Ht 170.2 cm (67\")   Wt 90.7 kg (200 lb)   SpO2 96%   BMI 31.32 kg/m²     Physical Exam   Constitutional: She is oriented to person, place, and time.   Appears older than stated age    Cardiovascular: Normal rate and regular rhythm.   Pulmonary/Chest: Effort normal and breath sounds normal.   Abdominal: Soft. Bowel sounds are normal. She exhibits no distension.   Tender to palpation of the right upper quadrant, no guarding    Neurological: She is alert and oriented to person, place, and time.   Skin: Skin is warm and dry.   Nursing note and vitals reviewed.      Lab  Lab Results   Component Value Date    WBC 2.82 (L) 02/17/2020    HGB 8.0 (L) 02/17/2020    HGB 7.7 (L) 02/17/2020    HGB 7.3 (L) 02/17/2020    MCV 82.1 02/17/2020    PLT 46 (C) 02/17/2020    INR 1.32 (H) 02/17/2020    INR 1.42 (H) 02/14/2020    INR 1.10 02/28/2018       Lab Results   Component Value Date    GLUCOSE 207 (H) 02/18/2020    BUN 16 02/18/2020    CREATININE 1.04 (H) 02/18/2020    EGFRIFNONA 54 (L) 02/18/2020    EGFRIFAFRI 67 02/14/2020    BCR 15.4 02/18/2020     02/18/2020    K 3.8 02/18/2020    CO2 22.0 02/18/2020    CALCIUM 7.6 (L) 02/18/2020    ALBUMIN 3.40 (L) 02/15/2020    ALKPHOS 75 02/15/2020    BILITOT 0.6 02/15/2020    ALT 18 02/15/2020    AST 16 02/15/2020       Assessment:    Esophageal varices with hemorrhage s/p band ligation x 7 on 2/14/20.    Hematemesis, resolved.  Acute blood loss anemia s/p 1 unit of PRBCs   Pancytopenia, s/p transfusion of 1 unit of platelets    Ascites s/p paracentesis with 2 L removed.    Portal hypertension   RUQ pain     Plan:    >>> Repeat LFTs today and will order portal " duplex   >>> Continue BID PPI  >>> CBC in the AM .       GELA Camacho  02/18/20  1:31 PM

## 2020-02-18 NOTE — PROGRESS NOTES
[facility] Medicine Services  PROGRESS NOTE    Patient Name: Lyndsey Luna  : 1960  MRN: [id number]    Date of Admission: 2020  Primary Care Physician: Javid Lin APRN    Subjective   Subjective     CC:f/u GI bleed    HPI: No acute events overnight, patient says she slept well this morning she does endorse some abdominal discomfort, she did ambulate yesterday without any issues.  Denies any more hematemesis    Review of Systems  Gen- No fevers, chills  CV- No chest pain, palpitations  Resp- No cough, dyspnea  GI- No N/V/D, +abd pain    All systems reviewed and negative except as mentioned in HPI  Objective   Objective     Vital Signs:   Temp:  [97.8 °F (36.6 °C)-98.4 °F (36.9 °C)] 97.8 °F (36.6 °C)  Heart Rate:  [67-70] 70  Resp:  [18] 18  BP: (121-125)/(51-62) 125/51        Physical Exam:  Constitutional: No acute distress, awake, alert  HENT: NCAT, mucous membranes moist  Respiratory: Clear to auscultation bilaterally, respiratory effort normal   Cardiovascular: RRR, no murmurs, rubs, or gallops, palpable pedal pulses bilaterally  Gastrointestinal: Positive bowel sounds, soft, nontender, distended   musculoskeletal: 1+ bilateral ankle edema  Psychiatric: Appropriate affect, cooperative  Neurologic: Oriented x 3, no focal deficits  Skin: No rashes    Results Reviewed:  Results from last 7 days   Lab Units 20  1948 20  0745 20  0333  20  0402  02/15/20  0615  20  1840   WBC 10*3/mm3  --   --  2.82*  --  3.51  --  1.90*   < >  --    HEMOGLOBIN g/dL 8.0* 7.7* 7.3*   < > 7.1*   < > 7.1*   < > 7.3*   HEMATOCRIT % 26.3* 25.6* 24.3*   < > 24.4*   < > 23.5*   < > 24.3*   PLATELETS 10*3/mm3  --   --  46*  --  53*  --  38*   < >  --    INR   --   --  1.32*  --   --   --   --   --  1.42*    < > = values in this interval not displayed.     Results from last 7 days   Lab Units 20  0319 20  0333 20  0402 02/15/20  0548 20  1005      SODIUM mmol/L 143 143 145 143 141   POTASSIUM mmol/L 3.8 3.7 3.7 4.4 4.0   CHLORIDE mmol/L 109* 111* 112* 110* 109*   CO2 mmol/L 22.0 23.0 23.0 21.0* 25.0   BUN mg/dL 16 28* 33* 38* 22*   CREATININE mg/dL 1.04* 1.27* 1.28* 1.23* 1.02*   GLUCOSE mg/dL 207* 79 76 145* 167*   CALCIUM mg/dL 7.6* 7.7* 8.6 8.3* 8.8   ALT (SGPT) U/L  --   --   --  18 21   AST (SGOT) U/L  --   --   --  16 21     Estimated Creatinine Clearance: 67.3 mL/min (A) (by C-G formula based on SCr of 1.04 mg/dL (H)).    Microbiology Results Abnormal     Procedure Component Value - Date/Time    Body Fluid Culture - Body Fluid, Peritoneum [[id number]] Collected:  02/15/20 1122    Lab Status:  Final result Specimen:  Body Fluid from Peritoneum Updated:  02/18/20 0636     Body Fluid Culture No growth at 3 days     Gram Stain No WBCs or organisms seen          Imaging Results (Last 24 Hours)     ** No results found for the last 24 hours. **               I have reviewed the medications:  Scheduled Meds:    atorvastatin 80 mg Oral Nightly   calcium carbonate EX 1,500 mg Oral Daily   carvedilol 6.25 mg Oral BID With Meals   cefTRIAXone 1 g Intravenous Q24H   cholecalciferol 1,000 Units Oral Daily   ferrous sulfate 325 mg Oral Daily With Breakfast   furosemide 20 mg Oral Daily   insulin detemir 20 Units Subcutaneous Nightly   insulin lispro 0-7 Units Subcutaneous 4x Daily With Meals & Nightly   lidocaine PF 1% 20 mL Subcutaneous Once   pantoprazole 40 mg Intravenous Q12H   sodium chloride 10 mL Intravenous Q12H   spironolactone 50 mg Oral Daily     Continuous Infusions:   PRN Meds:.•  acetaminophen **OR** acetaminophen **OR** acetaminophen  •  dextrose  •  dextrose  •  glucagon (human recombinant)  •  ondansetron **OR** ondansetron  •  sodium chloride  •  [COMPLETED] Insert peripheral IV **AND** sodium chloride  •  sodium chloride  •  traMADol    Assessment/Plan   Assessment & Plan     Active Hospital Problems    Diagnosis  POA   • Acute upper GI bleed  [K92.2]  Yes   • Portal hypertension (CMS/HCC) [K76.6]  Unknown   • CAD (coronary artery disease) [I25.10]  Yes   • Type 2 diabetes mellitus, with long-term current use of insulin (CMS/HCC) [E11.9, Z79.4]  Not Applicable      Resolved Hospital Problems   No resolved problems to display.        Brief Hospital Course to date:  Lyndsey Luna is a 59 y.o. female with past medical history of PAD, type 2 diabetes, MI status post stents patient presented with acute hematemesis and microcytic anemia and CT evidence for ascites admitted with acute variceal bleed.  She is s/p EGD with banding.    Plan  Portal hypertension with ascites  Esophageal varices with hemorrhage, patient with no known history of liver disease  -She is s/p banding x7  -Continue BID Protonix for 1 month then daily  -She is s/p paracentesis with 2 L removed  -We will need outpatient follow-up for repeat banding in 1 month (initial plan was to follow-up with Dr. Moore however he declines to follow-up and as such we will refer to our [facility] at discharge)  -continue 7-day course of antibiotics, currently on Rocephin  -Continue Aldactone, Lasix    Acute blood loss anemia/thrombocytopenia  -Secondary to above, s/p 1 unit platelets and 1 unit PRBC  -H&H and platelets currently stable, continue to trend    Well-controlled type 2 diabetes with A1c 7.3%  -FSBG is reviewed and are appropriate  -Continue basal and SSI for now, adjust as appropriate  -Note: she will need adjustments of his home insulin regimen prior to discharge    History of CAD s/p stenting 2 years ago  -Continue statin and beta-blocker, holding ASA secondary to bleed  -Patient will benefit from ACE/ARB, will defer this to outpatient follow-up    DVT Prophylaxis: Mechanical    Disposition: TBD    CODE STATUS:   Code Status and Medical Interventions:   Ordered at: 02/14/20 3498     Level Of Support Discussed With:    Patient    Next of Kin (If No Surrogate)     Code Status:    CPR      Medical Interventions (Level of Support Prior to Arrest):    Full         Electronically signed by Nataly Carreon MD, 02/18/20, 8:49 AM.

## 2020-02-19 ENCOUNTER — APPOINTMENT (OUTPATIENT)
Dept: CARDIOLOGY | Facility: HOSPITAL | Age: 60
End: 2020-02-19

## 2020-02-19 ENCOUNTER — TELEPHONE (OUTPATIENT)
Dept: FAMILY MEDICINE CLINIC | Age: 60
End: 2020-02-19

## 2020-02-19 VITALS
WEIGHT: 199.96 LBS | HEART RATE: 73 BPM | TEMPERATURE: 98.4 F | RESPIRATION RATE: 18 BRPM | DIASTOLIC BLOOD PRESSURE: 67 MMHG | OXYGEN SATURATION: 96 % | HEIGHT: 67 IN | SYSTOLIC BLOOD PRESSURE: 137 MMHG | BODY MASS INDEX: 31.38 KG/M2

## 2020-02-19 PROBLEM — K92.2 ACUTE UPPER GI BLEED: Status: RESOLVED | Noted: 2020-02-14 | Resolved: 2020-02-19

## 2020-02-19 LAB
ALBUMIN SERPL-MCNC: 3.1 G/DL (ref 3.5–5.2)
ALBUMIN/GLOB SERPL: 1.1 G/DL
ALP SERPL-CCNC: 73 U/L (ref 39–117)
ALPHA1 GLOB MFR UR ELPH: 158 MG/DL (ref 90–200)
ALT SERPL W P-5'-P-CCNC: 20 U/L (ref 1–33)
ANION GAP SERPL CALCULATED.3IONS-SCNC: 8 MMOL/L (ref 5–15)
AST SERPL-CCNC: 19 U/L (ref 1–32)
BASOPHILS # BLD AUTO: 0.04 10*3/MM3 (ref 0–0.2)
BASOPHILS NFR BLD AUTO: 1.4 % (ref 0–1.5)
BILIRUB SERPL-MCNC: 0.6 MG/DL (ref 0.2–1.2)
BUN BLD-MCNC: 11 MG/DL (ref 6–20)
BUN/CREAT SERPL: 10.7 (ref 7–25)
CALCIUM SPEC-SCNC: 8 MG/DL (ref 8.6–10.5)
CERULOPLASMIN SERPL-MCNC: 22 MG/DL (ref 19–39)
CHLORIDE SERPL-SCNC: 109 MMOL/L (ref 98–107)
CO2 SERPL-SCNC: 24 MMOL/L (ref 22–29)
CREAT BLD-MCNC: 1.03 MG/DL (ref 0.57–1)
DEPRECATED RDW RBC AUTO: 50.7 FL (ref 37–54)
EOSINOPHIL # BLD AUTO: 0.11 10*3/MM3 (ref 0–0.4)
EOSINOPHIL NFR BLD AUTO: 4 % (ref 0.3–6.2)
ERYTHROCYTE [DISTWIDTH] IN BLOOD BY AUTOMATED COUNT: 17.3 % (ref 12.3–15.4)
GFR SERPL CREATININE-BSD FRML MDRD: 55 ML/MIN/1.73
GLOBULIN UR ELPH-MCNC: 2.7 GM/DL
GLUCOSE BLD-MCNC: 188 MG/DL (ref 65–99)
GLUCOSE BLDC GLUCOMTR-MCNC: 162 MG/DL (ref 70–130)
GLUCOSE BLDC GLUCOMTR-MCNC: 200 MG/DL (ref 70–130)
HCT VFR BLD AUTO: 25 % (ref 34–46.6)
HGB BLD-MCNC: 7.6 G/DL (ref 12–15.9)
IMM GRANULOCYTES # BLD AUTO: 0.02 10*3/MM3 (ref 0–0.05)
IMM GRANULOCYTES NFR BLD AUTO: 0.7 % (ref 0–0.5)
LYMPHOCYTES # BLD AUTO: 0.85 10*3/MM3 (ref 0.7–3.1)
LYMPHOCYTES NFR BLD AUTO: 30.8 % (ref 19.6–45.3)
MCH RBC QN AUTO: 24.5 PG (ref 26.6–33)
MCHC RBC AUTO-ENTMCNC: 30.4 G/DL (ref 31.5–35.7)
MCV RBC AUTO: 80.6 FL (ref 79–97)
MONOCYTES # BLD AUTO: 0.37 10*3/MM3 (ref 0.1–0.9)
MONOCYTES NFR BLD AUTO: 13.4 % (ref 5–12)
NEUTROPHILS # BLD AUTO: 1.37 10*3/MM3 (ref 1.7–7)
NEUTROPHILS NFR BLD AUTO: 49.7 % (ref 42.7–76)
NRBC BLD AUTO-RTO: 0 /100 WBC (ref 0–0.2)
PLATELET # BLD AUTO: 43 10*3/MM3 (ref 140–450)
PMV BLD AUTO: 12.2 FL (ref 6–12)
POTASSIUM BLD-SCNC: 3.7 MMOL/L (ref 3.5–5.2)
PROT SERPL-MCNC: 5.8 G/DL (ref 6–8.5)
RBC # BLD AUTO: 3.1 10*6/MM3 (ref 3.77–5.28)
SODIUM BLD-SCNC: 141 MMOL/L (ref 136–145)
WBC NRBC COR # BLD: 2.76 10*3/MM3 (ref 3.4–10.8)

## 2020-02-19 PROCEDURE — 85025 COMPLETE CBC W/AUTO DIFF WBC: CPT | Performed by: PHYSICIAN ASSISTANT

## 2020-02-19 PROCEDURE — 99239 HOSP IP/OBS DSCHRG MGMT >30: CPT | Performed by: INTERNAL MEDICINE

## 2020-02-19 PROCEDURE — 82962 GLUCOSE BLOOD TEST: CPT

## 2020-02-19 PROCEDURE — 82977 ASSAY OF GGT: CPT | Performed by: INTERNAL MEDICINE

## 2020-02-19 PROCEDURE — 99232 SBSQ HOSP IP/OBS MODERATE 35: CPT | Performed by: PHYSICIAN ASSISTANT

## 2020-02-19 PROCEDURE — 93975 VASCULAR STUDY: CPT

## 2020-02-19 PROCEDURE — 83883 ASSAY NEPHELOMETRY NOT SPEC: CPT | Performed by: INTERNAL MEDICINE

## 2020-02-19 PROCEDURE — 83010 ASSAY OF HAPTOGLOBIN QUANT: CPT | Performed by: INTERNAL MEDICINE

## 2020-02-19 PROCEDURE — 83516 IMMUNOASSAY NONANTIBODY: CPT | Performed by: PHYSICIAN ASSISTANT

## 2020-02-19 PROCEDURE — 63710000001 INSULIN DETEMIR PER 5 UNITS: Performed by: INTERNAL MEDICINE

## 2020-02-19 PROCEDURE — 82103 ALPHA-1-ANTITRYPSIN TOTAL: CPT | Performed by: PHYSICIAN ASSISTANT

## 2020-02-19 PROCEDURE — 84478 ASSAY OF TRIGLYCERIDES: CPT | Performed by: INTERNAL MEDICINE

## 2020-02-19 PROCEDURE — 82172 ASSAY OF APOLIPOPROTEIN: CPT | Performed by: INTERNAL MEDICINE

## 2020-02-19 PROCEDURE — 80053 COMPREHEN METABOLIC PANEL: CPT | Performed by: INTERNAL MEDICINE

## 2020-02-19 PROCEDURE — 25010000002 CEFTRIAXONE PER 250 MG: Performed by: INTERNAL MEDICINE

## 2020-02-19 PROCEDURE — 82390 ASSAY OF CERULOPLASMIN: CPT | Performed by: PHYSICIAN ASSISTANT

## 2020-02-19 PROCEDURE — 82465 ASSAY BLD/SERUM CHOLESTEROL: CPT | Performed by: INTERNAL MEDICINE

## 2020-02-19 RX ORDER — FERROUS SULFATE 325(65) MG
325 TABLET ORAL
Qty: 30 TABLET | Refills: 1 | Status: SHIPPED | OUTPATIENT
Start: 2020-02-20 | End: 2020-03-18 | Stop reason: SDUPTHER

## 2020-02-19 RX ORDER — SPIRONOLACTONE 50 MG/1
50 TABLET, FILM COATED ORAL DAILY
Qty: 30 TABLET | Refills: 1 | Status: SHIPPED | OUTPATIENT
Start: 2020-02-20 | End: 2020-03-18 | Stop reason: SDUPTHER

## 2020-02-19 RX ORDER — PANTOPRAZOLE SODIUM 40 MG/1
TABLET, DELAYED RELEASE ORAL
Qty: 120 TABLET | Refills: 0 | Status: SHIPPED | OUTPATIENT
Start: 2020-02-19 | End: 2020-05-19

## 2020-02-19 RX ORDER — CARVEDILOL 6.25 MG/1
6.25 TABLET ORAL 2 TIMES DAILY WITH MEALS
Qty: 60 TABLET | Refills: 1 | Status: SHIPPED | OUTPATIENT
Start: 2020-02-19 | End: 2020-03-18 | Stop reason: ALTCHOICE

## 2020-02-19 RX ORDER — FUROSEMIDE 20 MG/1
20 TABLET ORAL DAILY
Qty: 30 TABLET | Refills: 1 | Status: SHIPPED | OUTPATIENT
Start: 2020-02-20 | End: 2020-03-18 | Stop reason: SDUPTHER

## 2020-02-19 RX ADMIN — FUROSEMIDE 20 MG: 20 TABLET ORAL at 08:51

## 2020-02-19 RX ADMIN — PANTOPRAZOLE SODIUM 40 MG: 40 INJECTION, POWDER, FOR SOLUTION INTRAVENOUS at 08:51

## 2020-02-19 RX ADMIN — CALCIUM CARBONATE 1500 MG: 750 TABLET ORAL at 08:52

## 2020-02-19 RX ADMIN — SPIRONOLACTONE 50 MG: 25 TABLET ORAL at 08:51

## 2020-02-19 RX ADMIN — INSULIN LISPRO 2 UNITS: 100 INJECTION, SOLUTION INTRAVENOUS; SUBCUTANEOUS at 12:03

## 2020-02-19 RX ADMIN — INSULIN DETEMIR 15 UNITS: 100 INJECTION, SOLUTION SUBCUTANEOUS at 08:50

## 2020-02-19 RX ADMIN — MELATONIN 1000 UNITS: at 08:51

## 2020-02-19 RX ADMIN — INSULIN LISPRO 3 UNITS: 100 INJECTION, SOLUTION INTRAVENOUS; SUBCUTANEOUS at 08:51

## 2020-02-19 RX ADMIN — FERROUS SULFATE TAB 325 MG (65 MG ELEMENTAL FE) 325 MG: 325 (65 FE) TAB at 08:52

## 2020-02-19 RX ADMIN — CEFTRIAXONE SODIUM 1 G: 1 INJECTION, POWDER, FOR SOLUTION INTRAMUSCULAR; INTRAVENOUS at 13:33

## 2020-02-19 RX ADMIN — SODIUM CHLORIDE, PRESERVATIVE FREE 10 ML: 5 INJECTION INTRAVENOUS at 08:52

## 2020-02-19 RX ADMIN — CARVEDILOL 6.25 MG: 6.25 TABLET, FILM COATED ORAL at 08:52

## 2020-02-19 NOTE — PLAN OF CARE
Problem: Patient Care Overview  Goal: Plan of Care Review  Outcome: Ongoing (interventions implemented as appropriate)  Flowsheets  Taken 2/18/2020 0406  Progress: improving  Taken 2/18/2020 2000  Plan of Care Reviewed With: patient  Taken 2/19/2020 0335  Outcome Summary: Patient rested throughout shift. No complaints of pain or n/v. hepatic duplex scheduled for today. Pt NPO since midnight. VSS. will continue to monitor.

## 2020-02-19 NOTE — PROGRESS NOTES
"GI Daily Progress Note  Subjective:    Chief Complaint:  Follow up esophageal variceal bleed     Feels much better today.  No recurrence in bleeding.   RUQ pain is resolved.       Objective:    /67 (BP Location: Left arm, Patient Position: Lying)   Pulse 73   Temp 98.4 °F (36.9 °C) (Oral)   Resp 18   Ht 170.2 cm (67.01\")   Wt 90.7 kg (199 lb 15.3 oz)   SpO2 96%   BMI 31.31 kg/m²     Physical Exam   Constitutional: She is oriented to person, place, and time.   Cardiovascular: Normal rate and regular rhythm.   Pulmonary/Chest: Effort normal and breath sounds normal. No respiratory distress.   Abdominal: Soft. Bowel sounds are normal. She exhibits no distension. There is no tenderness.   Neurological: She is alert and oriented to person, place, and time.   Skin: Skin is warm and dry.   Psychiatric: Her behavior is normal.   Nursing note and vitals reviewed.      Lab  Lab Results   Component Value Date    WBC 2.76 (L) 02/19/2020    HGB 7.6 (L) 02/19/2020    HGB 8.0 (L) 02/17/2020    HGB 7.7 (L) 02/17/2020    MCV 80.6 02/19/2020    PLT 43 (C) 02/19/2020    INR 1.32 (H) 02/17/2020    INR 1.42 (H) 02/14/2020    INR 1.10 02/28/2018       Lab Results   Component Value Date    GLUCOSE 188 (H) 02/19/2020    BUN 11 02/19/2020    CREATININE 1.03 (H) 02/19/2020    EGFRIFNONA 55 (L) 02/19/2020    EGFRIFAFRI 67 02/14/2020    BCR 10.7 02/19/2020     02/19/2020    K 3.7 02/19/2020    CO2 24.0 02/19/2020    CALCIUM 8.0 (L) 02/19/2020    ALBUMIN 3.10 (L) 02/19/2020    ALKPHOS 73 02/19/2020    BILITOT 0.6 02/19/2020    BILIDIR 0.2 02/18/2020    ALT 20 02/19/2020    AST 19 02/19/2020       Assessment:     Esophageal varices with hemorrhage s/p band ligation x 7 on 2/14/20.    Hematemesis, resolved.  Acute blood loss anemia s/p 1 unit of PRBCs   Pancytopenia, s/p transfusion of 1 unit of platelets    Ascites s/p paracentesis with 2 L removed.    Portal hypertension   RUQ pain        Plan:    Preliminary portal duplex " does not show thrombus.   CT scan does not show nodularity of liver consistent with cirrhosis but given her portal hypertension with ascites and varices with history of fatty liver it is certainly of concern.     >>> Will work up autoimmune markers (DAVID was positive) as well as alpha 1 antitrypsin, ceruloplasmin.  Ferritin was low.  Hep panel negative  >>> Discussed low sodium diet (<2,000 mg daily)  >>> Low dose diuretics (Furosemide 20 mg daily and Spironolactone 50 mg daily)  >>> Carvedilol 6.25 mg BID  >>> Repeat EGD with banding in 4 weeks.   Will arrange  >>> OTC Feosol with Bifera daily for 1 month    Outpatient follow up with Dr. Campbell in 1 month in office as well as EGD for banding    GELA Camacho  02/19/20  2:11 PM

## 2020-02-19 NOTE — PROGRESS NOTES
Case Management Discharge Note      Final Note: Plan is home with spouse. Denies discharge needs.           Destination      No service has been selected for the patient.      Durable Medical Equipment      No service has been selected for the patient.      Dialysis/Infusion      No service has been selected for the patient.      Home Medical Care      No service has been selected for the patient.      Therapy      No service has been selected for the patient.      Community Resources      No service has been selected for the patient.             Final Discharge Disposition Code: 01 - home or self-care

## 2020-02-19 NOTE — PROGRESS NOTES
[facility] Medicine Services  PROGRESS NOTE    Patient Name: Lyndsey Luna  : 1960  MRN: [id number]    Date of Admission: 2020  Primary Care Physician: Javid Lin APRN    Subjective   Subjective     CC: Follow-up GI bleed    HPI: No acute events overnight, patient says she slept well, denies any abdominal pain, no nausea    Review of Systems  Gen- No fevers, chills  CV- No chest pain, palpitations  Resp- No cough, dyspnea  GI- No N/V/D, abd pain    All systems reviewed and negative except as mentioned in HPI above  Objective   Objective     Vital Signs:   Temp:  [97.8 °F (36.6 °C)-98.5 °F (36.9 °C)] 98.5 °F (36.9 °C)  Heart Rate:  [70-77] 73  Resp:  [18] 18  BP: (124-143)/(51-62) 124/62        Physical Exam:  Constitutional: No acute distress, awake, alert  HENT: NCAT, mucous membranes moist  Respiratory: Clear to auscultation bilaterally, respiratory effort normal   Cardiovascular: RRR, no murmurs, rubs, or gallops, palpable pedal pulses bilaterally  Gastrointestinal: Positive bowel sounds, soft, nontender, nondistended  Musculoskeletal: No bilateral ankle edema  Psychiatric: Appropriate affect, cooperative  Neurologic: Oriented x 3, no focal deficits  Skin: No rashes    Results Reviewed:  Results from last 7 days   Lab Units 20  0505 20  1948 20  0745 20  0333  20  0402  20  1840   WBC 10*3/mm3 2.76*  --   --  2.82*  --  3.51   < >  --    HEMOGLOBIN g/dL 7.6* 8.0* 7.7* 7.3*   < > 7.1*   < > 7.3*   HEMATOCRIT % 25.0* 26.3* 25.6* 24.3*   < > 24.4*   < > 24.3*   PLATELETS 10*3/mm3 43*  --   --  46*  --  53*   < >  --    INR   --   --   --  1.32*  --   --   --  1.42*    < > = values in this interval not displayed.     Results from last 7 days   Lab Units 20  0505 20  0319 20  0333  02/15/20  0548   SODIUM mmol/L 141 143 143   < > 143   POTASSIUM mmol/L 3.7 3.8 3.7   < > 4.4   CHLORIDE mmol/L 109* 109* 111*   < > 110*      CO2 mmol/L 24.0 22.0 23.0   < > 21.0*   BUN mg/dL 11 16 28*   < > 38*   CREATININE mg/dL 1.03* 1.04* 1.27*   < > 1.23*   GLUCOSE mg/dL 188* 207* 79   < > 145*   CALCIUM mg/dL 8.0* 7.6* 7.7*   < > 8.3*   ALT (SGPT) U/L 20 18  --   --  18   AST (SGOT) U/L 19 18  --   --  16    < > = values in this interval not displayed.     Estimated Creatinine Clearance: 68 mL/min (A) (by C-G formula based on SCr of 1.03 mg/dL (H)).    Microbiology Results Abnormal     Procedure Component Value - Date/Time    Body Fluid Culture - Body Fluid, Peritoneum [[id number]] Collected:  02/15/20 1122    Lab Status:  Final result Specimen:  Body Fluid from Peritoneum Updated:  02/18/20 0636     Body Fluid Culture No growth at 3 days     Gram Stain No WBCs or organisms seen          Imaging Results (Last 24 Hours)     ** No results found for the last 24 hours. **               I have reviewed the medications:  Scheduled Meds:  atorvastatin 80 mg Oral Nightly   calcium carbonate EX 1,500 mg Oral Daily   carvedilol 6.25 mg Oral BID With Meals   cefTRIAXone 1 g Intravenous Q24H   cholecalciferol 1,000 Units Oral Daily   ferrous sulfate 325 mg Oral Daily With Breakfast   furosemide 20 mg Oral Daily   insulin detemir 15 Units Subcutaneous Q12H   insulin lispro 0-7 Units Subcutaneous 4x Daily With Meals & Nightly   lidocaine PF 1% 20 mL Subcutaneous Once   pantoprazole 40 mg Intravenous Q12H   sodium chloride 10 mL Intravenous Q12H   spironolactone 50 mg Oral Daily     Continuous Infusions:   PRN Meds:.•  acetaminophen **OR** acetaminophen **OR** acetaminophen  •  dextrose  •  dextrose  •  glucagon (human recombinant)  •  ondansetron **OR** ondansetron  •  sodium chloride  •  [COMPLETED] Insert peripheral IV **AND** sodium chloride  •  sodium chloride  •  traMADol    Assessment/Plan   Assessment & Plan     Active Hospital Problems    Diagnosis  POA   • Acute upper GI bleed [K92.2]  Yes   • Portal hypertension (CMS/HCC) [K76.6]  Unknown   • CAD  (coronary artery disease) [I25.10]  Yes   • Type 2 diabetes mellitus, with long-term current use of insulin (CMS/[facility]) [E11.9, Z79.4]  Not Applicable      Resolved Hospital Problems   No resolved problems to display.        Brief Hospital Course to date:  Lyndsey Luna is a 59 y.o. female with past medical history of PAD, type 2 diabetes, MI status post stents patient presented with acute hematemesis and microcytic anemia and CT evidence for ascites admitted with acute variceal bleed.  She is s/p EGD with banding.     Plan  Portal hypertension with ascites  Esophageal varices with hemorrhage, patient with no known history of liver disease  -She is s/p banding x7  -Continue BID Protonix for 1 month then daily  -She is s/p paracentesis with 2 L removed  -We will need outpatient follow-up for repeat banding in 1 month (initial plan was to follow-up with Dr. Moore however he declines to follow-up and as such we will refer to our [facility] at discharge)  -continue 7-day course of antibiotics, currently on Rocephin  -Continue Aldactone, Lasix  -Portal duplex to be done today     Acute blood loss anemia/thrombocytopenia  -Secondary to above, s/p 1 unit platelets and 1 unit PRBC  -H&H and platelets currently stable, continue to trend     Well-controlled type 2 diabetes with A1c 7.3%  -FSBG is reviewed and are appropriate  -Continue basal and SSI for now, adjust as appropriate  -Note: she will need adjustments of his home insulin regimen prior to discharge     History of CAD s/p stenting 2 years ago  -Continue statin and beta-blocker, holding ASA secondary to bleed  -Patient will benefit from ACE/ARB, will defer this to outpatient follow-up     DVT Prophylaxis: Mechanical     Disposition:  Anticipate discharge later today if portal duplex is unremarkable and if okay with GI.    CODE STATUS:   Code Status and Medical Interventions:   Ordered at: 02/14/20 3609     Level Of Support Discussed With:    Patient    Next of  Kin (If No Surrogate)     Code Status:    CPR     Medical Interventions (Level of Support Prior to Arrest):    Full     Electronically signed by Nataly Carreon MD, 02/19/20, 7:30 AM.

## 2020-02-19 NOTE — DISCHARGE SUMMARY
[facility] Medicine Services  DISCHARGE SUMMARY    Patient Name: Lyndsey Luna  : 1960  MRN: [id number]    Date of Admission: 2020  9:54 AM  Date of Discharge: 2020  Primary Care Physician: Javid Lin APRN    Consults     Date and Time Order Name Status Description    2020 1453 Inpatient Gastroenterology Consult Completed           Hospital Course     Presenting Problem:   Acute upper GI bleed [K92.2]  Acute upper GI bleed [K92.2]    Active Hospital Problems    Diagnosis  POA   • Portal hypertension (CMS/HCC) [K76.6]  Yes   • CAD (coronary artery disease) [I25.10]  Yes   • Type 2 diabetes mellitus, with long-term current use of insulin (CMS/HCC) [E11.9, Z79.4]  Not Applicable      Resolved Hospital Problems    Diagnosis Date Resolved POA   • Acute upper GI bleed [K92.2] 2020 Yes          Hospital Course:  Lyndsey Luna is a 59 y.o. female with past medical history of PAD, type 2 diabetes, MI status post stents patient presented with acute hematemesis and microcytic anemia and CT evidence for ascites admitted with acute variceal bleed.  She is s/p EGD with banding.     Plan  Portal hypertension with ascites  Esophageal varices with hemorrhage, patient with no known history of liver disease  -She is s/p banding x7,  she will follow-up with GI for repeat EGD in 4 weeks  -Continue BID Protonix for 1 month then daily  -She is s/p paracentesis with 2 L removed, s/p antibiotics with Rocephin  -Continue low-dose diuretics with Lasix 20 mg daily and spironolactone 50 mg daily, beta-blocker switched to Coreg 6.25 mg twice daily.  -GI recommends OTC Feosol and Bifera daily for 1 month  -Portal duplex to be done; preliminary results shows no thrombus  -Etiology of underlying liver disease is unknown, hepatitis panel was negative suspect fatty liver, autoimmune work-up currently ongoing, DAVID was positive,     Acute blood loss anemia/thrombocytopenia  -Secondary to  above, s/p 1 unit platelets and 1 unit PRBC  -H&H and platelets currently stable     Well-controlled type 2 diabetes with A1c 7.3%  -FSBG were reviewed and are appropriate  -Continue basal and SSI for now, adjust as appropriate  -Note: she will need adjustments of his home insulin regimen per PCP     History of CAD s/p stenting 2 years ago  -Continue statin and beta-blocker, ASA   -Patient will benefit from ACE/ARB, will defer this to outpatient follow-up    Discharge Follow Up Recommendations for outpatient labs/diagnostics:  Follow-up with GI for repeat EGD in 4 weeks  Follow-up with PCP in 1 week    Day of Discharge     HPI: No acute events overnight, patient says she feels fine denies any abdominal pain, no more bleeding.    Review of Systems  Gen- No fevers, chills  CV- No chest pain, palpitations  Resp- No cough, dyspnea  GI- No N/V/D, abd pain    All systems reviewed and negative except as mentioned in HPI above    Vital Signs:   Temp:  [98.4 °F (36.9 °C)-98.5 °F (36.9 °C)] 98.4 °F (36.9 °C)  Heart Rate:  [73-77] 73  Resp:  [18] 18  BP: (124-143)/(62-67) 137/67     Physical Exam:  Constitutional: No acute distress, awake, alert  HENT: NCAT, mucous membranes moist  Respiratory: Clear to auscultation bilaterally, respiratory effort normal   Cardiovascular: RRR, no murmurs, rubs, or gallops, palpable pedal pulses bilaterally  Gastrointestinal: Positive bowel sounds, soft, nontender, nondistended  Musculoskeletal: No bilateral ankle edema  Psychiatric: Appropriate affect, cooperative  Neurologic: Oriented x 3, no focal deficits  Skin: No rashes    Pertinent  and/or Most Recent Results     Results from last 7 days   Lab Units 02/19/20  0505 02/18/20  0319 02/17/20  1948 02/17/20  0745 02/17/20  0333 02/16/20  2330 02/16/20  1952 02/16/20  0402  02/15/20  0615 02/15/20  0548  02/14/20  1005   WBC 10*3/mm3 2.76*  --   --   --  2.82*  --   --  3.51  --  1.90* 1.94*  --  4.23   HEMOGLOBIN g/dL 7.6*  --  8.0* 7.7* 7.3*  7.9* 6.4* 7.1*   < > 7.1* 7.1*   < > 8.4*   HEMATOCRIT % 25.0*  --  26.3* 25.6* 24.3* 26.7* 21.6* 24.4*   < > 23.5* 23.2*   < > 27.7*   PLATELETS 10*3/mm3 43*  --   --   --  46*  --   --  53*  --  38* 37*  --  56*   SODIUM mmol/L 141 143  --   --  143  --   --  145  --   --  143  --  141   POTASSIUM mmol/L 3.7 3.8  --   --  3.7  --   --  3.7  --   --  4.4  --  4.0   CHLORIDE mmol/L 109* 109*  --   --  111*  --   --  112*  --   --  110*  --  109*   CO2 mmol/L 24.0 22.0  --   --  23.0  --   --  23.0  --   --  21.0*  --  25.0   BUN mg/dL 11 16  --   --  28*  --   --  33*  --   --  38*  --  22*   CREATININE mg/dL 1.03* 1.04*  --   --  1.27*  --   --  1.28*  --   --  1.23*  --  1.02*   GLUCOSE mg/dL 188* 207*  --   --  79  --   --  76  --   --  145*  --  167*   CALCIUM mg/dL 8.0* 7.6*  --   --  7.7*  --   --  8.6  --   --  8.3*  --  8.8    < > = values in this interval not displayed.     Results from last 7 days   Lab Units 02/19/20  0505 02/18/20  0319 02/17/20  0333 02/15/20  0548 02/14/20  1840 02/14/20  1005   BILIRUBIN mg/dL 0.6 0.4  --  0.6  --  1.0   ALK PHOS U/L 73 69  --  75  --  92   ALT (SGPT) U/L 20 18  --  18  --  21   AST (SGOT) U/L 19 18  --  16  --  21   PROTIME Seconds  --   --  15.8*  --  16.7*  --    INR   --   --  1.32*  --  1.42*  --            Invalid input(s): TG, LDLCALC, LDLREALC  Results from last 7 days   Lab Units 02/15/20  0548 02/14/20  1005   HEMOGLOBIN A1C % 7.30*  --    LACTATE mmol/L  --  1.5       Brief Urine Lab Results     None          Microbiology Results Abnormal     Procedure Component Value - Date/Time    Body Fluid Culture - Body Fluid, Peritoneum [[id number]] Collected:  02/15/20 1122    Lab Status:  Final result Specimen:  Body Fluid from Peritoneum Updated:  02/18/20 0636     Body Fluid Culture No growth at 3 days     Gram Stain No WBCs or organisms seen          Imaging Results (All)     Procedure Component Value Units Date/Time    CT Guided Paracentesis [[id number]]  Collected:  02/15/20 1114     Updated:  02/16/20 1821    Narrative:       EXAMINATION: CT GUIDED PARACENTESIS-      INDICATION: new onset ascites; K92.2-Gastrointestinal hemorrhage,  unspecified; I85.11-Secondary esophageal varices with bleeding;  K76.6-Portal hypertension; R16.1-Splenomegaly, not elsewhere classified;  D64.9-Anemia, unspecified; N17.9-Acute kidney failure, unspecified;  Z87.898-Personal history of other specified conditions; E11.69-Type 2  diabetes mellitus with other specified complication.     TECHNIQUE: Limited low-dose 5 mm axial images for paracentesis guidance.     The radiation dose reduction device was turned on for each scan per the  ALARA (As Low as Reasonably Achievable) protocol.     COMPARISON: 02/14/2020 abdomen and pelvis CT scan.     FINDINGS: There is ascites scattered fairly uniformly throughout the  abdomen and no large pocket of ascites in any one spot. The safest area  for potential paracentesis is localized in the right lower quadrant.  Informed written consent was obtained from the patient prior to  beginning. Ms. Luna indicates her  understanding and agrees to  proceed. The skin overlying the pocket of fluid in the right lower  quadrant was marked, prepped and draped in sterile fashion and 1%  lidocaine infiltrated into the skin and subcutaneous tissues as local  anesthesia. Subsequently, under CT guidance, a 6.5 Mauritian straight  catheter was advanced to the abdominal wall musculature and met some  resistance. Inspection of this catheter showed some stripping of the  catheter, and a new 6.5 Mauritian straight catheter was gradually advanced  to the peritoneal margin and  careful peritoneal puncture performed with  return of thin clear light yellow ascites. 60 cc were removed and a  sterile syringe for labs previously ordered, distributed into labeled  containers appropriate for labs previous ordered and hand-carried by the  technologist to pathology. Subsequently, a total  of 2.05 L of thin clear  yellow fluid was withdrawn. When no more fluid could be obtained,  catheter was withdrawn and pressure held at the stick site for three  minutes. Follow-up scanning shows no evidence of hemorrhage and mild  remaining ascites. There is no oozing at the skin site. Ms. Luna  tolerated the procedure very well and there were no complications.       Impression:       CT-guided diagnostic and therapeutic paracentesis without  complication. Removal of 2050 mL of thin clear yellow-tinged ascites.  Sample sent for labs ordered.     DICTATED:   02/15/2020  EDITED/ls :   02/15/2020         This report was finalized on 2/16/2020 6:18 PM by Dr. Norbert Adan MD.       CT Abdomen Pelvis With Contrast [[id number]] Collected:  02/14/20 1407     Updated:  02/14/20 1916    Narrative:       EXAMINATION: CT ABDOMEN PELVIS W CONTRAST- 02/14/2020     INDICATION: UGI bleed diabetic, metformin      TECHNIQUE: CT abdomen and pelvis without intravenous contrast     The radiation dose reduction device was turned on for each scan per the  ALARA (As Low as Reasonably Achievable) protocol.     COMPARISON: NONE     FINDINGS: Lung bases are clear. Liver demonstrates homogeneous  appearance of the parenchyma without focal liver lesion. Perihepatic and  paraspinal ascites noted. Gallbladder partially contracted and  unremarkable. No biliary dilatation. Pancreas unremarkable. Spleen is  enlarged to 20 cm in craniocaudal dimension consistent with sequela of  portal hypertension along with paraesophageal and perigastric varices as  well as mixed attenuation in the gastric lumen of likely blood products  or clot which is mildly distended. No mechanical obstructive process.  Adrenals without distinct nodule. Kidneys without hydronephrosis or  hydroureter despite bilateral nonobstructing nephrolithiasis. Renal  cortical scarring and atrophy greatest on the right. No bulky  retroperitoneal adenopathy. Patent nonaneurysmal  minimally  atherosclerotic abdominal aorta. Portal veins and IVC patent. GI tract  as detailed above. Small volume abdominopelvic ascites with mesenteric  edema. Pelvic viscera grossly unremarkable without bulky pelvic  adenopathy or free fluid. Degenerative changes of the spine without  aggressive osseous or soft tissue body wall lesions.       Impression:       Liver without focal lesion however enlarged spleen of likely  pleural hypertension sequela with small volume abdominopelvic ascites  and perigastric as well as paraesophageal varices with increased  attenuation of blood products in the mildly distended gastric lumen may  represent clot or hemorrhage from varices adjacent however no  intra-abdominal free air to suggest perforation.     D:  02/14/2020  E:  02/14/2020     This report was finalized on 2/14/2020 7:13 PM by Dr. Link Baker.       XR Chest 1 View [[id number]] Collected:  02/14/20 1054     Updated:  02/14/20 1321    Narrative:       EXAMINATION: XR CHEST 1 VW- 02/14/2020     INDICATION: UGI bleed      COMPARISON: NONE     FINDINGS: Portable chest reveals cardiac and mediastinal silhouettes  within normal limits. The lung fields are clear. No focal parenchymal  opacification present.  No pleural effusion or pneumothorax. The bony  structures are unremarkable. Pulmonary vascularity is within normal  limits.           Impression:       Lung fields are grossly clear with no acute parenchymal  disease.     D:  02/14/2020  E:  02/14/2020     This report was finalized on 2/14/2020 11:59 AM by Dr. Millie Sinclair MD.             Plan for Follow-up of Pending Labs/Results: PCP    Order Current Status    Methylmalonic Acid, Serum In process        Discharge Details        Discharge Medications      New Medications      Instructions Start Date   carvedilol 6.25 MG tablet  Commonly known as:  COREG   6.25 mg, Oral, 2 Times Daily With Meals      ferrous sulfate 325 (65 FE) MG tablet   325 mg, Oral, Daily  With Breakfast   Start Date:  February 20, 2020     furosemide 20 MG tablet  Commonly known as:  LASIX   20 mg, Oral, Daily   Start Date:  February 20, 2020     pantoprazole 40 MG EC tablet  Commonly known as:  PROTONIX   Take 1 tablet by mouth 2 (Two) Times a Day for 30 days, THEN 1 tablet Daily for 60 days.   Start Date:  February 19, 2020     spironolactone 50 MG tablet  Commonly known as:  ALDACTONE   50 mg, Oral, Daily   Start Date:  February 20, 2020        Continue These Medications      Instructions Start Date   aspirin 81 MG EC tablet   81 mg, Oral, Daily      atorvastatin 80 MG tablet  Commonly known as:  LIPITOR   80 mg, Oral, Daily      CLARITIN 10 MG tablet  Generic drug:  loratadine   10 mg, Oral, Daily      insulin glargine 100 UNIT/ML injection  Commonly known as:  LANTUS   100 Units, Subcutaneous, Nightly      metFORMIN 1000 MG tablet  Commonly known as:  GLUCOPHAGE   1,000 mg, Oral, 2 Times Daily With Meals      SITagliptin 100 MG tablet  Commonly known as:  JANUVIA   100 mg, Oral, Daily         Stop These Medications    metoprolol succinate XL 25 MG 24 hr tablet  Commonly known as:  TOPROL-XL     sucralfate 1 g tablet  Commonly known as:  CARAFATE            Allergies   Allergen Reactions   • Penicillins Itching         Discharge Disposition:  Home or Self Care    Diet:  Hospital:  Diet Order   Procedures   • Diet Regular; Cardiac, Consistent Carbohydrate       Activity:as tolerated    Restrictions or Other Recommendations:  None       CODE STATUS:    Code Status and Medical Interventions:   Ordered at: 02/14/20 1557     Level Of Support Discussed With:    Patient    Next of Kin (If No Surrogate)     Code Status:    CPR     Medical Interventions (Level of Support Prior to Arrest):    Full       No future appointments.      Time Spent on Discharge:  35minutes    Electronically signed by Nataly Carreon MD, 02/19/20, 2:24 PM.

## 2020-02-20 ENCOUNTER — READMISSION MANAGEMENT (OUTPATIENT)
Dept: CALL CENTER | Facility: HOSPITAL | Age: 60
End: 2020-02-20

## 2020-02-20 ENCOUNTER — CARE COORDINATION (OUTPATIENT)
Dept: CARE COORDINATION | Age: 60
End: 2020-02-20

## 2020-02-20 LAB
ACTIN IGG SERPL-ACNC: 8 UNITS (ref 0–19)
BH CV VAS HEPATIC PORTAL VEIN DIAMETER: 1.7 CM
BH CV VAS SMA AORTA EDV: 12.2 CM/S
BH CV VAS SMA AORTA PSV: 82.2 CM/S
BH CV VAS SMA HEPATIC EDV: 32.6 CM/S
BH CV VAS SMA HEPATIC PSV: 194 CM/S
BH CV VAS SMA SPLENIC EDV: 45.2 CM/S
BH CV VAS SMA SPLENIC PSV: 211 CM/S
DEPRECATED MITOCHONDRIA M2 IGG SER-ACNC: <20 UNITS (ref 0–20)
Lab: ABNORMAL
METHYLMALONATE SERPL-SCNC: 697 NMOL/L (ref 0–378)

## 2020-02-20 RX ORDER — PANTOPRAZOLE SODIUM 40 MG/1
40 TABLET, DELAYED RELEASE ORAL 2 TIMES DAILY
COMMUNITY
End: 2020-05-28 | Stop reason: SDUPTHER

## 2020-02-20 RX ORDER — CARVEDILOL 6.25 MG/1
6.25 TABLET ORAL 2 TIMES DAILY WITH MEALS
COMMUNITY
End: 2020-08-13 | Stop reason: SDUPTHER

## 2020-02-20 RX ORDER — FUROSEMIDE 20 MG/1
20 TABLET ORAL DAILY
COMMUNITY
End: 2020-04-22 | Stop reason: SDUPTHER

## 2020-02-20 RX ORDER — FERROUS SULFATE 325(65) MG
325 TABLET ORAL
COMMUNITY
End: 2020-03-12 | Stop reason: SDUPTHER

## 2020-02-20 RX ORDER — SPIRONOLACTONE 50 MG/1
50 TABLET, FILM COATED ORAL DAILY
COMMUNITY
End: 2020-08-13 | Stop reason: SDUPTHER

## 2020-02-20 NOTE — CARE COORDINATION
[facility] 45 Transitions Initial Follow Up Call    Call within 2 business days of discharge: Yes     Patient: Minor Kwong Patient : 1960 MRN: <[id number]>    Last Discharge 1362 [facility]  20  Portal Hypertension, upper GI bleed       RARS: No data recorded     Spoke with: Sylvain Wilkerson states that she is doing ok today. She does continue with swelling in her belly, she believes only slightly better. She does report swelling in her legs has improved. We reviewed her home meds and her discharge meds. Questions answered about some of the changes and actions of medications. We discussed diet. We discussed her fu appointment and follow up with GI is in March.       Discharge department/facility: [facility]    Non-face-to-face services provided:  Scheduled appointment with [facility]  Obtained and reviewed discharge summary and/or continuity of care documents    Follow Up  Future Appointments   Date Time Provider Ab Parker   2020  3:45 PM Jin Rodriguez, APRN - CNP [address]       Doreen Hodgkin, RN

## 2020-02-20 NOTE — OUTREACH NOTE
Prep Survey      Responses   Facility patient discharged from?  [place]   Is patient eligible?  Yes   Discharge diagnosis  acute variceal bleedPortal hypertension with ascites   Does the patient have one of the following disease processes/diagnoses(primary or secondary)?  Other   Does the patient have Home health ordered?  No   Is there a DME ordered?  No   Prep survey completed?  Yes          Kenia Connor RN

## 2020-02-21 ENCOUNTER — READMISSION MANAGEMENT (OUTPATIENT)
Dept: CALL CENTER | Facility: HOSPITAL | Age: 60
End: 2020-02-21

## 2020-02-21 NOTE — OUTREACH NOTE
Medical Week 1 Survey      Responses   Facility patient discharged from?  [place]   Does the patient have one of the following disease processes/diagnoses(primary or secondary)?  Other   Is there a successful TCM telephone encounter documented?  No   Week 1 attempt successful?  Yes   Call start time  1039   Call end time  1042   Discharge diagnosis  acute variceal bleedPortal hypertension with ascites   Meds reviewed with patient/caregiver?  Yes   Is the patient having any side effects they believe may be caused by any medication additions or changes?  No   Does the patient have all medications ordered at discharge?  Yes   Is the patient taking all medications as directed (includes completed medication regime)?  Yes   Comments regarding appointments  Appt with GI is 3/17/20   Does the patient have a primary care provider?   Yes   Does the patient have an appointment with their PCP within 7 days of discharge?  Yes   Comments regarding PCP  PCP appt 2/26/20   Has the patient kept scheduled appointments due by today?  N/A   Psychosocial issues?  No   Did the patient receive a copy of their discharge instructions?  Yes   Nursing interventions  Reviewed instructions with patient   What is the patient's perception of their health status since discharge?  Improving [Pt is not vomiting blood but still hurting in her ABD. No pain in ABD 2/20/20 but it hurting today.]   Is the patient/caregiver able to teach back signs and symptoms related to disease process for when to call PCP?  Yes   Is the patient/caregiver able to teach back signs and symptoms related to disease process for when to call 911?  Yes   Is the patient/caregiver able to teach back the hierarchy of who to call/visit for symptoms/problems? PCP, Specialist, Home health nurse, Urgent Care, ED, 911  Yes   If the patient is a current smoker, are they able to teach back resources for cessation?  -- [Nonsmoker]   Week 1 call completed?  Yes          Lilliam DAY  David, RN

## 2020-02-22 ENCOUNTER — NURSE TRIAGE (OUTPATIENT)
Dept: CALL CENTER | Facility: HOSPITAL | Age: 60
End: 2020-02-22

## 2020-02-22 ENCOUNTER — APPOINTMENT (OUTPATIENT)
Dept: GENERAL RADIOLOGY | Facility: HOSPITAL | Age: 60
End: 2020-02-22

## 2020-02-22 ENCOUNTER — HOSPITAL ENCOUNTER (INPATIENT)
Facility: HOSPITAL | Age: 60
LOS: 3 days | Discharge: HOME | End: 2020-02-25
Attending: EMERGENCY MEDICINE | Admitting: FAMILY MEDICINE

## 2020-02-22 ENCOUNTER — APPOINTMENT (OUTPATIENT)
Dept: CT IMAGING | Facility: HOSPITAL | Age: 60
End: 2020-02-22

## 2020-02-22 DIAGNOSIS — N20.1 URETERAL CALCULUS, RIGHT: ICD-10-CM

## 2020-02-22 DIAGNOSIS — K76.6 PORTAL HYPERTENSION (HCC): ICD-10-CM

## 2020-02-22 DIAGNOSIS — K74.60 CIRRHOSIS OF LIVER WITH ASCITES, UNSPECIFIED HEPATIC CIRRHOSIS TYPE (HCC): ICD-10-CM

## 2020-02-22 DIAGNOSIS — R11.2 NON-INTRACTABLE VOMITING WITH NAUSEA, UNSPECIFIED VOMITING TYPE: ICD-10-CM

## 2020-02-22 DIAGNOSIS — R18.8 CIRRHOSIS OF LIVER WITH ASCITES, UNSPECIFIED HEPATIC CIRRHOSIS TYPE (HCC): ICD-10-CM

## 2020-02-22 DIAGNOSIS — E11.9 TYPE 2 DIABETES MELLITUS WITHOUT COMPLICATION, WITH LONG-TERM CURRENT USE OF INSULIN (HCC): ICD-10-CM

## 2020-02-22 DIAGNOSIS — Z79.4 TYPE 2 DIABETES MELLITUS WITHOUT COMPLICATION, WITH LONG-TERM CURRENT USE OF INSULIN (HCC): ICD-10-CM

## 2020-02-22 DIAGNOSIS — N17.9 ACUTE RENAL FAILURE, UNSPECIFIED ACUTE RENAL FAILURE TYPE (HCC): ICD-10-CM

## 2020-02-22 DIAGNOSIS — N13.2 HYDRONEPHROSIS WITH URINARY OBSTRUCTION DUE TO URETERAL CALCULUS: ICD-10-CM

## 2020-02-22 DIAGNOSIS — N20.1 LEFT URETERAL STONE: ICD-10-CM

## 2020-02-22 DIAGNOSIS — N23 RENAL COLIC ON LEFT SIDE: Primary | ICD-10-CM

## 2020-02-22 PROBLEM — R16.1 SPLENOMEGALY: Status: ACTIVE | Noted: 2020-02-22

## 2020-02-22 PROBLEM — N13.9 OBSTRUCTIVE UROPATHY: Status: ACTIVE | Noted: 2020-02-22

## 2020-02-22 PROBLEM — N13.30 HYDRONEPHROSIS: Status: ACTIVE | Noted: 2020-02-22

## 2020-02-22 LAB
A2 MACROGLOB SERPL-MCNC: 207 MG/DL (ref 110–276)
ABO GROUP BLD: NORMAL
ALBUMIN SERPL-MCNC: 4 G/DL (ref 3.5–5.2)
ALBUMIN/GLOB SERPL: 1.3 G/DL
ALP SERPL-CCNC: 91 U/L (ref 39–117)
ALT SERPL W P-5'-P-CCNC: 19 U/L (ref 1–33)
ALT SERPL W P-5'-P-CCNC: 24 IU/L (ref 0–40)
ANION GAP SERPL CALCULATED.3IONS-SCNC: 12 MMOL/L (ref 5–15)
APO A-I SERPL-MCNC: 73 MG/DL (ref 116–209)
AST SERPL W P-5'-P-CCNC: 23 IU/L (ref 0–40)
AST SERPL-CCNC: 19 U/L (ref 1–32)
BACTERIA UR QL AUTO: ABNORMAL /HPF
BASOPHILS # BLD AUTO: 0.04 10*3/MM3 (ref 0–0.2)
BASOPHILS NFR BLD AUTO: 1 % (ref 0–1.5)
BILIRUB SERPL-MCNC: 0.4 MG/DL (ref 0–1.2)
BILIRUB SERPL-MCNC: 1.4 MG/DL (ref 0.2–1.2)
BILIRUB UR QL STRIP: NEGATIVE
BLD GP AB SCN SERPL QL: NEGATIVE
BUN BLD-MCNC: 18 MG/DL (ref 6–20)
BUN/CREAT SERPL: 7.6 (ref 7–25)
CALCIUM SPEC-SCNC: 9.4 MG/DL (ref 8.6–10.5)
CHLORIDE SERPL-SCNC: 102 MMOL/L (ref 98–107)
CHOLEST SERPL-MCNC: 103 MG/DL (ref 100–199)
CLARITY UR: CLEAR
CO2 SERPL-SCNC: 25 MMOL/L (ref 22–29)
COLOR UR: YELLOW
CREAT BLD-MCNC: 2.38 MG/DL (ref 0.57–1)
D-LACTATE SERPL-SCNC: 1 MMOL/L (ref 0.5–2)
DEPRECATED RDW RBC AUTO: 50.2 FL (ref 37–54)
EOSINOPHIL # BLD AUTO: 0.12 10*3/MM3 (ref 0–0.4)
EOSINOPHIL NFR BLD AUTO: 3 % (ref 0.3–6.2)
ERYTHROCYTE [DISTWIDTH] IN BLOOD BY AUTOMATED COUNT: 17.5 % (ref 12.3–15.4)
FIBROSIS STAGE SERPL QL: ABNORMAL
GFR SERPL CREATININE-BSD FRML MDRD: 21 ML/MIN/1.73
GGT SERPL-CCNC: 28 IU/L (ref 0–60)
GLOBULIN UR ELPH-MCNC: 3.1 GM/DL
GLUCOSE BLD-MCNC: 118 MG/DL (ref 65–99)
GLUCOSE BLDC GLUCOMTR-MCNC: 122 MG/DL (ref 70–130)
GLUCOSE SERPL-MCNC: 259 MG/DL (ref 65–99)
GLUCOSE UR STRIP-MCNC: NEGATIVE MG/DL
HAPTOGLOB SERPL-MCNC: 123 MG/DL (ref 33–346)
HCT VFR BLD AUTO: 29.2 % (ref 34–46.6)
HCT VFR BLD AUTO: 29.3 % (ref 34–46.6)
HGB BLD-MCNC: 8.8 G/DL (ref 12–15.9)
HGB BLD-MCNC: 9 G/DL (ref 12–15.9)
HGB UR QL STRIP.AUTO: ABNORMAL
HOLD SPECIMEN: NORMAL
HOLD SPECIMEN: NORMAL
HYALINE CASTS UR QL AUTO: ABNORMAL /LPF
IMM GRANULOCYTES # BLD AUTO: 0.02 10*3/MM3 (ref 0–0.05)
IMM GRANULOCYTES NFR BLD AUTO: 0.5 % (ref 0–0.5)
KETONES UR QL STRIP: NEGATIVE
LABORATORY COMMENT REPORT: ABNORMAL
LEUKOCYTE ESTERASE UR QL STRIP.AUTO: NEGATIVE
LIMITATIONS: (REFERENCE): ABNORMAL
LIPASE SERPL-CCNC: 60 U/L (ref 13–60)
LYMPHOCYTES # BLD AUTO: 0.57 10*3/MM3 (ref 0.7–3.1)
LYMPHOCYTES NFR BLD AUTO: 14.3 % (ref 19.6–45.3)
MCH RBC QN AUTO: 24.4 PG (ref 26.6–33)
MCHC RBC AUTO-ENTMCNC: 30.8 G/DL (ref 31.5–35.7)
MCV RBC AUTO: 79.1 FL (ref 79–97)
MONOCYTES # BLD AUTO: 0.44 10*3/MM3 (ref 0.1–0.9)
MONOCYTES NFR BLD AUTO: 11 % (ref 5–12)
NEUTROPHILS # BLD AUTO: 2.8 10*3/MM3 (ref 1.7–7)
NEUTROPHILS NFR BLD AUTO: 70.2 % (ref 42.7–76)
NITRITE UR QL STRIP: NEGATIVE
NRBC BLD AUTO-RTO: 0 /100 WBC (ref 0–0.2)
PH UR STRIP.AUTO: <=5 [PH] (ref 5–8)
PLATELET # BLD AUTO: 55 10*3/MM3 (ref 140–450)
PMV BLD AUTO: 12 FL (ref 6–12)
POTASSIUM BLD-SCNC: 3.9 MMOL/L (ref 3.5–5.2)
PROT SERPL-MCNC: 7.1 G/DL (ref 6–8.5)
PROT UR QL STRIP: NEGATIVE
RBC # BLD AUTO: 3.69 10*6/MM3 (ref 3.77–5.28)
RBC # UR: ABNORMAL /HPF
REF LAB TEST METHOD: ABNORMAL
RH BLD: POSITIVE
SODIUM BLD-SCNC: 139 MMOL/L (ref 136–145)
SP GR UR STRIP: 1.01 (ref 1–1.03)
SQUAMOUS #/AREA URNS HPF: ABNORMAL /HPF
T&S EXPIRATION DATE: NORMAL
TRIGL SERPL-MCNC: 239 MG/DL (ref 0–149)
TROPONIN T SERPL-MCNC: <0.01 NG/ML (ref 0–0.03)
UROBILINOGEN UR QL STRIP: ABNORMAL
WBC NRBC COR # BLD: 3.99 10*3/MM3 (ref 3.4–10.8)
WBC UR QL AUTO: ABNORMAL /HPF
WEIGHT: (REFERENCE): 99 LBS
WHOLE BLOOD HOLD SPECIMEN: NORMAL
WHOLE BLOOD HOLD SPECIMEN: NORMAL

## 2020-02-22 PROCEDURE — 85014 HEMATOCRIT: CPT | Performed by: NURSE PRACTITIONER

## 2020-02-22 PROCEDURE — 86901 BLOOD TYPING SEROLOGIC RH(D): CPT | Performed by: NURSE PRACTITIONER

## 2020-02-22 PROCEDURE — 71045 X-RAY EXAM CHEST 1 VIEW: CPT

## 2020-02-22 PROCEDURE — 86900 BLOOD TYPING SEROLOGIC ABO: CPT | Performed by: NURSE PRACTITIONER

## 2020-02-22 PROCEDURE — 80053 COMPREHEN METABOLIC PANEL: CPT | Performed by: EMERGENCY MEDICINE

## 2020-02-22 PROCEDURE — 25010000002 MORPHINE PER 10 MG: Performed by: EMERGENCY MEDICINE

## 2020-02-22 PROCEDURE — 63710000001 INSULIN DETEMIR PER 5 UNITS: Performed by: INTERNAL MEDICINE

## 2020-02-22 PROCEDURE — 85018 HEMOGLOBIN: CPT | Performed by: NURSE PRACTITIONER

## 2020-02-22 PROCEDURE — 87040 BLOOD CULTURE FOR BACTERIA: CPT | Performed by: PHYSICIAN ASSISTANT

## 2020-02-22 PROCEDURE — 74176 CT ABD & PELVIS W/O CONTRAST: CPT

## 2020-02-22 PROCEDURE — 25010000002 CEFTRIAXONE PER 250 MG: Performed by: PHYSICIAN ASSISTANT

## 2020-02-22 PROCEDURE — 83690 ASSAY OF LIPASE: CPT | Performed by: EMERGENCY MEDICINE

## 2020-02-22 PROCEDURE — 99284 EMERGENCY DEPT VISIT MOD MDM: CPT

## 2020-02-22 PROCEDURE — 81001 URINALYSIS AUTO W/SCOPE: CPT | Performed by: EMERGENCY MEDICINE

## 2020-02-22 PROCEDURE — 93005 ELECTROCARDIOGRAM TRACING: CPT

## 2020-02-22 PROCEDURE — 85025 COMPLETE CBC W/AUTO DIFF WBC: CPT

## 2020-02-22 PROCEDURE — 93005 ELECTROCARDIOGRAM TRACING: CPT | Performed by: EMERGENCY MEDICINE

## 2020-02-22 PROCEDURE — 83605 ASSAY OF LACTIC ACID: CPT | Performed by: EMERGENCY MEDICINE

## 2020-02-22 PROCEDURE — 86850 RBC ANTIBODY SCREEN: CPT | Performed by: NURSE PRACTITIONER

## 2020-02-22 PROCEDURE — 82962 GLUCOSE BLOOD TEST: CPT

## 2020-02-22 PROCEDURE — 25010000002 HYDROMORPHONE PER 4 MG: Performed by: NURSE PRACTITIONER

## 2020-02-22 PROCEDURE — 25010000002 HYDROMORPHONE PER 4 MG: Performed by: EMERGENCY MEDICINE

## 2020-02-22 PROCEDURE — 99223 1ST HOSP IP/OBS HIGH 75: CPT | Performed by: INTERNAL MEDICINE

## 2020-02-22 PROCEDURE — 84484 ASSAY OF TROPONIN QUANT: CPT | Performed by: EMERGENCY MEDICINE

## 2020-02-22 PROCEDURE — 25010000002 ONDANSETRON PER 1 MG: Performed by: EMERGENCY MEDICINE

## 2020-02-22 RX ORDER — DEXTROSE MONOHYDRATE 25 G/50ML
25 INJECTION, SOLUTION INTRAVENOUS
Status: DISCONTINUED | OUTPATIENT
Start: 2020-02-22 | End: 2020-02-25 | Stop reason: HOSPADM

## 2020-02-22 RX ORDER — HYDROMORPHONE HYDROCHLORIDE 1 MG/ML
0.5 INJECTION, SOLUTION INTRAMUSCULAR; INTRAVENOUS; SUBCUTANEOUS ONCE
Status: COMPLETED | OUTPATIENT
Start: 2020-02-22 | End: 2020-02-22

## 2020-02-22 RX ORDER — SODIUM CHLORIDE 0.9 % (FLUSH) 0.9 %
10 SYRINGE (ML) INJECTION EVERY 12 HOURS SCHEDULED
Status: DISCONTINUED | OUTPATIENT
Start: 2020-02-22 | End: 2020-02-25 | Stop reason: HOSPADM

## 2020-02-22 RX ORDER — ATORVASTATIN CALCIUM 40 MG/1
80 TABLET, FILM COATED ORAL DAILY
Status: DISCONTINUED | OUTPATIENT
Start: 2020-02-23 | End: 2020-02-25 | Stop reason: HOSPADM

## 2020-02-22 RX ORDER — FUROSEMIDE 20 MG/1
20 TABLET ORAL DAILY
Status: DISCONTINUED | OUTPATIENT
Start: 2020-02-23 | End: 2020-02-25 | Stop reason: HOSPADM

## 2020-02-22 RX ORDER — PANTOPRAZOLE SODIUM 40 MG/10ML
40 INJECTION, POWDER, LYOPHILIZED, FOR SOLUTION INTRAVENOUS ONCE
Status: COMPLETED | OUTPATIENT
Start: 2020-02-22 | End: 2020-02-22

## 2020-02-22 RX ORDER — PANTOPRAZOLE SODIUM 40 MG/1
40 TABLET, DELAYED RELEASE ORAL
Status: DISCONTINUED | OUTPATIENT
Start: 2020-02-23 | End: 2020-02-25 | Stop reason: HOSPADM

## 2020-02-22 RX ORDER — SODIUM CHLORIDE 0.9 % (FLUSH) 0.9 %
10 SYRINGE (ML) INJECTION AS NEEDED
Status: DISCONTINUED | OUTPATIENT
Start: 2020-02-22 | End: 2020-02-25 | Stop reason: HOSPADM

## 2020-02-22 RX ORDER — SODIUM CHLORIDE 9 MG/ML
50 INJECTION, SOLUTION INTRAVENOUS CONTINUOUS
Status: DISCONTINUED | OUTPATIENT
Start: 2020-02-22 | End: 2020-02-23

## 2020-02-22 RX ORDER — ASPIRIN 81 MG/1
81 TABLET ORAL DAILY
Status: DISCONTINUED | OUTPATIENT
Start: 2020-02-23 | End: 2020-02-25 | Stop reason: HOSPADM

## 2020-02-22 RX ORDER — ONDANSETRON 2 MG/ML
4 INJECTION INTRAMUSCULAR; INTRAVENOUS EVERY 6 HOURS PRN
Status: DISCONTINUED | OUTPATIENT
Start: 2020-02-22 | End: 2020-02-25 | Stop reason: HOSPADM

## 2020-02-22 RX ORDER — NICOTINE POLACRILEX 4 MG
15 LOZENGE BUCCAL
Status: DISCONTINUED | OUTPATIENT
Start: 2020-02-22 | End: 2020-02-25 | Stop reason: HOSPADM

## 2020-02-22 RX ORDER — CARVEDILOL 6.25 MG/1
6.25 TABLET ORAL 2 TIMES DAILY WITH MEALS
Status: DISCONTINUED | OUTPATIENT
Start: 2020-02-22 | End: 2020-02-25 | Stop reason: HOSPADM

## 2020-02-22 RX ORDER — FERROUS SULFATE 325(65) MG
325 TABLET ORAL
Status: DISCONTINUED | OUTPATIENT
Start: 2020-02-23 | End: 2020-02-25 | Stop reason: HOSPADM

## 2020-02-22 RX ORDER — ONDANSETRON 4 MG/1
4 TABLET, FILM COATED ORAL EVERY 6 HOURS PRN
Status: DISCONTINUED | OUTPATIENT
Start: 2020-02-22 | End: 2020-02-25 | Stop reason: HOSPADM

## 2020-02-22 RX ORDER — ONDANSETRON 2 MG/ML
4 INJECTION INTRAMUSCULAR; INTRAVENOUS ONCE
Status: COMPLETED | OUTPATIENT
Start: 2020-02-22 | End: 2020-02-22

## 2020-02-22 RX ORDER — NALOXONE HCL 0.4 MG/ML
0.4 VIAL (ML) INJECTION
Status: DISCONTINUED | OUTPATIENT
Start: 2020-02-22 | End: 2020-02-23

## 2020-02-22 RX ORDER — MORPHINE SULFATE 4 MG/ML
4 INJECTION, SOLUTION INTRAMUSCULAR; INTRAVENOUS ONCE
Status: COMPLETED | OUTPATIENT
Start: 2020-02-22 | End: 2020-02-22

## 2020-02-22 RX ORDER — CETIRIZINE HYDROCHLORIDE 10 MG/1
5 TABLET ORAL DAILY
Status: DISCONTINUED | OUTPATIENT
Start: 2020-02-23 | End: 2020-02-25 | Stop reason: HOSPADM

## 2020-02-22 RX ORDER — HYDROMORPHONE HYDROCHLORIDE 1 MG/ML
0.5 INJECTION, SOLUTION INTRAMUSCULAR; INTRAVENOUS; SUBCUTANEOUS
Status: DISCONTINUED | OUTPATIENT
Start: 2020-02-22 | End: 2020-02-23

## 2020-02-22 RX ORDER — SPIRONOLACTONE 25 MG/1
50 TABLET ORAL DAILY
Status: DISCONTINUED | OUTPATIENT
Start: 2020-02-23 | End: 2020-02-25 | Stop reason: HOSPADM

## 2020-02-22 RX ADMIN — CEFTRIAXONE SODIUM 1 G: 1 INJECTION, POWDER, FOR SOLUTION INTRAMUSCULAR; INTRAVENOUS at 23:23

## 2020-02-22 RX ADMIN — HYDROMORPHONE HYDROCHLORIDE 0.5 MG: 1 INJECTION, SOLUTION INTRAMUSCULAR; INTRAVENOUS; SUBCUTANEOUS at 23:26

## 2020-02-22 RX ADMIN — INSULIN DETEMIR 15 UNITS: 100 INJECTION, SOLUTION SUBCUTANEOUS at 23:44

## 2020-02-22 RX ADMIN — HYDROMORPHONE HYDROCHLORIDE 0.5 MG: 1 INJECTION, SOLUTION INTRAMUSCULAR; INTRAVENOUS; SUBCUTANEOUS at 21:01

## 2020-02-22 RX ADMIN — SODIUM CHLORIDE 50 ML/HR: 9 INJECTION, SOLUTION INTRAVENOUS at 23:23

## 2020-02-22 RX ADMIN — PANTOPRAZOLE SODIUM 40 MG: 40 INJECTION, POWDER, FOR SOLUTION INTRAVENOUS at 20:09

## 2020-02-22 RX ADMIN — CARVEDILOL 6.25 MG: 6.25 TABLET, FILM COATED ORAL at 23:23

## 2020-02-22 RX ADMIN — SODIUM CHLORIDE 500 ML: 9 INJECTION, SOLUTION INTRAVENOUS at 19:57

## 2020-02-22 RX ADMIN — ONDANSETRON 4 MG: 2 INJECTION INTRAMUSCULAR; INTRAVENOUS at 19:56

## 2020-02-22 RX ADMIN — MORPHINE SULFATE 4 MG: 4 INJECTION, SOLUTION INTRAMUSCULAR; INTRAVENOUS at 19:56

## 2020-02-22 RX ADMIN — ONDANSETRON 4 MG: 2 INJECTION INTRAMUSCULAR; INTRAVENOUS at 21:01

## 2020-02-22 NOTE — TELEPHONE ENCOUNTER
"She was discharged on [facility] on 02/14/2020- 02/ for an upper gi bleed. She is having abdominal pain on the left side,she rates it a 8 on the pain scale. She has not been vomiting, she feels very ill and is worried.     Reason for Disposition  • Patient sounds very sick or weak to the triager    Additional Information  • Negative: Severe difficulty breathing (e.g., struggling for each breath, speaks in single words)  • Negative: Shock suspected (e.g., cold/pale/clammy skin, too weak to stand, low BP, rapid pulse)  • Negative: Difficult to awaken or acting confused (e.g., disoriented, slurred speech)  • Negative: Passed out (i.e., lost consciousness, collapsed and was not responding)  • Negative: Visible sweat on face or sweat dripping down face  • Negative: Sounds like a life-threatening emergency to the triager  • Negative: Followed an abdomen (stomach) injury  • Negative: Chest pain  • Negative: [1] SEVERE pain (e.g., excruciating) AND [2] present > 1 hour  • Negative: [1] Pain lasts > 10 minutes AND [2] age > 50  • Negative: [1] Pain lasts > 10 minutes AND [2] age > 40 AND [3] associated chest, arm, neck, upper back or jaw pain  • Negative: [1] Pain lasts > 10 minutes AND [2] age > 35 AND [3] at least one cardiac risk factor (i.e., hypertension, diabetes, obesity, smoker or strong family history of heart disease)  • Negative: [1] Pain lasts > 10 minutes AND [2] history of heart disease (i.e., heart attack, bypass surgery, angina, angioplasty, CHF; not just a heart murmur)  • Negative: [1] Pain lasts > 10 minutes AND [2] difficulty breathing  • Negative: [1] Vomiting AND [2] contains red blood  (Exception: few streaks and only occurred once)  • Negative: [1] Vomiting AND [2] contains black (\"coffee ground\") material  • Negative: Blood in bowel movements  (Exception: Blood on surface of BM with constipation)  • Negative: Black or tarry bowel movements  (Exception: chronic-unchanged  black-grey bowel " "movements AND is taking iron pills or Pepto-bismol)  • Negative: [1] Pregnant > 24 weeks AND [2] hand or face swelling  • Negative: [1] MILD-MODERATE pain AND [2] constant AND [3] present > 2 hours  • Negative: [1] MILD-MODERATE pain AND [2] not relieved by antacids  • Negative: [1] Vomiting AND [2] contains bile (green color)  • Negative: [1] Vomiting AND [2] abdomen looks much more swollen than usual    Answer Assessment - Initial Assessment Questions  1. LOCATION: \"Where does it hurt?\"       Upper abdominal pain 8   2. RADIATION: \"Does the pain shoot anywhere else?\" (e.g., chest, back)      The pain radiates to the abdomin   3. ONSET: \"When did the pain begin?\" (e.g., minutes, hours or days ago)       It has gotten worse.   4. SUDDEN: \"Gradual or sudden onset?\"      Gradual onset   5. PATTERN \"Does the pain come and go, or is it constant?\"     - If constant: \"Is it getting better, staying the same, or worsening?\"       (Note: Constant means the pain never goes away completely; most serious pain is constant and it progresses)      - If intermittent: \"How long does it last?\" \"Do you have pain now?\"      (Note: Intermittent means the pain goes away completely between bouts)      It come and goes.   6. SEVERITY: \"How bad is the pain?\"  (e.g., Scale 1-10; mild, moderate, or severe)     - MILD (1-3): doesn't interfere with normal activities, abdomen soft and not tender to touch      - MODERATE (4-7): interferes with normal activities or awakens from sleep, tender to touch      - SEVERE (8-10): excruciating pain, doubled over, unable to do any normal activities        8   7. RECURRENT SYMPTOM: \"Have you ever had this type of abdominal pain before?\" If so, ask: \"When was the last time?\" and \"What happened that time?\"       She has has this pain before.   8. AGGRAVATING FACTORS: \"Does anything seem to cause this pain?\" (e.g., foods, stress, alcohol)      Nothing makes it better.   9. CARDIAC SYMPTOMS: \"Do you have any of " "the following symptoms: chest pain, difficulty breathing, sweating, nausea?\"      Nausea   10. OTHER SYMPTOMS: \"Do you have any other symptoms?\" (e.g., fever, vomiting, diarrhea)        Abdominal pain   11. PREGNANCY: \"Is there any chance you are pregnant?\" \"When was your last menstrual period?\"        no    Protocols used: ABDOMINAL PAIN - UPPER-ADULT-AH      "

## 2020-02-23 ENCOUNTER — ANESTHESIA EVENT (OUTPATIENT)
Dept: PERIOP | Facility: HOSPITAL | Age: 60
End: 2020-02-23

## 2020-02-23 ENCOUNTER — APPOINTMENT (OUTPATIENT)
Dept: GENERAL RADIOLOGY | Facility: HOSPITAL | Age: 60
End: 2020-02-23

## 2020-02-23 ENCOUNTER — ANESTHESIA (OUTPATIENT)
Dept: PERIOP | Facility: HOSPITAL | Age: 60
End: 2020-02-23

## 2020-02-23 ENCOUNTER — READMISSION MANAGEMENT (OUTPATIENT)
Dept: CALL CENTER | Facility: HOSPITAL | Age: 60
End: 2020-02-23

## 2020-02-23 LAB
ALBUMIN SERPL-MCNC: 3.5 G/DL (ref 3.5–5.2)
ALBUMIN/GLOB SERPL: 1.2 G/DL
ALP SERPL-CCNC: 78 U/L (ref 39–117)
ALT SERPL W P-5'-P-CCNC: 16 U/L (ref 1–33)
ANION GAP SERPL CALCULATED.3IONS-SCNC: 11 MMOL/L (ref 5–15)
AST SERPL-CCNC: 18 U/L (ref 1–32)
BASOPHILS # BLD AUTO: 0.04 10*3/MM3 (ref 0–0.2)
BASOPHILS NFR BLD AUTO: 0.9 % (ref 0–1.5)
BILIRUB SERPL-MCNC: 1.1 MG/DL (ref 0.2–1.2)
BUN BLD-MCNC: 19 MG/DL (ref 6–20)
BUN/CREAT SERPL: 7.9 (ref 7–25)
CALCIUM SPEC-SCNC: 8.7 MG/DL (ref 8.6–10.5)
CHLORIDE SERPL-SCNC: 104 MMOL/L (ref 98–107)
CO2 SERPL-SCNC: 25 MMOL/L (ref 22–29)
CREAT BLD-MCNC: 2.4 MG/DL (ref 0.57–1)
DEPRECATED RDW RBC AUTO: 50.9 FL (ref 37–54)
EOSINOPHIL # BLD AUTO: 0.12 10*3/MM3 (ref 0–0.4)
EOSINOPHIL NFR BLD AUTO: 2.6 % (ref 0.3–6.2)
ERYTHROCYTE [DISTWIDTH] IN BLOOD BY AUTOMATED COUNT: 17.6 % (ref 12.3–15.4)
GFR SERPL CREATININE-BSD FRML MDRD: 21 ML/MIN/1.73
GLOBULIN UR ELPH-MCNC: 2.9 GM/DL
GLUCOSE BLD-MCNC: 95 MG/DL (ref 65–99)
GLUCOSE BLDC GLUCOMTR-MCNC: 149 MG/DL (ref 70–130)
GLUCOSE BLDC GLUCOMTR-MCNC: 151 MG/DL (ref 70–130)
GLUCOSE BLDC GLUCOMTR-MCNC: 183 MG/DL (ref 70–130)
GLUCOSE BLDC GLUCOMTR-MCNC: 68 MG/DL (ref 70–130)
GLUCOSE BLDC GLUCOMTR-MCNC: 72 MG/DL (ref 70–130)
GLUCOSE BLDC GLUCOMTR-MCNC: 82 MG/DL (ref 70–130)
HCT VFR BLD AUTO: 26.5 % (ref 34–46.6)
HCT VFR BLD AUTO: 27.6 % (ref 34–46.6)
HCT VFR BLD AUTO: 27.7 % (ref 34–46.6)
HCT VFR BLD AUTO: 29 % (ref 34–46.6)
HGB BLD-MCNC: 7.9 G/DL (ref 12–15.9)
HGB BLD-MCNC: 8.2 G/DL (ref 12–15.9)
HGB BLD-MCNC: 8.3 G/DL (ref 12–15.9)
HGB BLD-MCNC: 8.6 G/DL (ref 12–15.9)
IMM GRANULOCYTES # BLD AUTO: 0.02 10*3/MM3 (ref 0–0.05)
IMM GRANULOCYTES NFR BLD AUTO: 0.4 % (ref 0–0.5)
INR PPP: 1.41 (ref 0.85–1.16)
LYMPHOCYTES # BLD AUTO: 0.88 10*3/MM3 (ref 0.7–3.1)
LYMPHOCYTES NFR BLD AUTO: 19.3 % (ref 19.6–45.3)
MCH RBC QN AUTO: 24.1 PG (ref 26.6–33)
MCHC RBC AUTO-ENTMCNC: 30 G/DL (ref 31.5–35.7)
MCV RBC AUTO: 80.3 FL (ref 79–97)
MONOCYTES # BLD AUTO: 0.56 10*3/MM3 (ref 0.1–0.9)
MONOCYTES NFR BLD AUTO: 12.3 % (ref 5–12)
NEUTROPHILS # BLD AUTO: 2.93 10*3/MM3 (ref 1.7–7)
NEUTROPHILS NFR BLD AUTO: 64.5 % (ref 42.7–76)
NRBC BLD AUTO-RTO: 0 /100 WBC (ref 0–0.2)
PLATELET # BLD AUTO: 46 10*3/MM3 (ref 140–450)
PMV BLD AUTO: 12.1 FL (ref 6–12)
POTASSIUM BLD-SCNC: 3.8 MMOL/L (ref 3.5–5.2)
PROT SERPL-MCNC: 6.4 G/DL (ref 6–8.5)
PROTHROMBIN TIME: 16.6 SECONDS (ref 11.2–14.3)
RBC # BLD AUTO: 3.45 10*6/MM3 (ref 3.77–5.28)
SODIUM BLD-SCNC: 140 MMOL/L (ref 136–145)
WBC NRBC COR # BLD: 4.55 10*3/MM3 (ref 3.4–10.8)

## 2020-02-23 PROCEDURE — 76000 FLUOROSCOPY <1 HR PHYS/QHP: CPT

## 2020-02-23 PROCEDURE — 82962 GLUCOSE BLOOD TEST: CPT

## 2020-02-23 PROCEDURE — 25010000002 HYDROMORPHONE PER 4 MG: Performed by: NURSE PRACTITIONER

## 2020-02-23 PROCEDURE — 25010000002 ONDANSETRON PER 1 MG: Performed by: ANESTHESIOLOGY

## 2020-02-23 PROCEDURE — 25010000002 CEFTRIAXONE PER 250 MG: Performed by: UROLOGY

## 2020-02-23 PROCEDURE — 85018 HEMOGLOBIN: CPT | Performed by: NURSE PRACTITIONER

## 2020-02-23 PROCEDURE — 85014 HEMATOCRIT: CPT | Performed by: NURSE PRACTITIONER

## 2020-02-23 PROCEDURE — 25010000002 DEXAMETHASONE PER 1 MG: Performed by: ANESTHESIOLOGY

## 2020-02-23 PROCEDURE — 0T778DZ DILATION OF LEFT URETER WITH INTRALUMINAL DEVICE, VIA NATURAL OR ARTIFICIAL OPENING ENDOSCOPIC: ICD-10-PCS | Performed by: UROLOGY

## 2020-02-23 PROCEDURE — 25010000002 PHENYLEPHRINE PER 1 ML: Performed by: ANESTHESIOLOGY

## 2020-02-23 PROCEDURE — 82365 CALCULUS SPECTROSCOPY: CPT | Performed by: UROLOGY

## 2020-02-23 PROCEDURE — 25010000002 PROPOFOL 10 MG/ML EMULSION: Performed by: ANESTHESIOLOGY

## 2020-02-23 PROCEDURE — 63710000001 INSULIN DETEMIR PER 5 UNITS: Performed by: UROLOGY

## 2020-02-23 PROCEDURE — 63710000001 INSULIN LISPRO (HUMAN) PER 5 UNITS: Performed by: NURSE PRACTITIONER

## 2020-02-23 PROCEDURE — 85018 HEMOGLOBIN: CPT | Performed by: UROLOGY

## 2020-02-23 PROCEDURE — 85025 COMPLETE CBC W/AUTO DIFF WBC: CPT | Performed by: NURSE PRACTITIONER

## 2020-02-23 PROCEDURE — 80053 COMPREHEN METABOLIC PANEL: CPT | Performed by: NURSE PRACTITIONER

## 2020-02-23 PROCEDURE — 85014 HEMATOCRIT: CPT | Performed by: UROLOGY

## 2020-02-23 PROCEDURE — 0TC78ZZ EXTIRPATION OF MATTER FROM LEFT URETER, VIA NATURAL OR ARTIFICIAL OPENING ENDOSCOPIC: ICD-10-PCS | Performed by: UROLOGY

## 2020-02-23 PROCEDURE — 85610 PROTHROMBIN TIME: CPT | Performed by: NURSE PRACTITIONER

## 2020-02-23 PROCEDURE — C2617 STENT, NON-COR, TEM W/O DEL: HCPCS | Performed by: UROLOGY

## 2020-02-23 PROCEDURE — 99233 SBSQ HOSP IP/OBS HIGH 50: CPT | Performed by: INTERNAL MEDICINE

## 2020-02-23 PROCEDURE — 25010000002 HYDROMORPHONE 1 MG/ML SOLUTION: Performed by: INTERNAL MEDICINE

## 2020-02-23 DEVICE — URETERAL STENT
Type: IMPLANTABLE DEVICE | Site: URETER | Status: FUNCTIONAL
Brand: PERCUFLEX™ PLUS

## 2020-02-23 RX ORDER — OXYCODONE HYDROCHLORIDE AND ACETAMINOPHEN 5; 325 MG/1; MG/1
1 TABLET ORAL EVERY 4 HOURS PRN
Status: DISCONTINUED | OUTPATIENT
Start: 2020-02-23 | End: 2020-02-25 | Stop reason: HOSPADM

## 2020-02-23 RX ORDER — PROPOFOL 10 MG/ML
VIAL (ML) INTRAVENOUS AS NEEDED
Status: DISCONTINUED | OUTPATIENT
Start: 2020-02-23 | End: 2020-02-23 | Stop reason: SURG

## 2020-02-23 RX ORDER — SODIUM CHLORIDE 0.9 % (FLUSH) 0.9 %
10 SYRINGE (ML) INJECTION AS NEEDED
Status: DISCONTINUED | OUTPATIENT
Start: 2020-02-23 | End: 2020-02-23 | Stop reason: HOSPADM

## 2020-02-23 RX ORDER — SODIUM CHLORIDE, SODIUM LACTATE, POTASSIUM CHLORIDE, CALCIUM CHLORIDE 600; 310; 30; 20 MG/100ML; MG/100ML; MG/100ML; MG/100ML
9 INJECTION, SOLUTION INTRAVENOUS CONTINUOUS
Status: DISCONTINUED | OUTPATIENT
Start: 2020-02-23 | End: 2020-02-25 | Stop reason: HOSPADM

## 2020-02-23 RX ORDER — DEXAMETHASONE SODIUM PHOSPHATE 4 MG/ML
INJECTION, SOLUTION INTRA-ARTICULAR; INTRALESIONAL; INTRAMUSCULAR; INTRAVENOUS; SOFT TISSUE AS NEEDED
Status: DISCONTINUED | OUTPATIENT
Start: 2020-02-23 | End: 2020-02-23 | Stop reason: SURG

## 2020-02-23 RX ORDER — PROMETHAZINE HYDROCHLORIDE 25 MG/ML
6.25 INJECTION, SOLUTION INTRAMUSCULAR; INTRAVENOUS ONCE AS NEEDED
Status: DISCONTINUED | OUTPATIENT
Start: 2020-02-23 | End: 2020-02-23 | Stop reason: HOSPADM

## 2020-02-23 RX ORDER — LIDOCAINE HYDROCHLORIDE 10 MG/ML
0.5 INJECTION, SOLUTION EPIDURAL; INFILTRATION; INTRACAUDAL; PERINEURAL ONCE AS NEEDED
Status: DISCONTINUED | OUTPATIENT
Start: 2020-02-23 | End: 2020-02-23 | Stop reason: HOSPADM

## 2020-02-23 RX ORDER — ONDANSETRON 2 MG/ML
INJECTION INTRAMUSCULAR; INTRAVENOUS AS NEEDED
Status: DISCONTINUED | OUTPATIENT
Start: 2020-02-23 | End: 2020-02-23 | Stop reason: SURG

## 2020-02-23 RX ORDER — MAGNESIUM HYDROXIDE 1200 MG/15ML
LIQUID ORAL AS NEEDED
Status: DISCONTINUED | OUTPATIENT
Start: 2020-02-23 | End: 2020-02-23 | Stop reason: HOSPADM

## 2020-02-23 RX ORDER — SODIUM CHLORIDE 0.9 % (FLUSH) 0.9 %
10 SYRINGE (ML) INJECTION EVERY 12 HOURS SCHEDULED
Status: DISCONTINUED | OUTPATIENT
Start: 2020-02-23 | End: 2020-02-23 | Stop reason: HOSPADM

## 2020-02-23 RX ORDER — HYDROMORPHONE HYDROCHLORIDE 1 MG/ML
0.5 INJECTION, SOLUTION INTRAMUSCULAR; INTRAVENOUS; SUBCUTANEOUS
Status: DISCONTINUED | OUTPATIENT
Start: 2020-02-23 | End: 2020-02-23 | Stop reason: HOSPADM

## 2020-02-23 RX ORDER — SODIUM CHLORIDE 9 MG/ML
INJECTION, SOLUTION INTRAVENOUS CONTINUOUS PRN
Status: DISCONTINUED | OUTPATIENT
Start: 2020-02-23 | End: 2020-02-23 | Stop reason: SURG

## 2020-02-23 RX ORDER — NALOXONE HCL 0.4 MG/ML
0.4 VIAL (ML) INJECTION
Status: DISCONTINUED | OUTPATIENT
Start: 2020-02-23 | End: 2020-02-25 | Stop reason: HOSPADM

## 2020-02-23 RX ORDER — FENTANYL CITRATE 50 UG/ML
50 INJECTION, SOLUTION INTRAMUSCULAR; INTRAVENOUS
Status: DISCONTINUED | OUTPATIENT
Start: 2020-02-23 | End: 2020-02-23 | Stop reason: HOSPADM

## 2020-02-23 RX ORDER — DEXTROSE MONOHYDRATE 50 MG/ML
100 INJECTION, SOLUTION INTRAVENOUS CONTINUOUS
Status: DISCONTINUED | OUTPATIENT
Start: 2020-02-23 | End: 2020-02-24

## 2020-02-23 RX ORDER — PROMETHAZINE HYDROCHLORIDE 25 MG/1
25 TABLET ORAL ONCE AS NEEDED
Status: DISCONTINUED | OUTPATIENT
Start: 2020-02-23 | End: 2020-02-23 | Stop reason: HOSPADM

## 2020-02-23 RX ORDER — PROMETHAZINE HYDROCHLORIDE 25 MG/1
25 SUPPOSITORY RECTAL ONCE AS NEEDED
Status: DISCONTINUED | OUTPATIENT
Start: 2020-02-23 | End: 2020-02-23 | Stop reason: HOSPADM

## 2020-02-23 RX ORDER — GLYCOPYRROLATE 0.2 MG/ML
INJECTION INTRAMUSCULAR; INTRAVENOUS AS NEEDED
Status: DISCONTINUED | OUTPATIENT
Start: 2020-02-23 | End: 2020-02-23 | Stop reason: SURG

## 2020-02-23 RX ADMIN — CARVEDILOL 6.25 MG: 6.25 TABLET, FILM COATED ORAL at 07:59

## 2020-02-23 RX ADMIN — FERROUS SULFATE TAB 325 MG (65 MG ELEMENTAL FE) 325 MG: 325 (65 FE) TAB at 08:06

## 2020-02-23 RX ADMIN — SODIUM CHLORIDE, PRESERVATIVE FREE 10 ML: 5 INJECTION INTRAVENOUS at 08:05

## 2020-02-23 RX ADMIN — DEXTROSE MONOHYDRATE 100 ML/HR: 50 INJECTION, SOLUTION INTRAVENOUS at 10:31

## 2020-02-23 RX ADMIN — HYDROMORPHONE HYDROCHLORIDE 1 MG: 1 INJECTION, SOLUTION INTRAMUSCULAR; INTRAVENOUS; SUBCUTANEOUS at 14:15

## 2020-02-23 RX ADMIN — HYDROMORPHONE HYDROCHLORIDE 1 MG: 1 INJECTION, SOLUTION INTRAMUSCULAR; INTRAVENOUS; SUBCUTANEOUS at 10:32

## 2020-02-23 RX ADMIN — OXYCODONE HYDROCHLORIDE AND ACETAMINOPHEN 1 TABLET: 5; 325 TABLET ORAL at 16:16

## 2020-02-23 RX ADMIN — DEXTROSE MONOHYDRATE 100 ML/HR: 50 INJECTION, SOLUTION INTRAVENOUS at 21:56

## 2020-02-23 RX ADMIN — INSULIN DETEMIR 15 UNITS: 100 INJECTION, SOLUTION SUBCUTANEOUS at 21:15

## 2020-02-23 RX ADMIN — HYDROMORPHONE HYDROCHLORIDE 0.5 MG: 1 INJECTION, SOLUTION INTRAMUSCULAR; INTRAVENOUS; SUBCUTANEOUS at 08:46

## 2020-02-23 RX ADMIN — ASPIRIN 81 MG: 81 TABLET, COATED ORAL at 08:04

## 2020-02-23 RX ADMIN — SODIUM CHLORIDE 50 ML/HR: 9 INJECTION, SOLUTION INTRAVENOUS at 08:04

## 2020-02-23 RX ADMIN — DEXTROSE 15 G: 15 GEL ORAL at 07:58

## 2020-02-23 RX ADMIN — SODIUM CHLORIDE: 9 INJECTION, SOLUTION INTRAVENOUS at 18:43

## 2020-02-23 RX ADMIN — EPHEDRINE SULFATE 10 MG: 50 INJECTION INTRAMUSCULAR; INTRAVENOUS; SUBCUTANEOUS at 18:57

## 2020-02-23 RX ADMIN — PROPOFOL 150 MG: 10 INJECTION, EMULSION INTRAVENOUS at 18:43

## 2020-02-23 RX ADMIN — HYDROMORPHONE HYDROCHLORIDE 0.5 MG: 1 INJECTION, SOLUTION INTRAMUSCULAR; INTRAVENOUS; SUBCUTANEOUS at 02:16

## 2020-02-23 RX ADMIN — FUROSEMIDE 20 MG: 20 TABLET ORAL at 08:04

## 2020-02-23 RX ADMIN — CETIRIZINE HYDROCHLORIDE 5 MG: 10 TABLET, FILM COATED ORAL at 08:05

## 2020-02-23 RX ADMIN — DEXAMETHASONE SODIUM PHOSPHATE 4 MG: 4 INJECTION, SOLUTION INTRAMUSCULAR; INTRAVENOUS at 18:49

## 2020-02-23 RX ADMIN — GLYCOPYRROLATE 0.4 MG: 0.2 INJECTION INTRAMUSCULAR; INTRAVENOUS at 18:50

## 2020-02-23 RX ADMIN — INSULIN LISPRO 2 UNITS: 100 INJECTION, SOLUTION INTRAVENOUS; SUBCUTANEOUS at 21:16

## 2020-02-23 RX ADMIN — OXYCODONE HYDROCHLORIDE AND ACETAMINOPHEN 1 TABLET: 5; 325 TABLET ORAL at 12:10

## 2020-02-23 RX ADMIN — CEFTRIAXONE 1 G: 1 INJECTION, POWDER, FOR SOLUTION INTRAMUSCULAR; INTRAVENOUS at 21:15

## 2020-02-23 RX ADMIN — ONDANSETRON 4 MG: 2 INJECTION INTRAMUSCULAR; INTRAVENOUS at 18:49

## 2020-02-23 RX ADMIN — PHENYLEPHRINE HYDROCHLORIDE 100 MCG: 10 INJECTION, SOLUTION INTRAMUSCULAR; INTRAVENOUS; SUBCUTANEOUS at 19:03

## 2020-02-23 RX ADMIN — SPIRONOLACTONE 50 MG: 25 TABLET ORAL at 08:04

## 2020-02-23 RX ADMIN — ATORVASTATIN CALCIUM 80 MG: 40 TABLET, FILM COATED ORAL at 08:04

## 2020-02-23 NOTE — OUTREACH NOTE
Medical Week 2 Survey      Responses   Facility patient discharged from?  [place]   Does the patient have one of the following disease processes/diagnoses(primary or secondary)?  Other   Week 2 attempt successful?  No   Revoke  Readmitted          Flor Alvarenga RN

## 2020-02-23 NOTE — ANESTHESIA PREPROCEDURE EVALUATION
Anesthesia Evaluation                  Airway   Mallampati: II  Dental      Pulmonary    Cardiovascular     (+) hypertension, CAD,       Neuro/Psych  GI/Hepatic/Renal/Endo    (+)   renal disease,     Musculoskeletal     Abdominal    Substance History      OB/GYN          Other                        Anesthesia Plan    ASA 3     general     intravenous induction     Anesthetic plan, all risks, benefits, and alternatives have been provided, discussed and informed consent has been obtained with: patient.

## 2020-02-23 NOTE — ANESTHESIA PROCEDURE NOTES
Airway  Urgency: elective    Airway not difficult    General Information and Staff    Patient location during procedure: OR  Anesthesiologist: Tung Alva MD    Indications and Patient Condition  Indications for airway management: airway protection    Preoxygenated: yes  Mask difficulty assessment: 1 - vent by mask    Final Airway Details  Final airway type: supraglottic airway      Successful airway: classic  Size 4    Number of attempts at approach: 1    Additional Comments  LMA placed without difficulty, ventilation with assist, equal breath sounds and symmetric chest rise and fall

## 2020-02-24 ENCOUNTER — PREP FOR SURGERY (OUTPATIENT)
Dept: OTHER | Facility: HOSPITAL | Age: 60
End: 2020-02-24

## 2020-02-24 DIAGNOSIS — I85.00 VARICES OF ESOPHAGUS DETERMINED BY ENDOSCOPY (HCC): Primary | ICD-10-CM

## 2020-02-24 LAB
ALBUMIN SERPL-MCNC: 3.3 G/DL (ref 3.5–5.2)
ALBUMIN/GLOB SERPL: 1.1 G/DL
ALP SERPL-CCNC: 80 U/L (ref 39–117)
ALT SERPL W P-5'-P-CCNC: 14 U/L (ref 1–33)
ANION GAP SERPL CALCULATED.3IONS-SCNC: 12 MMOL/L (ref 5–15)
APPEARANCE FLD: ABNORMAL
AST SERPL-CCNC: 15 U/L (ref 1–32)
BILIRUB SERPL-MCNC: 0.8 MG/DL (ref 0.2–1.2)
BUN BLD-MCNC: 21 MG/DL (ref 6–20)
BUN/CREAT SERPL: 10.9 (ref 7–25)
CALCIUM SPEC-SCNC: 8.1 MG/DL (ref 8.6–10.5)
CHLORIDE SERPL-SCNC: 97 MMOL/L (ref 98–107)
CO2 SERPL-SCNC: 22 MMOL/L (ref 22–29)
COLOR FLD: YELLOW
CREAT BLD-MCNC: 1.92 MG/DL (ref 0.57–1)
DEPRECATED RDW RBC AUTO: 51.1 FL (ref 37–54)
ERYTHROCYTE [DISTWIDTH] IN BLOOD BY AUTOMATED COUNT: 17.4 % (ref 12.3–15.4)
GFR SERPL CREATININE-BSD FRML MDRD: 27 ML/MIN/1.73
GLOBULIN UR ELPH-MCNC: 3 GM/DL
GLUCOSE BLD-MCNC: 323 MG/DL (ref 65–99)
GLUCOSE BLDC GLUCOMTR-MCNC: 280 MG/DL (ref 70–130)
GLUCOSE BLDC GLUCOMTR-MCNC: 309 MG/DL (ref 70–130)
GLUCOSE BLDC GLUCOMTR-MCNC: 332 MG/DL (ref 70–130)
GLUCOSE BLDC GLUCOMTR-MCNC: 389 MG/DL (ref 70–130)
HCT VFR BLD AUTO: 25.3 % (ref 34–46.6)
HGB BLD-MCNC: 7.8 G/DL (ref 12–15.9)
LYMPHOCYTES NFR FLD MANUAL: 10 %
MACROPHAGE FLUID: 51 %
MCH RBC QN AUTO: 25 PG (ref 26.6–33)
MCHC RBC AUTO-ENTMCNC: 30.8 G/DL (ref 31.5–35.7)
MCV RBC AUTO: 81.1 FL (ref 79–97)
MESOTHL CELL NFR FLD MANUAL: 10 %
NEUTROPHILS NFR FLD MANUAL: 29 %
PLATELET # BLD AUTO: 35 10*3/MM3 (ref 140–450)
POTASSIUM BLD-SCNC: 4.2 MMOL/L (ref 3.5–5.2)
PROT SERPL-MCNC: 6.3 G/DL (ref 6–8.5)
RBC # BLD AUTO: 3.12 10*6/MM3 (ref 3.77–5.28)
RBC # FLD AUTO: 2000 /MM3
SODIUM BLD-SCNC: 131 MMOL/L (ref 136–145)
WBC # FLD AUTO: 291 /MM3
WBC NRBC COR # BLD: 2.3 10*3/MM3 (ref 3.4–10.8)

## 2020-02-24 PROCEDURE — 49082 ABD PARACENTESIS: CPT | Performed by: INTERNAL MEDICINE

## 2020-02-24 PROCEDURE — 89051 BODY FLUID CELL COUNT: CPT | Performed by: INTERNAL MEDICINE

## 2020-02-24 PROCEDURE — P9047 ALBUMIN (HUMAN), 25%, 50ML: HCPCS | Performed by: INTERNAL MEDICINE

## 2020-02-24 PROCEDURE — 82962 GLUCOSE BLOOD TEST: CPT

## 2020-02-24 PROCEDURE — 99253 IP/OBS CNSLTJ NEW/EST LOW 45: CPT | Performed by: PHYSICIAN ASSISTANT

## 2020-02-24 PROCEDURE — 99232 SBSQ HOSP IP/OBS MODERATE 35: CPT | Performed by: INTERNAL MEDICINE

## 2020-02-24 PROCEDURE — 0W9G3ZZ DRAINAGE OF PERITONEAL CAVITY, PERCUTANEOUS APPROACH: ICD-10-PCS | Performed by: INTERNAL MEDICINE

## 2020-02-24 PROCEDURE — 25010000002 ALBUMIN HUMAN 25% PER 50 ML: Performed by: INTERNAL MEDICINE

## 2020-02-24 PROCEDURE — 85027 COMPLETE CBC AUTOMATED: CPT | Performed by: INTERNAL MEDICINE

## 2020-02-24 PROCEDURE — 25010000002 CEFTRIAXONE PER 250 MG: Performed by: UROLOGY

## 2020-02-24 PROCEDURE — 80053 COMPREHEN METABOLIC PANEL: CPT | Performed by: UROLOGY

## 2020-02-24 PROCEDURE — 63710000001 INSULIN DETEMIR PER 5 UNITS: Performed by: UROLOGY

## 2020-02-24 PROCEDURE — 87070 CULTURE OTHR SPECIMN AEROBIC: CPT | Performed by: INTERNAL MEDICINE

## 2020-02-24 RX ORDER — ALBUMIN (HUMAN) 12.5 G/50ML
37.5 SOLUTION INTRAVENOUS ONCE
Status: COMPLETED | OUTPATIENT
Start: 2020-02-24 | End: 2020-02-24

## 2020-02-24 RX ORDER — ALBUMIN (HUMAN) 12.5 G/50ML
75 SOLUTION INTRAVENOUS ONCE
Status: COMPLETED | OUTPATIENT
Start: 2020-02-24 | End: 2020-02-24

## 2020-02-24 RX ORDER — ALBUMIN (HUMAN) 12.5 G/50ML
87.5 SOLUTION INTRAVENOUS ONCE
Status: COMPLETED | OUTPATIENT
Start: 2020-02-24 | End: 2020-02-24

## 2020-02-24 RX ORDER — ALBUMIN (HUMAN) 12.5 G/50ML
62.5 SOLUTION INTRAVENOUS ONCE
Status: COMPLETED | OUTPATIENT
Start: 2020-02-24 | End: 2020-02-24

## 2020-02-24 RX ORDER — ALBUMIN (HUMAN) 12.5 G/50ML
100 SOLUTION INTRAVENOUS ONCE
Status: COMPLETED | OUTPATIENT
Start: 2020-02-24 | End: 2020-02-24

## 2020-02-24 RX ORDER — ALBUMIN (HUMAN) 12.5 G/50ML
50 SOLUTION INTRAVENOUS ONCE
Status: COMPLETED | OUTPATIENT
Start: 2020-02-24 | End: 2020-02-24

## 2020-02-24 RX ORDER — ALBUMIN (HUMAN) 12.5 G/50ML
112.5 SOLUTION INTRAVENOUS ONCE
Status: COMPLETED | OUTPATIENT
Start: 2020-02-24 | End: 2020-02-24

## 2020-02-24 RX ADMIN — OXYCODONE HYDROCHLORIDE AND ACETAMINOPHEN 1 TABLET: 5; 325 TABLET ORAL at 18:15

## 2020-02-24 RX ADMIN — FERROUS SULFATE TAB 325 MG (65 MG ELEMENTAL FE) 325 MG: 325 (65 FE) TAB at 08:47

## 2020-02-24 RX ADMIN — INSULIN LISPRO 5 UNITS: 100 INJECTION, SOLUTION INTRAVENOUS; SUBCUTANEOUS at 08:46

## 2020-02-24 RX ADMIN — INSULIN DETEMIR 15 UNITS: 100 INJECTION, SOLUTION SUBCUTANEOUS at 08:51

## 2020-02-24 RX ADMIN — ATORVASTATIN CALCIUM 80 MG: 40 TABLET, FILM COATED ORAL at 08:48

## 2020-02-24 RX ADMIN — CETIRIZINE HYDROCHLORIDE 5 MG: 10 TABLET, FILM COATED ORAL at 08:47

## 2020-02-24 RX ADMIN — ALBUMIN HUMAN 37.5 G: 0.25 SOLUTION INTRAVENOUS at 13:52

## 2020-02-24 RX ADMIN — INSULIN LISPRO 5 UNITS: 100 INJECTION, SOLUTION INTRAVENOUS; SUBCUTANEOUS at 18:15

## 2020-02-24 RX ADMIN — SPIRONOLACTONE 50 MG: 25 TABLET ORAL at 08:47

## 2020-02-24 RX ADMIN — OXYCODONE HYDROCHLORIDE AND ACETAMINOPHEN 1 TABLET: 5; 325 TABLET ORAL at 04:13

## 2020-02-24 RX ADMIN — PHENOL 1 SPRAY: 1.5 LIQUID ORAL at 04:11

## 2020-02-24 RX ADMIN — ASPIRIN 81 MG: 81 TABLET, COATED ORAL at 08:47

## 2020-02-24 RX ADMIN — OXYCODONE HYDROCHLORIDE AND ACETAMINOPHEN 1 TABLET: 5; 325 TABLET ORAL at 14:00

## 2020-02-24 RX ADMIN — FUROSEMIDE 20 MG: 20 TABLET ORAL at 08:47

## 2020-02-24 RX ADMIN — INSULIN DETEMIR 15 UNITS: 100 INJECTION, SOLUTION SUBCUTANEOUS at 21:12

## 2020-02-24 RX ADMIN — CARVEDILOL 6.25 MG: 6.25 TABLET, FILM COATED ORAL at 18:15

## 2020-02-24 RX ADMIN — INSULIN LISPRO 6 UNITS: 100 INJECTION, SOLUTION INTRAVENOUS; SUBCUTANEOUS at 21:12

## 2020-02-24 RX ADMIN — CARVEDILOL 6.25 MG: 6.25 TABLET, FILM COATED ORAL at 08:47

## 2020-02-24 RX ADMIN — PANTOPRAZOLE SODIUM 40 MG: 40 TABLET, DELAYED RELEASE ORAL at 05:30

## 2020-02-24 RX ADMIN — OXYCODONE HYDROCHLORIDE AND ACETAMINOPHEN 1 TABLET: 5; 325 TABLET ORAL at 08:48

## 2020-02-24 RX ADMIN — CEFTRIAXONE 1 G: 1 INJECTION, POWDER, FOR SOLUTION INTRAMUSCULAR; INTRAVENOUS at 21:12

## 2020-02-24 RX ADMIN — INSULIN LISPRO 4 UNITS: 100 INJECTION, SOLUTION INTRAVENOUS; SUBCUTANEOUS at 13:04

## 2020-02-24 NOTE — ANESTHESIA POSTPROCEDURE EVALUATION
Patient: Lyndsey Luna    Procedure Summary     Date:  02/23/20 Room / Location:   [facility] 07 /  [facility]    Anesthesia Start:  1837 Anesthesia Stop:  1921    Procedure:  CYSTOSCOPY URETEROSCOPY STONE EXTRACTION STENT INSERTION (Left ) Diagnosis:      Surgeon:  Elmer Weiss MD Provider:  Tung Alva MD    Anesthesia Type:  general ASA Status:  3          Anesthesia Type: general    Vitals  No vitals data found for the desired time range.          Post Anesthesia Care and Evaluation    Patient location during evaluation: PACU  Patient participation: complete - patient participated  Level of consciousness: awake  Pain score: 0  Pain management: adequate  Airway patency: patent  Anesthetic complications: No anesthetic complications  PONV Status: none  Cardiovascular status: acceptable and hemodynamically stable  Respiratory status: acceptable, nasal cannula, nonlabored ventilation and spontaneous ventilation  Hydration status: acceptable

## 2020-02-25 VITALS
TEMPERATURE: 98.1 F | HEART RATE: 62 BPM | HEIGHT: 67 IN | RESPIRATION RATE: 18 BRPM | SYSTOLIC BLOOD PRESSURE: 97 MMHG | BODY MASS INDEX: 31.86 KG/M2 | OXYGEN SATURATION: 97 % | DIASTOLIC BLOOD PRESSURE: 54 MMHG | WEIGHT: 203 LBS

## 2020-02-25 PROBLEM — N17.9 AKI (ACUTE KIDNEY INJURY): Status: RESOLVED | Noted: 2020-02-22 | Resolved: 2020-02-25

## 2020-02-25 PROBLEM — N13.9 OBSTRUCTIVE UROPATHY: Status: RESOLVED | Noted: 2020-02-22 | Resolved: 2020-02-25

## 2020-02-25 PROBLEM — N13.30 HYDRONEPHROSIS: Status: RESOLVED | Noted: 2020-02-22 | Resolved: 2020-02-25

## 2020-02-25 LAB
ANION GAP SERPL CALCULATED.3IONS-SCNC: 8 MMOL/L (ref 5–15)
BASOPHILS # BLD AUTO: 0.04 10*3/MM3 (ref 0–0.2)
BASOPHILS NFR BLD AUTO: 1.3 % (ref 0–1.5)
BUN BLD-MCNC: 22 MG/DL (ref 6–20)
BUN/CREAT SERPL: 14.3 (ref 7–25)
BURR CELLS BLD QL SMEAR: NORMAL
CALCIUM SPEC-SCNC: 8.4 MG/DL (ref 8.6–10.5)
CHLORIDE SERPL-SCNC: 99 MMOL/L (ref 98–107)
CO2 SERPL-SCNC: 25 MMOL/L (ref 22–29)
CREAT BLD-MCNC: 1.54 MG/DL (ref 0.57–1)
DACRYOCYTES BLD QL SMEAR: NORMAL
DEPRECATED RDW RBC AUTO: 51.8 FL (ref 37–54)
EOSINOPHIL # BLD AUTO: 0.08 10*3/MM3 (ref 0–0.4)
EOSINOPHIL NFR BLD AUTO: 2.5 % (ref 0.3–6.2)
ERYTHROCYTE [DISTWIDTH] IN BLOOD BY AUTOMATED COUNT: 17.5 % (ref 12.3–15.4)
GFR SERPL CREATININE-BSD FRML MDRD: 34 ML/MIN/1.73
GLUCOSE BLD-MCNC: 314 MG/DL (ref 65–99)
GLUCOSE BLDC GLUCOMTR-MCNC: 332 MG/DL (ref 70–130)
GLUCOSE BLDC GLUCOMTR-MCNC: 337 MG/DL (ref 70–130)
HCT VFR BLD AUTO: 27.1 % (ref 34–46.6)
HGB BLD-MCNC: 8.2 G/DL (ref 12–15.9)
HYPOCHROMIA BLD QL: NORMAL
IMM GRANULOCYTES # BLD AUTO: 0.02 10*3/MM3 (ref 0–0.05)
IMM GRANULOCYTES NFR BLD AUTO: 0.6 % (ref 0–0.5)
LYMPHOCYTES # BLD AUTO: 0.96 10*3/MM3 (ref 0.7–3.1)
LYMPHOCYTES NFR BLD AUTO: 30.1 % (ref 19.6–45.3)
MCH RBC QN AUTO: 24.8 PG (ref 26.6–33)
MCHC RBC AUTO-ENTMCNC: 30.3 G/DL (ref 31.5–35.7)
MCV RBC AUTO: 81.9 FL (ref 79–97)
MONOCYTES # BLD AUTO: 0.37 10*3/MM3 (ref 0.1–0.9)
MONOCYTES NFR BLD AUTO: 11.6 % (ref 5–12)
NEUTROPHILS # BLD AUTO: 1.72 10*3/MM3 (ref 1.7–7)
NEUTROPHILS NFR BLD AUTO: 53.9 % (ref 42.7–76)
NRBC BLD AUTO-RTO: 0 /100 WBC (ref 0–0.2)
OVALOCYTES BLD QL SMEAR: NORMAL
PLAT MORPH BLD: NORMAL
PLATELET # BLD AUTO: 41 10*3/MM3 (ref 140–450)
POTASSIUM BLD-SCNC: 3.8 MMOL/L (ref 3.5–5.2)
RBC # BLD AUTO: 3.31 10*6/MM3 (ref 3.77–5.28)
SODIUM BLD-SCNC: 132 MMOL/L (ref 136–145)
SOLUBLE LIVER IGG SER IA-ACNC: 0.6 UNITS (ref 0–20)
WBC MORPH BLD: NORMAL
WBC NRBC COR # BLD: 3.19 10*3/MM3 (ref 3.4–10.8)

## 2020-02-25 PROCEDURE — 63710000001 INSULIN DETEMIR PER 5 UNITS: Performed by: UROLOGY

## 2020-02-25 PROCEDURE — 85007 BL SMEAR W/DIFF WBC COUNT: CPT | Performed by: FAMILY MEDICINE

## 2020-02-25 PROCEDURE — 82962 GLUCOSE BLOOD TEST: CPT

## 2020-02-25 PROCEDURE — 63710000001 INSULIN LISPRO (HUMAN) PER 5 UNITS: Performed by: UROLOGY

## 2020-02-25 PROCEDURE — 85025 COMPLETE CBC W/AUTO DIFF WBC: CPT | Performed by: FAMILY MEDICINE

## 2020-02-25 PROCEDURE — 80048 BASIC METABOLIC PNL TOTAL CA: CPT | Performed by: PHYSICIAN ASSISTANT

## 2020-02-25 PROCEDURE — 99232 SBSQ HOSP IP/OBS MODERATE 35: CPT | Performed by: PHYSICIAN ASSISTANT

## 2020-02-25 RX ADMIN — SPIRONOLACTONE 50 MG: 25 TABLET ORAL at 09:08

## 2020-02-25 RX ADMIN — INSULIN DETEMIR 15 UNITS: 100 INJECTION, SOLUTION SUBCUTANEOUS at 09:09

## 2020-02-25 RX ADMIN — OXYCODONE HYDROCHLORIDE AND ACETAMINOPHEN 1 TABLET: 5; 325 TABLET ORAL at 00:43

## 2020-02-25 RX ADMIN — CARVEDILOL 6.25 MG: 6.25 TABLET, FILM COATED ORAL at 09:08

## 2020-02-25 RX ADMIN — PANTOPRAZOLE SODIUM 40 MG: 40 TABLET, DELAYED RELEASE ORAL at 05:04

## 2020-02-25 RX ADMIN — FERROUS SULFATE TAB 325 MG (65 MG ELEMENTAL FE) 325 MG: 325 (65 FE) TAB at 09:08

## 2020-02-25 RX ADMIN — CETIRIZINE HYDROCHLORIDE 5 MG: 10 TABLET, FILM COATED ORAL at 09:09

## 2020-02-25 RX ADMIN — INSULIN LISPRO 5 UNITS: 100 INJECTION, SOLUTION INTRAVENOUS; SUBCUTANEOUS at 12:42

## 2020-02-25 RX ADMIN — OXYCODONE HYDROCHLORIDE AND ACETAMINOPHEN 1 TABLET: 5; 325 TABLET ORAL at 05:04

## 2020-02-25 RX ADMIN — FUROSEMIDE 20 MG: 20 TABLET ORAL at 09:09

## 2020-02-25 RX ADMIN — INSULIN LISPRO 5 UNITS: 100 INJECTION, SOLUTION INTRAVENOUS; SUBCUTANEOUS at 10:04

## 2020-02-25 RX ADMIN — ATORVASTATIN CALCIUM 80 MG: 40 TABLET, FILM COATED ORAL at 09:08

## 2020-02-25 RX ADMIN — ASPIRIN 81 MG: 81 TABLET, COATED ORAL at 09:14

## 2020-02-25 RX ADMIN — SODIUM CHLORIDE, PRESERVATIVE FREE 10 ML: 5 INJECTION INTRAVENOUS at 09:14

## 2020-02-26 ENCOUNTER — READMISSION MANAGEMENT (OUTPATIENT)
Dept: CALL CENTER | Facility: HOSPITAL | Age: 60
End: 2020-02-26

## 2020-02-26 NOTE — OUTREACH NOTE
Prep Survey      Responses   Facility patient discharged from?  [facility]   Is patient eligible?  Yes   Discharge diagnosis  ureteroscopic stone extraction with stent placement.   Does the patient have one of the following disease processes/diagnoses(primary or secondary)?  General Surgery   Does the patient have Home health ordered?  No   Is there a DME ordered?  No   Prep survey completed?  Yes          Alice Ng RN

## 2020-02-27 ENCOUNTER — READMISSION MANAGEMENT (OUTPATIENT)
Dept: CALL CENTER | Facility: HOSPITAL | Age: 60
End: 2020-02-27

## 2020-02-27 LAB
BACTERIA SPEC AEROBE CULT: NORMAL
BACTERIA SPEC AEROBE CULT: NORMAL

## 2020-02-27 NOTE — OUTREACH NOTE
General Surgery Week 1 Survey      Responses   Facility patient discharged from?  [facility]   Does the patient have one of the following disease processes/diagnoses(primary or secondary)?  General Surgery   Is there a successful TCM telephone encounter documented?  No   Week 1 attempt successful?  No   Unsuccessful attempts  Attempt 1          Dominga Aiken RN

## 2020-02-28 ENCOUNTER — READMISSION MANAGEMENT (OUTPATIENT)
Dept: CALL CENTER | Facility: HOSPITAL | Age: 60
End: 2020-02-28

## 2020-02-28 NOTE — OUTREACH NOTE
General Surgery Week 1 Survey      Responses   Facility patient discharged from?  [facility]   Does the patient have one of the following disease processes/diagnoses(primary or secondary)?  General Surgery   Is there a successful TCM telephone encounter documented?  No   Week 1 attempt successful?  No   Unsuccessful attempts  Attempt 2          Lele Vang RN

## 2020-03-01 ENCOUNTER — APPOINTMENT (OUTPATIENT)
Dept: CT IMAGING | Facility: HOSPITAL | Age: 60
End: 2020-03-01

## 2020-03-01 ENCOUNTER — HOSPITAL ENCOUNTER (EMERGENCY)
Facility: HOSPITAL | Age: 60
Discharge: HOME | End: 2020-03-01
Attending: EMERGENCY MEDICINE | Admitting: EMERGENCY MEDICINE

## 2020-03-01 VITALS
WEIGHT: 185 LBS | TEMPERATURE: 99.1 F | DIASTOLIC BLOOD PRESSURE: 78 MMHG | OXYGEN SATURATION: 98 % | SYSTOLIC BLOOD PRESSURE: 136 MMHG | BODY MASS INDEX: 29.03 KG/M2 | RESPIRATION RATE: 18 BRPM | HEART RATE: 75 BPM | HEIGHT: 67 IN

## 2020-03-01 DIAGNOSIS — R31.29 OTHER MICROSCOPIC HEMATURIA: ICD-10-CM

## 2020-03-01 DIAGNOSIS — N23 URETERAL COLIC: ICD-10-CM

## 2020-03-01 DIAGNOSIS — Z87.442 HISTORY OF KIDNEY STONES: Primary | ICD-10-CM

## 2020-03-01 LAB
BACTERIA UR QL AUTO: ABNORMAL /HPF
BILIRUB UR QL STRIP: NEGATIVE
CLARITY UR: ABNORMAL
COLOR UR: YELLOW
GLUCOSE UR STRIP-MCNC: NEGATIVE MG/DL
HGB UR QL STRIP.AUTO: ABNORMAL
HYALINE CASTS UR QL AUTO: ABNORMAL /LPF
KETONES UR QL STRIP: ABNORMAL
LEUKOCYTE ESTERASE UR QL STRIP.AUTO: ABNORMAL
NITRITE UR QL STRIP: NEGATIVE
PH UR STRIP.AUTO: <=5 [PH] (ref 5–8)
PROT UR QL STRIP: ABNORMAL
RBC # UR: ABNORMAL /HPF
REF LAB TEST METHOD: ABNORMAL
SP GR UR STRIP: 1.01 (ref 1–1.03)
SQUAMOUS #/AREA URNS HPF: ABNORMAL /HPF
UROBILINOGEN UR QL STRIP: ABNORMAL
WBC UR QL AUTO: ABNORMAL /HPF

## 2020-03-01 PROCEDURE — 87086 URINE CULTURE/COLONY COUNT: CPT | Performed by: EMERGENCY MEDICINE

## 2020-03-01 PROCEDURE — 81001 URINALYSIS AUTO W/SCOPE: CPT | Performed by: EMERGENCY MEDICINE

## 2020-03-01 PROCEDURE — 74176 CT ABD & PELVIS W/O CONTRAST: CPT

## 2020-03-01 PROCEDURE — 99283 EMERGENCY DEPT VISIT LOW MDM: CPT

## 2020-03-01 PROCEDURE — 51702 INSERT TEMP BLADDER CATH: CPT

## 2020-03-01 RX ORDER — OXYCODONE HYDROCHLORIDE AND ACETAMINOPHEN 5; 325 MG/1; MG/1
1 TABLET ORAL EVERY 4 HOURS PRN
Qty: 15 TABLET | Refills: 0 | Status: SHIPPED | OUTPATIENT
Start: 2020-03-01 | End: 2020-03-17

## 2020-03-01 NOTE — DISCHARGE INSTRUCTIONS
Take Percocet as needed to help with pain that is not well controlled by Tylenol or ibuprofen.    Continue to drink plenty of fluids.    Keep outpatient follow-up with Dr. Weiss as scheduled.    Return to the ER with any further concern.

## 2020-03-01 NOTE — ED PROVIDER NOTES
Subjective   Lyndsey Luna is a 59 y.o. female who presents to the ED with complaints of urinary retention. She reports that about 8 days ago she was admitted to the hospital with a kidney stone. She had a stent placed on 7 days ago and was discharged 2 days later. She was able to pass the stone and reports that she had the stent taken out 2 days ago. Since this she has not been able to urinate but has only been able get out dribbles. She has experienced increasing abdominal pain and pressure. Her abdominal pain is worse on the left side and radiates into the left flank. She denies experiencing chest pain, difficulty breathing, runny nose, cough, congestion, fever, chills, or hematuria. She reports that she began taking oxycodone 2 days ago. She reports that she commonly gets kidney stones. There are no other acute complaints at this time.      History provided by:  Patient  Urinary Retention   Severity:  Moderate  Onset quality:  Sudden  Duration:  2 days  Timing:  Constant  Progression:  Worsening  Chronicity:  New  Context:  Recent urinary catheterization  Relieved by:  None tried  Ineffective treatments:  None tried  Associated symptoms: abdominal pain    Associated symptoms: no congestion, no cough, no fever, no rhinorrhea and no shortness of breath        Review of Systems   Constitutional: Negative for chills and fever.   HENT: Negative for congestion and rhinorrhea.    Respiratory: Negative for apnea, cough and shortness of breath.    Gastrointestinal: Positive for abdominal distention and abdominal pain.   Genitourinary: Positive for difficulty urinating and flank pain. Negative for hematuria.   All other systems reviewed and are negative.      Past Medical History:   Diagnosis Date   • Arthritis    • Diabetes mellitus (CMS/HCC)    • Gastric ulcer    • Hypertension    • Kidney stone    • Splenomegaly        Allergies   Allergen Reactions   • Penicillins Itching       Past Surgical History:   Procedure  Laterality Date   • CARDIAC CATHETERIZATION     • CYSTOSCOPY, URETEROSCOPY, RETROGRADE PYELOGRAM, STONE EXTRACTION, STENT INSERTION Left 2/23/2020    Procedure: CYSTOSCOPY URETEROSCOPY STONE EXTRACTION STENT INSERTION;  Surgeon: Elmer Weiss MD;  Location: [facility] OR;  Service: Urology;  Laterality: Left;   • ENDOSCOPY N/A 2/14/2020    Procedure: ESOPHAGOGASTRODUODENOSCOPY;  Surgeon: Brant Finch MD;  Location: [facility] ENDOSCOPY;  Service: Gastroenterology;  Laterality: N/A;  Esophageal banding for a total of 7 bands   • FRACTURE SURGERY      LEFT ANKLE   • KIDNEY STONE SURGERY         Family History   Problem Relation Age of Onset   • Diabetes Father        Social History     Socioeconomic History   • Marital status:      Spouse name: Not on file   • Number of children: Not on file   • Years of education: Not on file   • Highest education level: Not on file   Tobacco Use   • Smoking status: Never Smoker   Substance and Sexual Activity   • Alcohol use: Never     Frequency: Never   • Drug use: Never   • Sexual activity: Defer         Objective   Physical Exam   Constitutional: She is oriented to person, place, and time. She appears well-developed and well-nourished. No distress.   HENT:   Head: Normocephalic and atraumatic.   Nose: Nose normal.   Eyes: Conjunctivae are normal. No scleral icterus.   Neck: Normal range of motion. Neck supple.   Cardiovascular: Normal rate, regular rhythm and normal heart sounds.   No murmur heard.  Pulmonary/Chest: Effort normal and breath sounds normal. No respiratory distress.   Abdominal: Soft. There is tenderness in the suprapubic area.   Suprapubic abdominal tenderness.   Musculoskeletal: Normal range of motion. She exhibits no edema.   Neurological: She is alert and oriented to person, place, and time.   Skin: Skin is warm and dry.   Psychiatric: She has a normal mood and affect. Her behavior is normal.   Nursing note and vitals reviewed.      Procedures          ED Course      Recent Results (from the past 24 hour(s))   Urinalysis With Culture If Indicated - Urine, Catheter    Collection Time: 03/01/20  2:00 AM   Result Value Ref Range    Color, UA Yellow Yellow, Straw    Appearance, UA Cloudy (A) Clear    pH, UA <=5.0 5.0 - 8.0    Specific Gravity, UA 1.015 1.001 - 1.030    Glucose, UA Negative Negative    Ketones, UA Trace (A) Negative    Bilirubin, UA Negative Negative    Blood, UA Large (3+) (A) Negative    Protein,  mg/dL (2+) (A) Negative    Leuk Esterase, UA Moderate (2+) (A) Negative    Nitrite, UA Negative Negative    Urobilinogen, UA 1.0 E.U./dL 0.2 - 1.0 E.U./dL   Urinalysis, Microscopic Only - Urine, Catheter    Collection Time: 03/01/20  2:00 AM   Result Value Ref Range    RBC, UA 7-12 (A) None Seen, 0-2 /HPF    WBC, UA Too Numerous to Count (A) None Seen, 0-2 /HPF    Bacteria, UA None Seen None Seen, Trace /HPF    Squamous Epithelial Cells, UA None Seen None Seen, 0-2 /HPF    Hyaline Casts, UA 0-6 0 - 6 /LPF    Methodology Manual Light Microscopy      Note: In addition to lab results from this visit, the labs listed above may include labs taken at another facility or during a different encounter within the last 24 hours. Please correlate lab times with ED admission and discharge times for further clarification of the services performed during this visit.    CT Abdomen Pelvis Without Contrast   Final Result   There is dilation of left ureter only down bladder and there are 4 or 5 tiny 1 mm densities in the distal left ureter perhaps in the ureterovesical juncture. These could represent small fragments after lithotripsy. There are not present on 2/22/2020.   There are also nonobstructing stones in each kidney      The amount of ascites in the abdomen and pelvis is markedly decreased since 2/22/2020. A small amount remaining      Splenomegaly               Signer Name: Bernard Martin MD    Signed: 3/1/2020 2:22 AM    Workstation Name: [id number]      "[facility]        Vitals:    03/01/20 0100 03/01/20 0410   BP: 144/65 136/78   BP Location: Left arm    Patient Position: Sitting    Pulse: 94 75   Resp: 16 18   Temp: 99.1 °F (37.3 °C)    TempSrc: Oral    SpO2: 97% 98%   Weight: 83.9 kg (185 lb)    Height: 170.2 cm (67\")      Medications - No data to display  ECG/EMG Results (last 24 hours)     ** No results found for the last 24 hours. **        No orders to display                                                    MDM  Number of Diagnoses or Management Options  History of kidney stones: new and requires workup  Other microscopic hematuria: new and requires workup  Ureteral colic: new and requires workup  Diagnosis management comments: Bedside ultrasound did not show a mild amount of urine present in the bladder.    Xavier catheterization was performed and approximately 100 cc of urine was removed.    Urinalysis showed blood, but does not show any bacteria or signs of infection.    CT scan of the abdomen/pelvis shows multiple fragments approximately 1 mm in size at the left UVJ potentially due to recent lithotripsy procedure performed on a large left sided kidney stone.        Amount and/or Complexity of Data Reviewed  Clinical lab tests: ordered and reviewed  Tests in the radiology section of CPT®: ordered and reviewed  Obtain history from someone other than the patient: yes  Review and summarize past medical records: yes        Final diagnoses:   History of kidney stones   Ureteral colic   Other microscopic hematuria       Documentation assistance provided by jose j Ortiz.  Information recorded by the scribe was done at my direction and has been verified and validated by me.     Ángel Ortiz  03/01/20 0213       Michael Grace MD  03/01/20 7892    "

## 2020-03-02 LAB
BACTERIA FLD CULT: NORMAL
BACTERIA SPEC AEROBE CULT: ABNORMAL

## 2020-03-03 ENCOUNTER — READMISSION MANAGEMENT (OUTPATIENT)
Dept: CALL CENTER | Facility: HOSPITAL | Age: 60
End: 2020-03-03

## 2020-03-03 NOTE — OUTREACH NOTE
General Surgery Week 2 Survey      Responses   [facility] operated by [facility] patient discharged from?  [facility]   Does the patient have one of the following disease processes/diagnoses(primary or secondary)?  General Surgery   Week 2 attempt successful?  No   Unsuccessful attempts  Attempt 1          Patricia Marquez RN

## 2020-03-04 ENCOUNTER — READMISSION MANAGEMENT (OUTPATIENT)
Dept: CALL CENTER | Facility: HOSPITAL | Age: 60
End: 2020-03-04

## 2020-03-04 NOTE — OUTREACH NOTE
General Surgery Week 2 Survey      Responses   [facility] patient discharged from?  [place]   Does the patient have one of the following disease processes/diagnoses(primary or secondary)?  General Surgery   Week 2 attempt successful?  No   Unsuccessful attempts  Attempt 2          Dominga Aiken RN

## 2020-03-08 NOTE — DISCHARGE SUMMARY
[facility] Medicine Services  DISCHARGE SUMMARY    Patient Name: Lyndsey Luna  : 1960  MRN: [id number]    Date of Admission: 2020  7:20 PM  Date of Discharge: 2020   Primary Care Physician: Javid Lin APRN    Consults     Date and Time Order Name Status Description    2020 0627 Inpatient Gastroenterology Consult Completed     2020 0030 Inpatient Urology Consult Completed     2020 1453 Inpatient Gastroenterology Consult Completed           Hospital Course     Presenting Problem:   Obstructive uropathy [N13.9]    Active Hospital Problems    Diagnosis  POA   • Splenomegaly [R16.1]  Yes   • Cirrhosis of liver with ascites (CMS/HCC) [K74.60, R18.8]  Yes   • Portal hypertension (CMS/HCC) [K76.6]  Yes   • Type 2 diabetes mellitus, with long-term current use of insulin (CMS/HCC) [E11.9, Z79.4]  Not Applicable   • CAD (coronary artery disease) [I25.10]  Yes      Resolved Hospital Problems    Diagnosis Date Resolved POA   • **Obstructive uropathy [N13.9] 2020 Yes   • Hydronephrosis [N13.30] 2020 Yes   • ELIZA (acute kidney injury) (CMS/HCC) [N17.9] 2020 Yes          Hospital Course:  Lyndsey Luna is a 59 y.o. female with past medical history significant for type 2 diabetes, decompensated cirrhosis with portal hypertension and bleeding esophageal varices s/p recent banding, gastric ulcers, history of nephrolithiasis.  Patient presented to the ED with progressively worsening left flank pain with nausea.  She has been admitted with left distal ureter stone with hydronephrosis. Patient underwent cystoscopy with lithotripsy by urology. She had an acute kidney injury that resolved after lithotropsy. GI followed patient for her cirrhosis with portal hypertension. She underwent paracentesis. She had variceal badnding previously and will have repeat EGD in 4 weeks.       Discharge Follow Up Recommendations for outpatient labs/diagnostics:    3/17/2020 with GI as scheduled   Nephrology  PCP 1-2 weeks     Day of Discharge     HPI:   Patient is resting in bed. She is eager to go home.     Review of Systems  Gen- No fevers, chills  CV- No chest pain, palpitations  Resp- No cough, dyspnea  GI- No N/V/D, abd pain      Vital Signs:         Physical Exam:  Constitutional: No acute distress, awake, alert  HENT: NCAT, mucous membranes moist  Respiratory: Clear to auscultation bilaterally, respiratory effort normal   Cardiovascular: RRR, no murmurs, rubs, or gallops, palpable pedal pulses bilaterally  Gastrointestinal: Positive bowel sounds, soft, nontender,  Musculoskeletal: No bilateral ankle edema  Psychiatric: Appropriate affect, cooperative  Neurologic: Oriented x 3, strength symmetric in all extremities, Cranial Nerves grossly intact to confrontation, speech clear  Skin: No rashes      Pertinent  and/or Most Recent Results               Invalid input(s): PROT, LABALBU        Invalid input(s): TG, LDLCALC, LDLREALC        Brief Urine Lab Results  (Last result in the past 365 days)      Color   Clarity   Blood   Leuk Est   Nitrite   Protein   CREAT   Urine HCG        03/01/20 0200 Yellow Cloudy Large (3+) Moderate (2+) Negative 100 mg/dL (2+)               Microbiology Results Abnormal     Procedure Component Value - Date/Time    Body Fluid Culture - Body Fluid, Peritoneum [[id number]] Collected:  02/24/20 1053    Lab Status:  Final result Specimen:  Body Fluid from Peritoneum Updated:  03/02/20 1336     Body Fluid Culture No growth at 5 days    Blood Culture - Blood, Hand, Left [[id number]] Collected:  02/22/20 2140    Lab Status:  Final result Specimen:  Blood from Hand, Left Updated:  02/27/20 2215     Blood Culture No growth at 5 days    Blood Culture - Blood, Arm, Right [[id number]] Collected:  02/22/20 2155    Lab Status:  Final result Specimen:  Blood from Arm, Right Updated:  02/27/20 2215     Blood Culture No growth at 5 days          Imaging Results  (All)     Procedure Component Value Units Date/Time    FL C Arm During Surgery [[id number]] Collected:  02/24/20 0811     Updated:  02/24/20 1223    Narrative:       EXAMINATION: FL C ARM DURING SURGERY- 02/23/2020     INDICATION: Cystoscopy Ureteroscopy Retrograde Pyelogram ;  N23-Unspecified renal colic; N20.1-Calculus of ureter;  N13.2-Hydronephrosis with renal and ureteral calculous obstruction;  N17.9-Acute kidney failure, unspecified; R11.2-Nausea with vomiting,  unspecified; K76.6-Portal hypertension; K74.60-Unspecified cirrhosis of  liver; R18.8-Other ascites; E11.9-Type 2 diabetes mellitus without  complications; Z79.4-      TECHNIQUE: Intraoperative fluoroscopy for improved localization and  treatment planning     COMPARISON: NONE     FINDINGS: Intraoperative fluoroscopy with total fluoroscopic time usage  24 seconds and one representative image saved during cystoscopy and left  ureteroscopy with left stent placement.       Impression:       Intraoperative fluoroscopy utilized during cystoscopy and  left ureteroscopy with left stent placement.      D:  02/24/2020  E:  02/24/2020     This report was finalized on 2/24/2020 12:20 PM by Dr. Link Baker.       XR Chest 1 View [[id number]] Collected:  02/22/20 2155     Updated:  02/22/20 2157    Narrative:       CR Chest 1 Vw    INDICATION:   Upper abdominal pain protocol     COMPARISON:    February 14, 2020    FINDINGS:  Single portable AP view(s) of the chest.    The heart and mediastinal contours are normal. The lungs are clear. No pneumothorax or pleural effusion.       Impression:       No acute cardiopulmonary findings.    Signer Name: Ward Barbosa MD   Signed: 2/22/2020 9:55 PM   Workstation Name: [id number]    [facility]    CT Abdomen Pelvis Without Contrast [[id number]] Collected:  02/22/20 2038     Updated:  02/22/20 2041    Narrative:       CT Abdomen Pelvis WO    INDICATION:   59-year-old female with sudden onset left renal  colic. Nausea and vomiting. New left flank and abdominal pain. Upper GI bleeding and esophageal treatment procedure performed 1 week ago.    TECHNIQUE:   CT of the abdomen and pelvis without IV contrast. Coronal and sagittal reconstructions were obtained.  Radiation dose reduction techniques included automated exposure control or exposure modulation based on body size. Count of known CT and cardiac nuc  med studies performed in previous 12 months: 2.     COMPARISON:   2/14/2020    FINDINGS:  Abdomen: Trace bilateral pleural effusions. Scattered areas of atelectasis in both lower lobes. No pericardial effusion. Normal caliber aorta. There is severe splenomegaly measuring up to 22 cm. The liver is cirrhotic. Negative adrenal glands and the  pancreas is unremarkable. The gallbladder is distended. No radiopaque stone. The left hepatic lobe is draped over the spleen, unchanged. Noncontrast liver otherwise demonstrates no significant interval change. The right kidney is nonobstructed. There is  cortical scarring and there are multiple vascular and nonobstructing calcifications on the right. There are nonobstructing renal stones on the left, however, there is new left-sided hydronephrosis and hydroureter secondary to an obstructing stone in the  distal left ureter measuring about 10 x 5. Probable additional tiny stone material in the distal ureter as well. Urologic referral is recommended. This represents migration of the stone from the left UPJ level since the prior study.    Pelvis: Unremarkable bladder. No drainable fluid collection in the pelvis. Large volume of ascites in the abdomen and pelvis. The bowel is nonobstructed. Appendix not identified but no secondary signs of appendicitis. Negative inguinal canals. No free  air. There is no suspicious bone lesion.      Impression:         1. New left-sided hydronephrosis and hydroureter secondary to stone material that measures up to at least 10 x 5 mm the distal left  ureter. Urologic referral recommended given the size of the stone material which will not pass spontaneously.  2. Additional nonobstructing stones and renal vascular calcifications bilaterally. Asymmetric renal atrophy on the right.  3. Trace pleural effusions.  4. Large volume of ascites with cirrhosis and severe splenomegaly indicative of portal hypertension.  5. Gallbladder distention.  6. Appendix not identified but no secondary signs of appendicitis.          Signer Name: Reggie Rousseau MD   Signed: 2/22/2020 8:38 PM   Workstation Name: [email or web address]    [facility]          Results for orders placed during the hospital encounter of 02/14/20   Duplex Portal Hepatic Complete CAR    Narrative EXAMINATION: DUPLEX PORTAL HEPATIC COMPLETE  - 02/19/2020     INDICATION: Ascites, portal vein thrombosis.      TECHNIQUE: Duplex interrogation was performed of the hepatic vasculature  in both the sagittal and transverse planes.        COMPARISON: None.     FINDINGS: The abdominal aortic peak systolic velocity is 82.2 cm/s. The  hepatic artery peak systolic velocity is 194 cm/s and splenic artery  peak systolic velocity is 211 cm/s. Portal vein measures 1.7 cm. There  is patency identified of the portal vein with normal directional flow.  The flow is hepatopedal. The portal veins are patent with normal  directional flow. These splenic vein is patent with normal directional  flow with hepatic veins all patent showing normal directional flow. The  hepatic vein flow direction is hepatofugal. There is spontaneous flow  seen within the IVC. There is also flow identified in the superior  mesenteric vein. There is fluid identified diffusely throughout the  abdomen.           Impression Patency identified of the portal vein with normal  directional flow of the hepatic vasculature. There is ascites identified  throughout the abdomen.     DICTATED:   02/19/2020  EDITED/ls :   02/19/2020      This report was  finalized on 2/20/2020 1:12 PM by Dr. Millie Sinclair MD.          Results for orders placed during the hospital encounter of 02/14/20   Duplex Portal Hepatic Complete CAR    Narrative EXAMINATION: DUPLEX PORTAL HEPATIC COMPLETE  - 02/19/2020     INDICATION: Ascites, portal vein thrombosis.      TECHNIQUE: Duplex interrogation was performed of the hepatic vasculature  in both the sagittal and transverse planes.        COMPARISON: None.     FINDINGS: The abdominal aortic peak systolic velocity is 82.2 cm/s. The  hepatic artery peak systolic velocity is 194 cm/s and splenic artery  peak systolic velocity is 211 cm/s. Portal vein measures 1.7 cm. There  is patency identified of the portal vein with normal directional flow.  The flow is hepatopedal. The portal veins are patent with normal  directional flow. These splenic vein is patent with normal directional  flow with hepatic veins all patent showing normal directional flow. The  hepatic vein flow direction is hepatofugal. There is spontaneous flow  seen within the IVC. There is also flow identified in the superior  mesenteric vein. There is fluid identified diffusely throughout the  abdomen.           Impression Patency identified of the portal vein with normal  directional flow of the hepatic vasculature. There is ascites identified  throughout the abdomen.     DICTATED:   02/19/2020  EDITED/ls :   02/19/2020      This report was finalized on 2/20/2020 1:12 PM by Dr. Millie Sinclair MD.               Plan for Follow-up of Pending Labs/Results:    Order Current Status    STONE ANALYSIS - Calculus, Ureter, Right In process        Discharge Details        Discharge Medications      Continue These Medications      Instructions Start Date   aspirin 81 MG EC tablet   81 mg, Oral, Daily      atorvastatin 80 MG tablet  Commonly known as:  LIPITOR   80 mg, Oral, Daily      carvedilol 6.25 MG tablet  Commonly known as:  COREG   6.25 mg, Oral, 2 Times Daily With  Meals      CLARITIN 10 MG tablet  Generic drug:  loratadine   10 mg, Oral, Daily      ferrous sulfate 325 (65 FE) MG tablet   325 mg, Oral, Daily With Breakfast      furosemide 20 MG tablet  Commonly known as:  LASIX   20 mg, Oral, Daily      insulin glargine 100 UNIT/ML injection  Commonly known as:  LANTUS   100 Units, Subcutaneous, Nightly      metFORMIN 1000 MG tablet  Commonly known as:  GLUCOPHAGE   1,000 mg, Oral, 2 Times Daily With Meals      pantoprazole 40 MG EC tablet  Commonly known as:  PROTONIX   Take 1 tablet by mouth 2 (Two) Times a Day for 30 days, THEN 1 tablet Daily for 60 days.   Start Date:  February 19, 2020     SITagliptin 100 MG tablet  Commonly known as:  JANUVIA   100 mg, Oral, Daily      spironolactone 50 MG tablet  Commonly known as:  ALDACTONE   50 mg, Oral, Daily             Allergies   Allergen Reactions   • Penicillins Itching         Discharge Disposition:  Home or Self Care    Diet:  Hospital:No active diet order      Activity:  Activity Instructions     Activity as Tolerated            Restrictions or Other Recommendations:         CODE STATUS:    Code Status and Medical Interventions:   Ordered at: 02/22/20 2226     Level Of Support Discussed With:    Patient     Code Status:    CPR     Medical Interventions (Level of Support Prior to Arrest):    Full       Future Appointments   Date Time Provider Department Center   3/17/2020 10:30 AM Ezekiel Garner APRN [id number] None       Additional Instructions for the Follow-ups that You Need to Schedule     Discharge Follow-up with PCP   As directed       Currently Documented PCP:    Javid Lin APRN    PCP Phone Number:    [phone]     Follow Up Details:  later this week or first of next week, needs cbc and CMP         Discharge Follow-up with Specified Provider: Dr Luis F Longo; 2 Weeks   As directed      To:  Dr Luis F Longo    Follow Up:  2 Weeks    Follow Up Details:  patient is going to schedule on her own          Discharge Follow-up with Specified Provider: Dr Fredi MANLEY   As directed      To:  Dr Fredi MANLEY               Time Spent on Discharge:  35 minutes    Electronically signed by Stephanie Alexander DO, 03/07/20, 9:07 PM.

## 2020-03-10 ENCOUNTER — HOSPITAL ENCOUNTER (OUTPATIENT)
Facility: HOSPITAL | Age: 60
Discharge: HOME | End: 2020-03-10
Payer: COMMERCIAL

## 2020-03-10 ENCOUNTER — OFFICE VISIT (OUTPATIENT)
Dept: FAMILY MEDICINE CLINIC | Age: 60
End: 2020-03-10
Payer: COMMERCIAL

## 2020-03-10 LAB
COLOR STONE: NORMAL
COM CRY STONE QL IR: 100 %
COMPN STONE: NORMAL
LABORATORY COMMENT REPORT: NORMAL
LABORATORY COMMENT REPORT: NORMAL
Lab: NORMAL
Lab: NORMAL
PHOTO: NORMAL
SIZE STONE: NORMAL MM
SPECIMEN SOURCE: NORMAL
WT STONE: 185.8 MG

## 2020-03-10 PROCEDURE — 83036 HEMOGLOBIN GLYCOSYLATED A1C: CPT

## 2020-03-10 PROCEDURE — 80053 COMPREHEN METABOLIC PANEL: CPT

## 2020-03-10 PROCEDURE — 85025 COMPLETE CBC W/AUTO DIFF WBC: CPT

## 2020-03-10 PROCEDURE — 99214 OFFICE O/P EST MOD 30 MIN: CPT | Performed by: NURSE PRACTITIONER

## 2020-03-10 PROCEDURE — 82728 ASSAY OF FERRITIN: CPT

## 2020-03-10 PROCEDURE — 36415 COLL VENOUS BLD VENIPUNCTURE: CPT

## 2020-03-10 PROCEDURE — 83550 IRON BINDING TEST: CPT

## 2020-03-10 PROCEDURE — 1111F DSCHRG MED/CURRENT MED MERGE: CPT | Performed by: NURSE PRACTITIONER

## 2020-03-10 PROCEDURE — 82746 ASSAY OF FOLIC ACID SERUM: CPT

## 2020-03-10 PROCEDURE — 83540 ASSAY OF IRON: CPT

## 2020-03-10 PROCEDURE — 96372 THER/PROPH/DIAG INJ SC/IM: CPT | Performed by: NURSE PRACTITIONER

## 2020-03-10 PROCEDURE — 82607 VITAMIN B-12: CPT

## 2020-03-10 RX ORDER — LORATADINE 10 MG/1
10 TABLET ORAL DAILY
COMMUNITY
End: 2020-08-13

## 2020-03-10 RX ORDER — CYANOCOBALAMIN 1000 UG/ML
1000 INJECTION INTRAMUSCULAR; SUBCUTANEOUS ONCE
Status: COMPLETED | OUTPATIENT
Start: 2020-03-10 | End: 2020-03-10

## 2020-03-10 RX ORDER — CYANOCOBALAMIN 1000 UG/ML
1000 INJECTION INTRAMUSCULAR; SUBCUTANEOUS ONCE
Qty: 1 ML | Refills: 0 | Status: SHIPPED | OUTPATIENT
Start: 2020-03-10 | End: 2020-04-22 | Stop reason: SDUPTHER

## 2020-03-10 RX ADMIN — CYANOCOBALAMIN 1000 MCG: 1000 INJECTION INTRAMUSCULAR; SUBCUTANEOUS at 16:34

## 2020-03-10 NOTE — PROGRESS NOTES
Administrations This Visit     cyanocobalamin injection 1,000 mcg     Admin Date  03/10/2020  16:34 Action  Given Dose  1000 mcg Route  Intramuscular Site  Deltoid Left Administered By  Bismark Sifuentes MA    Ordering Provider:  KELSEY Shay CNP    NDC:  33824-333-42    Lot#:  [id number]    :  [facility]    Patient Supplied?:  No

## 2020-03-10 NOTE — PROGRESS NOTES
Chief Complaint   Patient presents with    GI Problem     GI Infection Hospital f/u       Have you seen any other physician or provider since your last visit yes [facility]     Have you had any other diagnostic tests since your last visit?  yes -     Have you changed or stopped any medications since your last visit? no

## 2020-03-10 NOTE — PROGRESS NOTES
Post-Discharge Transitional Care Management Services or Hospital Follow Up      Theresa Acosta   YOB: 1960    Date of Office Visit:  3/10/2020  Date of Hospital Admission:    Date of Hospital Discharge:    Risk of hospital readmission (high >=14%. Medium >=10%) :No data recorded    Care management risk score Rising risk (score 2-5) and Complex Care (Scores >=6): 6     Non face to face  following discharge, date last encounter closed (first attempt may have been earlier): *No documented post hospital discharge outreach found in the last 14 days    Call initiated 2 business days of discharge: *No response recorded in the last 14 days    Patient Active Problem List   Diagnosis    Type 2 diabetes mellitus with complication, with long-term current use of insulin ([address]    Depression    Environmental and seasonal allergies    Neuropathy    Essential hypertension    Gastrointestinal intolerance to foods    Pain in both lower extremities    Abnormal US (ultrasound) of abdomen    Spleen enlarged    Enlarged liver    Thrombocytopenia (HCC)    Abnormal bruising    Stage 3 chronic kidney disease ([address]       Allergies   Allergen Reactions    Pcn [Penicillins] Other (See Comments)     Not sure was a child       Medications listed as ordered at the time of discharge from hospital   Wael Puckett \"DANIELA\"   Home Medication Instructions ONEIDA:    Printed on:03/10/20 8047   Medication Information                      ACCU-CHEK ANIA PLUS strip  Inject 1 each into the skin 2 times daily             aspirin 81 MG tablet  Take 1 tablet by mouth daily             atorvastatin (LIPITOR) 80 MG tablet  Take 1 tablet by mouth nightly             blood glucose monitor kit and supplies  Test blood glucose four times daily Dx E11.9             blood glucose monitor strips  Test blood glucose 4 times daily.  Dx E11.9             carvedilol (COREG) 6.25 MG tablet  Take 6.25 mg by mouth 2 times daily (with meals) cyanocobalamin 1000 MCG/ML injection  Inject 1 mL into the skin every 30 days             cyanocobalamin 1000 MCG/ML injection  Inject 1 mL into the muscle once for 1 dose             diclofenac sodium 1 % GEL  Apply 2 g topically 2 times daily             exenatide (BYETTA 10 MCG PEN) 10 MCG/0.04ML injection  Inject 0.04 mLs into the skin 2 times daily (with meals)             ferrous sulfate 325 (65 Fe) MG tablet  Take 325 mg by mouth daily (with breakfast)             furosemide (LASIX) 20 MG tablet  Take 20 mg by mouth daily             Lancets MISC  Check blood glucose levels twice daily. Dx E11.9             LANTUS SOLOSTAR 100 UNIT/ML injection pen  INJECT 25 UNITS SUBCUTANEOUSLY ONCE DAILY IN THE MORNING  UNITS SUBCUTANEOUSLY AT BEDTIME             loratadine (CLARITIN) 10 MG tablet  Take 10 mg by mouth daily             metFORMIN (GLUCOPHAGE) 1000 MG tablet  Take 1 tablet by mouth 2 times daily (with meals)             nitroGLYCERIN (NITROSTAT) 0.4 MG SL tablet  up to max of 3 total doses. If no relief after 1 dose, call 911. pantoprazole (PROTONIX) 40 MG tablet  Take 40 mg by mouth 2 times daily Twice daily x 2 weeks then once daily for 60 days.   Start 2/19/20             SITagliptin (JANUVIA) 100 MG tablet  Take 1 tablet by mouth every morning (before breakfast)             spironolactone (ALDACTONE) 50 MG tablet  Take 50 mg by mouth daily             UNIFINE PENTIPS 32G X 4 MM MISC  USE AS DIRECTED TWO TIMES A DAY                   Medications marked \"taking\" at this time  Outpatient Medications Marked as Taking for the 3/10/20 encounter (Office Visit) with KELSEY Maldonado CNP   Medication Sig Dispense Refill    loratadine (CLARITIN) 10 MG tablet Take 10 mg by mouth daily      cyanocobalamin 1000 MCG/ML injection Inject 1 mL into the muscle once for 1 dose 1 mL 0    metFORMIN (GLUCOPHAGE) 1000 MG tablet Take 1 tablet by mouth 2 times daily (with meals) 60 tablet 2  carvedilol (COREG) 6.25 MG tablet Take 6.25 mg by mouth 2 times daily (with meals)      furosemide (LASIX) 20 MG tablet Take 20 mg by mouth daily      ferrous sulfate 325 (65 Fe) MG tablet Take 325 mg by mouth daily (with breakfast)      pantoprazole (PROTONIX) 40 MG tablet Take 40 mg by mouth 2 times daily Twice daily x 2 weeks then once daily for 60 days. Start 2/19/20      spironolactone (ALDACTONE) 50 MG tablet Take 50 mg by mouth daily      LANTUS SOLOSTAR 100 UNIT/ML injection pen INJECT 25 UNITS SUBCUTANEOUSLY ONCE DAILY IN THE MORNING  UNITS SUBCUTANEOUSLY AT BEDTIME 16 pen 2    SITagliptin (JANUVIA) 100 MG tablet Take 1 tablet by mouth every morning (before breakfast) (Patient taking differently: Take 100 mg by mouth daily ) 30 tablet 2    exenatide (BYETTA 10 MCG PEN) 10 MCG/0.04ML injection Inject 0.04 mLs into the skin 2 times daily (with meals) 2.4 mL 2    cyanocobalamin 1000 MCG/ML injection Inject 1 mL into the skin every 30 days 10 mL 0    atorvastatin (LIPITOR) 80 MG tablet Take 1 tablet by mouth nightly 90 tablet 5    diclofenac sodium 1 % GEL Apply 2 g topically 2 times daily 1 Tube 3    aspirin 81 MG tablet Take 1 tablet by mouth daily 30 tablet 5    nitroGLYCERIN (NITROSTAT) 0.4 MG SL tablet up to max of 3 total doses. If no relief after 1 dose, call 911. 25 tablet 3    UNIFINE PENTIPS 32G X 4 MM MISC USE AS DIRECTED TWO TIMES A  each 5    blood glucose monitor kit and supplies Test blood glucose four times daily Dx E11.9 1 kit 0    blood glucose monitor strips Test blood glucose 4 times daily. Dx E11.9 100 strip 5    Lancets MISC Check blood glucose levels twice daily.  Dx E11.9 100 each 5    ACCU-CHEK ANIA PLUS strip Inject 1 each into the skin 2 times daily 100 each 5        Medications patient taking as of now reconciled against medications ordered at time of hospital discharge: Yes    Chief Complaint   Patient presents with    GI Problem     GI Infection normal    Assessment/Plan:  1. Bleeding esophageal varices, unspecified esophageal varices type (HCC)    - NM DISCHARGE MEDS RECONCILED W/ CURRENT OUTPATIENT MED LIST  - CBC Auto Differential; Future  - Comprehensive Metabolic Panel; Future  - IRON AND TIBC; Future  - FERRITIN; Future    2. Type 2 diabetes mellitus with complication, with long-term current use of insulin (HCC)    - CBC Auto Differential; Future  - Comprehensive Metabolic Panel; Future  - Hemoglobin A1C; Future  - metFORMIN (GLUCOPHAGE) 1000 MG tablet; Take 1 tablet by mouth 2 times daily (with meals)  Dispense: 60 tablet; Refill: 2    3. Fatigue, unspecified type    - CBC Auto Differential; Future  - Comprehensive Metabolic Panel; Future  - IRON AND TIBC; Future  - FERRITIN; Future    4. B12 deficiency    - Vitamin B12 & Folate;  Future        Medical Decision Making: moderate complexity

## 2020-03-11 ENCOUNTER — READMISSION MANAGEMENT (OUTPATIENT)
Dept: CALL CENTER | Facility: HOSPITAL | Age: 60
End: 2020-03-11

## 2020-03-11 VITALS
HEIGHT: 69 IN | HEART RATE: 92 BPM | OXYGEN SATURATION: 98 % | BODY MASS INDEX: 26.51 KG/M2 | SYSTOLIC BLOOD PRESSURE: 124 MMHG | RESPIRATION RATE: 18 BRPM | DIASTOLIC BLOOD PRESSURE: 70 MMHG | WEIGHT: 179 LBS

## 2020-03-11 LAB
A/G RATIO: 1.2 (ref 0.8–2)
ALBUMIN SERPL-MCNC: 3.7 G/DL (ref 3.4–4.8)
ALP BLD-CCNC: 110 U/L (ref 25–100)
ALT SERPL-CCNC: 24 U/L (ref 4–36)
ANION GAP SERPL CALCULATED.3IONS-SCNC: 16 MMOL/L (ref 3–16)
AST SERPL-CCNC: 27 U/L (ref 8–33)
BASOPHILS ABSOLUTE: 0.1 K/UL (ref 0–0.1)
BASOPHILS RELATIVE PERCENT: 1.3 %
BILIRUB SERPL-MCNC: 0.8 MG/DL (ref 0.3–1.2)
BUN BLDV-MCNC: 29 MG/DL (ref 6–20)
CALCIUM SERPL-MCNC: 9.1 MG/DL (ref 8.5–10.5)
CHLORIDE BLD-SCNC: 99 MMOL/L (ref 98–107)
CO2: 22 MMOL/L (ref 20–30)
CREAT SERPL-MCNC: 2 MG/DL (ref 0.4–1.2)
EOSINOPHILS ABSOLUTE: 0.1 K/UL (ref 0–0.4)
EOSINOPHILS RELATIVE PERCENT: 2.6 %
FERRITIN: 78.7 NG/ML (ref 22–322)
FOLATE: 10.71 NG/ML
GFR AFRICAN AMERICAN: 31
GFR NON-AFRICAN AMERICAN: 25
GLOBULIN: 3.2 G/DL
GLUCOSE BLD-MCNC: 168 MG/DL (ref 74–106)
HBA1C MFR BLD: 7.3 %
HCT VFR BLD CALC: 30 % (ref 37–47)
HEMOGLOBIN: 8.8 G/DL (ref 11.5–16.5)
IMMATURE GRANULOCYTES #: 0.1 K/UL
IMMATURE GRANULOCYTES %: 1.6 % (ref 0–5)
IRON SATURATION: 6 % (ref 15–50)
IRON: 17 UG/DL (ref 37–145)
LYMPHOCYTES ABSOLUTE: 0.8 K/UL (ref 1.5–4)
LYMPHOCYTES RELATIVE PERCENT: 20.7 %
MCH RBC QN AUTO: 23 PG (ref 27–32)
MCHC RBC AUTO-ENTMCNC: 29.3 G/DL (ref 31–35)
MCV RBC AUTO: 78.5 FL (ref 80–100)
MONOCYTES ABSOLUTE: 0.5 K/UL (ref 0.2–0.8)
MONOCYTES RELATIVE PERCENT: 13.7 %
NEUTROPHILS ABSOLUTE: 2.3 K/UL (ref 2–7.5)
NEUTROPHILS RELATIVE PERCENT: 60.1 %
PDW BLD-RTO: 16.8 % (ref 11–16)
PLATELET # BLD: 98 K/UL (ref 150–400)
POTASSIUM SERPL-SCNC: 4.7 MMOL/L (ref 3.4–5.1)
RBC # BLD: 3.82 M/UL (ref 3.8–5.8)
SODIUM BLD-SCNC: 137 MMOL/L (ref 136–145)
TOTAL IRON BINDING CAPACITY: 264 UG/DL (ref 250–450)
TOTAL PROTEIN: 6.9 G/DL (ref 6.4–8.3)
VITAMIN B-12: >2000 PG/ML (ref 211–911)
WBC # BLD: 3.9 K/UL (ref 4–11)

## 2020-03-12 RX ORDER — ASCORBIC ACID 500 MG
500 TABLET ORAL DAILY
Qty: 30 TABLET | Refills: 3 | Status: SHIPPED | OUTPATIENT
Start: 2020-03-12 | End: 2020-03-18 | Stop reason: SDUPTHER

## 2020-03-12 RX ORDER — FERROUS SULFATE 325(65) MG
325 TABLET ORAL 2 TIMES DAILY WITH MEALS
Qty: 60 TABLET | Refills: 2 | Status: SHIPPED | OUTPATIENT
Start: 2020-03-12 | End: 2020-04-22 | Stop reason: SDUPTHER

## 2020-03-17 ENCOUNTER — LAB (OUTPATIENT)
Dept: LAB | Facility: HOSPITAL | Age: 60
End: 2020-03-17

## 2020-03-17 ENCOUNTER — OFFICE VISIT (OUTPATIENT)
Dept: GASTROENTEROLOGY | Facility: CLINIC | Age: 60
End: 2020-03-17

## 2020-03-17 VITALS
HEIGHT: 67 IN | HEART RATE: 93 BPM | SYSTOLIC BLOOD PRESSURE: 152 MMHG | WEIGHT: 182.6 LBS | BODY MASS INDEX: 28.66 KG/M2 | DIASTOLIC BLOOD PRESSURE: 65 MMHG

## 2020-03-17 DIAGNOSIS — I85.11 SECONDARY ESOPHAGEAL VARICES WITH BLEEDING (HCC): ICD-10-CM

## 2020-03-17 DIAGNOSIS — K72.10 END STAGE LIVER DISEASE (HCC): Primary | ICD-10-CM

## 2020-03-17 DIAGNOSIS — K74.60 CIRRHOSIS OF LIVER WITH ASCITES, UNSPECIFIED HEPATIC CIRRHOSIS TYPE (HCC): ICD-10-CM

## 2020-03-17 DIAGNOSIS — R18.8 CIRRHOSIS OF LIVER WITH ASCITES, UNSPECIFIED HEPATIC CIRRHOSIS TYPE (HCC): ICD-10-CM

## 2020-03-17 DIAGNOSIS — D50.9 IRON DEFICIENCY ANEMIA, UNSPECIFIED IRON DEFICIENCY ANEMIA TYPE: ICD-10-CM

## 2020-03-17 LAB
ALBUMIN SERPL-MCNC: 3.7 G/DL (ref 3.5–5.2)
ALBUMIN/GLOB SERPL: 1 G/DL
ALP SERPL-CCNC: 94 U/L (ref 39–117)
ALPHA-FETOPROTEIN: <0.605 NG/ML (ref 0–8.3)
ALT SERPL W P-5'-P-CCNC: 19 U/L (ref 1–33)
ANION GAP SERPL CALCULATED.3IONS-SCNC: 13.9 MMOL/L (ref 5–15)
AST SERPL-CCNC: 19 U/L (ref 1–32)
BASOPHILS # BLD AUTO: 0.05 10*3/MM3 (ref 0–0.2)
BASOPHILS NFR BLD AUTO: 1.4 % (ref 0–1.5)
BILIRUB SERPL-MCNC: 0.6 MG/DL (ref 0.2–1.2)
BUN BLD-MCNC: 19 MG/DL (ref 6–20)
BUN/CREAT SERPL: 12.5 (ref 7–25)
CALCIUM SPEC-SCNC: 9.5 MG/DL (ref 8.6–10.5)
CHLORIDE SERPL-SCNC: 100 MMOL/L (ref 98–107)
CO2 SERPL-SCNC: 21.1 MMOL/L (ref 22–29)
CREAT BLD-MCNC: 1.52 MG/DL (ref 0.57–1)
DEPRECATED RDW RBC AUTO: 46.3 FL (ref 37–54)
EOSINOPHIL # BLD AUTO: 0.13 10*3/MM3 (ref 0–0.4)
EOSINOPHIL NFR BLD AUTO: 3.7 % (ref 0.3–6.2)
ERYTHROCYTE [DISTWIDTH] IN BLOOD BY AUTOMATED COUNT: 16.7 % (ref 12.3–15.4)
GFR SERPL CREATININE-BSD FRML MDRD: 35 ML/MIN/1.73
GLOBULIN UR ELPH-MCNC: 3.7 GM/DL
GLUCOSE BLD-MCNC: 288 MG/DL (ref 65–99)
HCT VFR BLD AUTO: 27.1 % (ref 34–46.6)
HGB BLD-MCNC: 8.3 G/DL (ref 12–15.9)
IMM GRANULOCYTES # BLD AUTO: 0.03 10*3/MM3 (ref 0–0.05)
IMM GRANULOCYTES NFR BLD AUTO: 0.8 % (ref 0–0.5)
INR PPP: 1.21 (ref 0.85–1.16)
LYMPHOCYTES # BLD AUTO: 0.62 10*3/MM3 (ref 0.7–3.1)
LYMPHOCYTES NFR BLD AUTO: 17.6 % (ref 19.6–45.3)
MCH RBC QN AUTO: 23.7 PG (ref 26.6–33)
MCHC RBC AUTO-ENTMCNC: 30.6 G/DL (ref 31.5–35.7)
MCV RBC AUTO: 77.4 FL (ref 79–97)
MONOCYTES # BLD AUTO: 0.44 10*3/MM3 (ref 0.1–0.9)
MONOCYTES NFR BLD AUTO: 12.5 % (ref 5–12)
NEUTROPHILS # BLD AUTO: 2.26 10*3/MM3 (ref 1.7–7)
NEUTROPHILS NFR BLD AUTO: 64 % (ref 42.7–76)
NRBC BLD AUTO-RTO: 0 /100 WBC (ref 0–0.2)
PLATELET # BLD AUTO: 92 10*3/MM3 (ref 140–450)
PMV BLD AUTO: 11.7 FL (ref 6–12)
POTASSIUM BLD-SCNC: 5 MMOL/L (ref 3.5–5.2)
PROT SERPL-MCNC: 7.4 G/DL (ref 6–8.5)
PROTHROMBIN TIME: 15 SECONDS (ref 11.5–14)
RBC # BLD AUTO: 3.5 10*6/MM3 (ref 3.77–5.28)
SODIUM BLD-SCNC: 135 MMOL/L (ref 136–145)
WBC NRBC COR # BLD: 3.53 10*3/MM3 (ref 3.4–10.8)

## 2020-03-17 PROCEDURE — 99214 OFFICE O/P EST MOD 30 MIN: CPT | Performed by: NURSE PRACTITIONER

## 2020-03-17 PROCEDURE — 36415 COLL VENOUS BLD VENIPUNCTURE: CPT | Performed by: NURSE PRACTITIONER

## 2020-03-17 PROCEDURE — 85025 COMPLETE CBC W/AUTO DIFF WBC: CPT | Performed by: NURSE PRACTITIONER

## 2020-03-17 PROCEDURE — 80053 COMPREHEN METABOLIC PANEL: CPT | Performed by: NURSE PRACTITIONER

## 2020-03-17 PROCEDURE — 82105 ALPHA-FETOPROTEIN SERUM: CPT | Performed by: NURSE PRACTITIONER

## 2020-03-17 PROCEDURE — 85610 PROTHROMBIN TIME: CPT

## 2020-03-17 RX ORDER — SYRINGE-NEEDLE,INSULIN,0.5 ML 31 GX5/16"
1 SYRINGE, EMPTY DISPOSABLE MISCELLANEOUS DAILY
Qty: 100 EACH | Refills: 3 | Status: SHIPPED | OUTPATIENT
Start: 2020-03-17 | End: 2020-03-17 | Stop reason: SDUPTHER

## 2020-03-17 RX ORDER — SULFAMETHOXAZOLE AND TRIMETHOPRIM 800; 160 MG/1; MG/1
1 TABLET ORAL 2 TIMES DAILY
Qty: 20 TABLET | Refills: 0 | Status: SHIPPED | OUTPATIENT
Start: 2020-03-17 | End: 2020-03-27

## 2020-03-17 RX ORDER — CYANOCOBALAMIN 1000 UG/ML
1000 INJECTION INTRAMUSCULAR; SUBCUTANEOUS WEEKLY
Qty: 4 ML | Refills: 0 | Status: SHIPPED | OUTPATIENT
Start: 2020-03-17 | End: 2020-03-17 | Stop reason: SDUPTHER

## 2020-03-17 RX ORDER — SYRINGE-NEEDLE,INSULIN,0.5 ML 31 GX5/16"
1 SYRINGE, EMPTY DISPOSABLE MISCELLANEOUS DAILY
Qty: 100 EACH | Refills: 3 | Status: SHIPPED | OUTPATIENT
Start: 2020-03-17

## 2020-03-17 RX ORDER — NITROGLYCERIN 0.4 MG/1
1 TABLET SUBLINGUAL AS NEEDED
COMMUNITY
Start: 2019-01-30 | End: 2022-10-12 | Stop reason: SDUPTHER

## 2020-03-17 RX ORDER — SULFAMETHOXAZOLE AND TRIMETHOPRIM 800; 160 MG/1; MG/1
1 TABLET ORAL 2 TIMES DAILY
Qty: 20 TABLET | Refills: 0 | Status: SHIPPED | OUTPATIENT
Start: 2020-03-17 | End: 2020-03-17 | Stop reason: SDUPTHER

## 2020-03-17 RX ORDER — CYANOCOBALAMIN 1000 UG/ML
1000 INJECTION INTRAMUSCULAR; SUBCUTANEOUS WEEKLY
Qty: 4 ML | Refills: 0 | Status: SHIPPED | OUTPATIENT
Start: 2020-03-17 | End: 2020-04-08

## 2020-03-17 RX ORDER — CYANOCOBALAMIN 1000 UG/ML
1000 INJECTION, SOLUTION INTRAMUSCULAR; SUBCUTANEOUS WEEKLY
COMMUNITY
Start: 2019-11-26 | End: 2021-03-30

## 2020-03-17 NOTE — PROGRESS NOTES
GASTROENTEROLOGY OFFICE NOTE  Lyndsey Luna  [id number]  1960    CARE TEAM  Patient Care Team:  Javid Lin APRN as PCP - General (Nurse Practitioner)    Referring Provider: Javid Lin APRN    Chief Complaint   Patient presents with   • Vomiting     egd f/u        HISTORY OF PRESENT ILLNESS:  Ms. Luna is a 59-year-old female newly diagnosed with cirrhosis after a recent hospital stay in early February.  She presented to the ED at [facility] on 2/14/2020 with acute hematemesis and microcytic anemia with CT evidence for ascites and admitted with acute variceal bleed.  Hospital course included EGD that noted 4 columns of nonbleeding grade 3 varices in the middle third of the esophagus, 25 to 40 cm from the incisors.  They were 8 mm in largest diameter.  Stigmata of recent bleeding (at 31 cm) were evident and red jana signs were present.  Seven bands were successfully placed with complete eradication, resulting in deflation of varices. The stomach was normal and the examined portion of the duodenum was normal.  She received Rocephin IV.  She was placed on Coreg 6.25 mg twice daily which she continues today.  She continues iron for iron deficiency anemia and is having black, hard balls of stool.    Hospital course also included paracentesis where 2 L were removed.  She was started on low-dose diuretics; Lasix 20 mg daily and spironolactone 50 mg daily.  She has been weighing herself about every other day and has not had any significant weight gain.  She has not had any peripheral edema.    She and her spouse both deny any signs of hepatic encephalopathy including no changes in state of consciousness, intellectual function, personality or behavior changes.    She reports she had no previous knowledge of any liver disease.Serologic workup for hepatitides was unremarkable with the exception of DAVID positive.  Given her history of type 2 diabetes fatty liver disease is likely the etiology  of her cirrhosis.  She also has a medical history of coronary artery disease and myocardial infarction and is status post stenting about 2 years ago.    Since this hospitalization she was admitted once again with obstructive uropathy and underwent cystoscopy with lithotripsy and unfortunately had acute kidney injury which seems to be resolving since lithotripsy although her creatinine is still elevated.    PAST MEDICAL HISTORY  Past Medical History:   Diagnosis Date   • Arthritis    • Diabetes mellitus (CMS/HCC)    • Gastric ulcer    • Hypertension    • Kidney stone    • Splenomegaly         PAST SURGICAL HISTORY  Past Surgical History:   Procedure Laterality Date   • CARDIAC CATHETERIZATION     • CYSTOSCOPY, URETEROSCOPY, RETROGRADE PYELOGRAM, STONE EXTRACTION, STENT INSERTION Left 2/23/2020    Procedure: CYSTOSCOPY URETEROSCOPY STONE EXTRACTION STENT INSERTION;  Surgeon: Elmer Weiss MD;  [facility];  Service: Urology;  Laterality: Left;   • ENDOSCOPY N/A 2/14/2020    Procedure: ESOPHAGOGASTRODUODENOSCOPY;  Surgeon: Brant Finch MD;  Location:  [facility];  Service: Gastroenterology;  Laterality: N/A;  Esophageal banding for a total of 7 bands   • FRACTURE SURGERY      LEFT ANKLE   • KIDNEY STONE SURGERY          MEDICATIONS:    Current Outpatient Medications:   •  cyanocobalamin 1000 MCG/ML injection, Inject 1,000 mcg into the appropriate muscle as directed by prescriber 1 (One) Time Per Week., Disp: , Rfl:   •  nitroglycerin (NITROSTAT) 0.4 MG SL tablet, Take 1 tablet by mouth As Needed., Disp: , Rfl:   •  aspirin 81 MG EC tablet, Take 81 mg by mouth Daily., Disp: , Rfl:   •  atorvastatin (LIPITOR) 80 MG tablet, Take 80 mg by mouth Daily., Disp: , Rfl:   •  ferrous sulfate 325 (65 FE) MG tablet, Take 1 tablet by mouth Daily With Breakfast for 30 days., Disp: 30 tablet, Rfl: 5  •  furosemide (LASIX) 20 MG tablet, Take 1 tablet by mouth Daily for 30 days., Disp: 30 tablet, Rfl: 2  •   insulin glargine (LANTUS) 100 UNIT/ML injection, Inject 100 Units under the skin into the appropriate area as directed Every Night., Disp: , Rfl:   •  loratadine (CLARITIN) 10 MG tablet, Take 10 mg by mouth Daily., Disp: , Rfl:   •  metFORMIN (GLUCOPHAGE) 1000 MG tablet, Take 1,000 mg by mouth 2 (Two) Times a Day With Meals., Disp: , Rfl:   •  pantoprazole (PROTONIX) 40 MG EC tablet, Take 1 tablet by mouth 2 (Two) Times a Day for 30 days, THEN 1 tablet Daily for 60 days., Disp: 120 tablet, Rfl: 0  •  propranolol (INDERAL) 20 MG tablet, Take 1 tablet by mouth 2 (Two) Times a Day. Keep Heart Rate 55-65 and BP >90, Disp: 60 tablet, Rfl: 11  •  SITagliptin (JANUVIA) 100 MG tablet, Take 100 mg by mouth Daily., Disp: , Rfl:   •  spironolactone (ALDACTONE) 50 MG tablet, Take 1 tablet by mouth Daily for 30 days., Disp: 30 tablet, Rfl: 2    ALLERGIES  Allergies   Allergen Reactions   • Penicillins Itching       FAMILY HISTORY:  Family History   Problem Relation Age of Onset   • Diabetes Father        SOCIAL HISTORY  Social History     Socioeconomic History   • Marital status:      Spouse name: Not on file   • Number of children: Not on file   • Years of education: Not on file   • Highest education level: Not on file   Tobacco Use   • Smoking status: Never Smoker   Substance and Sexual Activity   • Alcohol use: Never     Frequency: Never   • Drug use: Never   • Sexual activity: Defer       REVIEW OF SYSTEMS  Review of Systems   Constitutional: Negative for activity change, appetite change, chills, diaphoresis, fatigue, fever, unexpected weight gain and unexpected weight loss.   HENT: Negative for congestion, dental problem, drooling, ear discharge, ear pain, facial swelling, hearing loss, mouth sores, nosebleeds, postnasal drip, rhinorrhea, sinus pressure, sneezing, sore throat, swollen glands, tinnitus, trouble swallowing and voice change.    Respiratory: Negative for apnea, cough, choking, chest tightness, shortness  "of breath, wheezing and stridor.    Cardiovascular: Negative for chest pain, palpitations and leg swelling.   Gastrointestinal: Positive for constipation. Negative for abdominal distention, abdominal pain, anal bleeding, blood in stool, diarrhea, nausea, rectal pain, vomiting, GERD and indigestion.   Endocrine: Negative for cold intolerance, heat intolerance, polydipsia, polyphagia and polyuria.   Musculoskeletal: Negative for arthralgias, back pain, gait problem, joint swelling, myalgias, neck pain, neck stiffness and bursitis.   Skin: Negative for color change, dry skin, rash and skin lesions.   Allergic/Immunologic: Negative for environmental allergies, food allergies and immunocompromised state.   Neurological: Negative for dizziness, tremors, seizures, syncope, facial asymmetry, speech difficulty, weakness, light-headedness, numbness, headache, memory problem and confusion.   Hematological: Negative for adenopathy. Does not bruise/bleed easily.   Psychiatric/Behavioral: Negative for agitation, behavioral problems, decreased concentration, dysphoric mood, hallucinations, self-injury, sleep disturbance, suicidal ideas, negative for hyperactivity, depressed mood and stress. The patient is not nervous/anxious.          PHYSICAL EXAM   /65 (BP Location: Right arm, Patient Position: Sitting, Cuff Size: Adult)   Pulse 93   Ht 170.2 cm (67.01\")   Wt 82.8 kg (182 lb 9.6 oz)   BMI 28.59 kg/m²   Physical Exam   Constitutional: She is oriented to person, place, and time. She appears well-developed and well-nourished.   HENT:   Head: Normocephalic.   Mouth/Throat: Oropharynx is clear and moist.   Eyes: Pupils are equal, round, and reactive to light. EOM are normal.   Neck: Normal range of motion. Neck supple.   Cardiovascular: Normal rate and regular rhythm.   Pulmonary/Chest: Effort normal and breath sounds normal. She has no wheezes. She has no rales.   Abdominal: Soft. Bowel sounds are normal. She exhibits no " mass. There is no tenderness. There is no rebound and no guarding. No hernia.   Musculoskeletal: Normal range of motion.   Neurological: She is alert and oriented to person, place, and time. No cranial nerve deficit.   Skin: Skin is warm and dry.   Psychiatric: She has a normal mood and affect. Her behavior is normal. Judgment normal.   Nursing note and vitals reviewed.    Results Review:  I have reviewed the patient's new clinical results.    ASSESSMENT / PLAN  1. End Stage Liver Disease  2. Cirrhosis, decompensated by variceal bleeding and ascites  -cirrhosis secondary to likely NAFLD  -MELDNa 16 -- Creatine driven, recent ELIZA secondary to obstructive uropathy   -No evidence of hepatic encephalopathy  # Hepatocellular carcinoma surveillance  - Abdominal imaging every six months  - Last abdominal CT (Feb 2020). Result: cirrhosis and severe splenomegaly indicative of portal hypertension, no focal lesions  Hepatic portal duplex (February 2020) result: Patency identified of the portal vein with normal directional flow of hepatic vasculature.  -Next imaging due August 2020  Plan:  -CBC, CMP, PT/INR, AFP today  - Ultrasound abdomen in August 2020    3.  Ascites  Plan:  -Low sodium diet 2 gm/day  -Daily weights  -Continue diuretics  Lasix 20 mg daily (refilled)  Spironolactone 50 mg daily (refilled)    4. Esophageal varices bleeding  -Last EGD 2/14/2020-status post banding x7  Current recommendations are to repeat EGD in 4 weeks which would be at this time to ensure complete eradication of varices, however in today's climate of Covid-19 it may not be a good idea to potentially subject her to this virus.  I have discussed this with her in detail and have decided to wait another month to recheck.  I am going to change her secondary prophylaxis for the prevention of recurrent esophageal variceal hemorrhage to propanolol as a precaution because there is not enough data to recommend carvedilol in the prevention of  rebleeding.  Plan:  -Discontinue carvedilol  -Begin propranolol 20 mg twice daily as secondary prophylaxis Target HR 55-65 bpm, systolic BP > 90 mmHg.  Patient will monitor HR and BP daily and I will follow-up with her on Monday, 3/23/2020 by phone.  -Heart Rate: 93  -BP: 152/65    5.  Nutrition  Plan:  -High protein diet  -Low sodium diet 2 gm/day  -Avoid alcohol, avoid NSAIDS    6.  Iron deficiency anemia  Plan:  -CBC (as above)  - Continue iron supplementation    Return in about 3 months (around 6/17/2020).    I discussed the patients findings and my recommendations with patient    JOSE D Sood

## 2020-03-18 PROBLEM — K72.10 END STAGE LIVER DISEASE: Status: ACTIVE | Noted: 2020-03-18

## 2020-03-18 RX ORDER — FERROUS SULFATE 325(65) MG
325 TABLET ORAL
Qty: 30 TABLET | Refills: 5 | Status: SHIPPED | OUTPATIENT
Start: 2020-03-18 | End: 2020-04-17

## 2020-03-18 RX ORDER — ASCORBIC ACID 500 MG
500 TABLET ORAL DAILY
Qty: 30 TABLET | Refills: 3 | Status: SHIPPED | OUTPATIENT
Start: 2020-03-18 | End: 2020-08-13 | Stop reason: SDUPTHER

## 2020-03-18 RX ORDER — PROPRANOLOL HYDROCHLORIDE 20 MG/1
20 TABLET ORAL 2 TIMES DAILY
Qty: 60 TABLET | Refills: 11 | Status: SHIPPED | OUTPATIENT
Start: 2020-03-18 | End: 2020-05-28

## 2020-03-18 RX ORDER — SPIRONOLACTONE 50 MG/1
50 TABLET, FILM COATED ORAL DAILY
Qty: 30 TABLET | Refills: 2 | Status: SHIPPED | OUTPATIENT
Start: 2020-03-18 | End: 2020-04-17

## 2020-03-18 RX ORDER — FUROSEMIDE 20 MG/1
20 TABLET ORAL DAILY
Qty: 30 TABLET | Refills: 2 | Status: SHIPPED | OUTPATIENT
Start: 2020-03-18 | End: 2020-04-17

## 2020-03-19 ENCOUNTER — TELEPHONE (OUTPATIENT)
Dept: GASTROENTEROLOGY | Facility: CLINIC | Age: 60
End: 2020-03-19

## 2020-03-19 NOTE — TELEPHONE ENCOUNTER
Tell her that is ok. I would like to see the HR lower but do not want to increase the dose just yet because I do not want to drop her BP too fast. Please have her call me again on Friday with an update and if BP is still stable and HR remains that high we will increase the dose.

## 2020-03-19 NOTE — TELEPHONE ENCOUNTER
Patient called and states that her Blood pressure reading was 120/76 with a HR of 90. She states that you had given her a new BP med and because it was at the numbers that you and her discussed she wanted to call and update you and see if there was anything else that she should be doing?

## 2020-03-23 ENCOUNTER — DOCUMENTATION (OUTPATIENT)
Dept: GASTROENTEROLOGY | Facility: CLINIC | Age: 60
End: 2020-03-23

## 2020-03-23 RX ORDER — INSULIN GLARGINE 100 [IU]/ML
INJECTION, SOLUTION SUBCUTANEOUS
Qty: 16 PEN | Refills: 2 | Status: SHIPPED
Start: 2020-03-23 | End: 2020-10-27 | Stop reason: ALTCHOICE

## 2020-03-23 NOTE — PROGRESS NOTES
Call made to patient to check on her in regards to her change in medication to propanolol 20 mg twice daily.  Her systolic blood pressure is staying around 100 and heart rate is now 80.  I would like for her heart rate to be lower, specifically between 55 and 65 but her blood pressure continues to dip consistently.  For now we will keep her on propanolol 20 mg twice daily and I will follow-up with her by phone later in the week.

## 2020-04-09 ENCOUNTER — PREP FOR SURGERY (OUTPATIENT)
Dept: OTHER | Facility: HOSPITAL | Age: 60
End: 2020-04-09

## 2020-04-09 DIAGNOSIS — I85.00 VARICES OF ESOPHAGUS DETERMINED BY ENDOSCOPY (HCC): Primary | ICD-10-CM

## 2020-04-09 DIAGNOSIS — K74.60 CIRRHOSIS OF LIVER WITH ASCITES, UNSPECIFIED HEPATIC CIRRHOSIS TYPE (HCC): ICD-10-CM

## 2020-04-09 DIAGNOSIS — R18.8 CIRRHOSIS OF LIVER WITH ASCITES, UNSPECIFIED HEPATIC CIRRHOSIS TYPE (HCC): ICD-10-CM

## 2020-04-13 ENCOUNTER — TELEPHONE (OUTPATIENT)
Dept: GASTROENTEROLOGY | Facility: CLINIC | Age: 60
End: 2020-04-13

## 2020-04-13 PROBLEM — I85.00 VARICES OF ESOPHAGUS DETERMINED BY ENDOSCOPY (HCC): Status: ACTIVE | Noted: 2020-04-13

## 2020-04-13 NOTE — TELEPHONE ENCOUNTER
Spoke with the patient and got a time and date that works for her. She had a procedure scheduled for 5/21/20 but Ezekiel did not want her to wait that long. We got her EGD scheduled for 4/15/20 @ 10:00am with an arrival time of 8:45am. The patient is aware of where to go and that she cannot have anyone come inside with her.

## 2020-04-15 ENCOUNTER — ANESTHESIA EVENT (OUTPATIENT)
Dept: GASTROENTEROLOGY | Facility: HOSPITAL | Age: 60
End: 2020-04-15

## 2020-04-15 ENCOUNTER — HOSPITAL ENCOUNTER (OUTPATIENT)
Facility: HOSPITAL | Age: 60
Setting detail: HOSPITAL OUTPATIENT SURGERY
Discharge: HOME | End: 2020-04-15
Attending: INTERNAL MEDICINE | Admitting: INTERNAL MEDICINE

## 2020-04-15 ENCOUNTER — ANESTHESIA (OUTPATIENT)
Dept: GASTROENTEROLOGY | Facility: HOSPITAL | Age: 60
End: 2020-04-15

## 2020-04-15 VITALS
OXYGEN SATURATION: 98 % | TEMPERATURE: 97.6 F | HEART RATE: 69 BPM | RESPIRATION RATE: 16 BRPM | DIASTOLIC BLOOD PRESSURE: 64 MMHG | SYSTOLIC BLOOD PRESSURE: 128 MMHG

## 2020-04-15 LAB
GLUCOSE BLDC GLUCOMTR-MCNC: 269 MG/DL (ref 70–130)
POTASSIUM BLD-SCNC: 4.3 MMOL/L (ref 3.5–5.2)

## 2020-04-15 PROCEDURE — 84132 ASSAY OF SERUM POTASSIUM: CPT | Performed by: ANESTHESIOLOGY

## 2020-04-15 PROCEDURE — 82962 GLUCOSE BLOOD TEST: CPT

## 2020-04-15 PROCEDURE — 43235 EGD DIAGNOSTIC BRUSH WASH: CPT | Performed by: INTERNAL MEDICINE

## 2020-04-15 PROCEDURE — 25010000002 PROPOFOL 10 MG/ML EMULSION: Performed by: NURSE ANESTHETIST, CERTIFIED REGISTERED

## 2020-04-15 RX ORDER — SODIUM CHLORIDE, SODIUM LACTATE, POTASSIUM CHLORIDE, AND CALCIUM CHLORIDE .6; .31; .03; .02 G/100ML; G/100ML; G/100ML; G/100ML
30 INJECTION, SOLUTION INTRAVENOUS CONTINUOUS
Status: DISCONTINUED | OUTPATIENT
Start: 2020-04-15 | End: 2020-04-16 | Stop reason: HOSPADM

## 2020-04-15 RX ORDER — PROMETHAZINE HYDROCHLORIDE 25 MG/ML
6.25 INJECTION, SOLUTION INTRAMUSCULAR; INTRAVENOUS ONCE AS NEEDED
Status: DISCONTINUED | OUTPATIENT
Start: 2020-04-15 | End: 2020-04-16 | Stop reason: HOSPADM

## 2020-04-15 RX ORDER — PROMETHAZINE HYDROCHLORIDE 25 MG/1
25 TABLET ORAL ONCE AS NEEDED
Status: DISCONTINUED | OUTPATIENT
Start: 2020-04-15 | End: 2020-04-16 | Stop reason: HOSPADM

## 2020-04-15 RX ORDER — PROMETHAZINE HYDROCHLORIDE 25 MG/1
25 SUPPOSITORY RECTAL ONCE AS NEEDED
Status: DISCONTINUED | OUTPATIENT
Start: 2020-04-15 | End: 2020-04-16 | Stop reason: HOSPADM

## 2020-04-15 RX ORDER — LABETALOL HYDROCHLORIDE 5 MG/ML
5 INJECTION, SOLUTION INTRAVENOUS
Status: DISCONTINUED | OUTPATIENT
Start: 2020-04-15 | End: 2020-04-16 | Stop reason: HOSPADM

## 2020-04-15 RX ORDER — ONDANSETRON 2 MG/ML
4 INJECTION INTRAMUSCULAR; INTRAVENOUS ONCE AS NEEDED
Status: DISCONTINUED | OUTPATIENT
Start: 2020-04-15 | End: 2020-04-16 | Stop reason: HOSPADM

## 2020-04-15 RX ORDER — FAMOTIDINE 10 MG/ML
20 INJECTION, SOLUTION INTRAVENOUS ONCE
Status: COMPLETED | OUTPATIENT
Start: 2020-04-15 | End: 2020-04-15

## 2020-04-15 RX ORDER — PROPOFOL 10 MG/ML
VIAL (ML) INTRAVENOUS AS NEEDED
Status: DISCONTINUED | OUTPATIENT
Start: 2020-04-15 | End: 2020-04-15 | Stop reason: SURG

## 2020-04-15 RX ORDER — IPRATROPIUM BROMIDE AND ALBUTEROL SULFATE 2.5; .5 MG/3ML; MG/3ML
3 SOLUTION RESPIRATORY (INHALATION) ONCE AS NEEDED
Status: DISCONTINUED | OUTPATIENT
Start: 2020-04-15 | End: 2020-04-16 | Stop reason: HOSPADM

## 2020-04-15 RX ORDER — LIDOCAINE HYDROCHLORIDE 10 MG/ML
INJECTION, SOLUTION EPIDURAL; INFILTRATION; INTRACAUDAL; PERINEURAL AS NEEDED
Status: DISCONTINUED | OUTPATIENT
Start: 2020-04-15 | End: 2020-04-15 | Stop reason: SURG

## 2020-04-15 RX ORDER — HYDRALAZINE HYDROCHLORIDE 20 MG/ML
5 INJECTION INTRAMUSCULAR; INTRAVENOUS
Status: DISCONTINUED | OUTPATIENT
Start: 2020-04-15 | End: 2020-04-16 | Stop reason: HOSPADM

## 2020-04-15 RX ADMIN — FAMOTIDINE 20 MG: 10 INJECTION INTRAVENOUS at 09:24

## 2020-04-15 RX ADMIN — SODIUM CHLORIDE, POTASSIUM CHLORIDE, SODIUM LACTATE AND CALCIUM CHLORIDE 30 ML/HR: 600; 310; 30; 20 INJECTION, SOLUTION INTRAVENOUS at 09:27

## 2020-04-15 RX ADMIN — PROPOFOL 20 MG: 10 INJECTION, EMULSION INTRAVENOUS at 09:51

## 2020-04-15 RX ADMIN — PROPOFOL 50 MG: 10 INJECTION, EMULSION INTRAVENOUS at 09:49

## 2020-04-15 RX ADMIN — LIDOCAINE HYDROCHLORIDE 50 MG: 10 INJECTION, SOLUTION EPIDURAL; INFILTRATION; INTRACAUDAL; PERINEURAL at 09:49

## 2020-04-15 RX ADMIN — PROPOFOL 20 MG: 10 INJECTION, EMULSION INTRAVENOUS at 09:52

## 2020-04-15 RX ADMIN — TOPICAL ANESTHETIC 1 SPRAY: 200 SPRAY DENTAL; PERIODONTAL at 09:25

## 2020-04-15 RX ADMIN — PROPOFOL 50 MG: 10 INJECTION, EMULSION INTRAVENOUS at 09:50

## 2020-04-15 NOTE — ANESTHESIA POSTPROCEDURE EVALUATION
Patient: Lyndsey Luna    Procedure Summary     Date:  04/15/20 Room / Location:   [facility]    Anesthesia Start:  0944 Anesthesia Stop:  0959    Procedure:  ESOPHAGOGASTRODUODENOSCOPY (N/A ) Diagnosis:       Varices of esophagus determined by endoscopy (CMS/HCC)      Cirrhosis of liver with ascites, unspecified hepatic cirrhosis type (CMS/HCC)      (Varices of esophagus determined by endoscopy (CMS/HCC) [I85.00])      (Cirrhosis of liver with ascites, unspecified hepatic cirrhosis type (CMS/HCC) [K74.60, R18.8])    Surgeon:  Jayme Astudillo MD Provider:  Gagandeep Arevalo MD    Anesthesia Type:  MAC ASA Status:  3          Anesthesia Type: MAC    Vitals  No vitals data found for the desired time range.          Post Anesthesia Care and Evaluation    Patient location during evaluation: PACU  Patient participation: complete - patient participated  Level of consciousness: awake and alert  Pain score: 0  Pain management: adequate  Airway patency: patent  Anesthetic complications: No anesthetic complications  PONV Status: none  Cardiovascular status: hemodynamically stable and acceptable  Respiratory status: nonlabored ventilation, acceptable and face mask  Hydration status: acceptable

## 2020-04-15 NOTE — OP NOTE
EGD summary.  See EGD report for details    Only small residual varices not amenable to banding noted in esophagus indicating complete obliteration of previously noted large varices.  Mild portal hypertensive gastropathy and antral gastritis noted normal duodenum noted.    Recommendation  Repeat EGD in 1 year  Follow-up GI clinic appointment in 8 weeks

## 2020-04-15 NOTE — ANESTHESIA PREPROCEDURE EVALUATION
Anesthesia Evaluation     Patient summary reviewed and Nursing notes reviewed   NPO Solid Status: > 8 hours  NPO Liquid Status: > 2 hours           Airway   Mallampati: II  TM distance: >3 FB  Neck ROM: full  No difficulty expected  Dental      Pulmonary    (-) COPD, sleep apnea, not a smoker  Cardiovascular   Exercise tolerance: good (4-7 METS)    ECG reviewed  Patient on routine beta blocker    (+) hypertension, past MI (2018 )  >12 months, CAD, cardiac stents (2018 [facility] ) more than 12 months ago hyperlipidemia,   (-) dysrhythmias, angina, CHF, orthopnea, TOLENTINO    ROS comment: ECG NSR     Neuro/Psych  (-) seizures, CVA  GI/Hepatic/Renal/Endo    (+)  PUD, GI bleeding upper , liver disease (ES Liver disease varices and ascites ) history of elevated LFT, renal disease stones, diabetes mellitus type 2 using insulin,     ROS Comment: Portal HTN Varices    Musculoskeletal     Abdominal    Substance History      OB/GYN          Other   arthritis,          Phys Exam Other: Mac 3  Grade 1 veiw 2020               Anesthesia Plan    ASA 3     MAC   (Propofol )  intravenous induction     Anesthetic plan, all risks, benefits, and alternatives have been provided, discussed and informed consent has been obtained with: patient.    Plan discussed with CRNA.

## 2020-04-16 ENCOUNTER — TELEPHONE (OUTPATIENT)
Dept: GASTROENTEROLOGY | Facility: CLINIC | Age: 60
End: 2020-04-16

## 2020-04-16 NOTE — TELEPHONE ENCOUNTER
Patient had an EGD yesterday and is having nausea and diarrhea today?  Do you think this could be from the test, and is there anything that she can do/take to help?

## 2020-04-22 RX ORDER — CYANOCOBALAMIN 1000 UG/ML
1000 INJECTION INTRAMUSCULAR; SUBCUTANEOUS ONCE
Qty: 1 ML | Refills: 0 | Status: SHIPPED | OUTPATIENT
Start: 2020-04-22 | End: 2020-08-13 | Stop reason: SDUPTHER

## 2020-04-22 RX ORDER — FERROUS SULFATE 325(65) MG
325 TABLET ORAL 2 TIMES DAILY WITH MEALS
Qty: 60 TABLET | Refills: 2 | Status: SHIPPED | OUTPATIENT
Start: 2020-04-22 | End: 2020-08-13 | Stop reason: SDUPTHER

## 2020-04-22 RX ORDER — FUROSEMIDE 20 MG/1
20 TABLET ORAL DAILY
Qty: 30 TABLET | Refills: 2 | Status: SHIPPED | OUTPATIENT
Start: 2020-04-22 | End: 2020-05-22

## 2020-05-14 ENCOUNTER — OFFICE VISIT (OUTPATIENT)
Dept: FAMILY MEDICINE CLINIC | Age: 60
End: 2020-05-14
Payer: COMMERCIAL

## 2020-05-14 PROCEDURE — 99442 PR PHYS/QHP TELEPHONE EVALUATION 11-20 MIN: CPT | Performed by: NURSE PRACTITIONER

## 2020-05-14 NOTE — PROGRESS NOTES
MG SL tablet up to max of 3 total doses. If no relief after 1 dose, call 911. 25 tablet 3    UNIFINE PENTIPS 32G X 4 MM MISC USE AS DIRECTED TWO TIMES A  each 5    blood glucose monitor kit and supplies Test blood glucose four times daily Dx E11.9 1 kit 0    blood glucose monitor strips Test blood glucose 4 times daily. Dx E11.9 100 strip 5    Lancets MISC Check blood glucose levels twice daily. Dx E11.9 100 each 5    ACCU-CHEK ANIA PLUS strip Inject 1 each into the skin 2 times daily 100 each 5     No current facility-administered medications on file prior to visit. Review of Systems   Constitutional: Negative for activity change, appetite change, chills and fever. HENT: Negative for congestion, ear pain, nosebleeds, sore throat and trouble swallowing. Eyes: Negative for pain and redness. Respiratory: Negative for cough, chest tightness and shortness of breath. Cardiovascular: Negative for chest pain, palpitations and leg swelling. Gastrointestinal: Negative for abdominal pain, diarrhea, nausea and vomiting. Genitourinary: Negative for difficulty urinating, dysuria and flank pain. Musculoskeletal: Negative for arthralgias, back pain and gait problem. Skin: Negative for color change, pallor, rash and wound. Allergic/Immunologic: Negative for environmental allergies and food allergies. Neurological: Negative for dizziness, numbness and headaches. Hematological: Negative for adenopathy. Does not bruise/bleed easily. Psychiatric/Behavioral: Negative for agitation and sleep disturbance. The patient is not nervous/anxious. OBJECTIVE:  UNABLE TO PERFORM THIS SINCE THIS ENCOUNTER IS VIA TELEPHONE DUE TO LIMITING EXPOSURE TO COVID-19 PER [employer] POLICIES AND PROCEDURES. No results found for requested labs within last 30 days.      Hemoglobin A1C (%)   Date Value   03/10/2020 7.3 (H)     Microscopic Examination (no units)   Date Value   11/15/2018 Not Indicated Microalbumin, Random Urine (mg/dL)   Date Value   07/09/2019 1.40     LDL Calculated (mg/dL)   Date Value   07/09/2019 58       Lab Results   Component Value Date    WBC 3.9 03/10/2020    NEUTROABS 2.3 03/10/2020    HGB 8.8 03/10/2020    HCT 30.0 03/10/2020    HCT 42.9 05/27/2012    MCV 78.5 03/10/2020    PLT 98 03/10/2020     05/27/2012     Lab Results   Component Value Date    TSH 2.54 07/12/2017        ASSESSMENT/PLAN:     Reji Joyner was seen today for 3 month follow-up. Diagnoses and all orders for this visit:    Essential hypertension  Stable. Continues with current plan of care    Type 2 diabetes mellitus with complication, with long-term current use of insulin (HCC)  Stable. Continue with current plan of care. Stage 3 chronic kidney disease (HCC)  Stable. Continue with current plan of care. 11-20 minutes spent on the phone discussing care with the patient. Controlled Substances Monitoring:     RX Monitoring 5/8/2018   Attestation The Prescription Monitoring Report for this patient was reviewed today. Periodic Controlled Substance Monitoring No signs of potential drug abuse or diversion identified. Chronic Pain > 80 MEDD -        PATIENT COUNSELING     Counseling was provided today regarding the following topics: Healthy eating habits, Regular exercise, substance abuse and healthy sleep habits. Discussed use, benefit, and side effects of prescribed medications. Barriers to medication compliance addressed. All patient questions answered. Patient voiced understanding. There are no discontinued medications. Return in about 3 months (around 8/14/2020). KELSEY Bowens CNP     Education was provided for discussed topics. Call the office with worsening complaints or any side effects to any medications. If an emergency please call 911. Please note: This chart was generated using Dragon dictation software.  Although every effort was made to ensure the accuracy of

## 2020-05-15 ASSESSMENT — ENCOUNTER SYMPTOMS
CHEST TIGHTNESS: 0
COUGH: 0
ABDOMINAL PAIN: 0
NAUSEA: 0
TROUBLE SWALLOWING: 0
VOMITING: 0
EYE REDNESS: 0
EYE PAIN: 0
SORE THROAT: 0
DIARRHEA: 0
BACK PAIN: 0
COLOR CHANGE: 0
SHORTNESS OF BREATH: 0

## 2020-05-21 ENCOUNTER — TELEPHONE (OUTPATIENT)
Dept: GASTROENTEROLOGY | Facility: CLINIC | Age: 60
End: 2020-05-21

## 2020-05-21 DIAGNOSIS — K74.60 CIRRHOSIS OF LIVER WITH ASCITES, UNSPECIFIED HEPATIC CIRRHOSIS TYPE (HCC): Primary | ICD-10-CM

## 2020-05-21 DIAGNOSIS — R18.8 CIRRHOSIS OF LIVER WITH ASCITES, UNSPECIFIED HEPATIC CIRRHOSIS TYPE (HCC): Primary | ICD-10-CM

## 2020-05-21 NOTE — TELEPHONE ENCOUNTER
Called patient back because she is having swelling In her abdomen and both ankles.     Per JOSE D Hernandez we are going to reinforce the 2000 mg sodium diet, increase furosemide to 40mg daily , increase Spironolactone to 100mg daily, and a BMP in 3-4 days with the increase in diuretics . No answer and LVM

## 2020-05-22 NOTE — TELEPHONE ENCOUNTER
"Called patient but no answer and the phone was ringing until it said  \"system on\" multiple times then disconnected.   "

## 2020-05-22 NOTE — TELEPHONE ENCOUNTER
Called patient and notified her about JOSE D Hernandez recommendations . We reinforced the 2000 mg sodium diet, increase furosemide to 40mg daily , increase Spironolactone to 100mg daily, and a BMP in 3-4 days with the increase in diuretics . Patient repeated how she will take the furosemide and spironolactone.  Confirmed she understood and will have her labs completed in 3-4 days days.

## 2020-05-28 ENCOUNTER — APPOINTMENT (OUTPATIENT)
Dept: CT IMAGING | Facility: HOSPITAL | Age: 60
End: 2020-05-28

## 2020-05-28 ENCOUNTER — HOSPITAL ENCOUNTER (EMERGENCY)
Facility: HOSPITAL | Age: 60
Discharge: HOME | End: 2020-05-28
Attending: EMERGENCY MEDICINE | Admitting: EMERGENCY MEDICINE

## 2020-05-28 ENCOUNTER — APPOINTMENT (OUTPATIENT)
Dept: GENERAL RADIOLOGY | Facility: HOSPITAL | Age: 60
End: 2020-05-28

## 2020-05-28 VITALS
WEIGHT: 188 LBS | OXYGEN SATURATION: 94 % | BODY MASS INDEX: 29.51 KG/M2 | DIASTOLIC BLOOD PRESSURE: 60 MMHG | HEART RATE: 76 BPM | TEMPERATURE: 98.4 F | HEIGHT: 67 IN | SYSTOLIC BLOOD PRESSURE: 128 MMHG | RESPIRATION RATE: 20 BRPM

## 2020-05-28 DIAGNOSIS — N18.9 CHRONIC RENAL IMPAIRMENT, UNSPECIFIED CKD STAGE: ICD-10-CM

## 2020-05-28 DIAGNOSIS — R44.1 HALLUCINATIONS, VISUAL: Primary | ICD-10-CM

## 2020-05-28 DIAGNOSIS — Z87.19 HISTORY OF CIRRHOSIS: ICD-10-CM

## 2020-05-28 LAB
ALBUMIN SERPL-MCNC: 4.1 G/DL (ref 3.5–5.2)
ALBUMIN/GLOB SERPL: 1.2 G/DL
ALP SERPL-CCNC: 88 U/L (ref 39–117)
ALT SERPL W P-5'-P-CCNC: 29 U/L (ref 1–33)
AMMONIA BLD-SCNC: 52 UMOL/L (ref 11–51)
AMPHET+METHAMPHET UR QL: NEGATIVE
AMPHETAMINES UR QL: NEGATIVE
ANION GAP SERPL CALCULATED.3IONS-SCNC: 14 MMOL/L (ref 5–15)
AST SERPL-CCNC: 27 U/L (ref 1–32)
BACTERIA UR QL AUTO: ABNORMAL /HPF
BARBITURATES UR QL SCN: NEGATIVE
BASOPHILS # BLD AUTO: 0.06 10*3/MM3 (ref 0–0.2)
BASOPHILS NFR BLD AUTO: 1.6 % (ref 0–1.5)
BENZODIAZ UR QL SCN: NEGATIVE
BILIRUB SERPL-MCNC: 0.9 MG/DL (ref 0.2–1.2)
BILIRUB UR QL STRIP: NEGATIVE
BUN BLD-MCNC: 18 MG/DL (ref 6–20)
BUN/CREAT SERPL: 12.9 (ref 7–25)
BUPRENORPHINE SERPL-MCNC: NEGATIVE NG/ML
CALCIUM SPEC-SCNC: 10.2 MG/DL (ref 8.6–10.5)
CANNABINOIDS SERPL QL: NEGATIVE
CHLORIDE SERPL-SCNC: 99 MMOL/L (ref 98–107)
CLARITY UR: CLEAR
CO2 SERPL-SCNC: 24 MMOL/L (ref 22–29)
COCAINE UR QL: NEGATIVE
COLOR UR: YELLOW
CREAT BLD-MCNC: 1.4 MG/DL (ref 0.57–1)
DEPRECATED RDW RBC AUTO: 55.5 FL (ref 37–54)
EOSINOPHIL # BLD AUTO: 0.23 10*3/MM3 (ref 0–0.4)
EOSINOPHIL NFR BLD AUTO: 6 % (ref 0.3–6.2)
ERYTHROCYTE [DISTWIDTH] IN BLOOD BY AUTOMATED COUNT: 17.4 % (ref 12.3–15.4)
ETHANOL BLD-MCNC: <10 MG/DL (ref 0–10)
GFR SERPL CREATININE-BSD FRML MDRD: 38 ML/MIN/1.73
GLOBULIN UR ELPH-MCNC: 3.3 GM/DL
GLUCOSE BLD-MCNC: 198 MG/DL (ref 65–99)
GLUCOSE UR STRIP-MCNC: NEGATIVE MG/DL
HCT VFR BLD AUTO: 37.3 % (ref 34–46.6)
HGB BLD-MCNC: 11.8 G/DL (ref 12–15.9)
HGB UR QL STRIP.AUTO: NEGATIVE
HOLD SPECIMEN: NORMAL
HOLD SPECIMEN: NORMAL
HYALINE CASTS UR QL AUTO: ABNORMAL /LPF
IMM GRANULOCYTES # BLD AUTO: 0.01 10*3/MM3 (ref 0–0.05)
IMM GRANULOCYTES NFR BLD AUTO: 0.3 % (ref 0–0.5)
KETONES UR QL STRIP: NEGATIVE
LEUKOCYTE ESTERASE UR QL STRIP.AUTO: ABNORMAL
LIPASE SERPL-CCNC: 98 U/L (ref 13–60)
LYMPHOCYTES # BLD AUTO: 0.99 10*3/MM3 (ref 0.7–3.1)
LYMPHOCYTES NFR BLD AUTO: 25.9 % (ref 19.6–45.3)
MCH RBC QN AUTO: 27.7 PG (ref 26.6–33)
MCHC RBC AUTO-ENTMCNC: 31.6 G/DL (ref 31.5–35.7)
MCV RBC AUTO: 87.6 FL (ref 79–97)
METHADONE UR QL SCN: NEGATIVE
MONOCYTES # BLD AUTO: 0.42 10*3/MM3 (ref 0.1–0.9)
MONOCYTES NFR BLD AUTO: 11 % (ref 5–12)
NEUTROPHILS # BLD AUTO: 2.11 10*3/MM3 (ref 1.7–7)
NEUTROPHILS NFR BLD AUTO: 55.2 % (ref 42.7–76)
NITRITE UR QL STRIP: NEGATIVE
NRBC BLD AUTO-RTO: 0 /100 WBC (ref 0–0.2)
OPIATES UR QL: NEGATIVE
OXYCODONE UR QL SCN: NEGATIVE
PCP UR QL SCN: NEGATIVE
PH UR STRIP.AUTO: <=5 [PH] (ref 5–8)
PLATELET # BLD AUTO: 55 10*3/MM3 (ref 140–450)
PMV BLD AUTO: 12 FL (ref 6–12)
POTASSIUM BLD-SCNC: 3.9 MMOL/L (ref 3.5–5.2)
PROPOXYPH UR QL: NEGATIVE
PROT SERPL-MCNC: 7.4 G/DL (ref 6–8.5)
PROT UR QL STRIP: NEGATIVE
RBC # BLD AUTO: 4.26 10*6/MM3 (ref 3.77–5.28)
RBC # UR: ABNORMAL /HPF
REF LAB TEST METHOD: ABNORMAL
SODIUM BLD-SCNC: 137 MMOL/L (ref 136–145)
SP GR UR STRIP: 1.01 (ref 1–1.03)
SQUAMOUS #/AREA URNS HPF: ABNORMAL /HPF
TRICYCLICS UR QL SCN: NEGATIVE
TROPONIN T SERPL-MCNC: <0.01 NG/ML (ref 0–0.03)
UROBILINOGEN UR QL STRIP: ABNORMAL
WBC NRBC COR # BLD: 3.82 10*3/MM3 (ref 3.4–10.8)
WBC UR QL AUTO: ABNORMAL /HPF
WHOLE BLOOD HOLD SPECIMEN: NORMAL
WHOLE BLOOD HOLD SPECIMEN: NORMAL

## 2020-05-28 PROCEDURE — 81001 URINALYSIS AUTO W/SCOPE: CPT | Performed by: PHYSICIAN ASSISTANT

## 2020-05-28 PROCEDURE — 87086 URINE CULTURE/COLONY COUNT: CPT | Performed by: PHYSICIAN ASSISTANT

## 2020-05-28 PROCEDURE — 93005 ELECTROCARDIOGRAM TRACING: CPT | Performed by: PHYSICIAN ASSISTANT

## 2020-05-28 PROCEDURE — 83690 ASSAY OF LIPASE: CPT | Performed by: PHYSICIAN ASSISTANT

## 2020-05-28 PROCEDURE — 85025 COMPLETE CBC W/AUTO DIFF WBC: CPT | Performed by: PHYSICIAN ASSISTANT

## 2020-05-28 PROCEDURE — 70450 CT HEAD/BRAIN W/O DYE: CPT

## 2020-05-28 PROCEDURE — 80307 DRUG TEST PRSMV CHEM ANLYZR: CPT | Performed by: PHYSICIAN ASSISTANT

## 2020-05-28 PROCEDURE — 99284 EMERGENCY DEPT VISIT MOD MDM: CPT

## 2020-05-28 PROCEDURE — 82140 ASSAY OF AMMONIA: CPT | Performed by: PHYSICIAN ASSISTANT

## 2020-05-28 PROCEDURE — 84484 ASSAY OF TROPONIN QUANT: CPT | Performed by: PHYSICIAN ASSISTANT

## 2020-05-28 PROCEDURE — 71045 X-RAY EXAM CHEST 1 VIEW: CPT

## 2020-05-28 PROCEDURE — 80053 COMPREHEN METABOLIC PANEL: CPT | Performed by: PHYSICIAN ASSISTANT

## 2020-05-28 RX ORDER — SODIUM CHLORIDE 0.9 % (FLUSH) 0.9 %
10 SYRINGE (ML) INJECTION AS NEEDED
Status: DISCONTINUED | OUTPATIENT
Start: 2020-05-28 | End: 2020-05-28 | Stop reason: HOSPADM

## 2020-05-28 RX ORDER — PANTOPRAZOLE SODIUM 40 MG/1
40 TABLET, DELAYED RELEASE ORAL DAILY
Qty: 30 TABLET | Refills: 2 | Status: SHIPPED | OUTPATIENT
Start: 2020-05-28 | End: 2020-08-13 | Stop reason: SDUPTHER

## 2020-05-28 NOTE — TELEPHONE ENCOUNTER
Called patient because it looks like she never had her labs drawn after the increase in diuretics.     No answer and LVM

## 2020-05-28 NOTE — TELEPHONE ENCOUNTER
Called patient back because she had left a voicemail . The patient confirmed she has not had her labs completed because she forgot to start increasing the dose of her medications until 05/26/2020 so her fourth day will be Monday and she will go have her labs drawn tomorrow morning.     While on the phone the patient noted she believes she's been hallucinating on and off for the past 3 weeks. For example the patient has been seeing someone walking across the patio when theres no one there and something crossing the floor . Patient was unable to give me more examples and confirmed she has had increased forgetfulness. The patient has not had  Hallucinations today but the past 2-3 days prior they had become more frequent to the point she was concerned. Confirmed patient has not had shaking of the hands or arms, disorientation, or slurred speech.    Tried contacting JOSE D Hernandez but we were  Unable to get in contact with her. Notified the physician   Who was still in office and he recommended the patient go to the Emergency Department with her diagnosis of cirrhosis because of the concern for hepatic encephalopathy.  Notified patient of 's recommendations and the reasoning behind them. Patient confirmed she understood and will go to [facility] Emergency Department .

## 2020-05-29 ENCOUNTER — TELEPHONE (OUTPATIENT)
Dept: GASTROENTEROLOGY | Facility: CLINIC | Age: 60
End: 2020-05-29

## 2020-05-29 RX ORDER — CARVEDILOL 6.25 MG/1
6.25 TABLET ORAL 2 TIMES DAILY WITH MEALS
Qty: 60 TABLET | Refills: 2 | Status: SHIPPED | OUTPATIENT
Start: 2020-05-29 | End: 2022-05-13

## 2020-05-29 NOTE — TELEPHONE ENCOUNTER
Called the patient back because she had left a voicemail  That the Emergency Department told her to follow up with our office because JOSE D Hernnadez  May want to make some medication changes .    Notified JOSE D Hernandez who confirmed the Emergency Department had recommended the patient stop her propranolol because they think that may have been what was causing the patients hallucinations so we will start the patient back on carvedilol 6.25 mg take 1 by mouth twice daily . Notified patient of JOSE D Hernandez recommendations at this time.  Patient noted that she did take the propranolol this morning because she was concerned about her blood pressure going up . Reiterated to the patient she should stop the propranolol because of the hallucinations and she can not take propranolol with the carvedilol . Patient confirmed she understood and will discontinue the Propranolol and start the carvedilol tomorrow .

## 2020-05-29 NOTE — TELEPHONE ENCOUNTER
Called [facility] pharmacy and spoke with Tona VALDOVINOS ,  Intern Pharmacist who confirmed she will cancel the patients prescription for propranolol.

## 2020-05-30 LAB — BACTERIA SPEC AEROBE CULT: NO GROWTH

## 2020-06-17 ENCOUNTER — HOSPITAL ENCOUNTER (EMERGENCY)
Facility: HOSPITAL | Age: 60
Discharge: HOME | End: 2020-06-17
Attending: EMERGENCY MEDICINE | Admitting: EMERGENCY MEDICINE

## 2020-06-17 ENCOUNTER — APPOINTMENT (OUTPATIENT)
Dept: CT IMAGING | Facility: HOSPITAL | Age: 60
End: 2020-06-17

## 2020-06-17 VITALS
SYSTOLIC BLOOD PRESSURE: 128 MMHG | OXYGEN SATURATION: 97 % | TEMPERATURE: 98 F | HEART RATE: 75 BPM | HEIGHT: 67 IN | RESPIRATION RATE: 18 BRPM | BODY MASS INDEX: 29.51 KG/M2 | WEIGHT: 188 LBS | DIASTOLIC BLOOD PRESSURE: 75 MMHG

## 2020-06-17 DIAGNOSIS — D69.6 THROMBOCYTOPENIA (HCC): ICD-10-CM

## 2020-06-17 DIAGNOSIS — R07.9 CHEST PAIN, UNSPECIFIED TYPE: Primary | ICD-10-CM

## 2020-06-17 DIAGNOSIS — Z86.79 HISTORY OF CORONARY ARTERY DISEASE: ICD-10-CM

## 2020-06-17 LAB
ALBUMIN SERPL-MCNC: 3.9 G/DL (ref 3.5–5.2)
ALBUMIN/GLOB SERPL: 1.3 G/DL
ALP SERPL-CCNC: 82 U/L (ref 39–117)
ALT SERPL W P-5'-P-CCNC: 27 U/L (ref 1–33)
ANION GAP SERPL CALCULATED.3IONS-SCNC: 11 MMOL/L (ref 5–15)
AST SERPL-CCNC: 25 U/L (ref 1–32)
BASOPHILS # BLD AUTO: 0.03 10*3/MM3 (ref 0–0.2)
BASOPHILS NFR BLD AUTO: 1.1 % (ref 0–1.5)
BILIRUB SERPL-MCNC: 0.9 MG/DL (ref 0.2–1.2)
BUN BLD-MCNC: 12 MG/DL (ref 6–20)
BUN/CREAT SERPL: 9.8 (ref 7–25)
CALCIUM SPEC-SCNC: 9.2 MG/DL (ref 8.6–10.5)
CHLORIDE SERPL-SCNC: 105 MMOL/L (ref 98–107)
CO2 SERPL-SCNC: 26 MMOL/L (ref 22–29)
CREAT BLD-MCNC: 1.23 MG/DL (ref 0.57–1)
DEPRECATED RDW RBC AUTO: 50.9 FL (ref 37–54)
EOSINOPHIL # BLD AUTO: 0.14 10*3/MM3 (ref 0–0.4)
EOSINOPHIL NFR BLD AUTO: 5.3 % (ref 0.3–6.2)
ERYTHROCYTE [DISTWIDTH] IN BLOOD BY AUTOMATED COUNT: 15.7 % (ref 12.3–15.4)
GFR SERPL CREATININE-BSD FRML MDRD: 45 ML/MIN/1.73
GLOBULIN UR ELPH-MCNC: 2.9 GM/DL
GLUCOSE BLD-MCNC: 264 MG/DL (ref 65–99)
HCT VFR BLD AUTO: 35.8 % (ref 34–46.6)
HGB BLD-MCNC: 11.6 G/DL (ref 12–15.9)
HOLD SPECIMEN: NORMAL
HOLD SPECIMEN: NORMAL
IMM GRANULOCYTES # BLD AUTO: 0.01 10*3/MM3 (ref 0–0.05)
IMM GRANULOCYTES NFR BLD AUTO: 0.4 % (ref 0–0.5)
LIPASE SERPL-CCNC: 54 U/L (ref 13–60)
LYMPHOCYTES # BLD AUTO: 0.68 10*3/MM3 (ref 0.7–3.1)
LYMPHOCYTES NFR BLD AUTO: 25.9 % (ref 19.6–45.3)
MCH RBC QN AUTO: 28.7 PG (ref 26.6–33)
MCHC RBC AUTO-ENTMCNC: 32.4 G/DL (ref 31.5–35.7)
MCV RBC AUTO: 88.6 FL (ref 79–97)
MONOCYTES # BLD AUTO: 0.33 10*3/MM3 (ref 0.1–0.9)
MONOCYTES NFR BLD AUTO: 12.5 % (ref 5–12)
NEUTROPHILS # BLD AUTO: 1.44 10*3/MM3 (ref 1.7–7)
NEUTROPHILS NFR BLD AUTO: 54.8 % (ref 42.7–76)
NRBC BLD AUTO-RTO: 0 /100 WBC (ref 0–0.2)
NT-PROBNP SERPL-MCNC: 178.1 PG/ML (ref 5–900)
PLATELET # BLD AUTO: 43 10*3/MM3 (ref 140–450)
PMV BLD AUTO: 12 FL (ref 6–12)
POTASSIUM BLD-SCNC: 3.6 MMOL/L (ref 3.5–5.2)
PROT SERPL-MCNC: 6.8 G/DL (ref 6–8.5)
RBC # BLD AUTO: 4.04 10*6/MM3 (ref 3.77–5.28)
SODIUM BLD-SCNC: 142 MMOL/L (ref 136–145)
TROPONIN T SERPL-MCNC: <0.01 NG/ML (ref 0–0.03)
TROPONIN T SERPL-MCNC: <0.01 NG/ML (ref 0–0.03)
WBC NRBC COR # BLD: 2.63 10*3/MM3 (ref 3.4–10.8)
WHOLE BLOOD HOLD SPECIMEN: NORMAL
WHOLE BLOOD HOLD SPECIMEN: NORMAL

## 2020-06-17 PROCEDURE — 85025 COMPLETE CBC W/AUTO DIFF WBC: CPT | Performed by: EMERGENCY MEDICINE

## 2020-06-17 PROCEDURE — 84484 ASSAY OF TROPONIN QUANT: CPT | Performed by: EMERGENCY MEDICINE

## 2020-06-17 PROCEDURE — 80053 COMPREHEN METABOLIC PANEL: CPT | Performed by: EMERGENCY MEDICINE

## 2020-06-17 PROCEDURE — 0 IOPAMIDOL PER 1 ML: Performed by: EMERGENCY MEDICINE

## 2020-06-17 PROCEDURE — 99283 EMERGENCY DEPT VISIT LOW MDM: CPT

## 2020-06-17 PROCEDURE — 71275 CT ANGIOGRAPHY CHEST: CPT

## 2020-06-17 PROCEDURE — 93005 ELECTROCARDIOGRAM TRACING: CPT | Performed by: EMERGENCY MEDICINE

## 2020-06-17 PROCEDURE — 83880 ASSAY OF NATRIURETIC PEPTIDE: CPT | Performed by: EMERGENCY MEDICINE

## 2020-06-17 PROCEDURE — 83690 ASSAY OF LIPASE: CPT | Performed by: EMERGENCY MEDICINE

## 2020-06-17 RX ORDER — SODIUM CHLORIDE 0.9 % (FLUSH) 0.9 %
10 SYRINGE (ML) INJECTION AS NEEDED
Status: DISCONTINUED | OUTPATIENT
Start: 2020-06-17 | End: 2020-06-17 | Stop reason: HOSPADM

## 2020-06-17 RX ORDER — ASPIRIN 81 MG/1
324 TABLET, CHEWABLE ORAL ONCE
Status: COMPLETED | OUTPATIENT
Start: 2020-06-17 | End: 2020-06-17

## 2020-06-17 RX ADMIN — ASPIRIN 81 MG 324 MG: 81 TABLET ORAL at 12:51

## 2020-06-17 RX ADMIN — IOPAMIDOL 85 ML: 755 INJECTION, SOLUTION INTRAVENOUS at 15:25

## 2020-06-17 NOTE — CONSULTS
[facility] Consult Note      Patient Care Team:  Javid Lin APRN as PCP - General (Nurse Practitioner)  Javid Lin APRN  No ref. provider found    Subjective     History of Present Illness: Ms. Luna is a pleasant 59-year-old female with a history of hypertension, hypercholesterolemia, diabetes mellitus, and coronary artery disease.  She is post previous MI and stents approximately 2 years ago at [facility].  She is complaining of a 1 day history of left arm pain which she describes as an ache.  She said this is similar to what she felt prior to her heart attack and this made her extremely anxious and nervous.  She denied any associated shortness of breath or palpitations.  She also denies any exertional component.  Her EKG reveals no acute EKG changes and her troponins are negative x2.      Current Facility-Administered Medications:   •  sodium chloride 0.9 % flush 10 mL, 10 mL, Intravenous, PRN, Malika, Lele Bone MD    Current Outpatient Medications:   •  aspirin 81 MG EC tablet, Take 81 mg by mouth Daily., Disp: , Rfl:   •  atorvastatin (LIPITOR) 80 MG tablet, Take 80 mg by mouth Daily., Disp: , Rfl:   •  cyanocobalamin 1000 MCG/ML injection, Inject 1,000 mcg into the appropriate muscle as directed by prescriber 1 (One) Time Per Week., Disp: , Rfl:   •  insulin glargine (LANTUS) 100 UNIT/ML injection, Inject 100 Units under the skin into the appropriate area as directed Every Night., Disp: , Rfl:   •  loratadine (CLARITIN) 10 MG tablet, Take 10 mg by mouth Daily., Disp: , Rfl:   •  metFORMIN (GLUCOPHAGE) 1000 MG tablet, Take 1,000 mg by mouth 2 (Two) Times a Day With Meals., Disp: , Rfl:   •  nitroglycerin (NITROSTAT) 0.4 MG SL tablet, Take 1 tablet by mouth As Needed., Disp: , Rfl:   •  SITagliptin (JANUVIA) 100 MG tablet, Take 100 mg by mouth Daily., Disp: , Rfl:   •  carvedilol (Coreg) 6.25 MG tablet, Take 1 tablet by mouth 2 (Two) Times a Day  With Meals., Disp: 60 tablet, Rfl: 2    Social History     Socioeconomic History   • Marital status:      Spouse name: Not on file   • Number of children: Not on file   • Years of education: Not on file   • Highest education level: Not on file   Tobacco Use   • Smoking status: Never Smoker   Substance and Sexual Activity   • Alcohol use: Never     Frequency: Never   • Drug use: Never   • Sexual activity: Defer       Family History   Problem Relation Age of Onset   • Diabetes Father        Review of Systems   Constitutional: Negative.    HENT: Negative.    Eyes: Negative.    Respiratory: Negative.    Cardiovascular: Positive for chest pain.   Gastrointestinal: Negative.    Endocrine: Negative.    Genitourinary: Negative.    Musculoskeletal: Negative.    Skin: Negative.    Allergic/Immunologic: Negative.    Neurological: Negative.    Hematological: Negative.    Psychiatric/Behavioral: The patient is nervous/anxious.           Objective     Vital Signs  Temp:  [98 °F (36.7 °C)] 98 °F (36.7 °C)  Heart Rate:  [75] 75  Resp:  [18] 18  BP: (128)/(75) 128/75    No intake or output data in the 24 hours ending 06/17/20 1432  No intake/output data recorded.    Physical Exam   Constitutional: No distress.   HENT:   Nose: Nose normal.   Mouth/Throat: Oropharynx is clear.   Eyes: Pupils are equal, round, and reactive to light. Conjunctivae are normal.   Neck: Normal range of motion and thyroid normal. Neck supple. No JVD present.   Cardiovascular: Normal rate, regular rhythm and normal heart sounds. Exam reveals no gallop and no S3.   No murmur heard.  Pulmonary/Chest: Effort normal and breath sounds normal. She exhibits no tenderness.   Abdominal: Soft. Bowel sounds are normal. She exhibits no distension. There is no tenderness.   Musculoskeletal: Normal range of motion. She exhibits no edema.   Neurological: She is alert and oriented to person, place, and time.   Skin: Skin is warm and dry.         Results Review:    I  reviewed the patient's new clinical results.    WBC WBC   Date/Time Value Ref Range Status   06/17/2020 1258 2.63 (L) 3.40 - 10.80 10*3/mm3 Final      HGB Hemoglobin   Date/Time Value Ref Range Status   06/17/2020 1258 11.6 (L) 12.0 - 15.9 g/dL Final      HCT Hematocrit   Date/Time Value Ref Range Status   06/17/2020 1258 35.8 34.0 - 46.6 % Final      Platlets Platelets   Date/Time Value Ref Range Status   06/17/2020 1258 43 (C) 140 - 450 10*3/mm3 Final        PT/INR:      Lab Results   Component Value Date    PROTIME 15.0 (H) 03/17/2020    PROTIME 16.6 (H) 02/23/2020    PROTIME 15.8 (H) 02/17/2020    PROTIME 16.7 (H) 02/14/2020    PROTIME 11.5 (H) 02/28/2018    INR 1.21 (H) 03/17/2020    INR 1.41 (H) 02/23/2020    INR 1.32 (H) 02/17/2020    INR 1.42 (H) 02/14/2020    INR 1.10 02/28/2018       Sodium Sodium   Date/Time Value Ref Range Status   06/17/2020 1258 142 136 - 145 mmol/L Final      Potassium Potassium   Date/Time Value Ref Range Status   06/17/2020 1258 3.6 3.5 - 5.2 mmol/L Final      Chloride Chloride   Date/Time Value Ref Range Status   06/17/2020 1258 105 98 - 107 mmol/L Final      Bicarbonate No results found for: PLASMABICARB   BUN BUN   Date/Time Value Ref Range Status   06/17/2020 1258 12 6 - 20 mg/dL Final      Creatinine Creatinine   Date/Time Value Ref Range Status   06/17/2020 1258 1.23 (H) 0.57 - 1.00 mg/dL Final      Calcium Calcium   Date/Time Value Ref Range Status   06/17/2020 1258 9.2 8.6 - 10.5 mg/dL Final      Magnesium @RESULFAST(MG:3)@   Troponin       No results found for: TROPONINI                EKG: normal EKG, normal sinus rhythm.      Patient Active Problem List   Diagnosis   • Portal hypertension (CMS/[facility])   • CAD (coronary artery disease)   • Type 2 diabetes mellitus, with long-term current use of insulin (CMS/HCC)   • Splenomegaly   • Cirrhosis of liver with ascites (CMS/HCC)   • Secondary esophageal varices with bleeding (CMS/HCC)   • End stage liver disease (CMS/HCC)   •  Varices of esophagus determined by endoscopy (CMS/[facility])       Assessment/Plan     59-year-old female with a known history of coronary artery disease.  She is post previous MI and stents at [facility] approximately 2 years ago.  She presented to [facility] ED with chest pain and left arm pain.  Troponins are negative x2 and EKG reveals no acute EKG changes.  We we will schedule her for a Lexiscan stress test ASAP.  She has been instructed to avoid exertion.  She does have sublingual nitroglycerin at home.    I discussed the patient's findings and my recommendations with patient, nursing staff and consulting provider    GELA Long  06/17/20  14:32

## 2020-06-17 NOTE — ED PROVIDER NOTES
Subjective   59-year-old female with a history of coronary artery disease presents for evaluation of chest pain.  Of note, the patient last had a heart catheterization in 2018 at which time she received 2 stents.  She is followed by Dr. Wilson.  She states that she has been experiencing some mild stuttering chest pain over the past 2 days but this morning began experiencing left-sided chest pressure that radiated to her left arm, prompting her visit to the emergency department.  Pain is currently 4 out of 10 in severity.  She denies any accompanying vomiting or diaphoresis.  She is unsure as to what may have triggered her current symptoms.  No cough or fever.  No recent illness.  No known exposures to anyone with a novel coronavirus.          Review of Systems   Cardiovascular: Positive for chest pain.   All other systems reviewed and are negative.      Past Medical History:   Diagnosis Date   • Arthritis    • Diabetes mellitus (CMS/HCC)    • Gastric ulcer    • Hypertension    • Kidney stone    • Splenomegaly        Allergies   Allergen Reactions   • Penicillins Itching       Past Surgical History:   Procedure Laterality Date   • CARDIAC CATHETERIZATION     • CYSTOSCOPY, URETEROSCOPY, RETROGRADE PYELOGRAM, STONE EXTRACTION, STENT INSERTION Left 2/23/2020    Procedure: CYSTOSCOPY URETEROSCOPY STONE EXTRACTION STENT INSERTION;  Surgeon: Elmer Weiss MD;  Location: [facility] OR;  Service: Urology;  Laterality: Left;   • ENDOSCOPY N/A 2/14/2020    Procedure: ESOPHAGOGASTRODUODENOSCOPY;  Surgeon: Brant Finch MD;  Location:  [facility];  Service: Gastroenterology;  Laterality: N/A;  Esophageal banding for a total of 7 bands   • ENDOSCOPY N/A 4/15/2020    Procedure: ESOPHAGOGASTRODUODENOSCOPY;  Surgeon: Jayme Astudillo MD;  Location:  [facility];  Service: Gastroenterology;  Laterality: N/A;   • FRACTURE SURGERY      LEFT ANKLE   • KIDNEY STONE SURGERY         Family History   Problem  Relation Age of Onset   • Diabetes Father        Social History     Socioeconomic History   • Marital status:      Spouse name: Not on file   • Number of children: Not on file   • Years of education: Not on file   • Highest education level: Not on file   Tobacco Use   • Smoking status: Never Smoker   Substance and Sexual Activity   • Alcohol use: Never     Frequency: Never   • Drug use: Never   • Sexual activity: Defer           Objective   Physical Exam   Constitutional: She is oriented to person, place, and time. She appears well-developed and well-nourished. No distress.   Nontoxic-appearing female   HENT:   Head: Normocephalic and atraumatic.   Mouth/Throat: Oropharynx is clear and moist.   Eyes: Pupils are equal, round, and reactive to light. EOM are normal.   Neck: Normal range of motion. Neck supple.   Cardiovascular: Normal rate, normal heart sounds, intact distal pulses and normal pulses. Exam reveals no gallop and no friction rub.   No murmur heard.  Pulmonary/Chest: Effort normal and breath sounds normal. No respiratory distress. She has no wheezes. She has no rales.   Abdominal: Soft. Bowel sounds are normal. She exhibits no distension and no mass. There is no tenderness. There is no rebound and no guarding.   Musculoskeletal: Normal range of motion.        Right lower leg: She exhibits no edema.        Left lower leg: She exhibits no edema.   Neurological: She is alert and oriented to person, place, and time.   Skin: Skin is warm and dry. No rash noted. She is not diaphoretic. No erythema.   Psychiatric: She has a normal mood and affect. Judgment and thought content normal.   Nursing note and vitals reviewed.      Procedures           ED Course  ED Course as of Jun 17 1652 Wed Jun 17, 2020   1241 59-year-old female with a history of coronary artery disease, followed by Dr. Wilson, presents for evaluation of chest pain.  The patient states that she had 2 stents in 2018.  Over the past couple of  days, the patient states that she has had some stuttering chest pain.  This morning, she had chest pressure that radiated to her back and her left upper extremity, prompting her visit to the emergency department this morning.  On arrival to the ED, patient nontoxic-appearing.  Benign exam.    [DD]   1242 Initial EKG revealed normal sinus rhythm with a heart rate of 76 and no ST segments suggestive of or concerning for ischemia.  We will obtain labs and imaging prior to consulting cardiology.    [DD]   1334 I discussed the patient's case with Dr. Wilson, and he will come to the emergency department to evaluate the patient.    [DD]   1336 Labs remarkable for significant thrombocytopenia.    [DD]   1411 Xavi from cardiology came and evaluated the patient and recommended obtaining a repeat troponin and EKG, and assuming that they were negative/unchanged, he recommended follow-up stress test within the next 48 hours which she will arrange.  I agree with his expert assessment.    [DD]   1541 Chest CTA unremarkable.  Upon reevaluation, the patient feels much improved.    [DD]   1542 Repeat troponin/EKG negative/unchanged.  Doubt ACS, PE, dissection, or emergent cardiothoracic process at this time based on exam, history, clinical presentation, gestalt, objective findings in the ED, and risk stratification.  The patient will follow up with the chest pain clinic within the next 48 hours for further outpatient work-up and evaluation.  Agreeable with plan and given appropriate strict return precautions.        [DD]   1543 The patient's thrombocytopenia is chronic in nature.  She will follow-up with her primary care physician within the next 1 to 2 weeks for recheck of her platelets.    [DD]      ED Course User Index  [DD] Lele Daly MD                                   Recent Results (from the past 24 hour(s))   Troponin    Collection Time: 06/17/20 12:58 PM   Result Value Ref Range    Troponin T <0.010 0.000 - 0.030  ng/mL   Comprehensive Metabolic Panel    Collection Time: 06/17/20 12:58 PM   Result Value Ref Range    Glucose 264 (H) 65 - 99 mg/dL    BUN 12 6 - 20 mg/dL    Creatinine 1.23 (H) 0.57 - 1.00 mg/dL    Sodium 142 136 - 145 mmol/L    Potassium 3.6 3.5 - 5.2 mmol/L    Chloride 105 98 - 107 mmol/L    CO2 26.0 22.0 - 29.0 mmol/L    Calcium 9.2 8.6 - 10.5 mg/dL    Total Protein 6.8 6.0 - 8.5 g/dL    Albumin 3.90 3.50 - 5.20 g/dL    ALT (SGPT) 27 1 - 33 U/L    AST (SGOT) 25 1 - 32 U/L    Alkaline Phosphatase 82 39 - 117 U/L    Total Bilirubin 0.9 0.2 - 1.2 mg/dL    eGFR Non African Amer 45 (L) >60 mL/min/1.73    Globulin 2.9 gm/dL    A/G Ratio 1.3 g/dL    BUN/Creatinine Ratio 9.8 7.0 - 25.0    Anion Gap 11.0 5.0 - 15.0 mmol/L   Lipase    Collection Time: 06/17/20 12:58 PM   Result Value Ref Range    Lipase 54 13 - 60 U/L   BNP    Collection Time: 06/17/20 12:58 PM   Result Value Ref Range    proBNP 178.1 5.0 - 900.0 pg/mL   Light Blue Top    Collection Time: 06/17/20 12:58 PM   Result Value Ref Range    Extra Tube hold for add-on    Green Top (Gel)    Collection Time: 06/17/20 12:58 PM   Result Value Ref Range    Extra Tube Hold for add-ons.    Lavender Top    Collection Time: 06/17/20 12:58 PM   Result Value Ref Range    Extra Tube hold for add-on    Gold Top - SST    Collection Time: 06/17/20 12:58 PM   Result Value Ref Range    Extra Tube Hold for add-ons.    CBC Auto Differential    Collection Time: 06/17/20 12:58 PM   Result Value Ref Range    WBC 2.63 (L) 3.40 - 10.80 10*3/mm3    RBC 4.04 3.77 - 5.28 10*6/mm3    Hemoglobin 11.6 (L) 12.0 - 15.9 g/dL    Hematocrit 35.8 34.0 - 46.6 %    MCV 88.6 79.0 - 97.0 fL    MCH 28.7 26.6 - 33.0 pg    MCHC 32.4 31.5 - 35.7 g/dL    RDW 15.7 (H) 12.3 - 15.4 %    RDW-SD 50.9 37.0 - 54.0 fl    MPV 12.0 6.0 - 12.0 fL    Platelets 43 (C) 140 - 450 10*3/mm3    Neutrophil % 54.8 42.7 - 76.0 %    Lymphocyte % 25.9 19.6 - 45.3 %    Monocyte % 12.5 (H) 5.0 - 12.0 %    Eosinophil % 5.3 0.3 -  "6.2 %    Basophil % 1.1 0.0 - 1.5 %    Immature Grans % 0.4 0.0 - 0.5 %    Neutrophils, Absolute 1.44 (L) 1.70 - 7.00 10*3/mm3    Lymphocytes, Absolute 0.68 (L) 0.70 - 3.10 10*3/mm3    Monocytes, Absolute 0.33 0.10 - 0.90 10*3/mm3    Eosinophils, Absolute 0.14 0.00 - 0.40 10*3/mm3    Basophils, Absolute 0.03 0.00 - 0.20 10*3/mm3    Immature Grans, Absolute 0.01 0.00 - 0.05 10*3/mm3    nRBC 0.0 0.0 - 0.2 /100 WBC   Troponin    Collection Time: 06/17/20  3:11 PM   Result Value Ref Range    Troponin T <0.010 0.000 - 0.030 ng/mL     Note: In addition to lab results from this visit, the labs listed above may include labs taken at another facility or during a different encounter within the last 24 hours. Please correlate lab times with ED admission and discharge times for further clarification of the services performed during this visit.    CT Angiogram Chest   Preliminary Result   1. Normal-appearing thoracic and visualized abdominal aorta without   dissection or periaortic inflammation.   2. No acute intrathoracic process.   3. Visual is portions of the upper abdomen demonstrate perihepatic and   paraspinal ascites of indeterminate segments with questionable   hepatosplenomegaly on partially imaging of the upper abdomen segments.                Vitals:    06/17/20 1238 06/17/20 1240   BP:  128/75   Pulse:  75   Resp:  18   Temp:  98 °F (36.7 °C)   TempSrc:  Oral   SpO2:  97%   Weight: 85.3 kg (188 lb)    Height: 170.2 cm (67\")      Medications   sodium chloride 0.9 % flush 10 mL (has no administration in time range)   aspirin chewable tablet 324 mg (324 mg Oral Given 6/17/20 1251)   iopamidol (ISOVUE-370) 76 % injection 100 mL (85 mL Intravenous Given 6/17/20 1525)     ECG/EMG Results (last 24 hours)     Procedure Component Value Units Date/Time    ECG 12 Lead [[id number]] Collected:  06/17/20 1239     Updated:  06/17/20 1359    ECG 12 Lead [[id number]] Collected:  06/17/20 1504     Updated:  06/17/20 1513        ECG 12 " Lead         ECG 12 Lead                     MDM    Final diagnoses:   Chest pain, unspecified type   History of coronary artery disease   Thrombocytopenia (CMS/HCC)            Lele Daly MD  06/17/20 [id number]

## 2020-06-22 ENCOUNTER — TELEPHONE (OUTPATIENT)
Dept: CARDIOLOGY | Facility: HOSPITAL | Age: 60
End: 2020-06-22

## 2020-06-22 NOTE — TELEPHONE ENCOUNTER
Patient was referred to the Chest Pain Clinic by the [facility] ER. Several attempts have been made to contact patient with no success. Letter was mailed to patient to contact the office to schedule.

## 2020-06-25 ENCOUNTER — TRANSCRIBE ORDERS (OUTPATIENT)
Dept: ADMINISTRATIVE | Facility: HOSPITAL | Age: 60
End: 2020-06-25

## 2020-06-25 DIAGNOSIS — R07.2 PRECORDIAL CHEST PAIN: Primary | ICD-10-CM

## 2020-07-15 NOTE — TELEPHONE ENCOUNTER
Refill request received from pharmacy.  Medication pending for approval.       Last Office Visit With PCP:  5/14/2020    Next Visit Date:  Future Appointments   Date Time Provider Ab Parker   8/13/2020  3:30 PM KEL[facility]Y Napier - [facility] [place] BRIELLE AYON

## 2020-07-22 RX ORDER — SITAGLIPTIN 100 MG/1
TABLET, FILM COATED ORAL
Qty: 90 TABLET | Refills: 1 | Status: SHIPPED | OUTPATIENT
Start: 2020-07-22 | End: 2020-10-27 | Stop reason: SDUPTHER

## 2020-07-22 NOTE — TELEPHONE ENCOUNTER
Refill request received from pharmacy.  Medication pending for approval.       Last Office Visit With PCP:  5/14/2020    Next Visit Date:  Future Appointments   Date Time Provider Ab Parker   8/13/2020  3:30 PM KELSEY Shen - CNP [address]

## 2020-07-27 ENCOUNTER — HOSPITAL ENCOUNTER (OUTPATIENT)
Dept: CARDIOLOGY | Facility: HOSPITAL | Age: 60
Discharge: HOME | End: 2020-07-27
Admitting: INTERNAL MEDICINE

## 2020-07-27 VITALS
HEART RATE: 75 BPM | SYSTOLIC BLOOD PRESSURE: 150 MMHG | HEIGHT: 67 IN | BODY MASS INDEX: 31.86 KG/M2 | WEIGHT: 203 LBS | DIASTOLIC BLOOD PRESSURE: 82 MMHG

## 2020-07-27 DIAGNOSIS — R07.2 PRECORDIAL CHEST PAIN: ICD-10-CM

## 2020-07-27 PROCEDURE — 93017 CV STRESS TEST TRACING ONLY: CPT

## 2020-07-27 PROCEDURE — 78452 HT MUSCLE IMAGE SPECT MULT: CPT

## 2020-07-27 PROCEDURE — 0 TECHNETIUM SESTAMIBI: Performed by: INTERNAL MEDICINE

## 2020-07-27 PROCEDURE — A9500 TC99M SESTAMIBI: HCPCS | Performed by: INTERNAL MEDICINE

## 2020-07-27 RX ADMIN — TECHNETIUM TC 99M SESTAMIBI 1 DOSE: 1 INJECTION INTRAVENOUS at 10:00

## 2020-07-27 RX ADMIN — TECHNETIUM TC 99M SESTAMIBI 1 DOSE: 1 INJECTION INTRAVENOUS at 08:05

## 2020-07-31 LAB
BH CV STRESS BP STAGE 2: NORMAL
BH CV STRESS BP STAGE 4: NORMAL
BH CV STRESS COMMENTS STAGE 1: NORMAL
BH CV STRESS DOSE REGADENOSON STAGE 1: 0.4
BH CV STRESS DURATION MIN STAGE 1: 1
BH CV STRESS DURATION MIN STAGE 2: 1
BH CV STRESS DURATION MIN STAGE 3: 1
BH CV STRESS DURATION MIN STAGE 4: 1
BH CV STRESS DURATION SEC STAGE 1: 0
BH CV STRESS DURATION SEC STAGE 2: 0
BH CV STRESS DURATION SEC STAGE 3: 0
BH CV STRESS DURATION SEC STAGE 4: 0
BH CV STRESS HR STAGE 1: 100
BH CV STRESS HR STAGE 2: 96
BH CV STRESS HR STAGE 3: 91
BH CV STRESS HR STAGE 4: 88
BH CV STRESS PROTOCOL 1: NORMAL
BH CV STRESS RECOVERY BP: NORMAL MMHG
BH CV STRESS RECOVERY HR: 83 BPM
BH CV STRESS STAGE 1: 1
BH CV STRESS STAGE 2: 2
BH CV STRESS STAGE 3: 3
BH CV STRESS STAGE 4: 4
LV EF NUC BP: 73 %
MAXIMAL PREDICTED HEART RATE: 161 BPM
PERCENT MAX PREDICTED HR: 62.11 %
STRESS BASELINE BP: NORMAL MMHG
STRESS BASELINE HR: 73 BPM
STRESS PERCENT HR: 73 %
STRESS POST PEAK BP: NORMAL MMHG
STRESS POST PEAK HR: 100 BPM
STRESS TARGET HR: 137 BPM

## 2020-08-03 ENCOUNTER — HOSPITAL ENCOUNTER (OUTPATIENT)
Facility: HOSPITAL | Age: 60
Setting detail: HOSPITAL OUTPATIENT SURGERY
End: 2020-08-03
Attending: INTERNAL MEDICINE | Admitting: INTERNAL MEDICINE

## 2020-08-03 ENCOUNTER — TRANSCRIBE ORDERS (OUTPATIENT)
Dept: ADMINISTRATIVE | Facility: HOSPITAL | Age: 60
End: 2020-08-03

## 2020-08-03 DIAGNOSIS — I20.0 UNSTABLE ANGINA (HCC): Primary | ICD-10-CM

## 2020-08-03 DIAGNOSIS — I20.0 UNSTABLE ANGINA (HCC): ICD-10-CM

## 2020-08-06 ENCOUNTER — TRANSCRIBE ORDERS (OUTPATIENT)
Dept: ADMINISTRATIVE | Facility: HOSPITAL | Age: 60
End: 2020-08-06

## 2020-08-06 DIAGNOSIS — I20.0 UNSTABLE ANGINA (HCC): Primary | ICD-10-CM

## 2020-08-10 ENCOUNTER — APPOINTMENT (OUTPATIENT)
Dept: PREADMISSION TESTING | Facility: HOSPITAL | Age: 60
End: 2020-08-10

## 2020-08-11 ENCOUNTER — APPOINTMENT (OUTPATIENT)
Dept: PREADMISSION TESTING | Facility: HOSPITAL | Age: 60
End: 2020-08-11

## 2020-08-11 LAB
REF LAB TEST METHOD: NORMAL
SARS-COV-2 RNA RESP QL NAA+PROBE: NOT DETECTED

## 2020-08-11 PROCEDURE — U0004 COV-19 TEST NON-CDC HGH THRU: HCPCS

## 2020-08-11 PROCEDURE — U0002 COVID-19 LAB TEST NON-CDC: HCPCS

## 2020-08-11 PROCEDURE — C9803 HOPD COVID-19 SPEC COLLECT: HCPCS

## 2020-08-12 NOTE — PATIENT INSTRUCTIONS
or into a sealable bag. Cover up or remove any of your personal information on the empty containers by covering it with black permanent marker or duct tape. Place the sealed container with the mixture, and the empty drug containers, in the trash. · If you use a medication that is in the form of a patch, dispose of used patches by folding them in half so that the sticky sides meet, and then flushing them down a toilet. They should not be placed in the household trash where children or pets can find them. · If you have any questions, ask your provider or pharmacist for more information. · Be sure to keep all appointments for provider visits or tests. We are committed to providing you with the best care possible. In order to help us achieve these goals please remember to bring all medications, herbal products, and over the counter supplements with you to each visit. If your provider has ordered testing for you, please be sure to follow up with our office if you have not received results within 7 days after the testing took place. *If you receive a survey after visiting one of our offices, please take time to share your experience concerning your physician office visit. These surveys are confidential and no health information about you is shared. We are eager to improve for you and we are counting on your feedback to help make that happen. The patient is sincerely urged to quit smoking. The numerous direct health benefits are discussed. If she decides to quit, there are a number of helpful adjunctive aids, and she can see me to discuss nicotine replacement therapy and bupropion anytime in the future.

## 2020-08-12 NOTE — PROGRESS NOTES
Health Maintenance Due This Visit   Colonoscopy No   Mammogram Yes   Annual Wellness Visit No   Microalbumin Yes   HgbA1C No   Diabetic Eye Exam Yes    House Bill One Due This Visit   DACIA No   UDS No   Contract No

## 2020-08-13 ENCOUNTER — OFFICE VISIT (OUTPATIENT)
Dept: FAMILY MEDICINE CLINIC | Age: 60
End: 2020-08-13
Payer: COMMERCIAL

## 2020-08-13 PROCEDURE — G0444 DEPRESSION SCREEN ANNUAL: HCPCS | Performed by: NURSE PRACTITIONER

## 2020-08-13 PROCEDURE — 3051F HG A1C>EQUAL 7.0%<8.0%: CPT | Performed by: NURSE PRACTITIONER

## 2020-08-13 PROCEDURE — 99442 PR PHYS/QHP TELEPHONE EVALUATION 11-20 MIN: CPT | Performed by: NURSE PRACTITIONER

## 2020-08-13 RX ORDER — PANTOPRAZOLE SODIUM 40 MG/1
40 TABLET, DELAYED RELEASE ORAL DAILY
Qty: 30 TABLET | Refills: 2 | Status: SHIPPED | OUTPATIENT
Start: 2020-08-13 | End: 2020-11-25

## 2020-08-13 RX ORDER — ASPIRIN 81 MG/1
81 TABLET ORAL DAILY
Qty: 90 TABLET | Refills: 1 | Status: SHIPPED | OUTPATIENT
Start: 2020-08-13 | End: 2021-01-25

## 2020-08-13 RX ORDER — ASCORBIC ACID 500 MG
500 TABLET ORAL DAILY
Qty: 30 TABLET | Refills: 3 | Status: SHIPPED | OUTPATIENT
Start: 2020-08-13

## 2020-08-13 RX ORDER — CARVEDILOL 6.25 MG/1
6.25 TABLET ORAL 2 TIMES DAILY WITH MEALS
Qty: 60 TABLET | Refills: 2 | Status: SHIPPED | OUTPATIENT
Start: 2020-08-13 | End: 2021-02-02

## 2020-08-13 RX ORDER — FERROUS SULFATE 325(65) MG
325 TABLET ORAL 2 TIMES DAILY WITH MEALS
Qty: 60 TABLET | Refills: 2 | Status: SHIPPED | OUTPATIENT
Start: 2020-08-13 | End: 2021-01-25

## 2020-08-13 RX ORDER — FEXOFENADINE HCL 180 MG/1
180 TABLET ORAL DAILY
Qty: 30 TABLET | Refills: 0 | Status: SHIPPED | OUTPATIENT
Start: 2020-08-13 | End: 2020-09-11

## 2020-08-13 RX ORDER — CYANOCOBALAMIN 1000 UG/ML
1000 INJECTION INTRAMUSCULAR; SUBCUTANEOUS ONCE
Qty: 1 ML | Refills: 5 | Status: SHIPPED | OUTPATIENT
Start: 2020-08-13 | End: 2020-10-27 | Stop reason: SDUPTHER

## 2020-08-13 RX ORDER — SPIRONOLACTONE 50 MG/1
50 TABLET, FILM COATED ORAL DAILY
Qty: 30 TABLET | Refills: 2 | Status: SHIPPED | OUTPATIENT
Start: 2020-08-13 | End: 2020-11-11

## 2020-08-13 NOTE — PROGRESS NOTES
SUBJECTIVE:    Patient ID: Adilene Willoughby is a 61 y.o. female. Chief Complaint   Patient presents with    1 Month Follow-Up       Telephone encounter for Follow-up for chronic illness. . Patient reports she is Scheduled for heart cath in am at [address] [place] [address] Street to come into the clinic for appointment today. Requesting maintenance medication refills. Still having trouble controlling blood glucose levels which is from compliance with dieting and exercising regularly. Patient overall stable today without any acute complaints. Knee Pain    Incident onset: chronic. The injury mechanism is unknown. The pain is present in the right knee. The quality of the pain is described as aching. The pain is at a severity of 4/10. The pain is mild. The pain has been constant since onset. Associated symptoms include a loss of motion. Pertinent negatives include no inability to bear weight, loss of sensation, muscle weakness, numbness or tingling. She reports no foreign bodies present. The symptoms are aggravated by movement. She has tried acetaminophen, NSAIDs, ice and elevation for the symptoms. The treatment provided mild relief. Diabetes   She presents for her follow-up diabetic visit. She has type 2 diabetes mellitus. MedicAlert identification noted. Her disease course has been fluctuating. There are no hypoglycemic associated symptoms. Pertinent negatives for hypoglycemia include no dizziness, headaches, nervousness/anxiousness or pallor. Associated symptoms include fatigue, polydipsia and polyphagia. Pertinent negatives for diabetes include no chest pain. There are no hypoglycemic complications. Symptoms are stable. Diabetic complications include peripheral neuropathy. Risk factors for coronary artery disease include diabetes mellitus, dyslipidemia, family history, obesity, hypertension, stress, post-menopausal and sedentary lifestyle.  Current diabetic treatment includes insulin injections and oral agent (dual therapy). She is compliant with treatment most of the time. Her weight is stable. She is following a generally healthy (improving) diet. Meal planning includes avoidance of concentrated sweets. She has had a previous visit with a dietitian. She participates in exercise intermittently. Her home blood glucose trend is fluctuating minimally. Her overall blood glucose range is >200 mg/dl. An ACE inhibitor/angiotensin II receptor blocker is being taken. She does not see a podiatrist.Eye exam is current. Active Ambulatory Problems     Diagnosis Date Noted    Type 2 diabetes mellitus with complication, with long-term current use of insulin ([facility] 06/30/2017    Depression 06/30/2017    Environmental and seasonal allergies 06/30/2017    Neuropathy 06/30/2017    Essential hypertension 06/30/2017    Gastrointestinal intolerance to foods 07/12/2017    Pain in both lower extremities 01/30/2018    Abnormal US (ultrasound) of abdomen 02/27/2018    Spleen enlarged 02/27/2018    Enlarged liver 02/27/2018    Thrombocytopenia (HCC) 02/27/2018    Abnormal bruising 04/30/2018    Stage 3 chronic kidney disease ([address] 06/15/2018     Resolved Ambulatory Problems     Diagnosis Date Noted    No Resolved Ambulatory Problems     Past Medical History:   Diagnosis Date    Arthritis     Hypertension     Ulcer      Allergies   Allergen Reactions    Pcn [Penicillins] Other (See Comments)     Not sure was a child      Past Surgical History:   Procedure Laterality Date    ENDOMETRIAL ABLATION  2011    FRACTURE SURGERY Left     KIDNEY STONE SURGERY        Family History   Problem Relation Age of Onset    Breast Cancer Mother     Heart Attack Father     Kidney Disease Sister     Diabetes Sister     Diabetes Brother        Patient's medications, allergies, past medical, surgical, social and family histories were reviewed and updated as appropriate.   Current Outpatient Medications on File Prior to Visit   Medication Sig Dispense Refill    JANUVIA 100 MG tablet TAKE ONE TABLET BY MOUTH EVERY MORNING BEFORE BREAKFAST 90 tablet 1    metFORMIN (GLUCOPHAGE) 1000 MG tablet TAKE ONE TABLET BY MOUTH TWICE A DAY WITH MEALS 180 tablet 1    furosemide (LASIX) 20 MG tablet Take 1 tablet by mouth daily 30 tablet 2    LANTUS SOLOSTAR 100 UNIT/ML injection pen INJECT 25 UNITS SUBCUTANEOUSLY ONCE DAILY IN THE MORNING  UNITS SUBCUTANEOUSLY AT BEDTIME 16 pen 2    Insulin Syringe-Needle U-100 ([employer] INS SYR .3CC/29G) 29G X 1/2\" 0.3 ML MISC 1 each by Does not apply route daily 100 each 3    cyanocobalamin 1000 MCG/ML injection Inject 1 mL into the skin once a week for 4 doses 4 mL 0    exenatide (BYETTA 10 MCG PEN) 10 MCG/0.04ML injection Inject 0.04 mLs into the skin 2 times daily (with meals) 2.4 mL 2    cyanocobalamin 1000 MCG/ML injection Inject 1 mL into the skin every 30 days 10 mL 0    atorvastatin (LIPITOR) 80 MG tablet Take 1 tablet by mouth nightly 90 tablet 5    aspirin 81 MG tablet Take 1 tablet by mouth daily 30 tablet 5    nitroGLYCERIN (NITROSTAT) 0.4 MG SL tablet up to max of 3 total doses. If no relief after 1 dose, call 911. 25 tablet 3    UNIFINE PENTIPS 32G X 4 MM MISC USE AS DIRECTED TWO TIMES A  each 5    blood glucose monitor kit and supplies Test blood glucose four times daily Dx E11.9 1 kit 0    blood glucose monitor strips Test blood glucose 4 times daily. Dx E11.9 100 strip 5    Lancets MISC Check blood glucose levels twice daily. Dx E11.9 100 each 5    ACCU-CHEK ANIA PLUS strip Inject 1 each into the skin 2 times daily 100 each 5     No current facility-administered medications on file prior to visit. Review of Systems   Constitutional: Positive for fatigue. Negative for activity change, appetite change, chills and fever. HENT: Negative for congestion, ear pain, nosebleeds, sore throat and trouble swallowing. Eyes: Negative for pain and redness.    Respiratory: Negative for cough, 1 month follow-up. Diagnoses and all orders for this visit:    Controlled type 2 diabetes mellitus with complication, with long-term current use of insulin [facility]    Screening for breast cancer    Fatigue, unspecified type  -     ferrous sulfate (IRON 325) 325 (65 Fe) MG tablet; Take 1 tablet by mouth 2 times daily (with meals)  -     vitamin C (ASCORBIC ACID) 500 MG tablet; Take 1 tablet by mouth daily    Anemia, unspecified type  -     ferrous sulfate (IRON 325) 325 (65 Fe) MG tablet; Take 1 tablet by mouth 2 times daily (with meals)  -     vitamin C (ASCORBIC ACID) 500 MG tablet; Take 1 tablet by mouth daily    Type 2 diabetes mellitus with complication, with long-term current use of insulin ([facility])  -     spironolactone (ALDACTONE) 50 MG tablet; Take 1 tablet by mouth daily  -     carvedilol (COREG) 6.25 MG tablet; Take 1 tablet by mouth 2 times daily (with meals)  -     ferrous sulfate (IRON 325) 325 (65 Fe) MG tablet; Take 1 tablet by mouth 2 times daily (with meals)  -     aspirin EC 81 MG EC tablet; Take 1 tablet by mouth daily  -     dapagliflozin (FARXIGA) 10 MG tablet; Take 1 tablet by mouth every morning    B12 deficiency  -     cyanocobalamin 1000 MCG/ML injection; Inject 1 mL into the muscle once for 1 dose    Gastroesophageal reflux disease without esophagitis  -     pantoprazole (PROTONIX) 40 MG tablet; Take 1 tablet by mouth daily    Other orders  -     fexofenadine (ALLEGRA) 180 MG tablet; Take 1 tablet by mouth daily           Telephone encounter 15 minutes spent    Controlled Substances Monitoring:     RX Monitoring 5/8/2018   Attestation The Prescription Monitoring Report for this patient was reviewed today. Periodic Controlled Substance Monitoring No signs of potential drug abuse or diversion identified.    Chronic Pain > 80 MEDD -        PATIENT COUNSELING     Counseling was provided today regarding the following topics: Healthy eating habits, Regular exercise, substance abuse and healthy

## 2020-08-14 ENCOUNTER — HOSPITAL ENCOUNTER (OUTPATIENT)
Facility: HOSPITAL | Age: 60
Setting detail: HOSPITAL OUTPATIENT SURGERY
Discharge: HOME | End: 2020-08-14
Attending: INTERNAL MEDICINE | Admitting: INTERNAL MEDICINE

## 2020-08-14 ENCOUNTER — TELEPHONE (OUTPATIENT)
Dept: FAMILY MEDICINE CLINIC | Age: 60
End: 2020-08-14

## 2020-08-14 VITALS
TEMPERATURE: 99 F | BODY MASS INDEX: 31 KG/M2 | SYSTOLIC BLOOD PRESSURE: 119 MMHG | HEIGHT: 67 IN | DIASTOLIC BLOOD PRESSURE: 63 MMHG | HEART RATE: 71 BPM | OXYGEN SATURATION: 96 % | RESPIRATION RATE: 18 BRPM | WEIGHT: 197.5 LBS

## 2020-08-14 DIAGNOSIS — I20.0 UNSTABLE ANGINA (HCC): ICD-10-CM

## 2020-08-14 LAB
ANION GAP SERPL CALCULATED.3IONS-SCNC: 10 MMOL/L (ref 5–15)
BUN SERPL-MCNC: 17 MG/DL (ref 6–20)
BUN/CREAT SERPL: 12.7 (ref 7–25)
CALCIUM SPEC-SCNC: 9.4 MG/DL (ref 8.6–10.5)
CHLORIDE SERPL-SCNC: 106 MMOL/L (ref 98–107)
CO2 SERPL-SCNC: 24 MMOL/L (ref 22–29)
CREAT BLDA-MCNC: 1.4 MG/DL (ref 0.6–1.3)
CREAT SERPL-MCNC: 1.34 MG/DL (ref 0.57–1)
DEPRECATED RDW RBC AUTO: 44.4 FL (ref 37–54)
ERYTHROCYTE [DISTWIDTH] IN BLOOD BY AUTOMATED COUNT: 13.5 % (ref 12.3–15.4)
GFR SERPL CREATININE-BSD FRML MDRD: 40 ML/MIN/1.73
GLUCOSE SERPL-MCNC: 214 MG/DL (ref 65–99)
HCT VFR BLD AUTO: 35.7 % (ref 34–46.6)
HGB BLD-MCNC: 11.7 G/DL (ref 12–15.9)
MCH RBC QN AUTO: 29.3 PG (ref 26.6–33)
MCHC RBC AUTO-ENTMCNC: 32.8 G/DL (ref 31.5–35.7)
MCV RBC AUTO: 89.3 FL (ref 79–97)
PLATELET # BLD AUTO: 30 10*3/MM3 (ref 140–450)
PMV BLD AUTO: 11.8 FL (ref 6–12)
POTASSIUM SERPL-SCNC: 3.7 MMOL/L (ref 3.5–5.2)
RBC # BLD AUTO: 4 10*6/MM3 (ref 3.77–5.28)
SODIUM SERPL-SCNC: 140 MMOL/L (ref 136–145)
WBC # BLD AUTO: 2.63 10*3/MM3 (ref 3.4–10.8)

## 2020-08-14 PROCEDURE — 0 IOPAMIDOL PER 1 ML: Performed by: INTERNAL MEDICINE

## 2020-08-14 PROCEDURE — 93458 L HRT ARTERY/VENTRICLE ANGIO: CPT | Performed by: INTERNAL MEDICINE

## 2020-08-14 PROCEDURE — 82565 ASSAY OF CREATININE: CPT

## 2020-08-14 PROCEDURE — 80048 BASIC METABOLIC PNL TOTAL CA: CPT

## 2020-08-14 PROCEDURE — 25010000002 MIDAZOLAM PER 1 MG: Performed by: INTERNAL MEDICINE

## 2020-08-14 PROCEDURE — 25010000002 HEPARIN (PORCINE) PER 1000 UNITS: Performed by: INTERNAL MEDICINE

## 2020-08-14 PROCEDURE — 25010000002 FENTANYL CITRATE (PF) 100 MCG/2ML SOLUTION: Performed by: INTERNAL MEDICINE

## 2020-08-14 PROCEDURE — 85027 COMPLETE CBC AUTOMATED: CPT

## 2020-08-14 PROCEDURE — C1769 GUIDE WIRE: HCPCS | Performed by: INTERNAL MEDICINE

## 2020-08-14 PROCEDURE — C1894 INTRO/SHEATH, NON-LASER: HCPCS | Performed by: INTERNAL MEDICINE

## 2020-08-14 PROCEDURE — 36415 COLL VENOUS BLD VENIPUNCTURE: CPT

## 2020-08-14 RX ORDER — MIDAZOLAM HYDROCHLORIDE 1 MG/ML
INJECTION INTRAMUSCULAR; INTRAVENOUS AS NEEDED
Status: DISCONTINUED | OUTPATIENT
Start: 2020-08-14 | End: 2020-08-14 | Stop reason: HOSPADM

## 2020-08-14 RX ORDER — LIDOCAINE HYDROCHLORIDE 10 MG/ML
INJECTION, SOLUTION EPIDURAL; INFILTRATION; INTRACAUDAL; PERINEURAL AS NEEDED
Status: DISCONTINUED | OUTPATIENT
Start: 2020-08-14 | End: 2020-08-14 | Stop reason: HOSPADM

## 2020-08-14 RX ORDER — TEMAZEPAM 7.5 MG/1
7.5 CAPSULE ORAL NIGHTLY PRN
Status: DISCONTINUED | OUTPATIENT
Start: 2020-08-14 | End: 2020-08-14 | Stop reason: HOSPADM

## 2020-08-14 RX ORDER — ASPIRIN 325 MG
325 TABLET, DELAYED RELEASE (ENTERIC COATED) ORAL DAILY
Status: DISCONTINUED | OUTPATIENT
Start: 2020-08-14 | End: 2020-08-14 | Stop reason: HOSPADM

## 2020-08-14 RX ORDER — ACETAMINOPHEN 325 MG/1
650 TABLET ORAL EVERY 4 HOURS PRN
Status: DISCONTINUED | OUTPATIENT
Start: 2020-08-14 | End: 2020-08-14 | Stop reason: HOSPADM

## 2020-08-14 RX ORDER — ISOSORBIDE MONONITRATE 30 MG/1
30 TABLET, EXTENDED RELEASE ORAL DAILY
Qty: 30 TABLET | Refills: 11 | Status: SHIPPED | OUTPATIENT
Start: 2020-08-14 | End: 2021-03-26 | Stop reason: SDUPTHER

## 2020-08-14 RX ORDER — HYDROCODONE BITARTRATE AND ACETAMINOPHEN 5; 325 MG/1; MG/1
1 TABLET ORAL EVERY 4 HOURS PRN
Status: DISCONTINUED | OUTPATIENT
Start: 2020-08-14 | End: 2020-08-14 | Stop reason: HOSPADM

## 2020-08-14 RX ORDER — DEXTROSE MONOHYDRATE 25 G/50ML
25 INJECTION, SOLUTION INTRAVENOUS
Status: DISCONTINUED | OUTPATIENT
Start: 2020-08-14 | End: 2020-08-14 | Stop reason: HOSPADM

## 2020-08-14 RX ORDER — NICOTINE POLACRILEX 4 MG
15 LOZENGE BUCCAL
Status: DISCONTINUED | OUTPATIENT
Start: 2020-08-14 | End: 2020-08-14 | Stop reason: HOSPADM

## 2020-08-14 RX ORDER — SODIUM CHLORIDE 9 MG/ML
250 INJECTION, SOLUTION INTRAVENOUS CONTINUOUS
Status: ACTIVE | OUTPATIENT
Start: 2020-08-14 | End: 2020-08-14

## 2020-08-14 RX ORDER — FENTANYL CITRATE 50 UG/ML
INJECTION, SOLUTION INTRAMUSCULAR; INTRAVENOUS AS NEEDED
Status: DISCONTINUED | OUTPATIENT
Start: 2020-08-14 | End: 2020-08-14 | Stop reason: HOSPADM

## 2020-08-14 RX ORDER — ALPRAZOLAM 0.25 MG/1
0.25 TABLET ORAL 3 TIMES DAILY PRN
Status: DISCONTINUED | OUTPATIENT
Start: 2020-08-14 | End: 2020-08-14 | Stop reason: HOSPADM

## 2020-08-14 RX ADMIN — ASPIRIN 325 MG: 325 TABLET, COATED ORAL at 07:05

## 2020-08-14 NOTE — H&P
Pre-Cardiac Catheterization Report  Cardiovascular Laboratory  [facility]      Patient:  Lyndsey Luna  :  1960  PCP:  Javid Lin APRN  PHONE:  [phone]    DATE: 2020    BRIEF HPI:  Lyndsey Luna is a 59 y.o. female with hypertension, hypercholesterolemia, diabetes mellitus, family history of coronary artery disease, and known coronary artery disease.  She is post previous coronary stents.  She is complaining of a 2-month history of increasing shortness of breath.  She states it is moderate in severity lasting minutes with associated chest pain-heaviness and dyspnea on exertion.  Her symptoms increased with activity and are decreased with rest.  She recently underwent an abnormal stress test and now presents for left heart catheterization with possible intervention.    Cardiac Risk Factors:  diabetes mellitus, dyslipidemia, family history of premature cardiovascular disease, hypertension, obesity (BMI >= 30 kg/m2)    Anginal class in last 2 weeks:  CCS class II    CHF Class in last 2 weeks:  NYHA Class II    Cardiogenic shock:  no    Cardiac arrest <24 hours:  no    Stress test within last 6 months:   yes   Details:    Previous cardiac catheterization:  yes  Details:     Previous CABG:  no  Details:      Allergies:     IV contrast allergy:  no  Allergies   Allergen Reactions   • Penicillins Itching       MEDICATIONS:  Prior to Admission medications    Medication Sig Start Date End Date Taking? Authorizing Provider   aspirin 81 MG EC tablet Take 81 mg by mouth Daily.   Yes ProviderLexy MD   atorvastatin (LIPITOR) 80 MG tablet Take 80 mg by mouth Daily.   Yes ProviderLexy MD   carvedilol (Coreg) 6.25 MG tablet Take 1 tablet by mouth 2 (Two) Times a Day With Meals. 20  Yes Ezekiel Garner APRN   cyanocobalamin 1000 MCG/ML injection Inject 1,000 mcg into the appropriate muscle as directed by prescriber 1 (One) Time Per Week. 19  Yes  Lexy Lundberg MD   insulin glargine (LANTUS) 100 UNIT/ML injection Inject 100 Units under the skin into the appropriate area as directed Every Night.   Yes Lexy Lundberg MD   loratadine (CLARITIN) 10 MG tablet Take 10 mg by mouth Daily.   Yes Lexy Lundberg MD   metFORMIN (GLUCOPHAGE) 1000 MG tablet Take 1,000 mg by mouth 2 (Two) Times a Day With Meals.   Yes Lexy Lundberg MD   SITagliptin (JANUVIA) 100 MG tablet Take 100 mg by mouth Daily.   Yes Lexy Lundberg MD   nitroglycerin (NITROSTAT) 0.4 MG SL tablet Take 1 tablet by mouth As Needed. 1/30/19   Lexy Lundberg MD       Past medical & surgical history, social and family history reviewed in the electronic medical record.    ROS:  Cardiovascular ROS: positive for - chest pain, dyspnea on exertion and shortness of breath    Physical Exam:    Vitals:   Vitals:    08/14/20 0644   BP: 117/58   Pulse:    Resp:    Temp:    SpO2: 96%          08/14/20  0615   Weight: 89.6 kg (197 lb 8 oz)       General Appearance:    Alert, cooperative, in no acute distress   Head:    Normocephalic, without obvious abnormality, atraumatic   Eyes:            Lids and lashes normal, conjunctivae and sclerae normal, no   icterus, no pallor, corneas clear, PERRLA   Ears:    Ears appear intact with no abnormalities noted   Neck:   No adenopathy, supple, trachea midline, no thyromegaly, no   carotid bruit, no JVD   Back:     No kyphosis present, no scoliosis present, range of motion normal   Lungs:     Clear to auscultation,respirations regular, even and                unlabored    Heart:    Regular rhythm and normal rate, normal S1 and S2, no         murmur, no gallop, no rub, no click   Chest Wall:    No abnormalities observed   Abdomen:     Normal bowel sounds, no masses, no organomegaly, soft     nontender, nondistended, no guarding, no rebound                tenderness   Rectal:     Deferred   Extremities:   Moves all extremities well, no  edema, no cyanosis, no           redness   Pulses:   Pulses palpable and equal bilaterally   Skin:   No bleeding, bruising or rash   Neurologic:   Cranial nerves 2 - 12 grossly intact, sensation intact     Barbaeu Test:  Left: Normal  (oxymetric Allens) Right: Not Assessed             Lab Results   Component Value Date    CHLPL 140 07/09/2019    TRIG 239 (H) 02/19/2020    HDL 28 (L) 07/09/2019    AST 25 06/17/2020    ALT 27 06/17/2020           Impression      · Abnormal stress test    Plan     · Procedure to perform: [facility]  · Planned access: Left radial artery              GELA Long  08/14/20  07:00

## 2020-08-14 NOTE — NURSING NOTE
Cath lab called to premed patient. Labs not back yet but POC creatinine done. Labs drawn in June hemoglobin 11.6. Lab called with critical platelets of 30. Notified GELA Marr on unit.

## 2020-08-24 ASSESSMENT — PATIENT HEALTH QUESTIONNAIRE - PHQ9
2. FEELING DOWN, DEPRESSED OR HOPELESS: 1
SUM OF ALL RESPONSES TO PHQ QUESTIONS 1-9: 2
SUM OF ALL RESPONSES TO PHQ QUESTIONS 1-9: 2
1. LITTLE INTEREST OR PLEASURE IN DOING THINGS: 1
SUM OF ALL RESPONSES TO PHQ9 QUESTIONS 1 & 2: 2

## 2020-08-24 ASSESSMENT — ENCOUNTER SYMPTOMS
EYE REDNESS: 0
TROUBLE SWALLOWING: 0
BACK PAIN: 0
CHEST TIGHTNESS: 0
VOMITING: 0
DIARRHEA: 0
ABDOMINAL PAIN: 0
EYE PAIN: 0
SORE THROAT: 0
COLOR CHANGE: 0
COUGH: 0
NAUSEA: 0
SHORTNESS OF BREATH: 0

## 2020-08-27 ENCOUNTER — TELEPHONE (OUTPATIENT)
Dept: FAMILY MEDICINE CLINIC | Age: 60
End: 2020-08-27

## 2020-08-27 NOTE — TELEPHONE ENCOUNTER
Pt would like to get [place] on 340B due to insurance issues. Would it be possible to send this rx to [facility] with \"340B\" in the comments?      Thank you,  Grey Zamora

## 2020-09-09 ENCOUNTER — OFFICE VISIT (OUTPATIENT)
Dept: GASTROENTEROLOGY | Facility: CLINIC | Age: 60
End: 2020-09-09

## 2020-09-09 DIAGNOSIS — Z79.4 TYPE 2 DIABETES MELLITUS WITH OTHER SPECIFIED COMPLICATION, WITH LONG-TERM CURRENT USE OF INSULIN (HCC): ICD-10-CM

## 2020-09-09 DIAGNOSIS — E11.69 TYPE 2 DIABETES MELLITUS WITH OTHER SPECIFIED COMPLICATION, WITH LONG-TERM CURRENT USE OF INSULIN (HCC): ICD-10-CM

## 2020-09-09 DIAGNOSIS — R11.0 NAUSEA: Primary | ICD-10-CM

## 2020-09-09 PROCEDURE — 99213 OFFICE O/P EST LOW 20 MIN: CPT | Performed by: NURSE PRACTITIONER

## 2020-09-09 RX ORDER — FUROSEMIDE 20 MG/1
1 TABLET ORAL DAILY
COMMUNITY
Start: 2020-07-14 | End: 2020-10-05 | Stop reason: SDUPTHER

## 2020-09-09 RX ORDER — PANTOPRAZOLE SODIUM 40 MG/1
1 TABLET, DELAYED RELEASE ORAL DAILY
COMMUNITY
Start: 2020-08-13 | End: 2021-03-26 | Stop reason: SDUPTHER

## 2020-09-09 RX ORDER — FERROUS SULFATE 325(65) MG
325 TABLET ORAL DAILY
COMMUNITY
Start: 2020-08-13 | End: 2020-09-12

## 2020-09-09 RX ORDER — SPIRONOLACTONE 50 MG/1
1 TABLET, FILM COATED ORAL DAILY
COMMUNITY
Start: 2020-08-13 | End: 2020-10-05 | Stop reason: SDUPTHER

## 2020-09-09 RX ORDER — FEXOFENADINE HCL 180 MG/1
1 TABLET ORAL DAILY
COMMUNITY
Start: 2020-08-13 | End: 2021-03-26

## 2020-09-09 NOTE — PROGRESS NOTES
"GASTROENTEROLOGY OFFICE NOTE  Lyndsey Luna  [id number]  1960    CARE TEAM  Patient Care Team:  Javid Lin APRN as PCP - General (Nurse Practitioner)    You have chosen to receive care through a telephone visit. Do you consent to use a telephone visit for your medical care today? Yes    Referring Provider: Javid Lin APRN    Chief Complaint   Patient presents with   • Nausea        HISTORY OF PRESENT ILLNESS:  Ms. Luna is seen today with complaints of nausea.  She reports that for the past 3 or more week she has been waking up with \"morning sickness\" that last for several hours to the morning, sometimes longer.  Sometimes eating breakfast resolves her nausea but not always.  She has not had any vomiting.  She denies associated acid reflux, abdominal pain, or changes in her bowel habits.  She has not started any new medications that may attribute.      Her medical history includes type 2 diabetes, her sugar seems more controlled to her than usual however, she reports that it is typically around 120 fasting and on 2 recent occasions was in the 300s.  Her primary referred her to endocrinology at [facility] but she would like to see Dr. Quinones and therefore canceled the appointment.  She has been trying to get an appointment with one of the physicians at [facility] but has been unable to do so.    PAST MEDICAL HISTORY  Past Medical History:   Diagnosis Date   • Arthritis    • Diabetes mellitus (CMS/HCC)    • Gastric ulcer    • Hypertension    • Kidney stone    • Splenomegaly         PAST SURGICAL HISTORY  Past Surgical History:   Procedure Laterality Date   • CARDIAC CATHETERIZATION     • CARDIAC CATHETERIZATION N/A 8/14/2020    Procedure: Left Heart Cath;  Surgeon: Yoshi Wilson MD;  Location: [facility] INVASIVE LOCATION;  Service: Cardiovascular;  Laterality: N/A;   • CYSTOSCOPY, URETEROSCOPY, RETROGRADE PYELOGRAM, STONE EXTRACTION, STENT INSERTION Left 2/23/2020    Procedure: " Physical Therapy Daily Treatment Note    Date:  2019    Patient Name:  Cely Wade    :  1974  MRN: [id number]  Restrictions/Precautions:      Medical/Treatment Diagnosis Information:   · Diagnosis: Multiple Sclerosis  · Treatment Diagnosis: multiple sclerosis-weakness  Insurance/Certification information:  PT Insurance Information: [id number] [facility]  Physician Information:  Referring Practitioner: Louise Brasher. MD Shira   Plan of care signed (Y/N):  N  Visit# / total visits:  3/10 (4th POC) 33 total  Pain level: 0/10      Time In: 2:47  Time Out: 3:36    Progress Note: []  Yes  [x]  No  Next due by: Visit #10  Or by 19    Subjective: Pt rpts to clinic with no current complaints of fatigue or instability stating \"I feel pretty stable today\". Objective: Pt tolerated todays session well indicating no increase in pain post session but noting fatigue post session. Pt was able to initiate sequenced color disk stepping and leg press with good tolerance this date. Requires CGA with all standing activity not involving AD. Discussed PT frequency as pt is adamant about performing PT 4-5x/week. Instructed to consult with physician and evaluating PT for recommendation. Observation: Rpts with RW and stiff legged gait pattern. Gait laps around clinic without AD required CGA/min A due to losses of balance. Gait belt used to maintain upright posture and prevent a fall. Test measurements:   Tandem Stance: Left Leading: x15\", Right leading: 10\" (19)  Tinetti Score 18/28 (19)  CGA with all balance activities and ambulation without AD this date due to decreased LE/core/trunk control. Exercises:   Exercise/Equipment Resistance/Repetitions Other comments   Offset Balance 3 x 30\" each EO & EC   DD DLS 3 x 82\"    AIREX HR/TR 20x     AIREX Marches 20x each    AIREX 3 way hip 20x each bilat    AIREX HS Curls 15x each     Sit-Stands 20x  From SBallRequired CGA this date   Sidesteps 5 lapsWith obstacles. CYSTOSCOPY URETEROSCOPY STONE EXTRACTION STENT INSERTION;  Surgeon: Elmer Weiss MD;  Location:  [facility];  Service: Urology;  Laterality: Left;   • ENDOSCOPY N/A 2/14/2020    Procedure: ESOPHAGOGASTRODUODENOSCOPY;  Surgeon: Brant Finch MD;  Location:  [facility];  Service: Gastroenterology;  Laterality: N/A;  Esophageal banding for a total of 7 bands   • ENDOSCOPY N/A 4/15/2020    Procedure: ESOPHAGOGASTRODUODENOSCOPY;  Surgeon: Jayme Astudillo MD;  Location:  [facility];  Service: Gastroenterology;  Laterality: N/A;   • FRACTURE SURGERY      LEFT ANKLE   • KIDNEY STONE SURGERY          MEDICATIONS:    Current Outpatient Medications:   •  Aspirin Buf,CaCarb-MgCarb-MgO, 81 MG tablet, Take 81 mg by mouth Daily., Disp: , Rfl:   •  Dapagliflozin Propanediol 10 MG tablet, Take 10 mg by mouth Daily., Disp: , Rfl:   •  ferrous sulfate 325 (65 FE) MG tablet, Take 325 mg by mouth Daily., Disp: , Rfl:   •  aspirin 81 MG EC tablet, Take 81 mg by mouth Daily., Disp: , Rfl:   •  atorvastatin (LIPITOR) 80 MG tablet, Take 80 mg by mouth Daily., Disp: , Rfl:   •  carvedilol (Coreg) 6.25 MG tablet, Take 1 tablet by mouth 2 (Two) Times a Day With Meals., Disp: 60 tablet, Rfl: 2  •  cyanocobalamin 1000 MCG/ML injection, Inject 1,000 mcg into the appropriate muscle as directed by prescriber 1 (One) Time Per Week., Disp: , Rfl:   •  fexofenadine (ALLEGRA) 180 MG tablet, Take 1 tablet by mouth Daily., Disp: , Rfl:   •  furosemide (LASIX) 20 MG tablet, Take 1 tablet by mouth Daily., Disp: , Rfl:   •  insulin glargine (LANTUS) 100 UNIT/ML injection, Inject 100 Units under the skin into the appropriate area as directed Every Night., Disp: , Rfl:   •  isosorbide mononitrate (IMDUR) 30 MG 24 hr tablet, Take 1 tablet by mouth Daily., Disp: 30 tablet, Rfl: 11  •  loratadine (CLARITIN) 10 MG tablet, Take 10 mg by mouth Daily., Disp: , Rfl:   •  metFORMIN (GLUCOPHAGE) 1000 MG tablet, Take 1,000 mg by mouth 2  DD Balance 3 x 30\" With offset   EO & EC   SLS With support to hold up R LE   amb without AD 2.0 laps CGA from therapist   BOSU flat side balance 3 x 30\" CGA from therapist, eyes open   BOSU Round Side Balance 2 x 30\"    Mini Lunges Mod A from therapist   WICHO 8'  Seat 11 LV6   Seated marches x20 Seated on SBall   LAQ x20    Step taps x10  F,L 2\"   Squats  1x15 each on  AIREX, DD, BOSU1 HHA. Ball between legs. Hip abd/add [id number]; GREEN   [] Provided verbal/tactile cueing for activities related to strengthening, flexibility, endurance, ROM. (38934)   [x] Provided verbal/tactile cueing for activities related to improving balance, coordination, kinesthetic sense, posture, motor skill, proprioception. (44955)    Therapeutic Activities:     [] Therapeutic activities, direct (one-on-one) patient contact (use of dynamic activities to improve functional performance). (65901)    Gait:   [x] Provided training and instruction to the patient for ambulation re-education. (94052)  Verbal cues for proper swing phase bilaterally this date and to take moderate steps at a slow to moderate pace in order to avoid losing balance anteriorly. Self-Care/ADL's  [] Self-care/home management training and compensatory training, meal preparation, safety procedures, and instructions in use of assistive technology devices/adaptive equipment, direct one-on-one contact. (60434)    Home Exercise Program:     [] Reviewed/Progressed HEP activities related to strengthening, flexibility, endurance, ROM. (18923)  [x] Reviewed/Progressed HEP activities related to improving balance, coordination, kinesthetic sense, posture, motor skill, proprioception.  (69815)    Manual Treatments:    [] Provided manual therapy to mobilize soft tissue/joints for the purpose of modulating pain, promoting relaxation,  increasing ROM, reducing/eliminating soft tissue swelling/inflammation/restriction, improving soft tissue extensibility.  (54829)    Service Based (Two) Times a Day With Meals., Disp: , Rfl:   •  nitroglycerin (NITROSTAT) 0.4 MG SL tablet, Take 1 tablet by mouth As Needed., Disp: , Rfl:   •  pantoprazole (PROTONIX) 40 MG EC tablet, Take 1 tablet by mouth Daily., Disp: , Rfl:   •  SITagliptin (JANUVIA) 100 MG tablet, Take 100 mg by mouth Daily., Disp: , Rfl:   •  spironolactone (ALDACTONE) 50 MG tablet, Take 1 tablet by mouth Daily., Disp: , Rfl:     ALLERGIES  Allergies   Allergen Reactions   • Penicillins Itching       FAMILY HISTORY:  Family History   Problem Relation Age of Onset   • Diabetes Father        SOCIAL HISTORY  Social History     Socioeconomic History   • Marital status:      Spouse name: Not on file   • Number of children: Not on file   • Years of education: Not on file   • Highest education level: Not on file   Tobacco Use   • Smoking status: Never Smoker   • Smokeless tobacco: Never Used   Substance and Sexual Activity   • Alcohol use: Never     Frequency: Never   • Drug use: Never   • Sexual activity: Defer       REVIEW OF SYSTEMS  Review of Systems   Constitutional: Negative for activity change, appetite change, chills, diaphoresis, fatigue, fever, unexpected weight gain and unexpected weight loss.   HENT: Negative for trouble swallowing and voice change.    Gastrointestinal: Positive for nausea. Negative for abdominal distention, abdominal pain, anal bleeding, blood in stool, constipation, diarrhea, rectal pain, vomiting, GERD and indigestion.   Endocrine: Positive for polydipsia.     PHYSICAL EXAM   There were no vitals taken for this visit.  Physical Exam   Constitutional: She is oriented to person, place, and time. She appears well-developed and well-nourished. No distress.   HENT:   Head: Normocephalic and atraumatic.   Nose: Nose normal.   Eyes: Pupils are equal, round, and reactive to light. Conjunctivae and EOM are normal.   Pulmonary/Chest: Effort normal.   Neurological: She is alert and oriented to person, place, and time.    Psychiatric: She has a normal mood and affect. Her behavior is normal. Judgment normal.       Results Review:  [facility] records reviewed and discussed with patient.     ASSESSMENT / PLAN  1. Nausea  Consider etiology likely related to uncontrolled diabetes  Plan:  -Pantoprazole 40 mg - change dosing time to 30 minutes before dinner  2.  Type 2 diabetes: With current use of insulin  Plan:  -Refer to endocrinology, patient is to also call her primary care to send records to endocrinology regarding diabetes    Return in 20 days (on 9/29/2020) for Next scheduled follow up. --F/U cirrhosis    I discussed the patients findings and my recommendations with patient    JOSE D Sood

## 2020-09-11 RX ORDER — FEXOFENADINE HCL 180 MG/1
TABLET ORAL
Qty: 30 TABLET | Refills: 5 | Status: SHIPPED | OUTPATIENT
Start: 2020-09-11

## 2020-09-11 NOTE — TELEPHONE ENCOUNTER
Refill request received from pharmacy. Medication pending for approval.       Last Office Visit With PCP:  08/13/2020    Next Visit Date:  No future appointments.     DACIA Burdick

## 2020-09-29 ENCOUNTER — OFFICE VISIT (OUTPATIENT)
Dept: GASTROENTEROLOGY | Facility: CLINIC | Age: 60
End: 2020-09-29

## 2020-09-29 ENCOUNTER — LAB (OUTPATIENT)
Dept: LAB | Facility: HOSPITAL | Age: 60
End: 2020-09-29

## 2020-09-29 VITALS
TEMPERATURE: 97.1 F | SYSTOLIC BLOOD PRESSURE: 148 MMHG | BODY MASS INDEX: 30.98 KG/M2 | HEART RATE: 83 BPM | HEIGHT: 67 IN | WEIGHT: 197.4 LBS | DIASTOLIC BLOOD PRESSURE: 64 MMHG

## 2020-09-29 DIAGNOSIS — K74.60 CIRRHOSIS OF LIVER WITH ASCITES, UNSPECIFIED HEPATIC CIRRHOSIS TYPE (HCC): Primary | ICD-10-CM

## 2020-09-29 DIAGNOSIS — R18.8 CIRRHOSIS OF LIVER WITH ASCITES, UNSPECIFIED HEPATIC CIRRHOSIS TYPE (HCC): ICD-10-CM

## 2020-09-29 DIAGNOSIS — R18.8 CIRRHOSIS OF LIVER WITH ASCITES, UNSPECIFIED HEPATIC CIRRHOSIS TYPE (HCC): Primary | ICD-10-CM

## 2020-09-29 DIAGNOSIS — K59.00 CONSTIPATION, UNSPECIFIED CONSTIPATION TYPE: ICD-10-CM

## 2020-09-29 DIAGNOSIS — K74.60 CIRRHOSIS OF LIVER WITH ASCITES, UNSPECIFIED HEPATIC CIRRHOSIS TYPE (HCC): ICD-10-CM

## 2020-09-29 DIAGNOSIS — I85.11 SECONDARY ESOPHAGEAL VARICES WITH BLEEDING (HCC): ICD-10-CM

## 2020-09-29 LAB
ALBUMIN SERPL-MCNC: 4.2 G/DL (ref 3.5–5.2)
ALBUMIN/GLOB SERPL: 1.3 G/DL
ALP SERPL-CCNC: 92 U/L (ref 39–117)
ALPHA-FETOPROTEIN: 1.64 NG/ML (ref 0–8.3)
ALT SERPL W P-5'-P-CCNC: 36 U/L (ref 1–33)
ANION GAP SERPL CALCULATED.3IONS-SCNC: 12 MMOL/L (ref 5–15)
AST SERPL-CCNC: 19 U/L (ref 1–32)
BASOPHILS # BLD AUTO: 0.04 10*3/MM3 (ref 0–0.2)
BASOPHILS NFR BLD AUTO: 1.1 % (ref 0–1.5)
BILIRUB SERPL-MCNC: 1.1 MG/DL (ref 0–1.2)
BUN SERPL-MCNC: 21 MG/DL (ref 8–23)
BUN/CREAT SERPL: 14 (ref 7–25)
CALCIUM SPEC-SCNC: 10.4 MG/DL (ref 8.6–10.5)
CHLORIDE SERPL-SCNC: 104 MMOL/L (ref 98–107)
CO2 SERPL-SCNC: 24 MMOL/L (ref 22–29)
CREAT SERPL-MCNC: 1.5 MG/DL (ref 0.57–1)
DEPRECATED RDW RBC AUTO: 47 FL (ref 37–54)
EOSINOPHIL # BLD AUTO: 0.12 10*3/MM3 (ref 0–0.4)
EOSINOPHIL NFR BLD AUTO: 3.2 % (ref 0.3–6.2)
ERYTHROCYTE [DISTWIDTH] IN BLOOD BY AUTOMATED COUNT: 14.7 % (ref 12.3–15.4)
GFR SERPL CREATININE-BSD FRML MDRD: 35 ML/MIN/1.73
GLOBULIN UR ELPH-MCNC: 3.2 GM/DL
GLUCOSE SERPL-MCNC: 242 MG/DL (ref 65–99)
HCT VFR BLD AUTO: 39.3 % (ref 34–46.6)
HGB BLD-MCNC: 13 G/DL (ref 12–15.9)
INR PPP: 1.12 (ref 0.85–1.16)
LYMPHOCYTES # BLD AUTO: 0.74 10*3/MM3 (ref 0.7–3.1)
LYMPHOCYTES NFR BLD AUTO: 19.9 % (ref 19.6–45.3)
MCH RBC QN AUTO: 29.3 PG (ref 26.6–33)
MCHC RBC AUTO-ENTMCNC: 33.1 G/DL (ref 31.5–35.7)
MCV RBC AUTO: 88.5 FL (ref 79–97)
MONOCYTES # BLD AUTO: 0.48 10*3/MM3 (ref 0.1–0.9)
MONOCYTES NFR BLD AUTO: 12.9 % (ref 5–12)
NEUTROPHILS NFR BLD AUTO: 2.32 10*3/MM3 (ref 1.7–7)
NEUTROPHILS NFR BLD AUTO: 62.4 % (ref 42.7–76)
PLATELET # BLD AUTO: 34 10*3/MM3 (ref 140–450)
PMV BLD AUTO: 12.8 FL (ref 6–12)
POTASSIUM SERPL-SCNC: 4.6 MMOL/L (ref 3.5–5.2)
PROT SERPL-MCNC: 7.4 G/DL (ref 6–8.5)
PROTHROMBIN TIME: 14.1 SECONDS (ref 11.5–14)
RBC # BLD AUTO: 4.44 10*6/MM3 (ref 3.77–5.28)
SODIUM SERPL-SCNC: 140 MMOL/L (ref 136–145)
WBC # BLD AUTO: 3.72 10*3/MM3 (ref 3.4–10.8)

## 2020-09-29 PROCEDURE — 36415 COLL VENOUS BLD VENIPUNCTURE: CPT | Performed by: NURSE PRACTITIONER

## 2020-09-29 PROCEDURE — 80053 COMPREHEN METABOLIC PANEL: CPT | Performed by: NURSE PRACTITIONER

## 2020-09-29 PROCEDURE — 99214 OFFICE O/P EST MOD 30 MIN: CPT | Performed by: NURSE PRACTITIONER

## 2020-09-29 PROCEDURE — 85025 COMPLETE CBC W/AUTO DIFF WBC: CPT | Performed by: NURSE PRACTITIONER

## 2020-09-29 PROCEDURE — 85610 PROTHROMBIN TIME: CPT

## 2020-09-29 PROCEDURE — 82105 ALPHA-FETOPROTEIN SERUM: CPT | Performed by: NURSE PRACTITIONER

## 2020-09-29 RX ORDER — FERROUS SULFATE 325(65) MG
325 TABLET ORAL DAILY
COMMUNITY
Start: 2020-08-13 | End: 2021-03-26 | Stop reason: SDUPTHER

## 2020-09-29 RX ORDER — LACTULOSE 10 G/15ML
10 SOLUTION ORAL DAILY
Qty: 473 ML | Refills: 5 | Status: SHIPPED | OUTPATIENT
Start: 2020-09-29 | End: 2021-03-26

## 2020-09-29 RX ORDER — EZETIMIBE 10 MG/1
10 TABLET ORAL DAILY
COMMUNITY
End: 2021-03-26 | Stop reason: SDUPTHER

## 2020-09-29 NOTE — PROGRESS NOTES
"GASTROENTEROLOGY OFFICE NOTE  Lyndesy Luna  [id number]  1960    CARE TEAM  Patient Care Team:  Javid Lin APRN as PCP - General (Nurse Practitioner)    Referring Provider: Javid Lin APRN     Chief Complaint   Patient presents with   • Cirrhosis        HISTORY OF PRESENT ILLNESS:  Ms. Luna presents in follow-up with decompensated cirrhosis secondary to fatty liver disease.      She has a history of bleeding esophageal varices noted in February 2020 with 7 bands placed.  Repeat EGD in April showed grade 1 esophageal varices (residual) no evidence of bleeding.  She is currently taking Coreg 6.25 mg twice daily.    She is having some problems with increased abdominal girth.  Currently taking Lasix 20 mg daily and spironolactone 50 mg daily.  She reports she is maintaining a low-sodium/2 g diet \"for the most part\".    She has had no signs of hepatic encephalopathy.  Specifically, her and her spouse deny any changes of state of consciousness, intellectual function, personality or behavior.    She was seen about a month ago with complaints of nausea in the mornings that subsided without intervention within a few hours of waking.  We have changed her PPI to take in the evening rather than morning dose and she reports that she is doing much better in this regard.  On a rare occasion she still has some nausea.      She is having some trouble with her diabetes, her blood sugar is reportedly up and down.  At her last visit, she requested a referral to endocrinology, Dr. Bedolla.  Referral was placed but patient has not heard from them-we will check on this for her today.    She has complaints of constipation.  She is having a hard, small volume bowel movement about every 4 to 5 days.    PAST MEDICAL HISTORY  Past Medical History:   Diagnosis Date   • Arthritis    • Diabetes mellitus (CMS/HCC)    • Gastric ulcer    • Hypertension    • Kidney stone    • Splenomegaly         PAST SURGICAL HISTORY  Past " Surgical History:   Procedure Laterality Date   • CARDIAC CATHETERIZATION     • CARDIAC CATHETERIZATION N/A 8/14/2020    Procedure: Left Heart Cath;  Surgeon: Yoshi Wilson MD;  Location:  [facility];  Service: Cardiovascular;  Laterality: N/A;   • CYSTOSCOPY, URETEROSCOPY, RETROGRADE PYELOGRAM, STONE EXTRACTION, STENT INSERTION Left 2/23/2020    Procedure: CYSTOSCOPY URETEROSCOPY STONE EXTRACTION STENT INSERTION;  Surgeon: Elmer Weiss MD;  Location:  [facility];  Service: Urology;  Laterality: Left;   • ENDOSCOPY N/A 2/14/2020    Procedure: ESOPHAGOGASTRODUODENOSCOPY;  Surgeon: Brant Finch MD;  Location:  [facility];  Service: Gastroenterology;  Laterality: N/A;  Esophageal banding for a total of 7 bands   • ENDOSCOPY N/A 4/15/2020    Procedure: ESOPHAGOGASTRODUODENOSCOPY;  Surgeon: Jayme Astudillo MD;  Location:  [facility];  Service: Gastroenterology;  Laterality: N/A;   • FRACTURE SURGERY      LEFT ANKLE   • KIDNEY STONE SURGERY          MEDICATIONS:    Current Outpatient Medications:   •  ferrous sulfate 325 (65 FE) MG tablet, Take 325 mg by mouth Daily., Disp: , Rfl:   •  aspirin 81 MG EC tablet, Take 81 mg by mouth Daily., Disp: , Rfl:   •  Aspirin Buf,CaCarb-MgCarb-MgO, 81 MG tablet, Take 81 mg by mouth Daily., Disp: , Rfl:   •  atorvastatin (LIPITOR) 80 MG tablet, Take 80 mg by mouth Daily., Disp: , Rfl:   •  carvedilol (Coreg) 6.25 MG tablet, Take 1 tablet by mouth 2 (Two) Times a Day With Meals., Disp: 60 tablet, Rfl: 2  •  cyanocobalamin 1000 MCG/ML injection, Inject 1,000 mcg into the appropriate muscle as directed by prescriber 1 (One) Time Per Week., Disp: , Rfl:   •  Dapagliflozin Propanediol 10 MG tablet, Take 10 mg by mouth Daily., Disp: , Rfl:   •  ezetimibe (ZETIA) 10 MG tablet, Take 10 mg by mouth Daily., Disp: , Rfl:   •  fexofenadine (ALLEGRA) 180 MG tablet, Take 1 tablet by mouth Daily., Disp: , Rfl:   •  furosemide (LASIX) 20 MG  tablet, Take 1 tablet by mouth Daily., Disp: , Rfl:   •  insulin glargine (LANTUS) 100 UNIT/ML injection, Inject 100 Units under the skin into the appropriate area as directed Every Night., Disp: , Rfl:   •  isosorbide mononitrate (IMDUR) 30 MG 24 hr tablet, Take 1 tablet by mouth Daily., Disp: 30 tablet, Rfl: 11  •  lactulose (CHRONULAC) 10 GM/15ML solution, Take 15 mL by mouth Daily., Disp: 473 mL, Rfl: 5  •  loratadine (CLARITIN) 10 MG tablet, Take 10 mg by mouth Daily., Disp: , Rfl:   •  metFORMIN (GLUCOPHAGE) 1000 MG tablet, Take 1,000 mg by mouth 2 (Two) Times a Day With Meals., Disp: , Rfl:   •  nitroglycerin (NITROSTAT) 0.4 MG SL tablet, Take 1 tablet by mouth As Needed., Disp: , Rfl:   •  pantoprazole (PROTONIX) 40 MG EC tablet, Take 1 tablet by mouth Daily., Disp: , Rfl:   •  SITagliptin (JANUVIA) 100 MG tablet, Take 100 mg by mouth Daily., Disp: , Rfl:   •  spironolactone (ALDACTONE) 50 MG tablet, Take 1 tablet by mouth Daily., Disp: , Rfl:     ALLERGIES  Allergies   Allergen Reactions   • Penicillins Itching       FAMILY HISTORY:  Family History   Problem Relation Age of Onset   • Diabetes Father    • Colon cancer Neg Hx    • Colon polyps Neg Hx        SOCIAL HISTORY  Social History     Socioeconomic History   • Marital status:      Spouse name: Not on file   • Number of children: Not on file   • Years of education: Not on file   • Highest education level: Not on file   Tobacco Use   • Smoking status: Never Smoker   • Smokeless tobacco: Never Used   Substance and Sexual Activity   • Alcohol use: Never     Frequency: Never   • Drug use: Never   • Sexual activity: Defer       REVIEW OF SYSTEMS  Review of Systems   Constitutional: Negative for activity change, appetite change, chills, diaphoresis, fatigue, fever, unexpected weight gain and unexpected weight loss.   HENT: Negative for trouble swallowing and voice change.    Cardiovascular: Negative for leg swelling.   Gastrointestinal: Positive for  "abdominal distention and constipation. Negative for abdominal pain, anal bleeding, blood in stool, diarrhea, nausea, rectal pain, vomiting, GERD and indigestion.         PHYSICAL EXAM   /64 (BP Location: Right arm, Patient Position: Sitting, Cuff Size: Adult)   Pulse 83   Temp 97.1 °F (36.2 °C) (Temporal)   Ht 170.2 cm (67.01\")   Wt 89.5 kg (197 lb 6.4 oz)   BMI 30.91 kg/m²   Physical Exam  Vitals signs and nursing note reviewed.   Constitutional:       Appearance: Normal appearance. She is well-developed.   HENT:      Head: Normocephalic and atraumatic.      Nose: Nose normal.      Mouth/Throat:      Mouth: Mucous membranes are moist.      Pharynx: Oropharynx is clear.   Eyes:      Pupils: Pupils are equal, round, and reactive to light.   Neck:      Musculoskeletal: Normal range of motion and neck supple.   Cardiovascular:      Rate and Rhythm: Normal rate and regular rhythm.   Pulmonary:      Effort: Pulmonary effort is normal.      Breath sounds: Normal breath sounds. No wheezing or rales.   Abdominal:      General: Bowel sounds are normal. There is distension.      Palpations: Abdomen is soft. There is no mass.      Tenderness: There is no abdominal tenderness. There is no guarding or rebound.      Hernia: No hernia is present.   Musculoskeletal: Normal range of motion.   Skin:     General: Skin is warm and dry.   Neurological:      Mental Status: She is alert and oriented to person, place, and time.      Cranial Nerves: No cranial nerve deficit.   Psychiatric:         Behavior: Behavior normal.         Judgment: Judgment normal.       Results Review:  I have reviewed the patient's new clinical results.    ASSESSMENT / PLAN  1. Cirrhosis, decompensated by variceal bleeding and ascites  -cirrhosis secondary to NAFLD   -No evidence of hepatic encephalopathy  # Hepatocellular carcinoma surveillance  - Abdominal imaging every six months  - Last abdominal CT (Feb 2020). Result: cirrhosis and severe " splenomegaly indicative of portal hypertension, no focal lesions  Hepatic portal duplex (February 2020) result: Patency identified of the portal vein with normal directional flow of hepatic vasculature.  -Next imaging due  Plan:  -CBC, CMP, PT/INR, AFP today  - Ultrasound abdomen      3.  Ascites  Plan:  -Low sodium diet 2 gm/day  -Daily weights  -Increase diuretics  Lasix 40 mg daily  Spironolactone 100 mg daily   - BMP in 4 days     4. Esophageal varices bleeding  -Last EGD  4/15/2020: Grade 1 esophageal varices (residual)  Plan:  -Continue Coreg 6.25 mg twice daily  -EGD in April 2021     5.  Nutrition  Plan:  -High protein diet  -Low sodium diet 2 gm/day  -Avoid alcohol, avoid NSAIDS    6.  Constipation  Plan:  -Lactulose 10 g daily    Return in about 6 months (around 3/29/2021).    I discussed the patients findings and my recommendations with patient    JOSE D Sood

## 2020-10-04 ENCOUNTER — LAB (OUTPATIENT)
Dept: LAB | Facility: HOSPITAL | Age: 60
End: 2020-10-04

## 2020-10-04 DIAGNOSIS — K74.60 CIRRHOSIS OF LIVER WITH ASCITES, UNSPECIFIED HEPATIC CIRRHOSIS TYPE (HCC): ICD-10-CM

## 2020-10-04 DIAGNOSIS — R18.8 CIRRHOSIS OF LIVER WITH ASCITES, UNSPECIFIED HEPATIC CIRRHOSIS TYPE (HCC): ICD-10-CM

## 2020-10-04 LAB
ANION GAP SERPL CALCULATED.3IONS-SCNC: 12.6 MMOL/L (ref 5–15)
BUN SERPL-MCNC: 21 MG/DL (ref 8–23)
BUN/CREAT SERPL: 14.6 (ref 7–25)
CALCIUM SPEC-SCNC: 10.5 MG/DL (ref 8.6–10.5)
CHLORIDE SERPL-SCNC: 100 MMOL/L (ref 98–107)
CO2 SERPL-SCNC: 24.4 MMOL/L (ref 22–29)
CREAT SERPL-MCNC: 1.44 MG/DL (ref 0.57–1)
GFR SERPL CREATININE-BSD FRML MDRD: 37 ML/MIN/1.73
GLUCOSE SERPL-MCNC: 181 MG/DL (ref 65–99)
POTASSIUM SERPL-SCNC: 4.1 MMOL/L (ref 3.5–5.2)
SODIUM SERPL-SCNC: 137 MMOL/L (ref 136–145)

## 2020-10-04 PROCEDURE — 80048 BASIC METABOLIC PNL TOTAL CA: CPT

## 2020-10-04 PROCEDURE — 36415 COLL VENOUS BLD VENIPUNCTURE: CPT

## 2020-10-08 RX ORDER — FUROSEMIDE 20 MG/1
TABLET ORAL
Qty: 60 TABLET | Refills: 5 | Status: SHIPPED | OUTPATIENT
Start: 2020-10-08 | End: 2021-06-07

## 2020-10-08 RX ORDER — SPIRONOLACTONE 50 MG/1
TABLET, FILM COATED ORAL
Qty: 60 TABLET | Refills: 5 | Status: SHIPPED | OUTPATIENT
Start: 2020-10-08 | End: 2021-06-07

## 2020-10-23 ENCOUNTER — HOSPITAL ENCOUNTER (OUTPATIENT)
Dept: ULTRASOUND IMAGING | Facility: HOSPITAL | Age: 60
Discharge: HOME | End: 2020-10-23
Admitting: NURSE PRACTITIONER

## 2020-10-23 DIAGNOSIS — R18.8 CIRRHOSIS OF LIVER WITH ASCITES, UNSPECIFIED HEPATIC CIRRHOSIS TYPE (HCC): ICD-10-CM

## 2020-10-23 DIAGNOSIS — K74.60 CIRRHOSIS OF LIVER WITH ASCITES, UNSPECIFIED HEPATIC CIRRHOSIS TYPE (HCC): ICD-10-CM

## 2020-10-23 PROCEDURE — 76700 US EXAM ABDOM COMPLETE: CPT

## 2020-10-27 ENCOUNTER — TELEPHONE (OUTPATIENT)
Dept: FAMILY MEDICINE CLINIC | Age: 60
End: 2020-10-27

## 2020-10-27 RX ORDER — EMPAGLIFLOZIN 25 MG/1
1 TABLET, FILM COATED ORAL DAILY
Qty: 90 TABLET | Refills: 1 | Status: SHIPPED | OUTPATIENT
Start: 2020-10-27

## 2020-10-27 RX ORDER — CYANOCOBALAMIN 1000 UG/ML
1000 INJECTION INTRAMUSCULAR; SUBCUTANEOUS ONCE
Qty: 1 ML | Refills: 5 | Status: SHIPPED | OUTPATIENT
Start: 2020-10-27 | End: 2020-10-27

## 2020-10-27 RX ORDER — DULAGLUTIDE 1.5 MG/.5ML
1.5 INJECTION, SOLUTION SUBCUTANEOUS WEEKLY
Qty: 4 PEN | Refills: 2 | Status: SHIPPED | OUTPATIENT
Start: 2020-10-27 | End: 2021-01-27

## 2020-10-27 RX ORDER — INSULIN DETEMIR 100 [IU]/ML
60 INJECTION, SOLUTION SUBCUTANEOUS 2 TIMES DAILY
Qty: 5 PEN | Refills: 3 | Status: SHIPPED | OUTPATIENT
Start: 2020-10-27 | End: 2021-02-17 | Stop reason: SDUPTHER

## 2020-10-27 NOTE — TELEPHONE ENCOUNTER
Pt appeared on my list to call as she fills Lantus and Candice Blossom through [employer] which are no longer available on the [employer] program.  Called pt and spoke with her, she is fine switching to Levemir and Jardiance on [employer]. Spoke with Gonzalo Chase and he made these changes. The pt's previous Lantus rx was for 25 units in the morning and 100 units at bedtime. The pt reported on the phone today that she was taking around 30-40 units in the AM and up to 150 units at bedtime. Gonzalo Chase changed her dosing with the Levemir to be 60 units Bid. I encouraged the pt to check her glucose regularly before she gives her morning and nightime insulin doses so Gonzalo Chase will know how to adjust her insulin at her next appointment. Also instructed pt to call us if her glucose is running high or low with the levemir. Lastly, pt was started on Trulicity. It needs a PA and the PA is being submitted.     Herve Rome, KristopherD

## 2020-11-03 NOTE — TELEPHONE ENCOUNTER
Attempted to complete PA several times and was not able to get it submitted on cover my meds to any of the insurances that the pharmacy provided me. Called pharmacy back today and the med went through on the pt's short term medicare (before her actual medicare kicks in). Pt's cost was around $8 and she picked it up yesterday.     Lorne Beyer, PharmD

## 2020-11-09 ENCOUNTER — TELEPHONE (OUTPATIENT)
Dept: FAMILY MEDICINE CLINIC | Age: 60
End: 2020-11-09

## 2020-11-09 NOTE — TELEPHONE ENCOUNTER
Patient's referral, along with all pertinent medical records faxed to the attention of Dr. Alyssa Sol (Endocrinology) on 11/09/20, they will contact the patient and our office with appointment date/time/location.

## 2020-11-11 RX ORDER — FLUCONAZOLE 150 MG/1
150 TABLET ORAL DAILY
Qty: 3 TABLET | Refills: 0 | Status: SHIPPED | OUTPATIENT
Start: 2020-11-11 | End: 2020-11-12 | Stop reason: SDUPTHER

## 2020-11-11 RX ORDER — SULFAMETHOXAZOLE AND TRIMETHOPRIM 800; 160 MG/1; MG/1
1 TABLET ORAL 2 TIMES DAILY
Qty: 14 TABLET | Refills: 0 | Status: SHIPPED | OUTPATIENT
Start: 2020-11-11 | End: 2020-11-12 | Stop reason: SDUPTHER

## 2020-11-12 RX ORDER — SULFAMETHOXAZOLE AND TRIMETHOPRIM 800; 160 MG/1; MG/1
1 TABLET ORAL 2 TIMES DAILY
Qty: 14 TABLET | Refills: 0 | Status: SHIPPED | OUTPATIENT
Start: 2020-11-12 | End: 2020-11-19

## 2020-11-12 RX ORDER — FLUCONAZOLE 150 MG/1
150 TABLET ORAL DAILY
Qty: 3 TABLET | Refills: 0 | Status: SHIPPED | OUTPATIENT
Start: 2020-11-12 | End: 2020-11-15

## 2020-11-19 ENCOUNTER — TELEPHONE (OUTPATIENT)
Dept: FAMILY MEDICINE CLINIC | Age: 60
End: 2020-11-19

## 2020-11-19 NOTE — TELEPHONE ENCOUNTER
Patient scheduled to see Dr. Janine Gray (Podiatry) on 12/03/20 at 1:30. I have mailed patient referral with appointment date/time/location.

## 2020-11-25 RX ORDER — PANTOPRAZOLE SODIUM 40 MG/1
TABLET, DELAYED RELEASE ORAL
Qty: 90 TABLET | Refills: 1 | Status: SHIPPED | OUTPATIENT
Start: 2020-11-25

## 2020-11-25 NOTE — TELEPHONE ENCOUNTER
Refill request received from pharmacy. Medication pending for approval.       Last Office Visit With PCP:  08/13/2020    Next Visit Date:  No future appointments.     DACIA AYON

## 2020-12-29 ENCOUNTER — TELEPHONE (OUTPATIENT)
Dept: FAMILY MEDICINE CLINIC | Age: 60
End: 2020-12-29

## 2021-01-22 DIAGNOSIS — E11.8 TYPE 2 DIABETES MELLITUS WITH COMPLICATION, WITH LONG-TERM CURRENT USE OF INSULIN (HCC): ICD-10-CM

## 2021-01-22 DIAGNOSIS — R53.83 FATIGUE, UNSPECIFIED TYPE: ICD-10-CM

## 2021-01-22 DIAGNOSIS — D64.9 ANEMIA, UNSPECIFIED TYPE: ICD-10-CM

## 2021-01-22 DIAGNOSIS — Z79.4 TYPE 2 DIABETES MELLITUS WITH COMPLICATION, WITH LONG-TERM CURRENT USE OF INSULIN (HCC): ICD-10-CM

## 2021-01-23 DIAGNOSIS — E11.8 TYPE 2 DIABETES MELLITUS WITH COMPLICATION, WITH LONG-TERM CURRENT USE OF INSULIN (HCC): ICD-10-CM

## 2021-01-23 DIAGNOSIS — Z79.4 TYPE 2 DIABETES MELLITUS WITH COMPLICATION, WITH LONG-TERM CURRENT USE OF INSULIN (HCC): ICD-10-CM

## 2021-01-25 RX ORDER — ASPIRIN 81 MG/1
TABLET ORAL
Qty: 90 TABLET | Refills: 1 | Status: SHIPPED | OUTPATIENT
Start: 2021-01-25

## 2021-01-25 RX ORDER — FERROUS SULFATE 325(65) MG
TABLET ORAL
Qty: 180 TABLET | Refills: 1 | Status: SHIPPED | OUTPATIENT
Start: 2021-01-25

## 2021-01-25 NOTE — TELEPHONE ENCOUNTER
Refill request received from pharmacy. Medication pending for approval.       Last Office Visit With PCP:  08/13/2020    Next Visit Date:  No future appointments.     DACIA reddy

## 2021-01-27 DIAGNOSIS — Z79.4 TYPE 2 DIABETES MELLITUS WITH COMPLICATION, WITH LONG-TERM CURRENT USE OF INSULIN (HCC): ICD-10-CM

## 2021-01-27 DIAGNOSIS — E11.8 TYPE 2 DIABETES MELLITUS WITH COMPLICATION, WITH LONG-TERM CURRENT USE OF INSULIN (HCC): ICD-10-CM

## 2021-01-27 RX ORDER — DULAGLUTIDE 1.5 MG/.5ML
INJECTION, SOLUTION SUBCUTANEOUS
Qty: 4 PEN | Refills: 0 | Status: SHIPPED | OUTPATIENT
Start: 2021-01-27

## 2021-02-01 NOTE — TELEPHONE ENCOUNTER
Called [facility] to see if there was an issue with the pt's Trulicity. Back in October the pt's short term Medicare covered the rx, didn't know if her insurance still covered it or not in the new year.   Called pharmacy and they ran a CO[employer] claim and it was covered at around $9.    Madison Rodriguez PharmD

## 2021-02-02 DIAGNOSIS — E11.8 TYPE 2 DIABETES MELLITUS WITH COMPLICATION, WITH LONG-TERM CURRENT USE OF INSULIN (HCC): ICD-10-CM

## 2021-02-02 DIAGNOSIS — Z79.4 TYPE 2 DIABETES MELLITUS WITH COMPLICATION, WITH LONG-TERM CURRENT USE OF INSULIN (HCC): ICD-10-CM

## 2021-02-02 RX ORDER — CARVEDILOL 6.25 MG/1
TABLET ORAL
Qty: 60 TABLET | Refills: 0 | Status: SHIPPED | OUTPATIENT
Start: 2021-02-02

## 2021-02-17 ENCOUNTER — TELEPHONE (OUTPATIENT)
Dept: FAMILY MEDICINE CLINIC | Age: 61
End: 2021-02-17

## 2021-02-17 DIAGNOSIS — Z79.4 TYPE 2 DIABETES MELLITUS WITH COMPLICATION, WITH LONG-TERM CURRENT USE OF INSULIN (HCC): ICD-10-CM

## 2021-02-17 DIAGNOSIS — E11.8 TYPE 2 DIABETES MELLITUS WITH COMPLICATION, WITH LONG-TERM CURRENT USE OF INSULIN (HCC): ICD-10-CM

## 2021-02-17 RX ORDER — INSULIN GLARGINE 100 [IU]/ML
60 INJECTION, SOLUTION SUBCUTANEOUS 2 TIMES DAILY
Qty: 36 ML | Refills: 2 | Status: SHIPPED | OUTPATIENT
Start: 2021-02-17 | End: 2021-05-18

## 2021-02-17 RX ORDER — INSULIN DETEMIR 100 [IU]/ML
60 INJECTION, SOLUTION SUBCUTANEOUS 2 TIMES DAILY
Qty: 36 ML | Refills: 2 | Status: SHIPPED | OUTPATIENT
Start: 2021-02-17 | End: 2021-02-17

## 2021-03-02 DIAGNOSIS — K21.9 GASTROESOPHAGEAL REFLUX DISEASE WITHOUT ESOPHAGITIS: ICD-10-CM

## 2021-03-02 DIAGNOSIS — Z79.4 TYPE 2 DIABETES MELLITUS WITH COMPLICATION, WITH LONG-TERM CURRENT USE OF INSULIN (HCC): ICD-10-CM

## 2021-03-02 DIAGNOSIS — I21.4 NSTEMI (NON-ST ELEVATED MYOCARDIAL INFARCTION) (HCC): ICD-10-CM

## 2021-03-02 DIAGNOSIS — E11.8 TYPE 2 DIABETES MELLITUS WITH COMPLICATION, WITH LONG-TERM CURRENT USE OF INSULIN (HCC): ICD-10-CM

## 2021-03-02 RX ORDER — ATORVASTATIN CALCIUM 80 MG/1
TABLET, FILM COATED ORAL
Qty: 90 TABLET | Refills: 4 | OUTPATIENT
Start: 2021-03-02

## 2021-03-02 RX ORDER — CARVEDILOL 6.25 MG/1
TABLET ORAL
Qty: 60 TABLET | Refills: 0 | OUTPATIENT
Start: 2021-03-02

## 2021-03-02 RX ORDER — PANTOPRAZOLE SODIUM 40 MG/1
TABLET, DELAYED RELEASE ORAL
Qty: 90 TABLET | Refills: 1 | OUTPATIENT
Start: 2021-03-02

## 2021-03-02 RX ORDER — DULAGLUTIDE 1.5 MG/.5ML
INJECTION, SOLUTION SUBCUTANEOUS
Refills: 0 | OUTPATIENT
Start: 2021-03-02

## 2021-03-02 NOTE — TELEPHONE ENCOUNTER
Refill request received from pharmacy. Medication pending for approval.       Last Office Visit With PCP:  5/14/2020    Next Visit Date:  No future appointments.     DACIA Burdick

## 2021-03-09 DIAGNOSIS — Z79.4 TYPE 2 DIABETES MELLITUS WITH COMPLICATION, WITH LONG-TERM CURRENT USE OF INSULIN (HCC): ICD-10-CM

## 2021-03-09 DIAGNOSIS — E11.8 TYPE 2 DIABETES MELLITUS WITH COMPLICATION, WITH LONG-TERM CURRENT USE OF INSULIN (HCC): ICD-10-CM

## 2021-03-09 RX ORDER — DULAGLUTIDE 1.5 MG/.5ML
INJECTION, SOLUTION SUBCUTANEOUS
Refills: 0 | OUTPATIENT
Start: 2021-03-09

## 2021-03-09 NOTE — TELEPHONE ENCOUNTER
Refill request received from pharmacy. Medication pending for approval.       Last Office Visit With PCP:  5/14/2020    Next Visit Date:  No future appointments.     DACIA AYON

## 2021-03-15 RX ORDER — FEXOFENADINE HCL 180 MG/1
TABLET ORAL
Qty: 30 TABLET | Refills: 4 | OUTPATIENT
Start: 2021-03-15

## 2021-03-15 NOTE — TELEPHONE ENCOUNTER
Refill request received from pharmacy. Medication pending for approval.       Last Office Visit With PCP:  5/14/2020    Next Visit Date:  No future appointments.     DACIA reddy

## 2021-03-18 DIAGNOSIS — Z79.4 TYPE 2 DIABETES MELLITUS WITH COMPLICATION, WITH LONG-TERM CURRENT USE OF INSULIN (HCC): ICD-10-CM

## 2021-03-18 DIAGNOSIS — E11.8 TYPE 2 DIABETES MELLITUS WITH COMPLICATION, WITH LONG-TERM CURRENT USE OF INSULIN (HCC): ICD-10-CM

## 2021-03-19 RX ORDER — CARVEDILOL 6.25 MG/1
TABLET ORAL
Qty: 60 TABLET | Refills: 0 | OUTPATIENT
Start: 2021-03-19

## 2021-03-19 NOTE — TELEPHONE ENCOUNTER
Refill request received from pharmacy. Medication pending for approval.       Last Office Visit With PCP:  8/13/2020    Next Visit Date:  No future appointments.     DACIA reddy

## 2021-03-26 ENCOUNTER — OFFICE VISIT (OUTPATIENT)
Dept: INTERNAL MEDICINE | Facility: CLINIC | Age: 61
End: 2021-03-26

## 2021-03-26 ENCOUNTER — LAB (OUTPATIENT)
Dept: LAB | Facility: HOSPITAL | Age: 61
End: 2021-03-26

## 2021-03-26 VITALS
TEMPERATURE: 97.1 F | RESPIRATION RATE: 16 BRPM | BODY MASS INDEX: 29.98 KG/M2 | WEIGHT: 191 LBS | DIASTOLIC BLOOD PRESSURE: 74 MMHG | OXYGEN SATURATION: 98 % | HEART RATE: 81 BPM | SYSTOLIC BLOOD PRESSURE: 144 MMHG | HEIGHT: 67 IN

## 2021-03-26 DIAGNOSIS — E55.9 VITAMIN D DEFICIENCY: ICD-10-CM

## 2021-03-26 DIAGNOSIS — E78.2 MIXED HYPERLIPIDEMIA: ICD-10-CM

## 2021-03-26 DIAGNOSIS — E53.9 VITAMIN B DEFICIENCY: ICD-10-CM

## 2021-03-26 DIAGNOSIS — Z79.4 TYPE 2 DIABETES MELLITUS WITH OTHER SPECIFIED COMPLICATION, WITH LONG-TERM CURRENT USE OF INSULIN (HCC): Primary | ICD-10-CM

## 2021-03-26 DIAGNOSIS — Z13.29 THYROID DISORDER SCREENING: ICD-10-CM

## 2021-03-26 DIAGNOSIS — I10 ESSENTIAL HYPERTENSION: ICD-10-CM

## 2021-03-26 DIAGNOSIS — E11.69 TYPE 2 DIABETES MELLITUS WITH OTHER SPECIFIED COMPLICATION, WITH LONG-TERM CURRENT USE OF INSULIN (HCC): Primary | ICD-10-CM

## 2021-03-26 LAB
25(OH)D3 SERPL-MCNC: 13 NG/ML
ALBUMIN SERPL-MCNC: 4.1 G/DL (ref 3.5–5.2)
ALBUMIN/GLOB SERPL: 1.6 G/DL
ALP SERPL-CCNC: 86 U/L (ref 39–117)
ALT SERPL W P-5'-P-CCNC: 37 U/L (ref 1–33)
ANION GAP SERPL CALCULATED.3IONS-SCNC: 8.2 MMOL/L (ref 5–15)
AST SERPL-CCNC: 25 U/L (ref 1–32)
BILIRUB SERPL-MCNC: 1.1 MG/DL (ref 0–1.2)
BUN SERPL-MCNC: 15 MG/DL (ref 8–23)
BUN/CREAT SERPL: 11.9 (ref 7–25)
CALCIUM SPEC-SCNC: 9.7 MG/DL (ref 8.6–10.5)
CHLORIDE SERPL-SCNC: 104 MMOL/L (ref 98–107)
CHOLEST SERPL-MCNC: 105 MG/DL (ref 0–200)
CO2 SERPL-SCNC: 26.8 MMOL/L (ref 22–29)
CREAT SERPL-MCNC: 1.26 MG/DL (ref 0.57–1)
DEPRECATED RDW RBC AUTO: 45.5 FL (ref 37–54)
ERYTHROCYTE [DISTWIDTH] IN BLOOD BY AUTOMATED COUNT: 14.4 % (ref 12.3–15.4)
FOLATE SERPL-MCNC: 10.9 NG/ML (ref 4.78–24.2)
GFR SERPL CREATININE-BSD FRML MDRD: 43 ML/MIN/1.73
GLOBULIN UR ELPH-MCNC: 2.6 GM/DL
GLUCOSE SERPL-MCNC: 206 MG/DL (ref 65–99)
HBA1C MFR BLD: 7.8 % (ref 4.8–5.6)
HCT VFR BLD AUTO: 37.4 % (ref 34–46.6)
HDLC SERPL-MCNC: 25 MG/DL (ref 40–60)
HGB BLD-MCNC: 12.6 G/DL (ref 12–15.9)
LDLC SERPL CALC-MCNC: 46 MG/DL (ref 0–100)
LDLC/HDLC SERPL: 1.55 {RATIO}
MCH RBC QN AUTO: 29.4 PG (ref 26.6–33)
MCHC RBC AUTO-ENTMCNC: 33.7 G/DL (ref 31.5–35.7)
MCV RBC AUTO: 87.2 FL (ref 79–97)
PLATELET # BLD AUTO: 31 10*3/MM3 (ref 140–450)
PMV BLD AUTO: 12.3 FL (ref 6–12)
POTASSIUM SERPL-SCNC: 4.4 MMOL/L (ref 3.5–5.2)
PROT SERPL-MCNC: 6.7 G/DL (ref 6–8.5)
RBC # BLD AUTO: 4.29 10*6/MM3 (ref 3.77–5.28)
SODIUM SERPL-SCNC: 139 MMOL/L (ref 136–145)
T4 FREE SERPL-MCNC: 1.3 NG/DL (ref 0.93–1.7)
TRIGL SERPL-MCNC: 206 MG/DL (ref 0–150)
TSH SERPL DL<=0.05 MIU/L-ACNC: 5.91 UIU/ML (ref 0.27–4.2)
VIT B12 BLD-MCNC: 357 PG/ML (ref 211–946)
VLDLC SERPL-MCNC: 34 MG/DL (ref 5–40)
WBC # BLD AUTO: 3.43 10*3/MM3 (ref 3.4–10.8)

## 2021-03-26 PROCEDURE — 82746 ASSAY OF FOLIC ACID SERUM: CPT | Performed by: NURSE PRACTITIONER

## 2021-03-26 PROCEDURE — 85027 COMPLETE CBC AUTOMATED: CPT | Performed by: NURSE PRACTITIONER

## 2021-03-26 PROCEDURE — 80053 COMPREHEN METABOLIC PANEL: CPT | Performed by: NURSE PRACTITIONER

## 2021-03-26 PROCEDURE — 84439 ASSAY OF FREE THYROXINE: CPT | Performed by: NURSE PRACTITIONER

## 2021-03-26 PROCEDURE — 82607 VITAMIN B-12: CPT | Performed by: NURSE PRACTITIONER

## 2021-03-26 PROCEDURE — 99214 OFFICE O/P EST MOD 30 MIN: CPT | Performed by: NURSE PRACTITIONER

## 2021-03-26 PROCEDURE — 80061 LIPID PANEL: CPT | Performed by: NURSE PRACTITIONER

## 2021-03-26 PROCEDURE — 83036 HEMOGLOBIN GLYCOSYLATED A1C: CPT | Performed by: NURSE PRACTITIONER

## 2021-03-26 PROCEDURE — 82306 VITAMIN D 25 HYDROXY: CPT | Performed by: NURSE PRACTITIONER

## 2021-03-26 PROCEDURE — 84443 ASSAY THYROID STIM HORMONE: CPT | Performed by: NURSE PRACTITIONER

## 2021-03-26 RX ORDER — DULAGLUTIDE 1.5 MG/.5ML
1.5 INJECTION, SOLUTION SUBCUTANEOUS WEEKLY
COMMUNITY
Start: 2021-02-02 | End: 2021-03-26 | Stop reason: SDUPTHER

## 2021-03-26 RX ORDER — INSULIN GLARGINE 100 [IU]/ML
100 INJECTION, SOLUTION SUBCUTANEOUS NIGHTLY
Qty: 3000 ML | Refills: 5 | Status: SHIPPED | OUTPATIENT
Start: 2021-03-26 | End: 2021-03-30

## 2021-03-26 RX ORDER — ATORVASTATIN CALCIUM 80 MG/1
80 TABLET, FILM COATED ORAL DAILY
Qty: 30 TABLET | Refills: 5 | Status: SHIPPED | OUTPATIENT
Start: 2021-03-26 | End: 2021-03-26

## 2021-03-26 RX ORDER — EZETIMIBE 10 MG/1
10 TABLET ORAL DAILY
Qty: 30 TABLET | Refills: 5 | Status: SHIPPED | OUTPATIENT
Start: 2021-03-26 | End: 2022-03-11

## 2021-03-26 RX ORDER — ISOSORBIDE MONONITRATE 30 MG/1
30 TABLET, EXTENDED RELEASE ORAL DAILY
Qty: 30 TABLET | Refills: 5 | Status: SHIPPED | OUTPATIENT
Start: 2021-03-26 | End: 2022-02-18

## 2021-03-26 RX ORDER — PANTOPRAZOLE SODIUM 40 MG/1
40 TABLET, DELAYED RELEASE ORAL DAILY
Qty: 30 TABLET | Refills: 5 | Status: SHIPPED | OUTPATIENT
Start: 2021-03-26 | End: 2021-11-29

## 2021-03-26 RX ORDER — PEN NEEDLE, DIABETIC 32GX 5/32"
1 NEEDLE, DISPOSABLE MISCELLANEOUS DAILY
Qty: 100 EACH | Refills: 5 | Status: SHIPPED | OUTPATIENT
Start: 2021-03-26 | End: 2022-05-16

## 2021-03-26 RX ORDER — DULAGLUTIDE 1.5 MG/.5ML
1.5 INJECTION, SOLUTION SUBCUTANEOUS WEEKLY
Qty: 4 PEN | Refills: 5 | Status: SHIPPED | OUTPATIENT
Start: 2021-03-26 | End: 2021-09-27

## 2021-03-26 RX ORDER — ROSUVASTATIN CALCIUM 40 MG/1
40 TABLET, COATED ORAL NIGHTLY
Qty: 30 TABLET | Refills: 5 | Status: SHIPPED | OUTPATIENT
Start: 2021-03-26 | End: 2021-09-22

## 2021-03-26 RX ORDER — PEN NEEDLE, DIABETIC 32GX 5/32"
NEEDLE, DISPOSABLE MISCELLANEOUS
COMMUNITY
Start: 2021-03-15 | End: 2021-03-26 | Stop reason: SDUPTHER

## 2021-03-26 RX ORDER — FERROUS SULFATE 325(65) MG
325 TABLET ORAL DAILY
Qty: 30 TABLET | Refills: 5 | Status: SHIPPED | OUTPATIENT
Start: 2021-03-26 | End: 2022-05-13

## 2021-03-29 ENCOUNTER — OFFICE VISIT (OUTPATIENT)
Dept: GASTROENTEROLOGY | Facility: CLINIC | Age: 61
End: 2021-03-29

## 2021-03-29 VITALS
SYSTOLIC BLOOD PRESSURE: 136 MMHG | TEMPERATURE: 98.7 F | HEIGHT: 67 IN | WEIGHT: 191.8 LBS | HEART RATE: 73 BPM | BODY MASS INDEX: 30.1 KG/M2 | DIASTOLIC BLOOD PRESSURE: 58 MMHG

## 2021-03-29 DIAGNOSIS — R18.8 CIRRHOSIS OF LIVER WITH ASCITES, UNSPECIFIED HEPATIC CIRRHOSIS TYPE (HCC): Primary | ICD-10-CM

## 2021-03-29 DIAGNOSIS — D69.6 THROMBOCYTOPENIA (HCC): ICD-10-CM

## 2021-03-29 DIAGNOSIS — K74.60 CIRRHOSIS OF LIVER WITH ASCITES, UNSPECIFIED HEPATIC CIRRHOSIS TYPE (HCC): Primary | ICD-10-CM

## 2021-03-29 PROCEDURE — 99214 OFFICE O/P EST MOD 30 MIN: CPT | Performed by: NURSE PRACTITIONER

## 2021-03-29 NOTE — PROGRESS NOTES
GASTROENTEROLOGY OFFICE NOTE  Lyndsey Luna  [id number]  1960    CARE TEAM  Patient Care Team:  Taryn Olivas APRN as PCP - General (Nurse Practitioner)    Referring Provider: Taryn Olivas APRN    Chief Complaint   Patient presents with   • Cirrhosis        HISTORY OF PRESENT ILLNESS:  Ms. Luna presents in follow-up with decompensated cirrhosis secondary to fatty liver disease.       She has a history of bleeding esophageal varices noted in February 2020 with 7 bands placed.  Repeat EGD in April showed grade 1 esophageal varices (residual) no evidence of bleeding.  She is currently taking Coreg 6.25 mg twice daily.    She continues to have problems with increased abdominal girth.  Currently taking Lasix 40 mg and spironolactone 100 mg daily.  She noted some improvement initially 6 months ago when her dosage was increased but is having worsening problems over the past month.  She does not seem to be completely compliant with a low-sodium diet.  She does not add salt to food but overall does not pay particular attention to the amount of sodium in foods.  Specifically, she eats out very frequently and does not know the sodium content of the foods that she eats.  She complains of bloating and frequent gas.    She continues to have troubles with control of her diabetes.  She has an upcoming appointment with endocrinology.    She has a history of thrombocytopenia secondary to cirrhosis.  Her platelet count has been consistently declining over the past year.    She mentions today that she has plans for a right knee replacement.  She is awaiting cardiac clearance and also better control of her diabetes before surgery.    PAST MEDICAL HISTORY  Past Medical History:   Diagnosis Date   • Arthritis    • Diabetes mellitus (CMS/HCC)    • Gastric ulcer    • Hypertension    • Kidney stone    • Splenomegaly         PAST SURGICAL HISTORY  Past Surgical History:   Procedure Laterality Date   •  CARDIAC CATHETERIZATION     • CARDIAC CATHETERIZATION N/A 8/14/2020    Procedure: Left Heart Cath;  Surgeon: Yoshi Wilson MD;  Location:  [facility];  Service: Cardiovascular;  Laterality: N/A;   • CYSTOSCOPY, URETEROSCOPY, RETROGRADE PYELOGRAM, STONE EXTRACTION, STENT INSERTION Left 2/23/2020    Procedure: CYSTOSCOPY URETEROSCOPY STONE EXTRACTION STENT INSERTION;  Surgeon: Elmer Weiss MD;  Location:  [facility];  Service: Urology;  Laterality: Left;   • ENDOSCOPY N/A 2/14/2020    Procedure: ESOPHAGOGASTRODUODENOSCOPY;  Surgeon: Brant Finch MD;  Location:  [facility];  Service: Gastroenterology;  Laterality: N/A;  Esophageal banding for a total of 7 bands   • ENDOSCOPY N/A 4/15/2020    Procedure: ESOPHAGOGASTRODUODENOSCOPY;  Surgeon: Jayme Astudillo MD;  Location:  [facility];  Service: Gastroenterology;  Laterality: N/A;   • FRACTURE SURGERY      LEFT ANKLE   • KIDNEY STONE SURGERY          MEDICATIONS:    Current Outpatient Medications:   •  aspirin 81 MG EC tablet, Take 81 mg by mouth Daily., Disp: , Rfl:   •  BD Pen Needle Bisi U/F 32G X 4 MM misc, Inject 1 each under the skin into the appropriate area as directed Daily., Disp: 100 each, Rfl: 5  •  carvedilol (Coreg) 6.25 MG tablet, Take 1 tablet by mouth 2 (Two) Times a Day With Meals., Disp: 60 tablet, Rfl: 2  •  Coenzyme Q10 (CoQ10) 400 MG capsule, Take 400 mg by mouth 3 (Three) Times a Day., Disp: 90 capsule, Rfl: 5  •  cyanocobalamin 1000 MCG/ML injection, Inject 1,000 mcg into the appropriate muscle as directed by prescriber 1 (One) Time Per Week., Disp: , Rfl:   •  ezetimibe (ZETIA) 10 MG tablet, Take 1 tablet by mouth Daily., Disp: 30 tablet, Rfl: 5  •  ferrous sulfate 325 (65 FE) MG tablet, Take 1 tablet by mouth Daily., Disp: 30 tablet, Rfl: 5  •  furosemide (LASIX) 20 MG tablet, Take 2 tablets by mouth daily, Disp: 60 tablet, Rfl: 5  •  insulin glargine (LANTUS, SEMGLEE) 100 UNIT/ML  injection, Inject 100 Units under the skin into the appropriate area as directed Every Night., Disp: 3000 mL, Rfl: 5  •  isosorbide mononitrate (IMDUR) 30 MG 24 hr tablet, Take 1 tablet by mouth Daily., Disp: 30 tablet, Rfl: 5  •  metFORMIN (GLUCOPHAGE) 1000 MG tablet, Take 1 tablet by mouth 2 (Two) Times a Day With Meals., Disp: 60 tablet, Rfl: 5  •  nitroglycerin (NITROSTAT) 0.4 MG SL tablet, Take 1 tablet by mouth As Needed., Disp: , Rfl:   •  pantoprazole (PROTONIX) 40 MG EC tablet, Take 1 tablet by mouth Daily., Disp: 30 tablet, Rfl: 5  •  rosuvastatin (CRESTOR) 40 MG tablet, Take 1 tablet by mouth Every Night., Disp: 30 tablet, Rfl: 5  •  SITagliptin (JANUVIA) 100 MG tablet, Take 1 tablet by mouth Daily., Disp: 30 tablet, Rfl: 5  •  spironolactone (ALDACTONE) 50 MG tablet, Take 2 tablets by mouth daily, Disp: 60 tablet, Rfl: 5  •  Trulicity 1.5 MG/0.5ML solution pen-injector, Inject 1.5 mg under the skin into the appropriate area as directed 1 (One) Time Per Week., Disp: 4 pen, Rfl: 5    ALLERGIES  Allergies   Allergen Reactions   • Penicillins Itching       FAMILY HISTORY:  Family History   Problem Relation Age of Onset   • Diabetes Father    • Colon cancer Neg Hx    • Colon polyps Neg Hx        SOCIAL HISTORY  Social History     Socioeconomic History   • Marital status:      Spouse name: Not on file   • Number of children: Not on file   • Years of education: Not on file   • Highest education level: Not on file   Tobacco Use   • Smoking status: Never Smoker   • Smokeless tobacco: Never Used   Vaping Use   • Vaping Use: Never used   Substance and Sexual Activity   • Alcohol use: Never   • Drug use: Never   • Sexual activity: Defer       REVIEW OF SYSTEMS  Review of Systems   Constitutional: Negative for activity change, appetite change, chills, diaphoresis, fatigue, fever, unexpected weight gain and unexpected weight loss.   HENT: Negative for trouble swallowing and voice change.    Gastrointestinal:  "Positive for abdominal distention and indigestion. Negative for abdominal pain, anal bleeding, blood in stool, constipation, diarrhea, nausea, rectal pain, vomiting and GERD.   Musculoskeletal: Positive for arthralgias.         PHYSICAL EXAM   /58 (BP Location: Right arm, Patient Position: Sitting, Cuff Size: Adult)   Pulse 73   Temp 98.7 °F (37.1 °C) (Temporal)   Ht 170.2 cm (67.01\")   Wt 87 kg (191 lb 12.8 oz)   BMI 30.03 kg/m²   Physical Exam  Vitals and nursing note reviewed.   Constitutional:       Appearance: Normal appearance. She is well-developed.   HENT:      Head: Normocephalic and atraumatic.      Nose: Nose normal.      Mouth/Throat:      Mouth: Mucous membranes are moist.      Pharynx: Oropharynx is clear.   Eyes:      Pupils: Pupils are equal, round, and reactive to light.   Cardiovascular:      Rate and Rhythm: Normal rate and regular rhythm.   Pulmonary:      Effort: Pulmonary effort is normal.      Breath sounds: Normal breath sounds. No wheezing or rales.   Abdominal:      General: Bowel sounds are normal. There is distension.      Palpations: Abdomen is soft. There is no mass.      Tenderness: There is no abdominal tenderness. There is no guarding or rebound.      Hernia: No hernia is present.   Musculoskeletal:         General: Normal range of motion.      Cervical back: Normal range of motion and neck supple.   Skin:     General: Skin is warm and dry.   Neurological:      Mental Status: She is alert and oriented to person, place, and time.      Cranial Nerves: No cranial nerve deficit.   Psychiatric:         Behavior: Behavior normal.         Judgment: Judgment normal.       Results Review:  [facility] records reviewed and discussed with patient.      Discussion:  Discussion had with patient regarding knee replacement and potential for postoperative risk of mortality related to decompensated cirrhosis.  Patient advised that elective surgeries are not recommended for patients with " cirrhosis.     ASSESSMENT / PLAN  1. Cirrhosis, decompensated by variceal bleeding and ascites  -cirrhosis secondary to NAFLD   -No evidence of hepatic encephalopathy  # Hepatocellular carcinoma surveillance  - Abdominal imaging every six months  - Last abdominal imaging US (Oct 2020). Result: Cirrhotic hepatic morphology with sequela of portal hypertension including splenomegaly.  Hepatic portal duplex (February 2020) result: Patency identified of the portal vein with normal directional flow of hepatic vasculature.  -Next imaging due  Plan:  -CBC, CMP, PT/INR, AFP-we will plan to increase diuretics and have labs drawn in 4 days (see below)  - Ultrasound abdomen      3.  Ascites  Plan:  -Low sodium diet 2 gm/day reeducated patient on importance  -Daily weights  -Increase diuretics  Lasix 60 mg daily  Spironolactone 150 mg daily   - BMP in 4 days     4. Esophageal varices bleeding  -Last EGD  4/15/2020: Grade 1 esophageal varices (residual)  Plan:  -Continue Coreg 6.25 mg twice daily  -EGD due but will hold secondary to thrombocytopenia     5.  Nutrition  Plan:  -High protein diet  -Low sodium diet 2 gm/day  -Avoid alcohol, avoid NSAIDS    6.  Thrombocytopenia  7.  Splenomegaly  Most likely related to cirrhosis however, I would appreciate hematology input as well.  Will refer for hematology eval.  Plan:  -Referral to hematology       Return in about 2 months (around 5/29/2021).    I discussed the patients findings and my recommendations with patient and family    JOSE D Sood

## 2021-03-30 ENCOUNTER — TELEPHONE (OUTPATIENT)
Dept: INTERNAL MEDICINE | Facility: CLINIC | Age: 61
End: 2021-03-30

## 2021-03-30 RX ORDER — ERGOCALCIFEROL 1.25 MG/1
50000 CAPSULE ORAL WEEKLY
Qty: 4 CAPSULE | Refills: 2 | Status: SHIPPED | OUTPATIENT
Start: 2021-03-30 | End: 2021-07-27

## 2021-03-30 RX ORDER — CYANOCOBALAMIN (VITAMIN B-12) 1000 MCG
1 TABLET ORAL DAILY
Qty: 30 TABLET | Refills: 2 | Status: SHIPPED | OUTPATIENT
Start: 2021-03-30 | End: 2022-05-13

## 2021-03-30 NOTE — TELEPHONE ENCOUNTER
Patient calling asking if lantus needs preauthorization. Can you call pharmacy and see if this went through ok? I've not seen any denials come through. thanks

## 2021-04-02 ENCOUNTER — TELEPHONE (OUTPATIENT)
Dept: GASTROENTEROLOGY | Facility: CLINIC | Age: 61
End: 2021-04-02

## 2021-04-02 ENCOUNTER — LAB (OUTPATIENT)
Dept: LAB | Facility: HOSPITAL | Age: 61
End: 2021-04-02

## 2021-04-02 DIAGNOSIS — K74.60 CIRRHOSIS OF LIVER WITH ASCITES, UNSPECIFIED HEPATIC CIRRHOSIS TYPE (HCC): ICD-10-CM

## 2021-04-02 DIAGNOSIS — R18.8 CIRRHOSIS OF LIVER WITH ASCITES, UNSPECIFIED HEPATIC CIRRHOSIS TYPE (HCC): ICD-10-CM

## 2021-04-02 LAB
ALBUMIN SERPL-MCNC: 4.2 G/DL (ref 3.5–5.2)
ALBUMIN/GLOB SERPL: 1.4 G/DL
ALP SERPL-CCNC: 90 U/L (ref 39–117)
ALPHA-FETOPROTEIN: 2 NG/ML (ref 0–8.3)
ALT SERPL W P-5'-P-CCNC: 32 U/L (ref 1–33)
ANION GAP SERPL CALCULATED.3IONS-SCNC: 10.4 MMOL/L (ref 5–15)
AST SERPL-CCNC: 30 U/L (ref 1–32)
BASOPHILS # BLD AUTO: 0.04 10*3/MM3 (ref 0–0.2)
BASOPHILS NFR BLD AUTO: 1.1 % (ref 0–1.5)
BILIRUB SERPL-MCNC: 1.1 MG/DL (ref 0–1.2)
BUN SERPL-MCNC: 19 MG/DL (ref 8–23)
BUN/CREAT SERPL: 13 (ref 7–25)
CALCIUM SPEC-SCNC: 9.4 MG/DL (ref 8.6–10.5)
CHLORIDE SERPL-SCNC: 103 MMOL/L (ref 98–107)
CO2 SERPL-SCNC: 26.6 MMOL/L (ref 22–29)
CREAT SERPL-MCNC: 1.46 MG/DL (ref 0.57–1)
DEPRECATED RDW RBC AUTO: 47.7 FL (ref 37–54)
EOSINOPHIL # BLD AUTO: 0.12 10*3/MM3 (ref 0–0.4)
EOSINOPHIL NFR BLD AUTO: 3.3 % (ref 0.3–6.2)
ERYTHROCYTE [DISTWIDTH] IN BLOOD BY AUTOMATED COUNT: 14.5 % (ref 12.3–15.4)
GFR SERPL CREATININE-BSD FRML MDRD: 37 ML/MIN/1.73
GLOBULIN UR ELPH-MCNC: 2.9 GM/DL
GLUCOSE SERPL-MCNC: 190 MG/DL (ref 65–99)
HCT VFR BLD AUTO: 39 % (ref 34–46.6)
HGB BLD-MCNC: 12.8 G/DL (ref 12–15.9)
INR PPP: 1.24 (ref 0.85–1.16)
LYMPHOCYTES # BLD AUTO: 0.61 10*3/MM3 (ref 0.7–3.1)
LYMPHOCYTES NFR BLD AUTO: 16.9 % (ref 19.6–45.3)
MCH RBC QN AUTO: 29.5 PG (ref 26.6–33)
MCHC RBC AUTO-ENTMCNC: 32.8 G/DL (ref 31.5–35.7)
MCV RBC AUTO: 89.9 FL (ref 79–97)
MONOCYTES # BLD AUTO: 0.48 10*3/MM3 (ref 0.1–0.9)
MONOCYTES NFR BLD AUTO: 13.3 % (ref 5–12)
NEUTROPHILS NFR BLD AUTO: 2.36 10*3/MM3 (ref 1.7–7)
NEUTROPHILS NFR BLD AUTO: 65.1 % (ref 42.7–76)
PLATELET # BLD AUTO: 39 10*3/MM3 (ref 140–450)
PMV BLD AUTO: 12.1 FL (ref 6–12)
POTASSIUM SERPL-SCNC: 4.5 MMOL/L (ref 3.5–5.2)
PROT SERPL-MCNC: 7.1 G/DL (ref 6–8.5)
PROTHROMBIN TIME: 15.3 SECONDS (ref 11.5–14)
RBC # BLD AUTO: 4.34 10*6/MM3 (ref 3.77–5.28)
SODIUM SERPL-SCNC: 140 MMOL/L (ref 136–145)
WBC # BLD AUTO: 3.62 10*3/MM3 (ref 3.4–10.8)

## 2021-04-02 PROCEDURE — 85025 COMPLETE CBC W/AUTO DIFF WBC: CPT

## 2021-04-02 PROCEDURE — 85610 PROTHROMBIN TIME: CPT

## 2021-04-02 PROCEDURE — 80053 COMPREHEN METABOLIC PANEL: CPT

## 2021-04-02 PROCEDURE — 82105 ALPHA-FETOPROTEIN SERUM: CPT

## 2021-04-05 ENCOUNTER — TELEPHONE (OUTPATIENT)
Dept: INTERNAL MEDICINE | Facility: CLINIC | Age: 61
End: 2021-04-05

## 2021-04-12 ENCOUNTER — LAB (OUTPATIENT)
Dept: LAB | Facility: HOSPITAL | Age: 61
End: 2021-04-12

## 2021-04-12 ENCOUNTER — CONSULT (OUTPATIENT)
Dept: ONCOLOGY | Facility: CLINIC | Age: 61
End: 2021-04-12

## 2021-04-12 VITALS
HEIGHT: 67 IN | DIASTOLIC BLOOD PRESSURE: 58 MMHG | BODY MASS INDEX: 28.41 KG/M2 | SYSTOLIC BLOOD PRESSURE: 120 MMHG | RESPIRATION RATE: 16 BRPM | HEART RATE: 80 BPM | TEMPERATURE: 97.3 F | WEIGHT: 181 LBS | OXYGEN SATURATION: 99 %

## 2021-04-12 DIAGNOSIS — R18.8 CIRRHOSIS OF LIVER WITH ASCITES, UNSPECIFIED HEPATIC CIRRHOSIS TYPE (HCC): ICD-10-CM

## 2021-04-12 DIAGNOSIS — K74.60 CIRRHOSIS OF LIVER WITH ASCITES, UNSPECIFIED HEPATIC CIRRHOSIS TYPE (HCC): ICD-10-CM

## 2021-04-12 DIAGNOSIS — D61.818 PANCYTOPENIA (HCC): ICD-10-CM

## 2021-04-12 DIAGNOSIS — R16.1 SPLENOMEGALY: Primary | ICD-10-CM

## 2021-04-12 DIAGNOSIS — R16.1 SPLENOMEGALY: ICD-10-CM

## 2021-04-12 LAB
ERYTHROCYTE [DISTWIDTH] IN BLOOD BY AUTOMATED COUNT: 15.3 % (ref 12.3–15.4)
FERRITIN SERPL-MCNC: 68.34 NG/ML (ref 13–150)
FOLATE SERPL-MCNC: 10.5 NG/ML (ref 4.78–24.2)
HCT VFR BLD AUTO: 36.9 % (ref 34–46.6)
HGB BLD-MCNC: 12.1 G/DL (ref 12–15.9)
IRON 24H UR-MRATE: 85 MCG/DL (ref 37–145)
IRON SATN MFR SERPL: 21 % (ref 20–50)
LYMPHOCYTES # BLD AUTO: 0.7 10*3/MM3 (ref 0.7–3.1)
LYMPHOCYTES NFR BLD AUTO: 24.2 % (ref 19.6–45.3)
MCH RBC QN AUTO: 28.4 PG (ref 26.6–33)
MCHC RBC AUTO-ENTMCNC: 32.7 G/DL (ref 31.5–35.7)
MCV RBC AUTO: 86.8 FL (ref 79–97)
MONOCYTES # BLD AUTO: 0.2 10*3/MM3 (ref 0.1–0.9)
MONOCYTES NFR BLD AUTO: 5.4 % (ref 5–12)
NEUTROPHILS NFR BLD AUTO: 2.1 10*3/MM3 (ref 1.7–7)
NEUTROPHILS NFR BLD AUTO: 70.4 % (ref 42.7–76)
PLATELET # BLD AUTO: 37 10*3/MM3 (ref 140–450)
PMV BLD AUTO: 9.4 FL (ref 6–12)
RBC # BLD AUTO: 4.26 10*6/MM3 (ref 3.77–5.28)
RETICS # AUTO: 0.06 10*6/MM3 (ref 0.02–0.13)
RETICS/RBC NFR AUTO: 1.52 % (ref 0.7–1.9)
TIBC SERPL-MCNC: 408 MCG/DL (ref 298–536)
TRANSFERRIN SERPL-MCNC: 274 MG/DL (ref 200–360)
VIT B12 BLD-MCNC: 1501 PG/ML (ref 211–946)
WBC # BLD AUTO: 3 10*3/MM3 (ref 3.4–10.8)

## 2021-04-12 PROCEDURE — 82746 ASSAY OF FOLIC ACID SERUM: CPT

## 2021-04-12 PROCEDURE — 82728 ASSAY OF FERRITIN: CPT

## 2021-04-12 PROCEDURE — 85025 COMPLETE CBC W/AUTO DIFF WBC: CPT

## 2021-04-12 PROCEDURE — 84155 ASSAY OF PROTEIN SERUM: CPT

## 2021-04-12 PROCEDURE — 84165 PROTEIN E-PHORESIS SERUM: CPT

## 2021-04-12 PROCEDURE — 99204 OFFICE O/P NEW MOD 45 MIN: CPT | Performed by: INTERNAL MEDICINE

## 2021-04-12 PROCEDURE — 83540 ASSAY OF IRON: CPT

## 2021-04-12 PROCEDURE — 84466 ASSAY OF TRANSFERRIN: CPT

## 2021-04-12 PROCEDURE — 36415 COLL VENOUS BLD VENIPUNCTURE: CPT

## 2021-04-12 PROCEDURE — 85045 AUTOMATED RETICULOCYTE COUNT: CPT

## 2021-04-12 PROCEDURE — 88184 FLOWCYTOMETRY/ TC 1 MARKER: CPT

## 2021-04-12 PROCEDURE — 88185 FLOWCYTOMETRY/TC ADD-ON: CPT

## 2021-04-12 PROCEDURE — 82607 VITAMIN B-12: CPT

## 2021-04-12 RX ORDER — CYANOCOBALAMIN 1000 UG/ML
INJECTION, SOLUTION INTRAMUSCULAR; SUBCUTANEOUS
COMMUNITY
Start: 2021-04-04 | End: 2021-07-27

## 2021-04-12 NOTE — PROGRESS NOTES
DATE OF CONSULTATION: 4/12/2021    REFERRING PHYSICIAN: Taryn Olivas APRN    Dear Taryn Cabrera APRN  Thank you for asking for my medical advice on this patient. I saw her in the  [place] office on 4/12/2021    REASON FOR CONSULTATION: Pancytopenia    HISTORY OF PRESENT ILLNESS: The patient is a very pleasant 60 y.o.  female   who was in her usual state of health until June 2020.  Patient presented with pancytopenia.  This has been watched over the last 8 months and there has been some decline in her thrombocytopenia.  The patient was referred to me for further recommendations.    SUBJECTIVE: When I saw the patient today she is here with her .  She does have history of nonalcoholic fatty steatohepatitis.  She does follow-up with GI.  She does have esophageal varices that required banding before.  She is currently on Aldactone as well as Lasix for recurrent ascites.  She also takes aspirin daily for coronary artery disease status post 2 stents placement more than 2 years ago.  She denies any active bleeding today however she did notice some hematuria over the weekend stops spontaneously    Review of Systems   Constitutional: Negative for activity change, appetite change, chills, fatigue, fever and unexpected weight change.   HENT: Negative for hearing loss, mouth sores, nosebleeds, sore throat and trouble swallowing.    Eyes: Negative for visual disturbance.   Respiratory: Negative for cough, chest tightness, shortness of breath and wheezing.    Cardiovascular: Negative for chest pain, palpitations and leg swelling.   Gastrointestinal: Negative for abdominal distention, abdominal pain, blood in stool, constipation, diarrhea, nausea, rectal pain and vomiting.   Endocrine: Negative for cold intolerance and heat intolerance.   Genitourinary: Negative for difficulty urinating, dysuria, frequency and urgency.   Musculoskeletal: Negative for arthralgias, back pain, gait problem, joint  swelling and myalgias.   Skin: Negative for rash.   Neurological: Negative for dizziness, tremors, syncope, weakness, light-headedness, numbness and headaches.   Hematological: Negative for adenopathy. Does not bruise/bleed easily.   Psychiatric/Behavioral: Negative for confusion, sleep disturbance and suicidal ideas. The patient is not nervous/anxious.        Past Medical History:   Diagnosis Date   • Arthritis    • Diabetes mellitus (CMS/HCC)    • Gastric ulcer    • Hypertension    • Kidney stone    • Splenomegaly        Social History     Socioeconomic History   • Marital status:      Spouse name: Not on file   • Number of children: Not on file   • Years of education: Not on file   • Highest education level: Not on file   Tobacco Use   • Smoking status: Never Smoker   • Smokeless tobacco: Never Used   Vaping Use   • Vaping Use: Never used   Substance and Sexual Activity   • Alcohol use: Never   • Drug use: Never   • Sexual activity: Defer       Family History   Problem Relation Age of Onset   • Diabetes Father    • Colon cancer Neg Hx    • Colon polyps Neg Hx        Past Surgical History:   Procedure Laterality Date   • CARDIAC CATHETERIZATION     • CARDIAC CATHETERIZATION N/A 8/14/2020    Procedure: Left Heart Cath;  Surgeon: Yoshi Wilson MD;  Location: [facility] CATH INVASIVE LOCATION;  Service: Cardiovascular;  Laterality: N/A;   • CYSTOSCOPY, URETEROSCOPY, RETROGRADE PYELOGRAM, STONE EXTRACTION, STENT INSERTION Left 2/23/2020    Procedure: CYSTOSCOPY URETEROSCOPY STONE EXTRACTION STENT INSERTION;  Surgeon: Elmer Weiss MD;  Location: [facility] OR;  Service: Urology;  Laterality: Left;   • ENDOSCOPY N/A 2/14/2020    Procedure: ESOPHAGOGASTRODUODENOSCOPY;  Surgeon: Brant Finch MD;  Location: [facility] ENDOSCOPY;  Service: Gastroenterology;  Laterality: N/A;  Esophageal banding for a total of 7 bands   • ENDOSCOPY N/A 4/15/2020    Procedure: ESOPHAGOGASTRODUODENOSCOPY;  Surgeon: Wilda  Jayme Escobar MD;  Location: [facility] ENDOSCOPY;  Service: Gastroenterology;  Laterality: N/A;   • FRACTURE SURGERY      LEFT ANKLE   • KIDNEY STONE SURGERY         Allergies   Allergen Reactions   • Penicillins Itching          Current Outpatient Medications:   •  aspirin 81 MG EC tablet, Take 81 mg by mouth Daily., Disp: , Rfl:   •  carvedilol (Coreg) 6.25 MG tablet, Take 1 tablet by mouth 2 (Two) Times a Day With Meals., Disp: 60 tablet, Rfl: 2  •  ezetimibe (ZETIA) 10 MG tablet, Take 1 tablet by mouth Daily., Disp: 30 tablet, Rfl: 5  •  ferrous sulfate 325 (65 FE) MG tablet, Take 1 tablet by mouth Daily., Disp: 30 tablet, Rfl: 5  •  furosemide (LASIX) 20 MG tablet, Take 2 tablets by mouth daily, Disp: 60 tablet, Rfl: 5  •  isosorbide mononitrate (IMDUR) 30 MG 24 hr tablet, Take 1 tablet by mouth Daily., Disp: 30 tablet, Rfl: 5  •  metFORMIN (GLUCOPHAGE) 1000 MG tablet, Take 1 tablet by mouth 2 (Two) Times a Day With Meals., Disp: 60 tablet, Rfl: 5  •  pantoprazole (PROTONIX) 40 MG EC tablet, Take 1 tablet by mouth Daily., Disp: 30 tablet, Rfl: 5  •  rosuvastatin (CRESTOR) 40 MG tablet, Take 1 tablet by mouth Every Night., Disp: 30 tablet, Rfl: 5  •  SITagliptin (JANUVIA) 100 MG tablet, Take 1 tablet by mouth Daily., Disp: 30 tablet, Rfl: 5  •  spironolactone (ALDACTONE) 50 MG tablet, Take 2 tablets by mouth daily, Disp: 60 tablet, Rfl: 5  •  Trulicity 1.5 MG/0.5ML solution pen-injector, Inject 1.5 mg under the skin into the appropriate area as directed 1 (One) Time Per Week., Disp: 4 pen, Rfl: 5  •  BD Pen Needle Bisi U/F 32G X 4 MM misc, Inject 1 each under the skin into the appropriate area as directed Daily., Disp: 100 each, Rfl: 5  •  Coenzyme Q10 (CoQ10) 400 MG capsule, Take 400 mg by mouth 3 (Three) Times a Day., Disp: 90 capsule, Rfl: 5  •  Cyanocobalamin (B-12) 1000 MCG tablet, Take 1 tablet by mouth Daily., Disp: 30 tablet, Rfl: 2  •  cyanocobalamin 1000 MCG/ML injection, , Disp: , Rfl:   •  Insulin  "Degludec (TRESIBA FLEXTOUCH) 200 UNIT/ML solution pen-injector pen injection, Inject 100 Units under the skin into the appropriate area as directed Every Night., Disp: 5 pen, Rfl: 5  •  nitroglycerin (NITROSTAT) 0.4 MG SL tablet, Take 1 tablet by mouth As Needed., Disp: , Rfl:   •  vitamin D (ERGOCALCIFEROL) 1.25 MG (40526 UT) capsule capsule, Take 1 capsule by mouth 1 (One) Time Per Week., Disp: 4 capsule, Rfl: 2    PHYSICAL EXAMINATION:   /58   Pulse 80   Temp 97.3 °F (36.3 °C) (Temporal)   Resp 16   Ht 170.2 cm (67.01\")   Wt 82.1 kg (181 lb)   SpO2 99%   BMI 28.34 kg/m²   Pain Score    04/12/21 1058   PainSc: 2  Comment: stomach pain                   ECOG Performance Status: 1 - Symptomatic but completely ambulatory  General Appearance:  alert, cooperative, no apparent distress and appears stated age   Neurologic/Psychiatric: A&O x 3, gait steady, appropriate affect, strength 5/5 in all muscle groups   HEENT:  Normocephalic, without obvious abnormality, mucous membranes moist   Neck: Supple, symmetrical, trachea midline, no adenopathy;  No thyromegaly, masses, or tenderness   Lungs:   Clear to auscultation bilaterally; respirations regular, even, and unlabored bilaterally   Heart:  Regular rate and rhythm, no murmurs appreciated   Abdomen:   Soft, non-tender, non-distended and no organomegaly   Lymph nodes: No cervical, supraclavicular, inguinal or axillary adenopathy noted   Extremities: Normal, atraumatic; no clubbing, cyanosis, or edema    Skin: No rashes, ulcers, or suspicious lesions noted       Lab on 04/12/2021   Component Date Value Ref Range Status   • WBC 04/12/2021 3.00* 3.40 - 10.80 10*3/mm3 Final   • RBC 04/12/2021 4.26  3.77 - 5.28 10*6/mm3 Final   • Hemoglobin 04/12/2021 12.1  12.0 - 15.9 g/dL Final   • Hematocrit 04/12/2021 36.9  34.0 - 46.6 % Final   • RDW 04/12/2021 15.3  12.3 - 15.4 % Final   • MCV 04/12/2021 86.8  79.0 - 97.0 fL Final   • MCH 04/12/2021 28.4  26.6 - 33.0 pg Final "   • MCHC 04/12/2021 32.7  31.5 - 35.7 g/dL Final   • MPV 04/12/2021 9.4  6.0 - 12.0 fL Final   • Platelets 04/12/2021 37* 140 - 450 10*3/mm3 Final   • Neutrophil % 04/12/2021 70.4  42.7 - 76.0 % Final   • Lymphocyte % 04/12/2021 24.2  19.6 - 45.3 % Final   • Monocyte % 04/12/2021 5.4  5.0 - 12.0 % Final   • Neutrophils, Absolute 04/12/2021 2.10  1.70 - 7.00 10*3/mm3 Final   • Lymphocytes, Absolute 04/12/2021 0.70  0.70 - 3.10 10*3/mm3 Final   • Monocytes, Absolute 04/12/2021 0.20  0.10 - 0.90 10*3/mm3 Final   Lab on 04/02/2021   Component Date Value Ref Range Status   • Glucose 04/02/2021 190* 65 - 99 mg/dL Final   • BUN 04/02/2021 19  8 - 23 mg/dL Final   • Creatinine 04/02/2021 1.46* 0.57 - 1.00 mg/dL Final   • Sodium 04/02/2021 140  136 - 145 mmol/L Final   • Potassium 04/02/2021 4.5  3.5 - 5.2 mmol/L Final   • Chloride 04/02/2021 103  98 - 107 mmol/L Final   • CO2 04/02/2021 26.6  22.0 - 29.0 mmol/L Final   • Calcium 04/02/2021 9.4  8.6 - 10.5 mg/dL Final   • Total Protein 04/02/2021 7.1  6.0 - 8.5 g/dL Final   • Albumin 04/02/2021 4.20  3.50 - 5.20 g/dL Final   • ALT (SGPT) 04/02/2021 32  1 - 33 U/L Final   • AST (SGOT) 04/02/2021 30  1 - 32 U/L Final   • Alkaline Phosphatase 04/02/2021 90  39 - 117 U/L Final   • Total Bilirubin 04/02/2021 1.1  0.0 - 1.2 mg/dL Final   • eGFR Non African Amer 04/02/2021 37* >60 mL/min/1.73 Final   • Globulin 04/02/2021 2.9  gm/dL Final   • A/G Ratio 04/02/2021 1.4  g/dL Final   • BUN/Creatinine Ratio 04/02/2021 13.0  7.0 - 25.0 Final   • Anion Gap 04/02/2021 10.4  5.0 - 15.0 mmol/L Final   • Protime 04/02/2021 15.3* 11.5 - 14.0 Seconds Final   • INR 04/02/2021 1.24* 0.85 - 1.16 Final   • ALPHA-FETOPROTEIN 04/02/2021 2.00  0 - 8.3 ng/mL Final   • WBC 04/02/2021 3.62  3.40 - 10.80 10*3/mm3 Final   • RBC 04/02/2021 4.34  3.77 - 5.28 10*6/mm3 Final   • Hemoglobin 04/02/2021 12.8  12.0 - 15.9 g/dL Final   • Hematocrit 04/02/2021 39.0  34.0 - 46.6 % Final   • MCV 04/02/2021 89.9  79.0  - 97.0 fL Final   • MCH 04/02/2021 29.5  26.6 - 33.0 pg Final   • MCHC 04/02/2021 32.8  31.5 - 35.7 g/dL Final   • RDW 04/02/2021 14.5  12.3 - 15.4 % Final   • RDW-SD 04/02/2021 47.7  37.0 - 54.0 fl Final   • MPV 04/02/2021 12.1* 6.0 - 12.0 fL Final   • Platelets 04/02/2021 39* 140 - 450 10*3/mm3 Final   • Neutrophil % 04/02/2021 65.1  42.7 - 76.0 % Final   • Lymphocyte % 04/02/2021 16.9* 19.6 - 45.3 % Final   • Monocyte % 04/02/2021 13.3* 5.0 - 12.0 % Final   • Eosinophil % 04/02/2021 3.3  0.3 - 6.2 % Final   • Basophil % 04/02/2021 1.1  0.0 - 1.5 % Final   • Neutrophils, Absolute 04/02/2021 2.36  1.70 - 7.00 10*3/mm3 Final   • Lymphocytes, Absolute 04/02/2021 0.61* 0.70 - 3.10 10*3/mm3 Final   • Monocytes, Absolute 04/02/2021 0.48  0.10 - 0.90 10*3/mm3 Final   • Eosinophils, Absolute 04/02/2021 0.12  0.00 - 0.40 10*3/mm3 Final   • Basophils, Absolute 04/02/2021 0.04  0.00 - 0.20 10*3/mm3 Final        No results found.      DIAGNOSTIC DATA:   1. Radiology: Liver and spleen ultrasound revealed cirrhotic liver with splenomegaly  2. Mrs. Vogel's note reviewed by me and documented in the  chart.   3. Pathology report: None available  4. Laboratory data: As above    ASSESSMENT: The patient is a very pleasant 60 y.o.  female  with pancytopenia    PROBLEM LIST:   1.  Pancytopenia:  A.  Induced by portal hypertension with splenomegaly  2.  Nonalcoholic steatohepatitis BONILLA:  A.  Follows with GI  3.  Chronic kidney disease stage III  4.  Esophageal varices  5.  Coronary artery disease:  A.  Status post 2 stents placement 2018  6.  Hypertension  7.  Type 2 diabetes    PLAN:   1. I had a long discussion today with the patient and her  about her  new diagnosis of pancytopenia. I reviewed the patient's documents including refereing provider's notes, lab results, imaging report.   2.  Explained to patient her pancytopenia is most likely induced by liver and spleen sequestration from cirrhotic liver as well as  associated portal hypertension and splenomegaly.  3.  I will rule out the possibly other confounding factors.  We will check blood work today including CBC, vitamin levels, folic acid vitamin B12, iron profile, serum ferritin, and serum protein to pheresis.  4.  Her chronic kidney disease could contribute to her anemia and the patient might benefit from recombinant erythropoietin replacement if her hemoglobin drops below 10.  Also with her elevated creatinine I think essential to rule out the possibility of plasma cell dyscrasias.  5.  I asked the patient to stop her aspirin.  She has been taking this for coronary disease however given the fact that patient's platelets has been persistently below 50 since June 2020 I think the risk of bleeding from aspirin is more than the benefit of primary prevention of clotting.  6.  She will follow-up with me in 6 months with repeated labs.  I will call the patient any significant abnormalities from today's blood work.    Nikki Browning MD  4/12/2021

## 2021-04-13 LAB
ALBUMIN SERPL ELPH-MCNC: 3.7 G/DL (ref 2.9–4.4)
ALBUMIN/GLOB SERPL: 1.1 {RATIO} (ref 0.7–1.7)
ALPHA1 GLOB SERPL ELPH-MCNC: 0.3 G/DL (ref 0–0.4)
ALPHA2 GLOB SERPL ELPH-MCNC: 0.8 G/DL (ref 0.4–1)
B-GLOBULIN SERPL ELPH-MCNC: 1 G/DL (ref 0.7–1.3)
GAMMA GLOB SERPL ELPH-MCNC: 1.2 G/DL (ref 0.4–1.8)
GLOBULIN SER CALC-MCNC: 3.4 G/DL (ref 2.2–3.9)
LABORATORY COMMENT REPORT: NORMAL
M PROTEIN SERPL ELPH-MCNC: NORMAL G/DL
PROT PATTERN SERPL ELPH-IMP: NORMAL
PROT SERPL-MCNC: 7.1 G/DL (ref 6–8.5)

## 2021-04-14 LAB — REF LAB TEST METHOD: NORMAL

## 2021-04-15 DIAGNOSIS — K74.60 CIRRHOSIS OF LIVER WITH ASCITES, UNSPECIFIED HEPATIC CIRRHOSIS TYPE (HCC): Primary | ICD-10-CM

## 2021-04-15 DIAGNOSIS — R18.8 CIRRHOSIS OF LIVER WITH ASCITES, UNSPECIFIED HEPATIC CIRRHOSIS TYPE (HCC): Primary | ICD-10-CM

## 2021-04-15 NOTE — PROGRESS NOTES
Spoke to patient this morning, she has been evaluated by hematology who also feels the patient's pancytopenia is related to cirrhosis and likely kidney disease is contributing.  She reports to me that the hematologist recommended she be referred to liver transplant for evaluation.  I will make that referral today.

## 2021-04-21 ENCOUNTER — LAB (OUTPATIENT)
Dept: LAB | Facility: HOSPITAL | Age: 61
End: 2021-04-21

## 2021-04-21 ENCOUNTER — OFFICE VISIT (OUTPATIENT)
Dept: ENDOCRINOLOGY | Facility: CLINIC | Age: 61
End: 2021-04-21

## 2021-04-21 VITALS
DIASTOLIC BLOOD PRESSURE: 64 MMHG | OXYGEN SATURATION: 98 % | HEART RATE: 82 BPM | SYSTOLIC BLOOD PRESSURE: 122 MMHG | HEIGHT: 67 IN | BODY MASS INDEX: 28.56 KG/M2 | WEIGHT: 182 LBS | TEMPERATURE: 97.1 F

## 2021-04-21 DIAGNOSIS — E11.65 UNCONTROLLED TYPE 2 DIABETES MELLITUS WITH HYPERGLYCEMIA (HCC): Primary | ICD-10-CM

## 2021-04-21 DIAGNOSIS — E11.65 UNCONTROLLED TYPE 2 DIABETES MELLITUS WITH HYPERGLYCEMIA (HCC): ICD-10-CM

## 2021-04-21 PROCEDURE — 99204 OFFICE O/P NEW MOD 45 MIN: CPT | Performed by: INTERNAL MEDICINE

## 2021-04-21 PROCEDURE — 36415 COLL VENOUS BLD VENIPUNCTURE: CPT

## 2021-04-21 PROCEDURE — 82985 ASSAY OF GLYCATED PROTEIN: CPT

## 2021-04-21 NOTE — PROGRESS NOTES
"     Office Note      Date: 2021  Patient Name: Lyndsey Luna  MRN: [id number]  : 1960    Chief Complaint   Patient presents with   • Diabetes       History of Present Illness:   Lyndsey Luna is a 60 y.o. female who presents for Diabetes -type2  She is seen today as a new patient  Type  2 dm was diagnosed in . She was started on pills- metformin..  She is currently on trulicity, januvia, metformin and one daily shot of insulin  She checks bg 3 times per day  She has only recently had low blood sugars.. 107 yesterday morning.   In past she had been in 300 + range.      She ha cirrhosis and splenomegaly  Hemoglobin A1C   Date Value Ref Range Status   2021 7.80 (H) 4.80 - 5.60 % Final   03/10/2020 7.3 (H) See comment % Final     Comment:     Comment:  Diagnosis of Diabetes: > or = 6.5%  Increased risk of diabetes (Prediabetes): 5.7-6.4%  Glycemic Control: Nonpregnant Adults: <7.0%                    Pregnant: <6.0%       Subjective      Diabetic Complications:  Eyes: No  Kidneys: Yes, describe: she is uncertain   Feet: No  Heart: Yes, describe: mi with 3 stents     Diet and Exercise:  Meals per day: 3  Minutes of exercise per week: <60 mins.    Review of Systems:   Review of Systems   Constitutional: Positive for fatigue.   HENT: Negative.    Respiratory: Negative.    Gastrointestinal: Positive for abdominal distention.   Endocrine: Positive for polydipsia and polyuria.   Genitourinary: Positive for hematuria.   Skin: Negative.    Psychiatric/Behavioral: Negative.        The following portions of the patient's history were reviewed and updated as appropriate: allergies, current medications, past family history, past medical history, past social history, past surgical history and problem list.    Objective     Visit Vitals  /64 (BP Location: Left arm, Patient Position: Sitting, Cuff Size: Adult)   Pulse 82   Temp 97.1 °F (36.2 °C) (Infrared)   Ht 170.2 cm (67\")   Wt 82.6 kg " (182 lb)   SpO2 98%   BMI 28.51 kg/m²       Labs:    CMP  Lab Results   Component Value Date    GLUCOSE 190 (H) 04/02/2021    BUN 19 04/02/2021    CREATININE 1.46 (H) 04/02/2021    EGFRIFNONA 37 (L) 04/02/2021    EGFRIFAFRI 67 02/14/2020    BCR 13.0 04/02/2021    K 4.5 04/02/2021    CO2 26.6 04/02/2021    CALCIUM 9.4 04/02/2021    PROTENTOTREF 7.1 04/12/2021    LABIL2 1.1 04/12/2021    AST 30 04/02/2021    ALT 32 04/02/2021        CBC w/DIFF  Lab Results   Component Value Date    WBC 3.00 (L) 04/12/2021    RBC 4.26 04/12/2021    HGB 12.1 04/12/2021    HCT 36.9 04/12/2021    MCV 86.8 04/12/2021    MCH 28.4 04/12/2021    MCHC 32.7 04/12/2021    RDW 15.3 04/12/2021    RDWSD 47.7 04/02/2021    MPV 9.4 04/12/2021    PLT 37 (L) 04/12/2021    NEUTRORELPCT 70.4 04/12/2021    LYMPHORELPCT 24.2 04/12/2021    MONORELPCT 5.4 04/12/2021    EOSRELPCT 3.3 04/02/2021    BASORELPCT 1.1 04/02/2021    AUTOIGPER 0.4 06/17/2020    NEUTROABS 2.10 04/12/2021    LYMPHSABS 0.70 04/12/2021    MONOSABS 0.20 04/12/2021    EOSABS 0.12 04/02/2021    BASOSABS 0.04 04/02/2021    AUTOIGNUM 0.01 06/17/2020    NRBC 0.0 06/17/2020       Physical Exam:  Physical Exam  Vitals reviewed.   Constitutional:       Appearance: Normal appearance.   HENT:      Head: Atraumatic.      Mouth/Throat:      Mouth: Mucous membranes are dry.   Lymphadenopathy:      Cervical: No cervical adenopathy.   Neurological:      Mental Status: She is alert.   Psychiatric:         Mood and Affect: Mood normal.         Thought Content: Thought content normal.         Judgment: Judgment normal.         Assessment / Plan      Assessment & Plan:  Problem List Items Addressed This Visit        Other    Uncontrolled type 2 diabetes mellitus with hyperglycemia (CMS/[facility]) - Primary    Current Assessment & Plan     -1- in the setting of splenomegaly, since RBC metabolism is abnormal, A1C testing is not reliable- it is falsely lowered due to short lifespan of RBC. I recommend following her  FSBG readings and fructosamine levels NOT A1C.    -2- would not use januvia and trulicity together as the mechanisms of action are redundant. Would stop januvia.    -3- would reduce metformin dose in the setting of cirrhosis.    -4- diet and exercise guidelines were given.    -5- insulin adjust protocol was given.          Relevant Medications    Trulicity 1.5 MG/0.5ML solution pen-injector    Insulin Degludec (TRESIBA FLEXTOUCH) 200 UNIT/ML solution pen-injector pen injection    metFORMIN (Glucophage) 500 MG tablet    Other Relevant Orders    Fructosamine           David Bedolla MD   04/21/2021

## 2021-04-21 NOTE — ASSESSMENT & PLAN NOTE
-1- in the setting of splenomegaly, since RBC metabolism is abnormal, A1C testing is not reliable- it is falsely lowered due to short lifespan of RBC. I recommend following her FSBG readings and fructosamine levels NOT A1C.    -2- would not use januvia and trulicity together as the mechanisms of action are redundant. Would stop januvia.    -3- would reduce metformin dose in the setting of cirrhosis.    -4- diet and exercise guidelines were given.    -5- insulin adjust protocol was given.

## 2021-04-22 LAB — FRUCTOSAMINE SERPL-SCNC: 322 UMOL/L (ref 0–285)

## 2021-05-11 DIAGNOSIS — K72.10 END STAGE LIVER DISEASE (HCC): ICD-10-CM

## 2021-05-11 RX ORDER — EPINEPHRINE 1 MG/ML
0.3 INJECTION, SOLUTION, CONCENTRATE INTRAVENOUS PRN
Status: CANCELLED | OUTPATIENT
Start: 2021-05-11

## 2021-05-11 RX ORDER — METHYLPREDNISOLONE SODIUM SUCCINATE 125 MG/2ML
125 INJECTION, POWDER, LYOPHILIZED, FOR SOLUTION INTRAMUSCULAR; INTRAVENOUS ONCE
Status: CANCELLED | OUTPATIENT
Start: 2021-05-11 | End: 2021-05-11

## 2021-05-11 RX ORDER — SODIUM CHLORIDE 9 MG/ML
INJECTION, SOLUTION INTRAVENOUS CONTINUOUS
Status: CANCELLED | OUTPATIENT
Start: 2021-05-11

## 2021-05-11 RX ORDER — SODIUM CHLORIDE 0.9 % (FLUSH) 0.9 %
5-40 SYRINGE (ML) INJECTION PRN
Status: CANCELLED | OUTPATIENT
Start: 2021-05-11

## 2021-05-11 RX ORDER — ALBUMIN (HUMAN) 12.5 G/50ML
80 SOLUTION INTRAVENOUS ONCE
Status: CANCELLED
Start: 2021-05-11 | End: 2021-05-11

## 2021-05-11 RX ORDER — DIPHENHYDRAMINE HYDROCHLORIDE 50 MG/ML
50 INJECTION INTRAMUSCULAR; INTRAVENOUS ONCE
Status: CANCELLED | OUTPATIENT
Start: 2021-05-11 | End: 2021-05-11

## 2021-06-01 ENCOUNTER — OFFICE VISIT (OUTPATIENT)
Dept: GASTROENTEROLOGY | Facility: CLINIC | Age: 61
End: 2021-06-01

## 2021-06-01 ENCOUNTER — APPOINTMENT (OUTPATIENT)
Dept: ULTRASOUND IMAGING | Facility: HOSPITAL | Age: 61
End: 2021-06-01

## 2021-06-01 VITALS
DIASTOLIC BLOOD PRESSURE: 52 MMHG | BODY MASS INDEX: 28.51 KG/M2 | HEART RATE: 73 BPM | WEIGHT: 182 LBS | SYSTOLIC BLOOD PRESSURE: 127 MMHG

## 2021-06-01 DIAGNOSIS — R18.8 CIRRHOSIS OF LIVER WITH ASCITES, UNSPECIFIED HEPATIC CIRRHOSIS TYPE (HCC): Primary | ICD-10-CM

## 2021-06-01 DIAGNOSIS — I85.11 SECONDARY ESOPHAGEAL VARICES WITH BLEEDING (HCC): ICD-10-CM

## 2021-06-01 DIAGNOSIS — K74.60 CIRRHOSIS OF LIVER WITH ASCITES, UNSPECIFIED HEPATIC CIRRHOSIS TYPE (HCC): Primary | ICD-10-CM

## 2021-06-01 DIAGNOSIS — D69.6 THROMBOCYTOPENIA (HCC): ICD-10-CM

## 2021-06-01 PROCEDURE — 99213 OFFICE O/P EST LOW 20 MIN: CPT | Performed by: NURSE PRACTITIONER

## 2021-06-01 RX ORDER — LACTULOSE 10 G/15ML
SOLUTION ORAL
COMMUNITY
Start: 2021-05-11 | End: 2022-05-13

## 2021-06-01 RX ORDER — ONDANSETRON 4 MG/1
TABLET, FILM COATED ORAL
COMMUNITY
Start: 2021-05-11 | End: 2022-05-26

## 2021-06-01 RX ORDER — INSULIN GLARGINE 100 [IU]/ML
60 INJECTION, SOLUTION SUBCUTANEOUS
COMMUNITY
Start: 2021-02-17 | End: 2021-07-27

## 2021-06-07 RX ORDER — SPIRONOLACTONE 50 MG/1
TABLET, FILM COATED ORAL
Qty: 180 TABLET | Refills: 4 | Status: SHIPPED | OUTPATIENT
Start: 2021-06-07 | End: 2022-05-13

## 2021-06-07 RX ORDER — FUROSEMIDE 20 MG/1
TABLET ORAL
Qty: 180 TABLET | Refills: 4 | Status: SHIPPED | OUTPATIENT
Start: 2021-06-07 | End: 2022-05-13

## 2021-06-07 NOTE — PROGRESS NOTES
GASTROENTEROLOGY OFFICE NOTE  Lyndsey Luna  [id number]  1960    CARE TEAM  Patient Care Team:  Taryn Olivas APRN as PCP - General (Nurse Practitioner)    Referring Provider: Taryn Olivas APRN    Chief Complaint   Patient presents with   • Cirrhosis        HISTORY OF PRESENT ILLNESS:  Ms. Luna presents in follow-up with decompensated cirrhosis secondary to fatty liver disease.    She has been evaluated by  hepatology and liver transplant.  She has an upcoming follow-up appointment with transplant.  I reviewed her record from her last 2 visits which shows that they plan to proceed with work-up for transplant.    She is decompensated by ascites.  At her visit in May with Dr. Crow at , her diuretics were decreased secondary to kidney function.  The patient understood that she was to discontinue the diuretics and therefore has not been taking these on a continuous basis but when she feels as if she is swollen she takes a Lasix from time to time.     She has a history of bleeding esophageal varices noted in February 2020 with 7 bands placed.  Repeat EGD in April showed grade 1 esophageal varices (residual) no evidence of bleeding.  She is currently taking Coreg 6.25 mg twice daily.  She is due for a surveillance EGD.    PAST MEDICAL HISTORY  Past Medical History:   Diagnosis Date   • Arthritis    • Diabetes mellitus (CMS/HCC)    • Gastric ulcer    • Hypertension    • Kidney stone    • Splenomegaly    • Type 2 diabetes mellitus, uncontrolled (CMS/HCC)         PAST SURGICAL HISTORY  Past Surgical History:   Procedure Laterality Date   • CARDIAC CATHETERIZATION     • CARDIAC CATHETERIZATION N/A 8/14/2020    Procedure: Left Heart Cath;  Surgeon: Yoshi Wilson MD;  Location: [facility] INVASIVE LOCATION;  Service: Cardiovascular;  Laterality: N/A;   • CYSTOSCOPY, URETEROSCOPY, RETROGRADE PYELOGRAM, STONE EXTRACTION, STENT INSERTION Left 2/23/2020    Procedure:  I have reviewed discharge instructions with the patient. The patient verbalized understanding. Patient left ED via Discharge Method: ambulatory to Home with family. Opportunity for questions and clarification provided. Patient given 1 scripts. Patient given work excuse. To continue your aftercare when you leave the hospital, you may receive an automated call from our care team to check in on how you are doing. This is a free service and part of our promise to provide the best care and service to meet your aftercare needs.  If you have questions, or wish to unsubscribe from this service please call [phone]. Thank you for Choosing our [facility]. CYSTOSCOPY URETEROSCOPY STONE EXTRACTION STENT INSERTION;  Surgeon: Elmer Weiss MD;  Location: [facility];  Service: Urology;  Laterality: Left;   • ENDOSCOPY N/A 2/14/2020    Procedure: ESOPHAGOGASTRODUODENOSCOPY;  Surgeon: Brant Finch MD;  Location:  [facility];  Service: Gastroenterology;  Laterality: N/A;  Esophageal banding for a total of 7 bands   • ENDOSCOPY N/A 4/15/2020    Procedure: ESOPHAGOGASTRODUODENOSCOPY;  Surgeon: Jayme Astudillo MD;  Location:  [facility];  Service: Gastroenterology;  Laterality: N/A;   • FRACTURE SURGERY      LEFT ANKLE   • KIDNEY STONE SURGERY          MEDICATIONS:    Current Outpatient Medications:   •  Insulin Glargine (Lantus SoloStar) 100 UNIT/ML injection pen, Inject 60 Units under the skin into the appropriate area as directed., Disp: , Rfl:   •  aspirin 81 MG EC tablet, Take 81 mg by mouth Daily., Disp: , Rfl:   •  BD Pen Needle Bisi U/F 32G X 4 MM misc, Inject 1 each under the skin into the appropriate area as directed Daily., Disp: 100 each, Rfl: 5  •  carvedilol (Coreg) 6.25 MG tablet, Take 1 tablet by mouth 2 (Two) Times a Day With Meals., Disp: 60 tablet, Rfl: 2  •  Coenzyme Q10 (CoQ10) 400 MG capsule, Take 400 mg by mouth 3 (Three) Times a Day., Disp: 90 capsule, Rfl: 5  •  Cyanocobalamin (B-12) 1000 MCG tablet, Take 1 tablet by mouth Daily., Disp: 30 tablet, Rfl: 2  •  cyanocobalamin 1000 MCG/ML injection, , Disp: , Rfl:   •  ezetimibe (ZETIA) 10 MG tablet, Take 1 tablet by mouth Daily., Disp: 30 tablet, Rfl: 5  •  ferrous sulfate 325 (65 FE) MG tablet, Take 1 tablet by mouth Daily. (Patient taking differently: Take 325 mg by mouth 2 (two) times a day.), Disp: 30 tablet, Rfl: 5  •  furosemide (LASIX) 20 MG tablet, TAKE TWO TABLETS BY MOUTH DAILY, Disp: 180 tablet, Rfl: 4  •  Insulin Degludec (TRESIBA FLEXTOUCH) 200 UNIT/ML solution pen-injector pen injection, Inject 100 Units under the skin into the appropriate area as directed  Every Night., Disp: 5 pen, Rfl: 5  •  isosorbide mononitrate (IMDUR) 30 MG 24 hr tablet, Take 1 tablet by mouth Daily., Disp: 30 tablet, Rfl: 5  •  lactulose (CHRONULAC) 10 GM/15ML solution, , Disp: , Rfl:   •  metFORMIN (Glucophage) 500 MG tablet, Take 1 tablet by mouth 2 (Two) Times a Day With Meals., Disp: 180 tablet, Rfl: 3  •  nitroglycerin (NITROSTAT) 0.4 MG SL tablet, Take 1 tablet by mouth As Needed., Disp: , Rfl:   •  ondansetron (ZOFRAN) 4 MG tablet, , Disp: , Rfl:   •  pantoprazole (PROTONIX) 40 MG EC tablet, Take 1 tablet by mouth Daily., Disp: 30 tablet, Rfl: 5  •  rosuvastatin (CRESTOR) 40 MG tablet, Take 1 tablet by mouth Every Night., Disp: 30 tablet, Rfl: 5  •  spironolactone (ALDACTONE) 50 MG tablet, TAKE TWO TABLETS BY MOUTH DAILY, Disp: 180 tablet, Rfl: 4  •  Trulicity 1.5 MG/0.5ML solution pen-injector, Inject 1.5 mg under the skin into the appropriate area as directed 1 (One) Time Per Week., Disp: 4 pen, Rfl: 5  •  vitamin D (ERGOCALCIFEROL) 1.25 MG (45591 UT) capsule capsule, Take 1 capsule by mouth 1 (One) Time Per Week., Disp: 4 capsule, Rfl: 2    ALLERGIES  Allergies   Allergen Reactions   • Penicillins Itching       FAMILY HISTORY:  Family History   Problem Relation Age of Onset   • Diabetes Father    • Colon cancer Neg Hx    • Colon polyps Neg Hx        SOCIAL HISTORY  Social History     Socioeconomic History   • Marital status:      Spouse name: Not on file   • Number of children: Not on file   • Years of education: Not on file   • Highest education level: Not on file   Tobacco Use   • Smoking status: Never Smoker   • Smokeless tobacco: Never Used   Vaping Use   • Vaping Use: Never used   Substance and Sexual Activity   • Alcohol use: Never   • Drug use: Never   • Sexual activity: Defer       REVIEW OF SYSTEMS  Review of Systems   Constitutional: Positive for fatigue. Negative for activity change, appetite change, chills, diaphoresis, fever, unexpected weight gain and unexpected  weight loss.   HENT: Negative for trouble swallowing and voice change.    Gastrointestinal: Positive for abdominal distention. Negative for abdominal pain, anal bleeding, blood in stool, constipation, diarrhea, nausea, rectal pain, vomiting, GERD and indigestion.       PHYSICAL EXAM   /52   Pulse 73   Wt 82.6 kg (182 lb)   BMI 28.51 kg/m²   Physical Exam  Constitutional:       General: She is not in acute distress.     Appearance: She is well-developed.   HENT:      Head: Normocephalic and atraumatic.      Nose: Nose normal.   Eyes:      Conjunctiva/sclera: Conjunctivae normal.      Pupils: Pupils are equal, round, and reactive to light.   Pulmonary:      Effort: Pulmonary effort is normal.   Abdominal:      General: Bowel sounds are normal. There is no distension.      Palpations: Abdomen is soft.   Neurological:      Mental Status: She is alert and oriented to person, place, and time.   Psychiatric:         Behavior: Behavior normal.         Judgment: Judgment normal.         ASSESSMENT / PLAN  1.  Cirrhosis decompensated by variceal bleeding and ascites, secondary to NAFLD  2.  Thrombocytopenia, secondary to cirrhosis  3.  Esophageal varices with bleeding  -No evidence hepatic encephalopathy  -Following with  hepatology and liver transplant--I have discussed with patient today that she should follow with their recommendations, I am here for her to continue to follow if needed however, plan of care will be developed from this point further by .  I have discussed with her her diuretics and that she should be taking Lasix 20 mg and spironolactone 50 mg daily as per Dr. Crow's note.  We will plan to proceed with an EGD.  Plan:  -EGD    Return for further treatment/plan of care with  hepatology.    I discussed the patients findings and my recommendations with patient    JOSE D Sood

## 2021-06-08 RX ORDER — SYRINGE WITH NEEDLE, 1 ML 25GX5/8"
SYRINGE, EMPTY DISPOSABLE MISCELLANEOUS
Refills: 4 | OUTPATIENT
Start: 2021-06-08

## 2021-06-14 ENCOUNTER — APPOINTMENT (OUTPATIENT)
Dept: PREADMISSION TESTING | Facility: HOSPITAL | Age: 61
End: 2021-06-14

## 2021-06-14 PROCEDURE — U0004 COV-19 TEST NON-CDC HGH THRU: HCPCS

## 2021-06-14 PROCEDURE — U0005 INFEC AGEN DETEC AMPLI PROBE: HCPCS

## 2021-06-14 PROCEDURE — C9803 HOPD COVID-19 SPEC COLLECT: HCPCS

## 2021-06-15 LAB — SARS-COV-2 RNA PNL SPEC NAA+PROBE: NOT DETECTED

## 2021-06-16 NOTE — LETTER
The medications may also lower testosterone, loss of bone strength, stamina and sex drive. For opiate medications, women who are of child bearing age 14-53 who could become pregnant should weigh the risks carefully with their physician as these medications make pregnancy more risky and the baby could be born sick and or addicted and have complications. For opiate medications, its possible to have dangerous interactions with alcohol and other medications. You may have an increase in pain called hyperalgesia, opioid medications can affect the brain and nerves that may cause increased pain and you may have trouble getting pain relief. To reduce the risks of harm to patients, we work to address lowering pain medication and improving your function. Dependence withdrawal symptoms may include; feelings of uneasiness, increased pain, irritability, belly pain, diarrhea, sweats and goose-flesh. 1. I understand that I have the following responsibilities:    ? I will take medications at the dose and frequency prescribed. ? I will not increase or change how I take my medications without the approval of the health care provider who signs this Medication Agreement. ? I will arrange for refills at the prescribed interval ONLY during regular office hours. I will not ask for refills earlier than agreed, after-hours, on holidays or on weekends. ? I will obtain all refills for medications at [address] [place] [address] , with full consent for my provider and pharmacist to exchange information in writing or verbally. ? I will not request any pain medications or controlled substances from other providers and will inform this provider of all other medications I am taking. ? I will inform my other health care providers that I am taking these medications and of the existence of this [id number]. In the event of an emergency, I will provide the same information to the emergency department providers. ? I will protect my prescriptions and medications. I understand that lost or misplaced prescriptions will not be replaced. ? I will keep medications only for my own use and will not share them with others. I will keep all medications away from children    ? I agree to participate in any medical, psychological or psychiatric assessments recommended by my provider. ? I will actively participate in any program designed to improve function, including social, physical, psychological and daily or work activities. 2. I will not use illegal or street drugs or another persons prescription. If I have an addiction problem with drugs or alcohol and my provider asks me to enter a program to address this issue, I agree to follow through. Such programs may include:     ? 12-Step Program and securing a sponsor    ? Individual counseling    ? Inpatient or outpatient treatment    ? Other___________________________    If in treatment, I will request that a copy of the programs initial evaluation and treatment recommendations be sent to this provider and will not expect refills until that is received. I will also request written monthly updates be sent to this provider to verify my continuing treatment. 3. I will consent to random drug screening to assure I am only taking the prescribed drugs, described in this MEDICATION AGREEMENT. I understand that a drug screen is a laboratory test in which a sample of my urine or blood is checked to see what drugs I have been taking. 4. I will keep all my scheduled appointments. If I need to cancel my appointment I will do so, a minimum of 24 hours before it is scheduled. 5. I will consent to random medication (pill) counts that are deemed necessary by my provider as a result of suspicious/unpredictable behavior or inappropriate drug screen results.  I understand if contacted for a random count it is my responsibility to bring all medications listed on - - -

## 2021-06-17 ENCOUNTER — OUTSIDE FACILITY SERVICE (OUTPATIENT)
Dept: GASTROENTEROLOGY | Facility: CLINIC | Age: 61
End: 2021-06-17

## 2021-06-17 PROCEDURE — 88305 TISSUE EXAM BY PATHOLOGIST: CPT | Performed by: INTERNAL MEDICINE

## 2021-06-17 PROCEDURE — 43239 EGD BIOPSY SINGLE/MULTIPLE: CPT | Performed by: INTERNAL MEDICINE

## 2021-06-18 ENCOUNTER — LAB REQUISITION (OUTPATIENT)
Dept: LAB | Facility: HOSPITAL | Age: 61
End: 2021-06-18

## 2021-06-18 DIAGNOSIS — K74.60 UNSPECIFIED CIRRHOSIS OF LIVER (HCC): ICD-10-CM

## 2021-06-19 LAB
CYTO UR: NORMAL
LAB AP CASE REPORT: NORMAL
LAB AP CLINICAL INFORMATION: NORMAL
PATH REPORT.FINAL DX SPEC: NORMAL
PATH REPORT.GROSS SPEC: NORMAL

## 2021-06-25 DIAGNOSIS — E11.8 TYPE 2 DIABETES MELLITUS WITH COMPLICATION, WITH LONG-TERM CURRENT USE OF INSULIN (HCC): ICD-10-CM

## 2021-06-25 DIAGNOSIS — Z79.4 TYPE 2 DIABETES MELLITUS WITH COMPLICATION, WITH LONG-TERM CURRENT USE OF INSULIN (HCC): ICD-10-CM

## 2021-06-28 RX ORDER — ASPIRIN 81 MG/1
TABLET ORAL
Qty: 30 TABLET | Refills: 0 | OUTPATIENT
Start: 2021-06-28

## 2021-07-08 ENCOUNTER — TELEPHONE (OUTPATIENT)
Dept: GASTROENTEROLOGY | Facility: CLINIC | Age: 61
End: 2021-07-08

## 2021-07-13 DIAGNOSIS — K04.7 ABSCESSED TOOTH: Primary | ICD-10-CM

## 2021-07-13 RX ORDER — CLINDAMYCIN HYDROCHLORIDE 300 MG/1
300 CAPSULE ORAL 3 TIMES DAILY
Qty: 30 CAPSULE | Refills: 0 | Status: SHIPPED | OUTPATIENT
Start: 2021-07-13 | End: 2021-07-23

## 2021-07-13 RX ORDER — HYDROCODONE BITARTRATE AND ACETAMINOPHEN 5; 325 MG/1; MG/1
1 TABLET ORAL EVERY 6 HOURS PRN
Qty: 12 TABLET | Refills: 0 | Status: SHIPPED | OUTPATIENT
Start: 2021-07-13 | End: 2022-03-15

## 2021-07-19 DIAGNOSIS — Z76.82 AWAITING LIVER TRANSPLANT: ICD-10-CM

## 2021-07-19 DIAGNOSIS — N28.9 FUNCTION KIDNEY DECREASED: Primary | ICD-10-CM

## 2021-07-27 ENCOUNTER — OFFICE VISIT (OUTPATIENT)
Dept: INTERNAL MEDICINE | Facility: CLINIC | Age: 61
End: 2021-07-27

## 2021-07-27 VITALS
TEMPERATURE: 96.8 F | RESPIRATION RATE: 16 BRPM | HEIGHT: 67 IN | WEIGHT: 183 LBS | BODY MASS INDEX: 28.72 KG/M2 | HEART RATE: 70 BPM | SYSTOLIC BLOOD PRESSURE: 124 MMHG | OXYGEN SATURATION: 99 % | DIASTOLIC BLOOD PRESSURE: 60 MMHG

## 2021-07-27 DIAGNOSIS — N28.9 FUNCTION KIDNEY DECREASED: ICD-10-CM

## 2021-07-27 DIAGNOSIS — K74.60 CIRRHOSIS OF LIVER WITH ASCITES, UNSPECIFIED HEPATIC CIRRHOSIS TYPE (HCC): ICD-10-CM

## 2021-07-27 DIAGNOSIS — I10 ESSENTIAL HYPERTENSION: ICD-10-CM

## 2021-07-27 DIAGNOSIS — Z79.4 TYPE 2 DIABETES MELLITUS WITH OTHER SPECIFIED COMPLICATION, WITH LONG-TERM CURRENT USE OF INSULIN (HCC): Primary | ICD-10-CM

## 2021-07-27 DIAGNOSIS — R18.8 CIRRHOSIS OF LIVER WITH ASCITES, UNSPECIFIED HEPATIC CIRRHOSIS TYPE (HCC): ICD-10-CM

## 2021-07-27 DIAGNOSIS — E11.69 TYPE 2 DIABETES MELLITUS WITH OTHER SPECIFIED COMPLICATION, WITH LONG-TERM CURRENT USE OF INSULIN (HCC): Primary | ICD-10-CM

## 2021-07-27 PROBLEM — E78.2 MIXED HYPERLIPIDEMIA: Status: ACTIVE | Noted: 2021-07-27

## 2021-07-27 PROBLEM — Z76.82 AWAITING LIVER TRANSPLANT: Status: ACTIVE | Noted: 2021-07-27

## 2021-07-27 PROBLEM — E53.9 VITAMIN B DEFICIENCY: Status: ACTIVE | Noted: 2021-07-27

## 2021-07-27 PROBLEM — E55.9 VITAMIN D DEFICIENCY: Status: ACTIVE | Noted: 2021-07-27

## 2021-07-27 PROCEDURE — 99214 OFFICE O/P EST MOD 30 MIN: CPT | Performed by: NURSE PRACTITIONER

## 2021-07-27 RX ORDER — LORATADINE 10 MG/1
10 TABLET ORAL DAILY
Qty: 30 TABLET | Refills: 5 | Status: SHIPPED | OUTPATIENT
Start: 2021-07-27 | End: 2021-12-27

## 2021-08-17 ENCOUNTER — TELEMEDICINE (OUTPATIENT)
Dept: INTERNAL MEDICINE | Facility: CLINIC | Age: 61
End: 2021-08-17

## 2021-08-17 DIAGNOSIS — Z79.4 TYPE 2 DIABETES MELLITUS WITH HYPERGLYCEMIA, WITH LONG-TERM CURRENT USE OF INSULIN (HCC): Primary | Chronic | ICD-10-CM

## 2021-08-17 DIAGNOSIS — E11.65 TYPE 2 DIABETES MELLITUS WITH HYPERGLYCEMIA, WITH LONG-TERM CURRENT USE OF INSULIN (HCC): Primary | Chronic | ICD-10-CM

## 2021-08-17 DIAGNOSIS — I10 ESSENTIAL HYPERTENSION: ICD-10-CM

## 2021-08-17 DIAGNOSIS — N28.9 FUNCTION KIDNEY DECREASED: ICD-10-CM

## 2021-08-17 PROCEDURE — 99214 OFFICE O/P EST MOD 30 MIN: CPT | Performed by: NURSE PRACTITIONER

## 2021-08-30 RX ORDER — INSULIN DEGLUDEC 200 U/ML
INJECTION, SOLUTION SUBCUTANEOUS
Qty: 18 ML | Refills: 3 | Status: SHIPPED | OUTPATIENT
Start: 2021-08-30 | End: 2021-09-05

## 2021-09-22 RX ORDER — ROSUVASTATIN CALCIUM 40 MG/1
TABLET, COATED ORAL
Qty: 90 TABLET | Refills: 1 | Status: SHIPPED | OUTPATIENT
Start: 2021-09-22 | End: 2022-05-13

## 2021-09-27 DIAGNOSIS — Z79.4 TYPE 2 DIABETES MELLITUS WITH OTHER SPECIFIED COMPLICATION, WITH LONG-TERM CURRENT USE OF INSULIN (HCC): ICD-10-CM

## 2021-09-27 DIAGNOSIS — E11.69 TYPE 2 DIABETES MELLITUS WITH OTHER SPECIFIED COMPLICATION, WITH LONG-TERM CURRENT USE OF INSULIN (HCC): ICD-10-CM

## 2021-09-27 RX ORDER — DULAGLUTIDE 1.5 MG/.5ML
INJECTION, SOLUTION SUBCUTANEOUS
Qty: 6 ML | Refills: 3 | Status: SHIPPED | OUTPATIENT
Start: 2021-09-27 | End: 2022-05-13

## 2021-09-28 RX ORDER — FLUCONAZOLE 150 MG/1
TABLET ORAL
Qty: 2 TABLET | Refills: 1 | Status: SHIPPED | OUTPATIENT
Start: 2021-09-28 | End: 2022-05-13

## 2021-09-29 ENCOUNTER — TELEPHONE (OUTPATIENT)
Dept: INTERNAL MEDICINE | Facility: CLINIC | Age: 61
End: 2021-09-29

## 2021-09-29 NOTE — TELEPHONE ENCOUNTER
Patient called stating her insurance is refusing to pay for tresiba and she is out. Have you seen any info on a PA. If not can you contact her pharmacy about this. I increased the dose so I wonder if this is just a dosage problem or if she actually needs a PA.

## 2021-09-29 NOTE — TELEPHONE ENCOUNTER
Did they give any info to why? I have many diabetics on higher dose than this. May need to do a PA stating why this dose is needed. I've never had this issue before

## 2021-10-08 ENCOUNTER — TELEPHONE (OUTPATIENT)
Dept: INTERNAL MEDICINE | Facility: CLINIC | Age: 61
End: 2021-10-08

## 2021-10-08 DIAGNOSIS — E11.42 DIABETIC POLYNEUROPATHY ASSOCIATED WITH TYPE 2 DIABETES MELLITUS (HCC): Primary | ICD-10-CM

## 2021-10-08 RX ORDER — METHOCARBAMOL 500 MG/1
500 TABLET, FILM COATED ORAL 3 TIMES DAILY PRN
Qty: 60 TABLET | Refills: 2 | Status: SHIPPED | OUTPATIENT
Start: 2021-10-08 | End: 2022-03-15

## 2021-10-08 RX ORDER — PREGABALIN 75 MG/1
75 CAPSULE ORAL 2 TIMES DAILY
Qty: 60 CAPSULE | Refills: 2 | Status: SHIPPED | OUTPATIENT
Start: 2021-10-08 | End: 2022-05-13

## 2021-10-08 NOTE — TELEPHONE ENCOUNTER
Pt is asking if there is anything she can take or be prescribed for her arthritis and neuropathy with her liver. Pt states she is having issued with the arthritis in her knees and the neuropathy at night.

## 2021-11-29 RX ORDER — PANTOPRAZOLE SODIUM 40 MG/1
TABLET, DELAYED RELEASE ORAL
Qty: 30 TABLET | Refills: 5 | Status: SHIPPED | OUTPATIENT
Start: 2021-11-29 | End: 2022-05-13 | Stop reason: SDUPTHER

## 2021-12-07 ENCOUNTER — TELEMEDICINE (OUTPATIENT)
Dept: INTERNAL MEDICINE | Facility: CLINIC | Age: 61
End: 2021-12-07

## 2021-12-07 DIAGNOSIS — K72.10 END STAGE LIVER DISEASE (HCC): ICD-10-CM

## 2021-12-07 DIAGNOSIS — Z79.4 TYPE 2 DIABETES MELLITUS WITH HYPERGLYCEMIA, WITH LONG-TERM CURRENT USE OF INSULIN (HCC): Primary | ICD-10-CM

## 2021-12-07 DIAGNOSIS — E11.65 TYPE 2 DIABETES MELLITUS WITH HYPERGLYCEMIA, WITH LONG-TERM CURRENT USE OF INSULIN (HCC): Primary | ICD-10-CM

## 2021-12-07 DIAGNOSIS — N18.32 STAGE 3B CHRONIC KIDNEY DISEASE (HCC): ICD-10-CM

## 2021-12-07 PROCEDURE — 99214 OFFICE O/P EST MOD 30 MIN: CPT | Performed by: NURSE PRACTITIONER

## 2021-12-07 NOTE — PROGRESS NOTES
"Subjective   No chief complaint on file.     Lyndsey Luna is a 61 y.o. female here today for ***.    300's in the morning. Quit ale8s and this didn't make much of a difference. Fluid taken off Friday.     [facility] -  June or July saw cards - dr. Rama Ortez.     Needs other dr. Cam Frank esophageal needs to be check and will discuss with [facility] if they want her to see them or [facility].     I have reviewed the following portions of the patient's history and confirmed they are accurate: {History and ROS:45182::\"allergies\",\"current medications\",\"past family history\",\"past medical history\",\"past social history\",\"past surgical history\",\"problem list\"}    I have personally completed the patient's review of systems.    Review of Systems    Current Outpatient Medications on File Prior to Visit   Medication Sig   • BD Pen Needle Bisi U/F 32G X 4 MM misc Inject 1 each under the skin into the appropriate area as directed Daily.   • carvedilol (Coreg) 6.25 MG tablet Take 1 tablet by mouth 2 (Two) Times a Day With Meals.   • Coenzyme Q10 (CoQ10) 400 MG capsule Take 400 mg by mouth 3 (Three) Times a Day.   • Cyanocobalamin (B-12) 1000 MCG tablet Take 1 tablet by mouth Daily.   • ezetimibe (ZETIA) 10 MG tablet Take 1 tablet by mouth Daily.   • ferrous sulfate 325 (65 FE) MG tablet Take 1 tablet by mouth Daily. (Patient taking differently: Take 325 mg by mouth 2 (two) times a day.)   • fluconazole (DIFLUCAN) 150 MG tablet Take one tablet by mouth and repeat in 3 days.   • furosemide (LASIX) 20 MG tablet TAKE TWO TABLETS BY MOUTH DAILY   • HYDROcodone-acetaminophen (NORCO) 5-325 MG per tablet Take 1 tablet by mouth Every 6 (Six) Hours As Needed for Moderate Pain .   • insulin aspart (novoLOG FLEXPEN) 100 UNIT/ML solution pen-injector sc pen Inject into skin 3 times daily with meals as directed per sliding scale provided. Do not exceed 40 units in 24 hours.   • Insulin Degludec (TRESIBA FLEXTOUCH) 200 UNIT/ML solution " pen-injector pen injection Inject 150 Units under the skin into the appropriate area as directed Every Night.   • isosorbide mononitrate (IMDUR) 30 MG 24 hr tablet Take 1 tablet by mouth Daily.   • lactulose (CHRONULAC) 10 GM/15ML solution    • loratadine (CLARITIN) 10 MG tablet Take 1 tablet by mouth Daily.   • methocarbamol (ROBAXIN) 500 MG tablet Take 1 tablet by mouth 3 (Three) Times a Day As Needed for Muscle Spasms (body aches.).   • nitroglycerin (NITROSTAT) 0.4 MG SL tablet Take 1 tablet by mouth As Needed.   • ondansetron (ZOFRAN) 4 MG tablet    • pantoprazole (PROTONIX) 40 MG EC tablet TAKE ONE TABLET BY MOUTH DAILY   • pregabalin (Lyrica) 75 MG capsule Take 1 capsule by mouth 2 (Two) Times a Day.   • rosuvastatin (CRESTOR) 40 MG tablet TAKE ONE TABLET BY MOUTH ONCE NIGHTLY   • spironolactone (ALDACTONE) 50 MG tablet TAKE TWO TABLETS BY MOUTH DAILY   • Trulicity 1.5 MG/0.5ML solution pen-injector INJECT 1.5 MG UNDER THE SKIN INTO THE APPROPRIATE AREA ONCE WEEKLY AS DIRECTED     No current facility-administered medications on file prior to visit.       Objective   There were no vitals filed for this visit.  There is no height or weight on file to calculate BMI.    Physical Exam    Assessment/Plan   Problem List Items Addressed This Visit     None             Current Outpatient Medications:   •  BD Pen Needle Bisi U/F 32G X 4 MM misc, Inject 1 each under the skin into the appropriate area as directed Daily., Disp: 100 each, Rfl: 5  •  carvedilol (Coreg) 6.25 MG tablet, Take 1 tablet by mouth 2 (Two) Times a Day With Meals., Disp: 60 tablet, Rfl: 2  •  Coenzyme Q10 (CoQ10) 400 MG capsule, Take 400 mg by mouth 3 (Three) Times a Day., Disp: 90 capsule, Rfl: 5  •  Cyanocobalamin (B-12) 1000 MCG tablet, Take 1 tablet by mouth Daily., Disp: 30 tablet, Rfl: 2  •  ezetimibe (ZETIA) 10 MG tablet, Take 1 tablet by mouth Daily., Disp: 30 tablet, Rfl: 5  •  ferrous sulfate 325 (65 FE) MG tablet, Take 1 tablet by mouth  Daily. (Patient taking differently: Take 325 mg by mouth 2 (two) times a day.), Disp: 30 tablet, Rfl: 5  •  fluconazole (DIFLUCAN) 150 MG tablet, Take one tablet by mouth and repeat in 3 days., Disp: 2 tablet, Rfl: 1  •  furosemide (LASIX) 20 MG tablet, TAKE TWO TABLETS BY MOUTH DAILY, Disp: 180 tablet, Rfl: 4  •  HYDROcodone-acetaminophen (NORCO) 5-325 MG per tablet, Take 1 tablet by mouth Every 6 (Six) Hours As Needed for Moderate Pain ., Disp: 12 tablet, Rfl: 0  •  insulin aspart (novoLOG FLEXPEN) 100 UNIT/ML solution pen-injector sc pen, Inject into skin 3 times daily with meals as directed per sliding scale provided. Do not exceed 40 units in 24 hours., Disp: 5 pen, Rfl: 5  •  Insulin Degludec (TRESIBA FLEXTOUCH) 200 UNIT/ML solution pen-injector pen injection, Inject 150 Units under the skin into the appropriate area as directed Every Night., Disp: 8 pen, Rfl: 5  •  isosorbide mononitrate (IMDUR) 30 MG 24 hr tablet, Take 1 tablet by mouth Daily., Disp: 30 tablet, Rfl: 5  •  lactulose (CHRONULAC) 10 GM/15ML solution, , Disp: , Rfl:   •  loratadine (CLARITIN) 10 MG tablet, Take 1 tablet by mouth Daily., Disp: 30 tablet, Rfl: 5  •  methocarbamol (ROBAXIN) 500 MG tablet, Take 1 tablet by mouth 3 (Three) Times a Day As Needed for Muscle Spasms (body aches.)., Disp: 60 tablet, Rfl: 2  •  nitroglycerin (NITROSTAT) 0.4 MG SL tablet, Take 1 tablet by mouth As Needed., Disp: , Rfl:   •  ondansetron (ZOFRAN) 4 MG tablet, , Disp: , Rfl:   •  pantoprazole (PROTONIX) 40 MG EC tablet, TAKE ONE TABLET BY MOUTH DAILY, Disp: 30 tablet, Rfl: 5  •  pregabalin (Lyrica) 75 MG capsule, Take 1 capsule by mouth 2 (Two) Times a Day., Disp: 60 capsule, Rfl: 2  •  rosuvastatin (CRESTOR) 40 MG tablet, TAKE ONE TABLET BY MOUTH ONCE NIGHTLY, Disp: 90 tablet, Rfl: 1  •  spironolactone (ALDACTONE) 50 MG tablet, TAKE TWO TABLETS BY MOUTH DAILY, Disp: 180 tablet, Rfl: 4  •  Trulicity 1.5 MG/0.5ML solution pen-injector, INJECT 1.5 MG UNDER THE  SKIN INTO THE APPROPRIATE AREA ONCE WEEKLY AS DIRECTED, Disp: 6 mL, Rfl: 3       Plan of care reviewed with the patient at the conclusion of today's visit.  Education was provided regarding diagnosis, management, and any prescribed or recommended OTC medications.  Patient verbalized understanding of and agreement with management plan.     No follow-ups on file.      Taryn Chavez, APRN

## 2021-12-26 PROBLEM — N18.32 STAGE 3B CHRONIC KIDNEY DISEASE: Status: ACTIVE | Noted: 2021-12-26

## 2021-12-26 PROBLEM — K75.81 LIVER CIRRHOSIS SECONDARY TO NASH: Status: ACTIVE | Noted: 2020-02-22

## 2021-12-27 ENCOUNTER — TELEMEDICINE (OUTPATIENT)
Dept: INTERNAL MEDICINE | Facility: CLINIC | Age: 61
End: 2021-12-27

## 2021-12-27 DIAGNOSIS — I10 ESSENTIAL HYPERTENSION: Primary | ICD-10-CM

## 2021-12-27 DIAGNOSIS — Z79.4 TYPE 2 DIABETES MELLITUS WITH HYPERGLYCEMIA, WITH LONG-TERM CURRENT USE OF INSULIN: Chronic | ICD-10-CM

## 2021-12-27 DIAGNOSIS — E11.65 TYPE 2 DIABETES MELLITUS WITH HYPERGLYCEMIA, WITH LONG-TERM CURRENT USE OF INSULIN: Chronic | ICD-10-CM

## 2021-12-27 DIAGNOSIS — J30.89 ENVIRONMENTAL AND SEASONAL ALLERGIES: ICD-10-CM

## 2021-12-27 DIAGNOSIS — K75.81 LIVER CIRRHOSIS SECONDARY TO NASH: ICD-10-CM

## 2021-12-27 DIAGNOSIS — K74.60 LIVER CIRRHOSIS SECONDARY TO NASH: ICD-10-CM

## 2021-12-27 PROCEDURE — 99214 OFFICE O/P EST MOD 30 MIN: CPT | Performed by: NURSE PRACTITIONER

## 2021-12-27 RX ORDER — LORATADINE 10 MG/1
10 TABLET ORAL DAILY
Qty: 30 TABLET | Refills: 11 | Status: SHIPPED | OUTPATIENT
Start: 2021-12-27 | End: 2022-05-13

## 2021-12-27 NOTE — PROGRESS NOTES
Subjective   Chief Complaint   Patient presents with   • Diabetes   • Hepatic Disease   • Chronic Kidney Disease      This is video visit.  You have chosen to receive care through a video visit today. Do you consent to use a video visit for your medical care today? Yes    Lyndsey Luna is a 61 y.o. female here today for diabetes, liver disease, and kidney disease.  Blood sugars continue to run very high.  She has difficulty controlling blood sugars.  Running 300 in the morning fasting.  She has quit drinking elevates but this has not made much of a difference.  Currently taking 150 units of Tresiba nightly.  She is using sliding scale insulin.  Not able to tolerate oral meds due to kidney disease. There has been difficulty with the pharmacy getting tresiba and may need to switch temporarily until this becomes better available. She previously saw endocrinologist Dr. Bedolla but due to personal reasons would feel more comfortable seeing a female.  Patient may benefit from insulin pump and she is interested in pursuing this.  She has end-stage liver disease and has just received approval from nephrology to go forward with liver and kidney transplant.  She will be following up with [facility] on this.  [facility] gastroenterology referred her to [facility] for transplant.  She is unsure who to follow-up with in regards to esophageal varices.  She had prior banding and is due for EGD.  She will discuss with [facility] at her next appointment if she needs to follow-up with [facility] on this.  Last week at [facility] to pull off multiple liters of fluid from her abdomen.  Has shortness of breath when she has abdominal swelling.  Denies chest pain.      I have reviewed the following portions of the patient's history and confirmed they are accurate: allergies, current medications, past family history, past medical history, past social history, past surgical history and problem  list    I have personally completed the patient's review of systems.    Review of Systems   Constitutional: Positive for fatigue. Negative for activity change, appetite change, chills, diaphoresis, fever, unexpected weight gain and unexpected weight loss.   HENT: Negative for ear discharge, ear pain, mouth sores, nosebleeds, sinus pressure, sneezing and sore throat.    Eyes: Negative for pain, discharge and itching.   Respiratory: Negative for cough, chest tightness, shortness of breath and wheezing.    Cardiovascular: Negative for chest pain, palpitations and leg swelling.   Gastrointestinal: Positive for abdominal distention. Negative for abdominal pain, constipation, diarrhea, nausea and vomiting.   Endocrine: Negative for heat intolerance, polydipsia and polyphagia.   Genitourinary: Negative for dysuria, flank pain, frequency, hematuria and urgency.   Musculoskeletal: Positive for arthralgias, back pain and myalgias. Negative for gait problem, joint swelling, neck pain and neck stiffness.   Skin: Negative for color change, pallor and rash.   Allergic/Immunologic: Negative for immunocompromised state.   Neurological: Negative for seizures, speech difficulty, weakness and numbness.   Hematological: Negative for adenopathy.   Psychiatric/Behavioral: Positive for depressed mood and stress. Negative for agitation, decreased concentration, sleep disturbance and suicidal ideas. The patient is nervous/anxious.        Current Outpatient Medications on File Prior to Visit   Medication Sig   • BD Pen Needle Bisi U/F 32G X 4 MM misc Inject 1 each under the skin into the appropriate area as directed Daily.   • carvedilol (Coreg) 6.25 MG tablet Take 1 tablet by mouth 2 (Two) Times a Day With Meals.   • Coenzyme Q10 (CoQ10) 400 MG capsule Take 400 mg by mouth 3 (Three) Times a Day.   • Cyanocobalamin (B-12) 1000 MCG tablet Take 1 tablet by mouth Daily.   • ezetimibe (ZETIA) 10 MG tablet Take 1 tablet by mouth Daily.   •  ferrous sulfate 325 (65 FE) MG tablet Take 1 tablet by mouth Daily. (Patient taking differently: Take 325 mg by mouth 2 (two) times a day.)   • fluconazole (DIFLUCAN) 150 MG tablet Take one tablet by mouth and repeat in 3 days.   • furosemide (LASIX) 20 MG tablet TAKE TWO TABLETS BY MOUTH DAILY   • HYDROcodone-acetaminophen (NORCO) 5-325 MG per tablet Take 1 tablet by mouth Every 6 (Six) Hours As Needed for Moderate Pain .   • insulin aspart (novoLOG FLEXPEN) 100 UNIT/ML solution pen-injector sc pen Inject into skin 3 times daily with meals as directed per sliding scale provided. Do not exceed 40 units in 24 hours.   • isosorbide mononitrate (IMDUR) 30 MG 24 hr tablet Take 1 tablet by mouth Daily.   • lactulose (CHRONULAC) 10 GM/15ML solution    • loratadine (CLARITIN) 10 MG tablet Take 1 tablet by mouth Daily.   • methocarbamol (ROBAXIN) 500 MG tablet Take 1 tablet by mouth 3 (Three) Times a Day As Needed for Muscle Spasms (body aches.).   • nitroglycerin (NITROSTAT) 0.4 MG SL tablet Take 1 tablet by mouth As Needed.   • ondansetron (ZOFRAN) 4 MG tablet    • pantoprazole (PROTONIX) 40 MG EC tablet TAKE ONE TABLET BY MOUTH DAILY   • pregabalin (Lyrica) 75 MG capsule Take 1 capsule by mouth 2 (Two) Times a Day.   • rosuvastatin (CRESTOR) 40 MG tablet TAKE ONE TABLET BY MOUTH ONCE NIGHTLY   • spironolactone (ALDACTONE) 50 MG tablet TAKE TWO TABLETS BY MOUTH DAILY   • Trulicity 1.5 MG/0.5ML solution pen-injector INJECT 1.5 MG UNDER THE SKIN INTO THE APPROPRIATE AREA ONCE WEEKLY AS DIRECTED     No current facility-administered medications on file prior to visit.       Objective   There were no vitals filed for this visit.  There is no height or weight on file to calculate BMI.    Physical Exam  Constitutional:       Appearance: Normal appearance. She is well-developed.   HENT:      Head: Normocephalic and atraumatic.      Nose: Nose normal.   Eyes:      General: Lids are normal.      Conjunctiva/sclera: Conjunctivae  normal.   Neck:      Trachea: Trachea normal.   Pulmonary:      Effort: No respiratory distress.   Neurological:      Mental Status: She is alert and oriented to person, place, and time.      GCS: GCS eye subscore is 4. GCS verbal subscore is 5. GCS motor subscore is 6.   Psychiatric:         Attention and Perception: Attention normal.         Mood and Affect: Mood normal.         Speech: Speech normal.         Behavior: Behavior normal. Behavior is cooperative.         Thought Content: Thought content normal.         Assessment/Plan   Problem List Items Addressed This Visit        Endocrine and Metabolic    Type 2 diabetes mellitus, with long-term current use of insulin (HCC) - Primary (Chronic)  Chronic issue unstable requiring medication management and monitoring. Will eat well balanced heart healthy diabetic diet, drink adequate water, increase physical activity, and get adequate rest. Will monitor blood sugars daily and keep log for next appt. Will contact office earlier if blood sugars are averaging above 250.   Increase long acting basal insulin to 156 units. Will switch to Toujeo for now. Continue sliding scale novolog.      Relevant Medications    Insulin Glargine, 2 Unit Dial, (TOUJEO) 300 UNIT/ML solution pen-injector injection    Other Relevant Orders    Ambulatory Referral to Endocrinology (Completed)       Gastrointestinal Abdominal     End stage liver disease (HCC)  Chronic issue unstable requiring further work-up and monitoring.  Continue with visits at [facility] for monitoring of ascites and transplant list.        Genitourinary and Reproductive     Stage 3b chronic kidney disease (HCC)  Chronic issue unstable requiring further work-up and monitoring.  She will continue to follow-up with nephrology and will follow up with Ximena Rowan on transplant list.             Current Outpatient Medications:   •  BD Pen Needle Bisi U/F 32G X 4 MM misc, Inject 1 each under the skin into the  appropriate area as directed Daily., Disp: 100 each, Rfl: 5  •  carvedilol (Coreg) 6.25 MG tablet, Take 1 tablet by mouth 2 (Two) Times a Day With Meals., Disp: 60 tablet, Rfl: 2  •  Coenzyme Q10 (CoQ10) 400 MG capsule, Take 400 mg by mouth 3 (Three) Times a Day., Disp: 90 capsule, Rfl: 5  •  Cyanocobalamin (B-12) 1000 MCG tablet, Take 1 tablet by mouth Daily., Disp: 30 tablet, Rfl: 2  •  ezetimibe (ZETIA) 10 MG tablet, Take 1 tablet by mouth Daily., Disp: 30 tablet, Rfl: 5  •  ferrous sulfate 325 (65 FE) MG tablet, Take 1 tablet by mouth Daily. (Patient taking differently: Take 325 mg by mouth 2 (two) times a day.), Disp: 30 tablet, Rfl: 5  •  fluconazole (DIFLUCAN) 150 MG tablet, Take one tablet by mouth and repeat in 3 days., Disp: 2 tablet, Rfl: 1  •  furosemide (LASIX) 20 MG tablet, TAKE TWO TABLETS BY MOUTH DAILY, Disp: 180 tablet, Rfl: 4  •  HYDROcodone-acetaminophen (NORCO) 5-325 MG per tablet, Take 1 tablet by mouth Every 6 (Six) Hours As Needed for Moderate Pain ., Disp: 12 tablet, Rfl: 0  •  insulin aspart (novoLOG FLEXPEN) 100 UNIT/ML solution pen-injector sc pen, Inject into skin 3 times daily with meals as directed per sliding scale provided. Do not exceed 40 units in 24 hours., Disp: 5 pen, Rfl: 5  •  Insulin Degludec (TRESIBA FLEXTOUCH) 200 UNIT/ML solution pen-injector pen injection, Inject 150 Units under the skin into the appropriate area as directed Every Night., Disp: 8 pen, Rfl: 5  •  isosorbide mononitrate (IMDUR) 30 MG 24 hr tablet, Take 1 tablet by mouth Daily., Disp: 30 tablet, Rfl: 5  •  lactulose (CHRONULAC) 10 GM/15ML solution, , Disp: , Rfl:   •  loratadine (CLARITIN) 10 MG tablet, Take 1 tablet by mouth Daily., Disp: 30 tablet, Rfl: 5  •  methocarbamol (ROBAXIN) 500 MG tablet, Take 1 tablet by mouth 3 (Three) Times a Day As Needed for Muscle Spasms (body aches.)., Disp: 60 tablet, Rfl: 2  •  nitroglycerin (NITROSTAT) 0.4 MG SL tablet, Take 1 tablet by mouth As Needed., Disp: , Rfl:   •   ondansetron (ZOFRAN) 4 MG tablet, , Disp: , Rfl:   •  pantoprazole (PROTONIX) 40 MG EC tablet, TAKE ONE TABLET BY MOUTH DAILY, Disp: 30 tablet, Rfl: 5  •  pregabalin (Lyrica) 75 MG capsule, Take 1 capsule by mouth 2 (Two) Times a Day., Disp: 60 capsule, Rfl: 2  •  rosuvastatin (CRESTOR) 40 MG tablet, TAKE ONE TABLET BY MOUTH ONCE NIGHTLY, Disp: 90 tablet, Rfl: 1  •  spironolactone (ALDACTONE) 50 MG tablet, TAKE TWO TABLETS BY MOUTH DAILY, Disp: 180 tablet, Rfl: 4  •  Trulicity 1.5 MG/0.5ML solution pen-injector, INJECT 1.5 MG UNDER THE SKIN INTO THE APPROPRIATE AREA ONCE WEEKLY AS DIRECTED, Disp: 6 mL, Rfl: 3       Plan of care reviewed with the patient at the conclusion of today's visit.  Education was provided regarding diagnosis, management, and any prescribed or recommended OTC medications.  Patient verbalized understanding of and agreement with management plan.     Return in about 2 weeks (around 12/21/2021), or if symptoms worsen or fail to improve.     [place]. I spent 25 minutes in medical discussion with patient during this visit.     JOSE D Zaragoza

## 2022-02-18 DIAGNOSIS — I10 ESSENTIAL HYPERTENSION: ICD-10-CM

## 2022-02-18 RX ORDER — ISOSORBIDE MONONITRATE 30 MG/1
TABLET, EXTENDED RELEASE ORAL
Qty: 90 TABLET | Refills: 1 | Status: SHIPPED | OUTPATIENT
Start: 2022-02-18 | End: 2022-05-13

## 2022-03-11 DIAGNOSIS — E78.2 MIXED HYPERLIPIDEMIA: ICD-10-CM

## 2022-03-11 RX ORDER — EZETIMIBE 10 MG/1
TABLET ORAL
Qty: 90 TABLET | Refills: 1 | Status: SHIPPED | OUTPATIENT
Start: 2022-03-11 | End: 2022-05-13 | Stop reason: SDUPTHER

## 2022-04-20 ENCOUNTER — TELEPHONE (OUTPATIENT)
Dept: INTERNAL MEDICINE | Facility: CLINIC | Age: 62
End: 2022-04-20

## 2022-04-20 NOTE — TELEPHONE ENCOUNTER
Caller: ARVIN    Relationship: Other [facility]    Best call back number:     What is the best time to reach you: ANYTIME    Who are you requesting to speak with (clinical staff, provider,  specific staff member): CLINICAL STAFF    Do you know the name of the person who called: ARVIN    What was the call regarding: PATIENT HAS DECLINED ALL HOME HEALTH CARE SERVICES    Do you require a callback: NO

## 2022-04-25 DIAGNOSIS — F41.0 PANIC ATTACK: Primary | ICD-10-CM

## 2022-04-25 RX ORDER — CLONAZEPAM 0.5 MG/1
0.5 TABLET ORAL DAILY PRN
Qty: 30 TABLET | Refills: 0 | Status: SHIPPED | OUTPATIENT
Start: 2022-04-25 | End: 2022-05-13

## 2022-04-25 RX ORDER — FLUOXETINE 10 MG/1
10 CAPSULE ORAL DAILY
Qty: 30 CAPSULE | Refills: 1 | Status: SHIPPED | OUTPATIENT
Start: 2022-04-25 | End: 2022-05-13

## 2022-04-28 RX ORDER — BUSPIRONE HYDROCHLORIDE 5 MG/1
TABLET ORAL
Qty: 90 TABLET | Refills: 2 | Status: SHIPPED | OUTPATIENT
Start: 2022-04-28 | End: 2022-05-13

## 2022-04-29 ENCOUNTER — TELEPHONE (OUTPATIENT)
Dept: INTERNAL MEDICINE | Facility: CLINIC | Age: 62
End: 2022-04-29

## 2022-04-29 NOTE — TELEPHONE ENCOUNTER
Caller: BHARAT    Relationship:     Best call back number: [phone]    Additional notes: BANDAR STATES DUE TO PATIENT HAVING PAPACENTESIS TREATMENT ON MONDAYS AND DIALYSIS ON Tuesday, Thursday AND SATURDAY PHYSICAL THERAPY AND OCCUPATION THERAPY WILL MOST LIKELY NOT BE ABLE TO SEE THE PATIENT UNTIL Wednesday OF NEXT WEEK. NURSING WILL BE SEEING THE PATIENT ONCE WEEKLY FOR MED TEACHING AND DISEASE PROCESS MANAGEMENT.

## 2022-05-06 DIAGNOSIS — G62.9 NEUROPATHY: Primary | ICD-10-CM

## 2022-05-06 RX ORDER — GABAPENTIN 100 MG/1
100 CAPSULE ORAL 3 TIMES DAILY
Qty: 90 CAPSULE | Refills: 0 | Status: SHIPPED | OUTPATIENT
Start: 2022-05-06 | End: 2022-05-13 | Stop reason: SDUPTHER

## 2022-05-13 PROBLEM — F41.8 DEPRESSION WITH ANXIETY: Status: ACTIVE | Noted: 2022-05-13

## 2022-05-13 PROBLEM — K64.9 HEMORRHOIDS: Status: ACTIVE | Noted: 2022-05-13

## 2022-05-13 PROBLEM — Z99.2 STAGE 5 CHRONIC KIDNEY DISEASE ON CHRONIC DIALYSIS: Status: ACTIVE | Noted: 2022-05-13

## 2022-05-13 PROBLEM — N18.6 STAGE 5 CHRONIC KIDNEY DISEASE ON CHRONIC DIALYSIS (HCC): Status: ACTIVE | Noted: 2022-05-13

## 2022-05-16 ENCOUNTER — OUTSIDE FACILITY SERVICE (OUTPATIENT)
Dept: INTERNAL MEDICINE | Facility: CLINIC | Age: 62
End: 2022-05-16

## 2022-05-16 PROCEDURE — OUTSIDEPOS PR OUTSIDE POS PLACEHOLDER: Performed by: NURSE PRACTITIONER

## 2022-05-16 RX ORDER — PEN NEEDLE, DIABETIC 32GX 5/32"
NEEDLE, DISPOSABLE MISCELLANEOUS
Qty: 100 EACH | Refills: 5 | Status: SHIPPED | OUTPATIENT
Start: 2022-05-16 | End: 2022-10-12 | Stop reason: SDUPTHER

## 2022-05-18 ENCOUNTER — OUTSIDE FACILITY SERVICE (OUTPATIENT)
Dept: INTERNAL MEDICINE | Facility: CLINIC | Age: 62
End: 2022-05-18

## 2022-05-18 PROCEDURE — OUTSIDEPOS PR OUTSIDE POS PLACEHOLDER: Performed by: NURSE PRACTITIONER

## 2022-05-26 RX ORDER — ONDANSETRON 4 MG/1
4 TABLET, ORALLY DISINTEGRATING ORAL 2 TIMES DAILY PRN
Qty: 30 TABLET | Refills: 1 | Status: SHIPPED | OUTPATIENT
Start: 2022-05-26 | End: 2022-10-12 | Stop reason: SDUPTHER

## 2022-06-03 ENCOUNTER — TELEMEDICINE (OUTPATIENT)
Dept: INTERNAL MEDICINE | Facility: CLINIC | Age: 62
End: 2022-06-03

## 2022-06-03 DIAGNOSIS — G62.9 NEUROPATHY: ICD-10-CM

## 2022-06-03 DIAGNOSIS — E11.42 TYPE 2 DIABETES MELLITUS WITH DIABETIC POLYNEUROPATHY, WITH LONG-TERM CURRENT USE OF INSULIN: Primary | Chronic | ICD-10-CM

## 2022-06-03 DIAGNOSIS — Z79.4 TYPE 2 DIABETES MELLITUS WITH DIABETIC POLYNEUROPATHY, WITH LONG-TERM CURRENT USE OF INSULIN: Primary | Chronic | ICD-10-CM

## 2022-06-03 DIAGNOSIS — L30.9 ECZEMA, UNSPECIFIED TYPE: ICD-10-CM

## 2022-06-03 PROCEDURE — 99214 OFFICE O/P EST MOD 30 MIN: CPT | Performed by: NURSE PRACTITIONER

## 2022-06-03 RX ORDER — TRIAMCINOLONE ACETONIDE 1 MG/G
1 CREAM TOPICAL 2 TIMES DAILY PRN
Qty: 453 G | Refills: 1 | Status: SHIPPED | OUTPATIENT
Start: 2022-06-03 | End: 2022-07-27

## 2022-06-03 RX ORDER — GABAPENTIN 100 MG/1
100 CAPSULE ORAL 4 TIMES DAILY PRN
Qty: 120 CAPSULE | Refills: 2 | Status: SHIPPED | OUTPATIENT
Start: 2022-06-03 | End: 2022-10-12 | Stop reason: SDUPTHER

## 2022-06-03 NOTE — PROGRESS NOTES
Subjective   Chief Complaint   Patient presents with   • Diabetes   • Peripheral Neuropathy      This is video visit.  You have chosen to receive care through a video visit today. Do you consent to use a video visit for your medical care today? Yes    Lyndsey Luna is a 61 y.o. female here today for diabetes, neuropathy, and skin problem. Patient's blood sugars have been averaging around 150 when she takes the novolog sliding scale. She continues to take the tresiba 165 units nightly. She is following a heart healthy diabetic diet and better overall. Neuropathy has been difficult to control but gabapentin does help. Neuropathy is worsening in her hands bilaterally. She has to take this more frequently to help. Currently taking the 100mg about twice daily. Based on kidney and liver failure she can take up to a max of 400mg daily. Since last hospitalization she has developed very dry and flaky skin that is itchy and peeling. She is applying lotions with no improvement.      I have reviewed the following portions of the patient's history and confirmed they are accurate: allergies, current medications, past family history, past medical history, past social history, past surgical history and problem list    I have personally completed the patient's review of systems.    Review of Systems   Constitutional: Positive for fatigue. Negative for activity change, appetite change, chills, diaphoresis, fever, unexpected weight gain and unexpected weight loss.   HENT: Negative for ear discharge, ear pain, mouth sores, nosebleeds, sinus pressure, sneezing and sore throat.    Eyes: Negative for pain, discharge and itching.   Respiratory: Negative for cough, chest tightness, shortness of breath and wheezing.    Cardiovascular: Negative for chest pain, palpitations and leg swelling.   Gastrointestinal: Positive for abdominal distention. Negative for abdominal pain, anal bleeding, constipation, diarrhea, nausea, rectal pain and  vomiting.   Endocrine: Negative for heat intolerance, polydipsia and polyphagia.   Genitourinary: Negative for dysuria, flank pain, frequency, hematuria and urgency.   Musculoskeletal: Positive for arthralgias, back pain and myalgias. Negative for gait problem, joint swelling, neck pain and neck stiffness.   Skin: Positive for dry skin and rash. Negative for color change and pallor.   Allergic/Immunologic: Negative for immunocompromised state.   Neurological: Positive for weakness and numbness. Negative for seizures and speech difficulty.   Hematological: Negative for adenopathy.   Psychiatric/Behavioral: Positive for sleep disturbance and stress. Negative for agitation, decreased concentration, suicidal ideas and depressed mood. The patient is nervous/anxious.        Current Outpatient Medications on File Prior to Visit   Medication Sig   • BD Pen Needle Bisi 2nd Gen 32G X 4 MM misc USE AS DIRECTED ONCE DAILY   • ciprofloxacin (Cipro) 250 MG tablet Take 1 tablet by mouth 2 (Two) Times a Day.   • ezetimibe (ZETIA) 10 MG tablet Take 1 tablet by mouth Daily.   • FLUoxetine (PROzac) 10 MG capsule Take 1 capsule by mouth Daily.   • hydrocortisone (ANUSOL-HC) 25 MG suppository Insert 1 suppository into the rectum 2 (Two) Times a Day.   • insulin aspart (novoLOG FLEXPEN) 100 UNIT/ML solution pen-injector sc pen Inject into skin 3 times daily with meals as directed per sliding scale provided. Do not exceed 40 units in 24 hours.   • Insulin Degludec (TRESIBA FLEXTOUCH) 200 UNIT/ML solution pen-injector pen injection Inject 165 Units under the skin into the appropriate area as directed Every Night.   • midodrine (PROAMATINE) 5 MG tablet Take 1 tablet by mouth 3 (Three) Times a Day Before Meals.   • nitroglycerin (NITROSTAT) 0.4 MG SL tablet Take 1 tablet by mouth As Needed.   • ondansetron ODT (Zofran ODT) 4 MG disintegrating tablet Place 1 tablet on the tongue 2 (Two) Times a Day As Needed for Nausea or Vomiting.   •  pantoprazole (PROTONIX) 40 MG EC tablet Take 1 tablet by mouth Daily.   • polyethylene glycol (MIRALAX) 17 GM/SCOOP powder Take 17 g by mouth Daily.   • rosuvastatin (Crestor) 20 MG tablet Take 1 tablet by mouth Daily.   • triamcinolone (KENALOG) 0.1 % ointment Apply 1 application topically to the appropriate area as directed 2 (Two) Times a Day.     No current facility-administered medications on file prior to visit.       Objective   There were no vitals filed for this visit.  There is no height or weight on file to calculate BMI.    Physical Exam  Constitutional:       Appearance: Normal appearance. She is well-developed.   HENT:      Head: Normocephalic and atraumatic.      Nose: Nose normal.   Eyes:      General: Lids are normal.      Conjunctiva/sclera: Conjunctivae normal.   Neck:      Trachea: Trachea normal.   Pulmonary:      Effort: No respiratory distress.   Neurological:      Mental Status: She is alert and oriented to person, place, and time.      GCS: GCS eye subscore is 4. GCS verbal subscore is 5. GCS motor subscore is 6.   Psychiatric:         Attention and Perception: Attention normal.         Mood and Affect: Mood normal.         Speech: Speech normal.         Behavior: Behavior normal. Behavior is cooperative.         Thought Content: Thought content normal.         Assessment & Plan   Problem List Items Addressed This Visit        Endocrine and Metabolic    Type 2 diabetes mellitus, with long-term current use of insulin (HCC) - Primary (Chronic)  Chronic stable. Continue tresiba 165 units at bedtime and novolog sliding scale. Follow diabetic diet.         Other Visit Diagnoses     Neuropathy      Chronic unstable. Increase gabapentin. Can take max dose of 400mg per day based on guidelines for kidney/liver failure. Educated on importance of keeping diabetes well controlled. Will closely monitor B12 level.      Relevant Medications    gabapentin (NEURONTIN) 100 MG capsule    Eczema, unspecified  type      New unstable. Start kenalog cream. Will mix 50/50 kenalog and aquaphor and apply to dry areas. Discussed importance of drinking adequate amount of water based on renal diet.       Relevant Medications    triamcinolone (KENALOG) 0.1 % cream             Current Outpatient Medications:   •  gabapentin (NEURONTIN) 100 MG capsule, Take 1 capsule by mouth 4 (Four) Times a Day As Needed (neuropathy. MAX OF 400MG PER DAY DUE TO KIDNEY/LIVER FAILURE)., Disp: 120 capsule, Rfl: 2  •  BD Pen Needle Bisi 2nd Gen 32G X 4 MM misc, USE AS DIRECTED ONCE DAILY, Disp: 100 each, Rfl: 5  •  ciprofloxacin (Cipro) 250 MG tablet, Take 1 tablet by mouth 2 (Two) Times a Day., Disp: 90 tablet, Rfl: 1  •  ezetimibe (ZETIA) 10 MG tablet, Take 1 tablet by mouth Daily., Disp: 90 tablet, Rfl: 3  •  FLUoxetine (PROzac) 10 MG capsule, Take 1 capsule by mouth Daily., Disp: 90 capsule, Rfl: 1  •  hydrocortisone (ANUSOL-HC) 25 MG suppository, Insert 1 suppository into the rectum 2 (Two) Times a Day., Disp: 12 each, Rfl: 2  •  insulin aspart (novoLOG FLEXPEN) 100 UNIT/ML solution pen-injector sc pen, Inject into skin 3 times daily with meals as directed per sliding scale provided. Do not exceed 40 units in 24 hours., Disp: 5 pen, Rfl: 5  •  Insulin Degludec (TRESIBA FLEXTOUCH) 200 UNIT/ML solution pen-injector pen injection, Inject 165 Units under the skin into the appropriate area as directed Every Night., Disp: 9 pen, Rfl: 1  •  midodrine (PROAMATINE) 5 MG tablet, Take 1 tablet by mouth 3 (Three) Times a Day Before Meals., Disp: 270 tablet, Rfl: 1  •  nitroglycerin (NITROSTAT) 0.4 MG SL tablet, Take 1 tablet by mouth As Needed., Disp: , Rfl:   •  ondansetron ODT (Zofran ODT) 4 MG disintegrating tablet, Place 1 tablet on the tongue 2 (Two) Times a Day As Needed for Nausea or Vomiting., Disp: 30 tablet, Rfl: 1  •  pantoprazole (PROTONIX) 40 MG EC tablet, Take 1 tablet by mouth Daily., Disp: 90 tablet, Rfl: 3  •  polyethylene glycol (MIRALAX)  17 GM/SCOOP powder, Take 17 g by mouth Daily., Disp: 850 g, Rfl: 5  •  rosuvastatin (Crestor) 20 MG tablet, Take 1 tablet by mouth Daily., Disp: 90 tablet, Rfl: 3  •  triamcinolone (KENALOG) 0.1 % cream, Apply 1 application topically to the appropriate area as directed 2 (Two) Times a Day As Needed for Rash., Disp: 453 g, Rfl: 1  •  triamcinolone (KENALOG) 0.1 % ointment, Apply 1 application topically to the appropriate area as directed 2 (Two) Times a Day., Disp: 453.6 g, Rfl: 2       Plan of care reviewed with the patient at the conclusion of today's visit.  Education was provided regarding diagnosis, management, and any prescribed or recommended OTC medications.  Patient verbalized understanding of and agreement with management plan.     Return in about 1 month (around 7/3/2022), or if symptoms worsen or fail to improve.     [place] in [place], [place] in [place]. I spent 25 minutes in medical discussion with patient during this visit.     Taryn Chavez, APRN

## 2022-06-06 ENCOUNTER — PATIENT ROUNDING (BHMG ONLY) (OUTPATIENT)
Dept: INTERNAL MEDICINE | Facility: CLINIC | Age: 62
End: 2022-06-06

## 2022-06-06 NOTE — PROGRESS NOTES
A  my chart message has been sent to the patient for patient rounding with [facility].

## 2022-06-08 ENCOUNTER — TELEPHONE (OUTPATIENT)
Dept: INTERNAL MEDICINE | Facility: CLINIC | Age: 62
End: 2022-06-08

## 2022-06-08 NOTE — TELEPHONE ENCOUNTER
Health Care Worker went to see patient at her home and was told she is supposed to be put on Hold now for Home Health, so she is discharging her until further notice or a new order is placed.  Thank you.

## 2022-07-05 ENCOUNTER — TELEPHONE (OUTPATIENT)
Dept: INTERNAL MEDICINE | Facility: CLINIC | Age: 62
End: 2022-07-05

## 2022-07-05 NOTE — TELEPHONE ENCOUNTER
Caller: Lyndsey Luna    Relationship to patient: Self    Best call back number: [phone]    Chief complaint: MEDICATIONS     Type of visit: OFFICE     Requested date: ASAP    If rescheduling, when is the original appointment: N/A    Additional notes:PATIENT AND HER  NEED TO BE SEEN ASAP FOR MEDICATIONS. SHE CAN NOT BE SEEN ON TUESDAYS OR THURSDAYS AND APPOINTMENT TIMES NEED TO BE AFTER 4 PM. PLEASE ADVISE

## 2022-07-08 ENCOUNTER — TELEPHONE (OUTPATIENT)
Dept: INTERNAL MEDICINE | Facility: CLINIC | Age: 62
End: 2022-07-08

## 2022-07-08 NOTE — TELEPHONE ENCOUNTER
Patient is one of my long term [facility] patients and has text me she and , Otoniel Luna, need appts together in office on a Monday, Wed, or Friday. Please contact patient and get them scheduled. Try to get them asap and let me know if no spots can be found. Thank you!

## 2022-07-27 ENCOUNTER — LAB (OUTPATIENT)
Dept: LAB | Facility: HOSPITAL | Age: 62
End: 2022-07-27

## 2022-07-27 ENCOUNTER — OFFICE VISIT (OUTPATIENT)
Dept: INTERNAL MEDICINE | Facility: CLINIC | Age: 62
End: 2022-07-27

## 2022-07-27 VITALS
HEART RATE: 72 BPM | BODY MASS INDEX: 28.66 KG/M2 | TEMPERATURE: 96.9 F | SYSTOLIC BLOOD PRESSURE: 120 MMHG | OXYGEN SATURATION: 98 % | HEIGHT: 67 IN | DIASTOLIC BLOOD PRESSURE: 58 MMHG

## 2022-07-27 DIAGNOSIS — N18.6 END STAGE CHRONIC KIDNEY DISEASE: ICD-10-CM

## 2022-07-27 DIAGNOSIS — E78.2 MIXED HYPERLIPIDEMIA: ICD-10-CM

## 2022-07-27 DIAGNOSIS — K72.10 CHRONIC LIVER FAILURE WITHOUT HEPATIC COMA: ICD-10-CM

## 2022-07-27 DIAGNOSIS — E11.42 TYPE 2 DIABETES MELLITUS WITH DIABETIC POLYNEUROPATHY, WITH LONG-TERM CURRENT USE OF INSULIN: Primary | ICD-10-CM

## 2022-07-27 DIAGNOSIS — Z13.29 THYROID DISORDER SCREENING: ICD-10-CM

## 2022-07-27 DIAGNOSIS — Z13.21 ENCOUNTER FOR VITAMIN DEFICIENCY SCREENING: ICD-10-CM

## 2022-07-27 DIAGNOSIS — Z79.4 TYPE 2 DIABETES MELLITUS WITH DIABETIC POLYNEUROPATHY, WITH LONG-TERM CURRENT USE OF INSULIN: Primary | ICD-10-CM

## 2022-07-27 DIAGNOSIS — E55.9 VITAMIN D DEFICIENCY: ICD-10-CM

## 2022-07-27 LAB
25(OH)D3 SERPL-MCNC: 5.5 NG/ML (ref 30–100)
ALBUMIN SERPL-MCNC: 3.7 G/DL (ref 3.5–5.2)
ALBUMIN/GLOB SERPL: 1.9 G/DL
ALP SERPL-CCNC: 75 U/L (ref 39–117)
ALT SERPL W P-5'-P-CCNC: 22 U/L (ref 1–33)
ANION GAP SERPL CALCULATED.3IONS-SCNC: 10.9 MMOL/L (ref 5–15)
AST SERPL-CCNC: 25 U/L (ref 1–32)
BILIRUB SERPL-MCNC: 0.9 MG/DL (ref 0–1.2)
BUN SERPL-MCNC: 18 MG/DL (ref 8–23)
BUN/CREAT SERPL: 6.6 (ref 7–25)
CALCIUM SPEC-SCNC: 9.1 MG/DL (ref 8.6–10.5)
CHLORIDE SERPL-SCNC: 103 MMOL/L (ref 98–107)
CHOLEST SERPL-MCNC: 114 MG/DL (ref 0–200)
CO2 SERPL-SCNC: 28.1 MMOL/L (ref 22–29)
CREAT SERPL-MCNC: 2.72 MG/DL (ref 0.57–1)
EGFRCR SERPLBLD CKD-EPI 2021: 19.3 ML/MIN/1.73
FOLATE SERPL-MCNC: 13.2 NG/ML (ref 4.78–24.2)
GLOBULIN UR ELPH-MCNC: 1.9 GM/DL
GLUCOSE SERPL-MCNC: 196 MG/DL (ref 65–99)
HBA1C MFR BLD: 6.6 % (ref 4.8–5.6)
HDLC SERPL-MCNC: 37 MG/DL (ref 40–60)
LDLC SERPL CALC-MCNC: 38 MG/DL (ref 0–100)
LDLC/HDLC SERPL: 0.71 {RATIO}
POTASSIUM SERPL-SCNC: 4 MMOL/L (ref 3.5–5.2)
PROT SERPL-MCNC: 5.6 G/DL (ref 6–8.5)
SODIUM SERPL-SCNC: 142 MMOL/L (ref 136–145)
TRIGL SERPL-MCNC: 254 MG/DL (ref 0–150)
TSH SERPL DL<=0.05 MIU/L-ACNC: 2.52 UIU/ML (ref 0.27–4.2)
VIT B12 BLD-MCNC: 399 PG/ML (ref 211–946)
VLDLC SERPL-MCNC: 39 MG/DL (ref 5–40)

## 2022-07-27 PROCEDURE — 85007 BL SMEAR W/DIFF WBC COUNT: CPT | Performed by: NURSE PRACTITIONER

## 2022-07-27 PROCEDURE — 85025 COMPLETE CBC W/AUTO DIFF WBC: CPT | Performed by: NURSE PRACTITIONER

## 2022-07-27 PROCEDURE — 83036 HEMOGLOBIN GLYCOSYLATED A1C: CPT | Performed by: NURSE PRACTITIONER

## 2022-07-27 PROCEDURE — 80061 LIPID PANEL: CPT | Performed by: NURSE PRACTITIONER

## 2022-07-27 PROCEDURE — 99214 OFFICE O/P EST MOD 30 MIN: CPT | Performed by: NURSE PRACTITIONER

## 2022-07-27 PROCEDURE — 82306 VITAMIN D 25 HYDROXY: CPT | Performed by: NURSE PRACTITIONER

## 2022-07-27 PROCEDURE — 84443 ASSAY THYROID STIM HORMONE: CPT | Performed by: NURSE PRACTITIONER

## 2022-07-27 PROCEDURE — 80053 COMPREHEN METABOLIC PANEL: CPT | Performed by: NURSE PRACTITIONER

## 2022-07-27 PROCEDURE — 82746 ASSAY OF FOLIC ACID SERUM: CPT | Performed by: NURSE PRACTITIONER

## 2022-07-27 PROCEDURE — 82607 VITAMIN B-12: CPT | Performed by: NURSE PRACTITIONER

## 2022-07-27 RX ORDER — EZETIMIBE 10 MG/1
10 TABLET ORAL DAILY
Qty: 90 TABLET | Refills: 3 | Status: SHIPPED | OUTPATIENT
Start: 2022-07-27 | End: 2022-10-12 | Stop reason: SDUPTHER

## 2022-07-27 NOTE — PROGRESS NOTES
Subjective   Chief Complaint   Patient presents with   • Hyperlipidemia   • Hypertension      Lyndsey Luna is a 61 y.o. female.     The patient is here today for an evaluation on hyperlipidemia and hypertension. She is accompanied by an adult male.     Hypertension  The patient is doing well. She states her blood pressure was low yesterday.     Diabetes  The patient reports her blood glucose will be well controlled through out the day. In the evenings, her blood glucose will be really elevated. She states last night it read at 405 mg/dl. The male adult accompanying her states he tries to get her on a diet at times. The patient is doing 165 units of Tresiba at night.     Skin  The patient reports her rash is coming back. She states it has been itchy, especially on her back.     Transplants  The patient reports being on the liver and kidney transplant list. She continues doing dialysis for 4-hours 3-days a week. She had a new port placed in her arm. The patient reports having 3 or 4 bowel movements when she takes the syrups that her liver doctor prescribed her, so she does not take it everyday.     Medications  The patient reports she has been responding well to Protonix. She takes 300 mg of gabapentin the majority of the time. She reports taking 400 mg when she really needs it.     I have reviewed the following portions of the patient's history and confirmed they are accurate: allergies, current medications, past family history, past medical history, past social history, past surgical history, and problem list    Message dillon about banding.     I have personally completed the patient's review of systems.    Review of Systems   Constitutional: Positive for fatigue. Negative for activity change, appetite change, chills, diaphoresis, fever, unexpected weight gain and unexpected weight loss.   HENT: Negative for ear discharge, ear pain, mouth sores, nosebleeds, sinus pressure, sneezing and sore throat.    Eyes:  Negative for pain, discharge and itching.   Respiratory: Negative for cough, chest tightness, shortness of breath and wheezing.    Cardiovascular: Negative for chest pain, palpitations and leg swelling.   Gastrointestinal: Positive for abdominal distention. Negative for abdominal pain, anal bleeding, constipation, diarrhea, nausea, rectal pain and vomiting.   Endocrine: Negative for heat intolerance, polydipsia and polyphagia.   Genitourinary: Negative for dysuria, flank pain, frequency, hematuria and urgency.   Musculoskeletal: Positive for arthralgias, back pain and myalgias. Negative for gait problem, joint swelling, neck pain and neck stiffness.   Skin: Positive for dry skin and rash. Negative for color change and pallor.   Allergic/Immunologic: Negative for immunocompromised state.   Neurological: Positive for weakness and numbness. Negative for seizures and speech difficulty.   Hematological: Negative for adenopathy.   Psychiatric/Behavioral: Positive for sleep disturbance and stress. Negative for agitation, decreased concentration, suicidal ideas and depressed mood. The patient is nervous/anxious.        Current Outpatient Medications on File Prior to Visit   Medication Sig   • BD Pen Needle Bisi 2nd Gen 32G X 4 MM misc USE AS DIRECTED ONCE DAILY   • ciprofloxacin (Cipro) 250 MG tablet Take 1 tablet by mouth 2 (Two) Times a Day.   • gabapentin (NEURONTIN) 100 MG capsule Take 1 capsule by mouth 4 (Four) Times a Day As Needed (neuropathy. MAX OF 400MG PER DAY DUE TO KIDNEY/LIVER FAILURE).   • hydrocortisone (ANUSOL-HC) 25 MG suppository Insert 1 suppository into the rectum 2 (Two) Times a Day.   • insulin aspart (novoLOG FLEXPEN) 100 UNIT/ML solution pen-injector sc pen Inject into skin 3 times daily with meals as directed per sliding scale provided. Do not exceed 40 units in 24 hours.   • Insulin Degludec (TRESIBA FLEXTOUCH) 200 UNIT/ML solution pen-injector pen injection Inject 165 Units under the skin into  "the appropriate area as directed Every Night.   • midodrine (PROAMATINE) 5 MG tablet Take 1 tablet by mouth 3 (Three) Times a Day Before Meals.   • nitroglycerin (NITROSTAT) 0.4 MG SL tablet Take 1 tablet by mouth As Needed.   • ondansetron ODT (Zofran ODT) 4 MG disintegrating tablet Place 1 tablet on the tongue 2 (Two) Times a Day As Needed for Nausea or Vomiting.   • pantoprazole (PROTONIX) 40 MG EC tablet Take 1 tablet by mouth Daily.   • polyethylene glycol (MIRALAX) 17 GM/SCOOP powder Take 17 g by mouth Daily.   • rosuvastatin (Crestor) 20 MG tablet Take 1 tablet by mouth Daily.     No current facility-administered medications on file prior to visit.       Objective   Vitals:    07/27/22 1130   BP: 120/58   BP Location: Right arm   Patient Position: Sitting   Pulse: 72   Temp: 96.9 °F (36.1 °C)   TempSrc: Infrared   SpO2: 98%   Height: 170.2 cm (67.01\")     Body mass index is 28.66 kg/m².    Physical Exam  Constitutional:       Appearance: Normal appearance. She is well-developed.   HENT:      Head: Normocephalic and atraumatic.      Nose: Nose normal.   Eyes:      General: Lids are normal.      Conjunctiva/sclera: Conjunctivae normal.   Neck:      Trachea: Trachea normal.   Cardiovascular:      Heart sounds: Murmur heard.   Pulmonary:      Effort: No respiratory distress.   Abdominal:      General: There is distension.   Neurological:      Mental Status: She is alert and oriented to person, place, and time.      GCS: GCS eye subscore is 4. GCS verbal subscore is 5. GCS motor subscore is 6.   Psychiatric:         Attention and Perception: Attention normal.         Mood and Affect: Mood normal.         Speech: Speech normal.         Behavior: Behavior normal. Behavior is cooperative.         Thought Content: Thought content normal.         Assessment & Plan   Problem List Items Addressed This Visit        Cardiac and Vasculature    Mixed hyperlipidemia    Relevant Medications    ezetimibe (ZETIA) 10 MG tablet    " Other Relevant Orders    Lipid Panel       Endocrine and Metabolic    Type 2 diabetes mellitus, with long-term current use of insulin (HCC) - Primary (Chronic)    Relevant Orders    Comprehensive Metabolic Panel    Hemoglobin A1c    Vitamin B12 & Folate    CBC Auto Differential    Vitamin D deficiency    Relevant Orders    Vitamin D 25 Hydroxy      Other Visit Diagnoses     Chronic liver failure without hepatic coma (HCC)        Relevant Orders    Comprehensive Metabolic Panel    End stage chronic kidney disease (HCC)        Thyroid disorder screening        Relevant Orders    TSH Rfx On Abnormal To Free T4    Encounter for vitamin deficiency screening        Relevant Orders    Vitamin B12 & Folate    Vitamin D 25 Hydroxy           1. Type 2 diabetes         - Chronic unstable. Patient will continue Tresiba and Novolog. We will be completing an A1C today and we will make adjustments based on results. Discussed with patient the importance of following a diabetic diet and stopping or refraining from drinking soda or at least drink diet soda.     2. Hyperlipidemia        - Chronic unstable. Patient will continue Zetia and Crestor. She will continue a heart healthy diet.    3. Chronic liver failure        - Chronic unstable. She is on the [facility] transplant list for a liver and kidney transplant. She will continue follow-ups with gastroenterology and continue paracentesis of abdomen at [facility] weekly.     4. End-stage kidney disease        - Chronic unstable. The patient will continue dialysis 3 times a week. Will refer her to the [facility]. She is currently on the kidney transplant list at the [facility].       Current Outpatient Medications:   •  ezetimibe (ZETIA) 10 MG tablet, Take 1 tablet by mouth Daily., Disp: 90 tablet, Rfl: 3  •  BD Pen Needle Bisi 2nd Gen 32G X 4 MM misc, USE AS DIRECTED ONCE DAILY, Disp: 100 each, Rfl: 5  •  ciprofloxacin  (Cipro) 250 MG tablet, Take 1 tablet by mouth 2 (Two) Times a Day., Disp: 90 tablet, Rfl: 1  •  gabapentin (NEURONTIN) 100 MG capsule, Take 1 capsule by mouth 4 (Four) Times a Day As Needed (neuropathy. MAX OF 400MG PER DAY DUE TO KIDNEY/LIVER FAILURE)., Disp: 120 capsule, Rfl: 2  •  hydrocortisone (ANUSOL-HC) 25 MG suppository, Insert 1 suppository into the rectum 2 (Two) Times a Day., Disp: 12 each, Rfl: 2  •  insulin aspart (novoLOG FLEXPEN) 100 UNIT/ML solution pen-injector sc pen, Inject into skin 3 times daily with meals as directed per sliding scale provided. Do not exceed 40 units in 24 hours., Disp: 5 pen, Rfl: 5  •  Insulin Degludec (TRESIBA FLEXTOUCH) 200 UNIT/ML solution pen-injector pen injection, Inject 165 Units under the skin into the appropriate area as directed Every Night., Disp: 9 pen, Rfl: 1  •  midodrine (PROAMATINE) 5 MG tablet, Take 1 tablet by mouth 3 (Three) Times a Day Before Meals., Disp: 270 tablet, Rfl: 1  •  nitroglycerin (NITROSTAT) 0.4 MG SL tablet, Take 1 tablet by mouth As Needed., Disp: , Rfl:   •  ondansetron ODT (Zofran ODT) 4 MG disintegrating tablet, Place 1 tablet on the tongue 2 (Two) Times a Day As Needed for Nausea or Vomiting., Disp: 30 tablet, Rfl: 1  •  pantoprazole (PROTONIX) 40 MG EC tablet, Take 1 tablet by mouth Daily., Disp: 90 tablet, Rfl: 3  •  polyethylene glycol (MIRALAX) 17 GM/SCOOP powder, Take 17 g by mouth Daily., Disp: 850 g, Rfl: 5  •  rosuvastatin (Crestor) 20 MG tablet, Take 1 tablet by mouth Daily., Disp: 90 tablet, Rfl: 3       Plan of care reviewed with the patient at the conclusion of today's visit.  Education was provided regarding diagnosis, management, and any prescribed or recommended OTC medications.  Patient verbalized understanding of and agreement with management plan.     Return in about 3 months (around 10/27/2022), or if symptoms worsen or fail to improve.      Transcribed from ambient dictation for Taryn Chavez, JOSE D by Maryann  Albina.  07/27/22   11:55 EDT    Patient verbalized consent to the visit recording.

## 2022-07-28 LAB
ANISOCYTOSIS BLD QL: ABNORMAL
BASOPHILS # BLD MANUAL: 0.06 10*3/MM3 (ref 0–0.2)
BASOPHILS NFR BLD MANUAL: 2.2 % (ref 0–1.5)
DEPRECATED RDW RBC AUTO: 48.6 FL (ref 37–54)
EOSINOPHIL # BLD MANUAL: 0.26 10*3/MM3 (ref 0–0.4)
EOSINOPHIL NFR BLD MANUAL: 8.7 % (ref 0.3–6.2)
ERYTHROCYTE [DISTWIDTH] IN BLOOD BY AUTOMATED COUNT: 15 % (ref 12.3–15.4)
HCT VFR BLD AUTO: 32 % (ref 34–46.6)
HGB BLD-MCNC: 10 G/DL (ref 12–15.9)
LYMPHOCYTES # BLD MANUAL: 0.45 10*3/MM3 (ref 0.7–3.1)
LYMPHOCYTES NFR BLD MANUAL: 6.5 % (ref 5–12)
MCH RBC QN AUTO: 28.2 PG (ref 26.6–33)
MCHC RBC AUTO-ENTMCNC: 31.3 G/DL (ref 31.5–35.7)
MCV RBC AUTO: 90.1 FL (ref 79–97)
MONOCYTES # BLD: 0.19 10*3/MM3 (ref 0.1–0.9)
MYELOCYTES NFR BLD MANUAL: 1.1 % (ref 0–0)
NEUTROPHILS # BLD AUTO: 1.96 10*3/MM3 (ref 1.7–7)
NEUTROPHILS NFR BLD MANUAL: 66.3 % (ref 42.7–76)
PLAT MORPH BLD: NORMAL
PLATELET # BLD AUTO: 44 10*3/MM3 (ref 140–450)
PMV BLD AUTO: 12.7 FL (ref 6–12)
POIKILOCYTOSIS BLD QL SMEAR: ABNORMAL
RBC # BLD AUTO: 3.55 10*6/MM3 (ref 3.77–5.28)
VARIANT LYMPHS NFR BLD MANUAL: 15.2 % (ref 19.6–45.3)
WBC MORPH BLD: NORMAL
WBC NRBC COR # BLD: 2.95 10*3/MM3 (ref 3.4–10.8)

## 2022-10-03 DIAGNOSIS — Z79.4 TYPE 2 DIABETES MELLITUS WITH HYPERGLYCEMIA, WITH LONG-TERM CURRENT USE OF INSULIN: Chronic | ICD-10-CM

## 2022-10-03 DIAGNOSIS — E11.65 TYPE 2 DIABETES MELLITUS WITH HYPERGLYCEMIA, WITH LONG-TERM CURRENT USE OF INSULIN: Chronic | ICD-10-CM

## 2022-10-04 RX ORDER — INSULIN DEGLUDEC 200 U/ML
INJECTION, SOLUTION SUBCUTANEOUS
Qty: 27 ML | Refills: 3 | Status: SHIPPED | OUTPATIENT
Start: 2022-10-04 | End: 2022-10-12 | Stop reason: SDUPTHER

## 2022-10-12 ENCOUNTER — LAB (OUTPATIENT)
Dept: LAB | Facility: HOSPITAL | Age: 62
End: 2022-10-12

## 2022-10-12 ENCOUNTER — OFFICE VISIT (OUTPATIENT)
Dept: INTERNAL MEDICINE | Facility: CLINIC | Age: 62
End: 2022-10-12

## 2022-10-12 VITALS
DIASTOLIC BLOOD PRESSURE: 70 MMHG | SYSTOLIC BLOOD PRESSURE: 132 MMHG | HEIGHT: 67 IN | OXYGEN SATURATION: 99 % | HEART RATE: 87 BPM | WEIGHT: 184 LBS | BODY MASS INDEX: 28.88 KG/M2 | TEMPERATURE: 97.6 F

## 2022-10-12 DIAGNOSIS — N18.6 STAGE 5 CHRONIC KIDNEY DISEASE ON CHRONIC DIALYSIS: ICD-10-CM

## 2022-10-12 DIAGNOSIS — N30.01 ACUTE CYSTITIS WITH HEMATURIA: ICD-10-CM

## 2022-10-12 DIAGNOSIS — R35.0 FREQUENT URINATION: ICD-10-CM

## 2022-10-12 DIAGNOSIS — K72.10 CHRONIC LIVER FAILURE WITHOUT HEPATIC COMA: ICD-10-CM

## 2022-10-12 DIAGNOSIS — Z99.2 STAGE 5 CHRONIC KIDNEY DISEASE ON CHRONIC DIALYSIS: ICD-10-CM

## 2022-10-12 DIAGNOSIS — Z79.4 TYPE 2 DIABETES MELLITUS WITH HYPERGLYCEMIA, WITH LONG-TERM CURRENT USE OF INSULIN: ICD-10-CM

## 2022-10-12 DIAGNOSIS — E78.2 MIXED HYPERLIPIDEMIA: ICD-10-CM

## 2022-10-12 DIAGNOSIS — N30.01 ACUTE CYSTITIS WITH HEMATURIA: Primary | ICD-10-CM

## 2022-10-12 DIAGNOSIS — G62.9 NEUROPATHY: ICD-10-CM

## 2022-10-12 DIAGNOSIS — K64.9 HEMORRHOIDS, UNSPECIFIED HEMORRHOID TYPE: ICD-10-CM

## 2022-10-12 DIAGNOSIS — R11.0 NAUSEA: ICD-10-CM

## 2022-10-12 DIAGNOSIS — E11.65 TYPE 2 DIABETES MELLITUS WITH HYPERGLYCEMIA, WITH LONG-TERM CURRENT USE OF INSULIN: ICD-10-CM

## 2022-10-12 LAB
BILIRUB BLD-MCNC: ABNORMAL MG/DL
CLARITY, POC: CLEAR
COLOR UR: YELLOW
EXPIRATION DATE: ABNORMAL
EXPIRATION DATE: NORMAL
GLUCOSE UR STRIP-MCNC: NEGATIVE MG/DL
HBA1C MFR BLD: 7.3 %
KETONES UR QL: ABNORMAL
LEUKOCYTE EST, POC: ABNORMAL
Lab: ABNORMAL
Lab: NORMAL
NITRITE UR-MCNC: NEGATIVE MG/ML
PH UR: 6 [PH] (ref 5–8)
PROT UR STRIP-MCNC: ABNORMAL MG/DL
RBC # UR STRIP: ABNORMAL /UL
SP GR UR: 1.02 (ref 1–1.03)
UROBILINOGEN UR QL: NORMAL

## 2022-10-12 PROCEDURE — 3051F HG A1C>EQUAL 7.0%<8.0%: CPT | Performed by: NURSE PRACTITIONER

## 2022-10-12 PROCEDURE — 81003 URINALYSIS AUTO W/O SCOPE: CPT | Performed by: NURSE PRACTITIONER

## 2022-10-12 PROCEDURE — 87086 URINE CULTURE/COLONY COUNT: CPT

## 2022-10-12 PROCEDURE — 83036 HEMOGLOBIN GLYCOSYLATED A1C: CPT | Performed by: NURSE PRACTITIONER

## 2022-10-12 PROCEDURE — 99214 OFFICE O/P EST MOD 30 MIN: CPT | Performed by: NURSE PRACTITIONER

## 2022-10-12 RX ORDER — ROSUVASTATIN CALCIUM 20 MG/1
20 TABLET, COATED ORAL DAILY
Qty: 90 TABLET | Refills: 3 | Status: SHIPPED | OUTPATIENT
Start: 2022-10-12

## 2022-10-12 RX ORDER — EZETIMIBE 10 MG/1
10 TABLET ORAL DAILY
Qty: 90 TABLET | Refills: 3 | Status: SHIPPED | OUTPATIENT
Start: 2022-10-12

## 2022-10-12 RX ORDER — MIDODRINE HYDROCHLORIDE 5 MG/1
5 TABLET ORAL
Qty: 270 TABLET | Refills: 1 | Status: SHIPPED | OUTPATIENT
Start: 2022-10-12

## 2022-10-12 RX ORDER — GABAPENTIN 100 MG/1
100 CAPSULE ORAL 4 TIMES DAILY PRN
Qty: 120 CAPSULE | Refills: 2 | Status: SHIPPED | OUTPATIENT
Start: 2022-10-12 | End: 2023-03-16

## 2022-10-12 RX ORDER — CEFDINIR 300 MG/1
CAPSULE ORAL
Qty: 5 CAPSULE | Refills: 0 | Status: SHIPPED | OUTPATIENT
Start: 2022-10-12 | End: 2023-03-16

## 2022-10-12 RX ORDER — INSULIN DEGLUDEC 200 U/ML
165 INJECTION, SOLUTION SUBCUTANEOUS NIGHTLY
Qty: 27 ML | Refills: 3 | Status: SHIPPED | OUTPATIENT
Start: 2022-10-12 | End: 2023-02-07

## 2022-10-12 RX ORDER — NITROGLYCERIN 0.4 MG/1
0.4 TABLET SUBLINGUAL AS NEEDED
Qty: 100 TABLET | Refills: 1 | Status: SHIPPED | OUTPATIENT
Start: 2022-10-12

## 2022-10-12 RX ORDER — PEN NEEDLE, DIABETIC 32GX 5/32"
1 NEEDLE, DISPOSABLE MISCELLANEOUS DAILY
Qty: 100 EACH | Refills: 5 | Status: SHIPPED | OUTPATIENT
Start: 2022-10-12

## 2022-10-12 RX ORDER — ONDANSETRON 4 MG/1
4 TABLET, ORALLY DISINTEGRATING ORAL 2 TIMES DAILY PRN
Qty: 30 TABLET | Refills: 1 | Status: SHIPPED | OUTPATIENT
Start: 2022-10-12

## 2022-10-12 RX ORDER — HYDROCORTISONE ACETATE 25 MG/1
25 SUPPOSITORY RECTAL 2 TIMES DAILY
Qty: 12 EACH | Refills: 2 | Status: SHIPPED | OUTPATIENT
Start: 2022-10-12 | End: 2023-03-16

## 2022-10-12 RX ORDER — PANTOPRAZOLE SODIUM 40 MG/1
40 TABLET, DELAYED RELEASE ORAL DAILY
Qty: 90 TABLET | Refills: 3 | Status: SHIPPED | OUTPATIENT
Start: 2022-10-12

## 2022-10-12 NOTE — PROGRESS NOTES
"Chief Complaint   Patient presents with   • Urinary Tract Infection       HPI  Lyndsey Luna is a 62 y.o. female presents for urinary symptoms.  She started noticing blood with wiping about last Thursday.  She now complains of pain in her lower abdomen.  She has also been hurting on the right side of her back.  No fever or vomiting.  Sugars have been running high.  A1c is due.  Last one <7.  Hasn't been watching her diet.  Male friend states she's drinking a lot of Kelsie-8  Still on the kidney/liver transplant list at   Gets dialysis 3 days a week    The following portions of the patient's history were reviewed and updated as appropriate: allergies, current medications, past family history, past medical history, past social history, past surgical history and problem list.    Subjective  Review of Systems   Constitutional: Negative for activity change, appetite change, fatigue and fever.   HENT: Negative for congestion.    Respiratory: Negative for cough and shortness of breath.    Cardiovascular: Negative for chest pain and leg swelling.   Gastrointestinal: Positive for abdominal distention and abdominal pain. Negative for nausea and vomiting.   Genitourinary: Positive for dysuria and hematuria.   Neurological: Negative for dizziness, weakness and confusion.   Psychiatric/Behavioral: Negative for behavioral problems and decreased concentration.       Objective  Visit Vitals  /70 (BP Location: Right arm, Patient Position: Sitting)   Pulse 87   Temp 97.6 °F (36.4 °C)   Ht 170.2 cm (67\")   Wt 83.5 kg (184 lb)   SpO2 99%   BMI 28.82 kg/m²        Physical Exam  Vitals and nursing note reviewed.   HENT:      Head: Normocephalic.   Eyes:      Pupils: Pupils are equal, round, and reactive to light.   Cardiovascular:      Rate and Rhythm: Normal rate and regular rhythm.      Pulses: Normal pulses.      Heart sounds: Murmur heard.   Pulmonary:      Effort: Pulmonary effort is normal.      Breath sounds: Normal " breath sounds.   Abdominal:      General: There is distension (mild).      Palpations: Abdomen is soft.      Tenderness: There is abdominal tenderness (suprapubic).      Hernia: A hernia (large) is present.   Musculoskeletal:      Comments: Sitting in a wheelchair   Skin:     General: Skin is warm and dry.      Capillary Refill: Capillary refill takes less than 2 seconds.   Neurological:      General: No focal deficit present.      Mental Status: She is alert and oriented to person, place, and time.      Gait: Gait is intact.   Psychiatric:         Attention and Perception: Attention normal.         Mood and Affect: Mood normal.         Behavior: Behavior normal.           Results for orders placed or performed in visit on 10/12/22   POCT urinalysis dipstick, automated    Specimen: Urine   Result Value Ref Range    Color Yellow Yellow, Straw, Dark Yellow, Ricarda    Clarity, UA Clear Clear    Specific Gravity  1.025 1.005 - 1.030    pH, Urine 6.0 5.0 - 8.0    Leukocytes Trace (A) Negative    Nitrite, UA Negative Negative    Protein, POC 2+ (A) Negative mg/dL    Glucose, UA Negative Negative mg/dL    Ketones, UA 3+ (A) Negative    Urobilinogen, UA Normal Normal, 0.2 E.U./dL    Bilirubin 1 mg/dL (A) Negative    Blood, UA 3+ (A) Negative    Lot Number [id number]     Expiration Date 10/02/2024    POC Glycosylated Hemoglobin (Hb A1C)    Specimen: Blood   Result Value Ref Range    Hemoglobin A1C 7.3 %    Lot Number [id number]     Expiration Date 06/14/2024            Procedures Assessment and Plan  Diagnoses and all orders for this visit:    1. Acute cystitis with hematuria (Primary)  -     cefdinir (OMNICEF) 300 MG capsule; Take 1 PO every 48 hours x5 doses  Dispense: 5 capsule; Refill: 0  -     Urine Culture - Urine, Urine, Clean Catch; Future    2. Frequent urination  -     POCT urinalysis dipstick, automated    3. Type 2 diabetes mellitus with hyperglycemia, with long-term current use of insulin ([facility])  -     POC  Glycosylated Hemoglobin (Hb A1C)  -     Insulin Degludec (Tresiba FlexTouch) 200 UNIT/ML solution pen-injector pen injection; Inject 165 Units under the skin into the appropriate area as directed Every Night.  Dispense: 27 mL; Refill: 3  -     insulin aspart (novoLOG FLEXPEN) 100 UNIT/ML solution pen-injector sc pen; Inject into skin 3 times daily with meals as directed per sliding scale provided. Do not exceed 40 units in 24 hours.  Dispense: 15 mL; Refill: 3  -     Insulin Pen Needle (BD Pen Needle Bisi 2nd Gen) 32G X 4 MM misc; Inject 1 each under the skin into the appropriate area as directed Daily. use as directed  Dispense: 100 each; Refill: 5    4. Mixed hyperlipidemia  -     rosuvastatin (Crestor) 20 MG tablet; Take 1 tablet by mouth Daily.  Dispense: 90 tablet; Refill: 3  -     ezetimibe (ZETIA) 10 MG tablet; Take 1 tablet by mouth Daily.  Dispense: 90 tablet; Refill: 3    5. Chronic liver failure without hepatic coma (HCC)  -     pantoprazole (PROTONIX) 40 MG EC tablet; Take 1 tablet by mouth Daily.  Dispense: 90 tablet; Refill: 3  -     midodrine (PROAMATINE) 5 MG tablet; Take 1 tablet by mouth 3 (Three) Times a Day Before Meals.  Dispense: 270 tablet; Refill: 1    6. Stage 5 chronic kidney disease on chronic dialysis (HCC)  -     midodrine (PROAMATINE) 5 MG tablet; Take 1 tablet by mouth 3 (Three) Times a Day Before Meals.  Dispense: 270 tablet; Refill: 1    7. Hemorrhoids, unspecified hemorrhoid type  -     hydrocortisone (ANUSOL-HC) 25 MG suppository; Insert 1 suppository into the rectum 2 (Two) Times a Day.  Dispense: 12 each; Refill: 2    8. Neuropathy  -     gabapentin (NEURONTIN) 100 MG capsule; Take 1 capsule by mouth 4 (Four) Times a Day As Needed (neuropathy. MAX OF 400MG PER DAY DUE TO KIDNEY/LIVER FAILURE).  Dispense: 120 capsule; Refill: 2    9. Nausea  -     ondansetron ODT (Zofran ODT) 4 MG disintegrating tablet; Place 1 tablet on the tongue 2 (Two) Times a Day As Needed for Nausea or  Vomiting.  Dispense: 30 tablet; Refill: 1    Other orders  -     nitroglycerin (NITROSTAT) 0.4 MG SL tablet; Take 1 tablet by mouth As Needed for Chest Pain.  Dispense: 100 tablet; Refill: 1    Timothy appropriate  A1c 7.3, d/w pt that she needed to decrease her Kelsie-8 intake and drink more water  Urine with Leuks - send urine cx - treat with Cefdinir every 48 hours due to HD  Meds refilled    Return in about 2 months (around 12/12/2022) for Medicare Wellness.       JOSE D Cabral

## 2022-10-13 LAB — BACTERIA SPEC AEROBE CULT: NO GROWTH

## 2022-10-21 ENCOUNTER — TELEPHONE (OUTPATIENT)
Dept: GASTROENTEROLOGY | Facility: CLINIC | Age: 62
End: 2022-10-21

## 2022-10-21 NOTE — TELEPHONE ENCOUNTER
CALLED PT TO SCHEDULE APPT WITH [facility] STATED PT IS OUT HAVING A PROCEDURE. LEFT MESSAGE TO CALL THE OFFICE TO SCHEDULE APPT.

## 2022-10-25 ENCOUNTER — PREP FOR SURGERY (OUTPATIENT)
Dept: OTHER | Facility: HOSPITAL | Age: 62
End: 2022-10-25

## 2022-10-25 DIAGNOSIS — N18.6 STAGE 5 CHRONIC KIDNEY DISEASE ON DIALYSIS: ICD-10-CM

## 2022-10-25 DIAGNOSIS — I85.11 SECONDARY ESOPHAGEAL VARICES WITH BLEEDING: Primary | ICD-10-CM

## 2022-10-25 DIAGNOSIS — Z76.82 AWAITING LIVER TRANSPLANT: ICD-10-CM

## 2022-10-25 DIAGNOSIS — K72.10 END STAGE LIVER DISEASE: ICD-10-CM

## 2022-10-25 DIAGNOSIS — Z99.2 STAGE 5 CHRONIC KIDNEY DISEASE ON DIALYSIS: ICD-10-CM

## 2022-11-29 ENCOUNTER — APPOINTMENT (OUTPATIENT)
Dept: PREADMISSION TESTING | Facility: HOSPITAL | Age: 62
End: 2022-11-29

## 2022-12-01 ENCOUNTER — TELEPHONE (OUTPATIENT)
Dept: GASTROENTEROLOGY | Facility: CLINIC | Age: 62
End: 2022-12-01

## 2022-12-01 NOTE — TELEPHONE ENCOUNTER
Patient called office requesting to cancel 01/17/2023 PAT & 01/19/2023 procedure. Patient expressed preference to call office back when ready to reschedule.

## 2023-02-06 DIAGNOSIS — E11.65 TYPE 2 DIABETES MELLITUS WITH HYPERGLYCEMIA, WITH LONG-TERM CURRENT USE OF INSULIN: ICD-10-CM

## 2023-02-06 DIAGNOSIS — Z79.4 TYPE 2 DIABETES MELLITUS WITH HYPERGLYCEMIA, WITH LONG-TERM CURRENT USE OF INSULIN: ICD-10-CM

## 2023-02-07 RX ORDER — INSULIN DEGLUDEC 200 U/ML
INJECTION, SOLUTION SUBCUTANEOUS
Qty: 27 ML | Refills: 3 | Status: SHIPPED | OUTPATIENT
Start: 2023-02-07

## 2023-03-16 ENCOUNTER — OFFICE VISIT (OUTPATIENT)
Dept: INTERNAL MEDICINE | Facility: CLINIC | Age: 63
End: 2023-03-16
Payer: MEDICARE

## 2023-03-16 VITALS
OXYGEN SATURATION: 98 % | RESPIRATION RATE: 16 BRPM | HEIGHT: 67 IN | DIASTOLIC BLOOD PRESSURE: 76 MMHG | TEMPERATURE: 98.4 F | HEART RATE: 74 BPM | SYSTOLIC BLOOD PRESSURE: 124 MMHG | WEIGHT: 186 LBS | BODY MASS INDEX: 29.19 KG/M2

## 2023-03-16 DIAGNOSIS — L29.9 ITCH OF SKIN: ICD-10-CM

## 2023-03-16 DIAGNOSIS — E78.2 MIXED HYPERLIPIDEMIA: ICD-10-CM

## 2023-03-16 DIAGNOSIS — E11.65 TYPE 2 DIABETES MELLITUS WITH HYPERGLYCEMIA, WITH LONG-TERM CURRENT USE OF INSULIN: ICD-10-CM

## 2023-03-16 DIAGNOSIS — Z00.00 MEDICARE ANNUAL WELLNESS VISIT, SUBSEQUENT: Primary | ICD-10-CM

## 2023-03-16 DIAGNOSIS — G62.9 NEUROPATHY: ICD-10-CM

## 2023-03-16 DIAGNOSIS — Z79.4 TYPE 2 DIABETES MELLITUS WITH HYPERGLYCEMIA, WITH LONG-TERM CURRENT USE OF INSULIN: ICD-10-CM

## 2023-03-16 LAB — HBA1C MFR BLD: 6.3 %

## 2023-03-16 RX ORDER — LORATADINE 10 MG/1
10 TABLET ORAL DAILY
Qty: 30 TABLET | Refills: 1 | Status: SHIPPED | OUTPATIENT
Start: 2023-03-16

## 2023-03-16 RX ORDER — PREGABALIN 25 MG/1
25 CAPSULE ORAL 3 TIMES DAILY
Qty: 90 CAPSULE | Refills: 2 | Status: SHIPPED | OUTPATIENT
Start: 2023-03-16 | End: 2023-03-29

## 2023-03-16 RX ORDER — CLOBETASOL PROPIONATE 0.5 MG/G
1 CREAM TOPICAL 2 TIMES DAILY
Qty: 60 G | Refills: 2 | Status: SHIPPED | OUTPATIENT
Start: 2023-03-16

## 2023-03-16 NOTE — PROGRESS NOTES
The ABCs of the Annual Wellness Visit  Subsequent Medicare Wellness Visit    Chief Complaint   Patient presents with   • Medicare Wellness-subsequent       Subjective   History of Present Illness:  Lyndsey Luna is a 62 y.o. female who presents for a Subsequent Medicare Wellness Visit.    The patient is here today for Medicare wellness. She is accompanied by an adult male.    Dialysis  The patient is doing a lot of dialysis and it is about the same. She has it in her arm now. They had to go in last week and wanted to and put the balloon through there and opened it back up because it was acting up. They went to [place]. She will go back in 05/2023.  She will go to the [facility] on 03/21/2023 and try to get on a list.  On 03/14/2023,  told her mail number had dropped to [phone]. When she gets that, they will not give her a liver and kidney. [facility] want her to come back and stay a whole week and they want to do her dialysis there. [facility] will not use any of her tests she had done with [facility]. They want her to repeat all her tests including mammogram, Pap smear, dermatologist and dentist. She has not seen [facility].  She was given a number and she would highly consider doing [facility]. She was told she could not do any more colonoscopies, but it is a requirement at [facility].      She did not feel well on 03/15/2023 after dialysis.  She is usually sick.  She is weak and tired.  She wants to sleep.      Allergies  The PA that comes to the dialysis center told her she needs to get back on the allergy medicine.      Neuropathy  The patient's blood glucose is fluctuating. She is not sure if the gabapentin is improving her symptoms.  Sometimes she has been having to take 4 a day. She has always taken 3. She is not supposed to take more than 3.    Diabetes  Dr. Baer wants her to get an insulin pump. She has an hemoglobin A1c of 6.3 percent prior it 7.3  percent. This morning her blood glucose was 93 mg/dL.    Health maintenance  he has not been admitted overnight in the past year. She does not have an advanced directive on file. Compared to last year, she feels like her mental and physical health are both worse. She denies any falls at home. She is able to get up and get around at home. She uses a walker. She denies any pain. She has arthritis in her knee, which keeps her from ambulating.  She has plenty of support.  She takes vitamin D 3 times a week.    Itching  She would like to see a dermatologist for itching.    Joint pain   She takes meloxicam as needed.      HEALTH RISK ASSESSMENT    Recent Hospitalizations:  No hospitalization(s) within the last year.    Current Medical Providers:  Patient Care Team:  Taryn Olivas APRN as PCP - General (Nurse Practitioner)    Smoking Status:  Social History     Tobacco Use   Smoking Status Never   Smokeless Tobacco Never       Alcohol Consumption:  Social History     Substance and Sexual Activity   Alcohol Use Never       Depression Screen:   PHQ-2/PHQ-9 Depression Screening 3/16/2023   Retired Total Score -   Little Interest or Pleasure in Doing Things 0-->not at all   Feeling Down, Depressed or Hopeless 0-->not at all   PHQ-9: Brief Depression Severity Measure Score 0       Fall Risk Screen:  TWILA Fall Risk Assessment was completed, and patient is at LOW risk for falls.Assessment completed on:3/16/2023    Health Habits and Functional and Cognitive Screening:  Functional & Cognitive Status 3/16/2023   Do you have difficulty preparing food and eating? No   Do you have difficulty bathing yourself, getting dressed or grooming yourself? No   Do you have difficulty using the toilet? No   Do you have difficulty moving around from place to place? Yes   Do you have trouble with steps or getting out of a bed or a chair? No   Current Diet Unhealthy Diet   Dental Exam Up to date   Eye Exam Up to date   Exercise (times  per week) 0 times per week   Current Exercises Include No Regular Exercise   Do you need help using the phone?  No   Are you deaf or do you have serious difficulty hearing?  No   Do you need help with transportation? Yes   Do you need help shopping? Yes   Do you need help preparing meals?  No   Do you need help with housework?  No   Do you need help with laundry? No   Do you need help taking your medications? No   Do you need help managing money? No   Do you ever drive or ride in a car without wearing a seat belt? No   Have you felt unusual stress, anger or loneliness in the last month? No   Who do you live with? Spouse   If you need help, do you have trouble finding someone available to you? No   Have you been bothered in the last four weeks by sexual problems? No   Do you have difficulty concentrating, remembering or making decisions? Yes         Does the patient have evidence of cognitive impairment? No    Asprin use counseling:Does not need ASA (and currently is not on it)    Age-appropriate Screening Schedule:  Refer to the list below for future screening recommendations based on patient's age, sex and/or medical conditions. Orders for these recommended tests are listed in the plan section. The patient has been provided with a written plan.    Health Maintenance   Topic Date Due   • Pneumococcal Vaccine 0-64 (1 - PCV) Never done   • ANNUAL WELLNESS VISIT  Never done   • DIABETIC FOOT EXAM  Never done   • COVID-19 Vaccine (2 - Booster for [place] series) 06/02/2021   • URINE MICROALBUMIN  03/22/2023   • HEMOGLOBIN A1C  09/16/2023   • MAMMOGRAM  10/11/2023   • DIABETIC EYE EXAM  10/17/2023   • LIPID PANEL  01/10/2024   • COLORECTAL CANCER SCREENING  03/29/2024   • PAP SMEAR  03/07/2025   • TDAP/TD VACCINES (3 - Td or Tdap) 06/24/2029   • HEPATITIS C SCREENING  Completed   • Hepatitis B  Completed   • INFLUENZA VACCINE  Completed   • ZOSTER VACCINE  Completed          The following portions of the patient's  history were reviewed and updated as appropriate: allergies, current medications, past family history, past medical history, past social history, past surgical history, and problem list.    Outpatient Medications Prior to Visit   Medication Sig Dispense Refill   • ezetimibe (ZETIA) 10 MG tablet Take 1 tablet by mouth Daily. 90 tablet 3   • insulin aspart (novoLOG FLEXPEN) 100 UNIT/ML solution pen-injector sc pen Inject into skin 3 times daily with meals as directed per sliding scale provided. Do not exceed 40 units in 24 hours. 15 mL 3   • midodrine (PROAMATINE) 5 MG tablet Take 1 tablet by mouth 3 (Three) Times a Day Before Meals. 270 tablet 1   • nitroglycerin (NITROSTAT) 0.4 MG SL tablet Take 1 tablet by mouth As Needed for Chest Pain. 100 tablet 1   • ondansetron ODT (Zofran ODT) 4 MG disintegrating tablet Place 1 tablet on the tongue 2 (Two) Times a Day As Needed for Nausea or Vomiting. 30 tablet 1   • pantoprazole (PROTONIX) 40 MG EC tablet Take 1 tablet by mouth Daily. 90 tablet 3   • polyethylene glycol (MIRALAX) 17 GM/SCOOP powder Take 17 g by mouth Daily. 850 g 5   • rifAXIMin (XIFAXAN) 200 MG tablet Take 1 tablet by mouth 2 (Two) Times a Day.     • rosuvastatin (Crestor) 20 MG tablet Take 1 tablet by mouth Daily. 90 tablet 3   • Tresiba FlexTouch 200 UNIT/ML solution pen-injector pen injection INJECT 165 UNITS UNDER THE SKIN ONCE NIGHTLY 27 mL 3   • gabapentin (NEURONTIN) 100 MG capsule Take 1 capsule by mouth 4 (Four) Times a Day As Needed (neuropathy. MAX OF 400MG PER DAY DUE TO KIDNEY/LIVER FAILURE). 120 capsule 2   • Insulin Pen Needle (BD Pen Needle Bisi 2nd Gen) 32G X 4 MM misc Inject 1 each under the skin into the appropriate area as directed Daily. use as directed 100 each 5   • cefdinir (OMNICEF) 300 MG capsule Take 1 PO every 48 hours x5 doses 5 capsule 0   • ciprofloxacin (Cipro) 250 MG tablet Take 1 tablet by mouth 2 (Two) Times a Day. 90 tablet 1   • hydrocortisone (ANUSOL-HC) 25 MG  suppository Insert 1 suppository into the rectum 2 (Two) Times a Day. 12 each 2     No facility-administered medications prior to visit.       Patient Active Problem List   Diagnosis   • Portal hypertension (HCC)   • CAD (coronary artery disease)   • Type 2 diabetes mellitus, with long-term current use of insulin (HCC)   • Splenomegaly   • Liver cirrhosis secondary to BONILLA (HCC)   • Secondary esophageal varices with bleeding (HCC)   • End stage liver disease (HCC)   • Varices of esophagus determined by endoscopy (HCC)   • Unstable angina (HCC)   • Pancytopenia (HCC)   • Mixed hyperlipidemia   • Essential hypertension   • Vitamin D deficiency   • Vitamin B deficiency   • Awaiting liver transplant   • Function kidney decreased   • Stage 3b chronic kidney disease (HCC)   • Hemorrhoids   • Depression with anxiety   • Stage 5 chronic kidney disease on chronic dialysis (HCC)       Advanced Care Planning:  ACP discussion was held with the patient during this visit. Patient does not have an advance directive, information provided.    Review of Systems   Constitutional: Positive for fatigue. Negative for activity change, appetite change, chills, diaphoresis, fever, unexpected weight gain and unexpected weight loss.   HENT: Negative for ear discharge, ear pain, mouth sores, nosebleeds, sinus pressure, sneezing and sore throat.    Eyes: Negative for pain, discharge and itching.   Respiratory: Negative for cough, chest tightness, shortness of breath and wheezing.    Cardiovascular: Negative for chest pain, palpitations and leg swelling.   Gastrointestinal: Positive for abdominal distention. Negative for abdominal pain, anal bleeding, constipation, diarrhea, nausea, rectal pain and vomiting.   Endocrine: Negative for heat intolerance, polydipsia and polyphagia.   Genitourinary: Negative for dysuria, flank pain, frequency, hematuria and urgency.   Musculoskeletal: Positive for arthralgias, back pain and myalgias. Negative for  "gait problem, joint swelling, neck pain and neck stiffness.   Skin: Positive for dry skin and rash. Negative for color change and pallor.   Allergic/Immunologic: Negative for immunocompromised state.   Neurological: Positive for weakness and numbness. Negative for seizures and speech difficulty.   Hematological: Negative for adenopathy.   Psychiatric/Behavioral: Positive for sleep disturbance and stress. Negative for agitation, decreased concentration, suicidal ideas and depressed mood. The patient is nervous/anxious.        Compared to one year ago, the patient feels her physical health is worse.  Compared to one year ago, the patient feels her mental health is worse.    Reviewed chart for potential of high risk medication in the elderly: yes  Reviewed chart for potential of harmful drug interactions in the elderly:yes    Objective         Vitals:    03/16/23 1030   BP: 124/76   Pulse: 74   Resp: 16   Temp: 98.4 °F (36.9 °C)   TempSrc: Tympanic   SpO2: 98%   Weight: 84.4 kg (186 lb)   Height: 170.2 cm (67\")       Body mass index is 29.13 kg/m².  Discussed the patient's BMI with her. The BMI is above average; BMI management plan is completed.    Physical Exam  Vitals reviewed.   Constitutional:       Appearance: Normal appearance. She is well-developed.   HENT:      Head: Normocephalic and atraumatic.      Nose: Nose normal.   Eyes:      General: Lids are normal.      Conjunctiva/sclera: Conjunctivae normal.      Pupils: Pupils are equal, round, and reactive to light.   Neck:      Thyroid: No thyromegaly.      Trachea: Trachea normal.   Cardiovascular:      Rate and Rhythm: Normal rate and regular rhythm.      Heart sounds: Normal heart sounds.   Pulmonary:      Effort: Pulmonary effort is normal. No respiratory distress.      Breath sounds: Normal breath sounds.   Abdominal:      General: There is distension.   Skin:     General: Skin is warm and dry.   Neurological:      Mental Status: She is alert and oriented to " person, place, and time.      GCS: GCS eye subscore is 4. GCS verbal subscore is 5. GCS motor subscore is 6.   Psychiatric:         Attention and Perception: Attention normal.         Mood and Affect: Mood normal.         Speech: Speech normal.         Behavior: Behavior normal. Behavior is cooperative.         Thought Content: Thought content normal.         BMI is >= 25 and <30. (Overweight) The following options were offered after discussion;: exercise counseling/recommendations and nutrition counseling/recommendations      Lab Results   Component Value Date    HGBA1C 6.3 03/16/2023        Assessment & Plan   Medicare Risks and Personalized Health Plan  CMS Preventative Services Quick Reference  Advance Directive Discussion  Cardiovascular risk  Chronic Pain   Depression/Dysphoria  Fall Risk  Inactivity/Sedentary  Obesity/Overweight   Osteoporosis Risk    The above risks/problems have been discussed with the patient.  Pertinent information has been shared with the patient in the After Visit Summary.  Follow up plans and orders are seen below in the Assessment/Plan Section.    Diagnoses and all orders for this visit:    1. Medicare annual wellness visit, subsequent (Primary)    2. Type 2 diabetes mellitus with hyperglycemia, with long-term current use of insulin (HCC)  -     POCT glycated hemoglobin, total    3. Mixed hyperlipidemia    4. Neuropathy  -     pregabalin (Lyrica) 25 MG capsule; Take 1 capsule by mouth 3 (Three) Times a Day.  Dispense: 90 capsule; Refill: 2    5. Itch of skin    Other orders  -     loratadine (CLARITIN) 10 MG tablet; Take 1 tablet by mouth Daily.  Dispense: 30 tablet; Refill: 1  -     clobetasol prop emollient base (TEMOVATE) 0.05 % emollient cream; Apply 1 application topically to the appropriate area as directed 2 (Two) Times a Day.  Dispense: 60 g; Refill: 2      Plan:    Medicare wellness  Patient will continue to eat a well-balanced diet, drink adequate amount of water, exercise  at least 3 times weekly, and get adequate rest.  Suggested a multivitamin daily.  Discussed future preventative screenings and immunizations.    Type 2 diabetes, chronic, stable. Continue Tresiba. Continue NovoLog. Follow diabetic diet.    Neuropathy, chronic, unstable. Start Lyrica. Discontinue gabapentin. Lyrica was based on a dosage adjustment due to her kidney failure and liver failure.    Itchy skin, chronic, unstable. Start clobetasol cream as needed.    Hyperlipidemia, chronic, unstable due to elevated triglycerides on last set of labs. Continue Crestor and Zetia. Follow heart healthy, low cholesterol diet. Consider starting an omega-3 supplement or fenofibrate, but we will need to look at kidney and liver clearance on this and dosage adjustment. She is due for fasting labs at next appointment.    Follow Up:  No follow-ups on file.     An After Visit Summary and PPPS were given to the patient.     Transcribed from ambient dictation for JOSE D Zaragoza by Colleen Camarena.  03/16/23   16:14 EDT    Patient or patient representative verbalized consent to the visit recording.  I have personally performed the services described in this document as transcribed by the above individual, and it is both accurate and complete.

## 2023-03-29 ENCOUNTER — TELEPHONE (OUTPATIENT)
Dept: INTERNAL MEDICINE | Facility: CLINIC | Age: 63
End: 2023-03-29
Payer: MEDICARE

## 2023-03-29 DIAGNOSIS — G62.9 NEUROPATHY: Primary | ICD-10-CM

## 2023-03-29 RX ORDER — GABAPENTIN 100 MG/1
100 CAPSULE ORAL 4 TIMES DAILY PRN
Qty: 120 CAPSULE | Refills: 2 | Status: SHIPPED | OUTPATIENT
Start: 2023-03-29

## 2023-03-29 NOTE — TELEPHONE ENCOUNTER
Caller: Lyndsey Luna    Relationship: Self    Best call back number: [phone]    What medications are you currently taking:   Current Outpatient Medications on File Prior to Visit   Medication Sig Dispense Refill   • clobetasol prop emollient base (TEMOVATE) 0.05 % emollient cream Apply 1 application topically to the appropriate area as directed 2 (Two) Times a Day. 60 g 2   • ezetimibe (ZETIA) 10 MG tablet Take 1 tablet by mouth Daily. 90 tablet 3   • insulin aspart (novoLOG FLEXPEN) 100 UNIT/ML solution pen-injector sc pen Inject into skin 3 times daily with meals as directed per sliding scale provided. Do not exceed 40 units in 24 hours. 15 mL 3   • Insulin Pen Needle (BD Pen Needle Bisi 2nd Gen) 32G X 4 MM misc Inject 1 each under the skin into the appropriate area as directed Daily. use as directed 100 each 5   • loratadine (CLARITIN) 10 MG tablet Take 1 tablet by mouth Daily. 30 tablet 1   • midodrine (PROAMATINE) 5 MG tablet Take 1 tablet by mouth 3 (Three) Times a Day Before Meals. 270 tablet 1   • nitroglycerin (NITROSTAT) 0.4 MG SL tablet Take 1 tablet by mouth As Needed for Chest Pain. 100 tablet 1   • ondansetron ODT (Zofran ODT) 4 MG disintegrating tablet Place 1 tablet on the tongue 2 (Two) Times a Day As Needed for Nausea or Vomiting. 30 tablet 1   • pantoprazole (PROTONIX) 40 MG EC tablet Take 1 tablet by mouth Daily. 90 tablet 3   • polyethylene glycol (MIRALAX) 17 GM/SCOOP powder Take 17 g by mouth Daily. 850 g 5   • pregabalin (Lyrica) 25 MG capsule Take 1 capsule by mouth 3 (Three) Times a Day. 90 capsule 2   • rifAXIMin (XIFAXAN) 200 MG tablet Take 1 tablet by mouth 2 (Two) Times a Day.     • rosuvastatin (Crestor) 20 MG tablet Take 1 tablet by mouth Daily. 90 tablet 3   • Tresiba FlexTouch 200 UNIT/ML solution pen-injector pen injection INJECT 165 UNITS UNDER THE SKIN ONCE NIGHTLY 27 mL 3     No current facility-administered medications on file prior to visit.          When did you  start taking these medications: 2 WEEKS    Which medication are you concerned about: pregabalin (Lyrica) 25 MG capsuleC    Who prescribed you this medication: PAMELA BARBER    What are your concerns: MEDICATION IS GIVING THE PATIENT A HEADACHE AND HER FEET ARE BURNING ACHING AND TINGLING.    How long have you had these concerns: WHEN SHE STARTED THE MEDICATION. WOULD LIKE SWITCH BACK TO GABAPENTIN IF POSSIBLE.

## 2023-03-30 NOTE — TELEPHONE ENCOUNTER
Yes tell her to immediately stop the lyrica and restart gabapentin. I sent her a new script of gabapentin with refills.

## 2023-05-09 ENCOUNTER — TELEPHONE (OUTPATIENT)
Dept: INTERNAL MEDICINE | Facility: CLINIC | Age: 63
End: 2023-05-09
Payer: MEDICARE

## 2023-05-09 NOTE — TELEPHONE ENCOUNTER
Caller: Jeremy Lyndseybrandon Osei    Relationship: Self    Best call back number: [phone]    What medications are you currently taking:   Current Outpatient Medications on File Prior to Visit   Medication Sig Dispense Refill   • clobetasol prop emollient base (TEMOVATE) 0.05 % emollient cream Apply 1 application topically to the appropriate area as directed 2 (Two) Times a Day. 60 g 2   • ezetimibe (ZETIA) 10 MG tablet Take 1 tablet by mouth Daily. 90 tablet 3   • gabapentin (NEURONTIN) 100 MG capsule Take 1 capsule by mouth 4 (Four) Times a Day As Needed (foot/leg pain). 120 capsule 2   • insulin aspart (novoLOG FLEXPEN) 100 UNIT/ML solution pen-injector sc pen Inject into skin 3 times daily with meals as directed per sliding scale provided. Do not exceed 40 units in 24 hours. 15 mL 3   • Insulin Pen Needle (BD Pen Needle Bisi 2nd Gen) 32G X 4 MM misc Inject 1 each under the skin into the appropriate area as directed Daily. use as directed 100 each 5   • loratadine (CLARITIN) 10 MG tablet Take 1 tablet by mouth Daily. 30 tablet 1   • midodrine (PROAMATINE) 5 MG tablet Take 1 tablet by mouth 3 (Three) Times a Day Before Meals. 270 tablet 1   • nitroglycerin (NITROSTAT) 0.4 MG SL tablet Take 1 tablet by mouth As Needed for Chest Pain. 100 tablet 1   • ondansetron ODT (Zofran ODT) 4 MG disintegrating tablet Place 1 tablet on the tongue 2 (Two) Times a Day As Needed for Nausea or Vomiting. 30 tablet 1   • pantoprazole (PROTONIX) 40 MG EC tablet Take 1 tablet by mouth Daily. 90 tablet 3   • polyethylene glycol (MIRALAX) 17 GM/SCOOP powder Take 17 g by mouth Daily. 850 g 5   • rifAXIMin (XIFAXAN) 200 MG tablet Take 1 tablet by mouth 2 (Two) Times a Day.     • rosuvastatin (Crestor) 20 MG tablet Take 1 tablet by mouth Daily. 90 tablet 3   • Tresiba FlexTouch 200 UNIT/ML solution pen-injector pen injection INJECT 165 UNITS UNDER THE SKIN ONCE NIGHTLY 27 mL 3   • triamcinolone (KENALOG) 0.1 % ointment APPLY TO AFFECTED  AREA(S) TWO TIMES A  g 0     No current facility-administered medications on file prior to visit.          When did you start taking these medications:     Which medication are you concerned about: insulin aspart (novoLOG FLEXPEN) 100 UNIT/ML solution pen-injector sc pen    Who prescribed you this medication:     What are your concerns: PATIENT STATES HER INSURANCE DOES NOT WANT TO COVER THIS MEDICATION ANYMORE AND WOULD LIKE TO KNOW IF SHE CAN SWITCH TO HUMALOG.     How long have you had these concerns:

## 2023-05-12 RX ORDER — INSULIN LISPRO 100 [IU]/ML
INJECTION, SOLUTION INTRAVENOUS; SUBCUTANEOUS
Qty: 15 ML | Refills: 5 | Status: SHIPPED | OUTPATIENT
Start: 2023-05-12

## 2023-05-15 ENCOUNTER — PRIOR AUTHORIZATION (OUTPATIENT)
Dept: INTERNAL MEDICINE | Facility: CLINIC | Age: 63
End: 2023-05-15
Payer: MEDICARE

## 2023-05-16 RX ORDER — LORATADINE 10 MG/1
TABLET ORAL
Qty: 30 TABLET | Refills: 1 | Status: SHIPPED | OUTPATIENT
Start: 2023-05-16

## 2023-06-12 DIAGNOSIS — Z79.4 TYPE 2 DIABETES MELLITUS WITH HYPERGLYCEMIA, WITH LONG-TERM CURRENT USE OF INSULIN: ICD-10-CM

## 2023-06-12 DIAGNOSIS — E11.65 TYPE 2 DIABETES MELLITUS WITH HYPERGLYCEMIA, WITH LONG-TERM CURRENT USE OF INSULIN: ICD-10-CM

## 2023-06-13 RX ORDER — INSULIN DEGLUDEC 200 U/ML
INJECTION, SOLUTION SUBCUTANEOUS
Qty: 27 ML | Refills: 3 | Status: SHIPPED | OUTPATIENT
Start: 2023-06-13

## 2023-06-29 ENCOUNTER — TELEPHONE (OUTPATIENT)
Dept: INTERNAL MEDICINE | Facility: CLINIC | Age: 63
End: 2023-06-29

## 2023-06-29 NOTE — TELEPHONE ENCOUNTER
Caller: [facility]    Relationship:     Best call back number: [phone]    What is the best time to reach you: ANYTIME    Who are you requesting to speak with (clinical staff, provider,  specific staff member): CLINICAL    What was the call regarding:[facility] WANTED TO ADVISE US THE PATIENT WAS ADMITTED TO [facility] AND IS STILL CURRENTLY THERE    Is it okay if the provider responds through MyChart: NO

## 2023-08-03 ENCOUNTER — OFFICE VISIT (OUTPATIENT)
Dept: INTERNAL MEDICINE | Facility: CLINIC | Age: 63
End: 2023-08-03
Payer: MEDICARE

## 2023-08-03 VITALS
SYSTOLIC BLOOD PRESSURE: 128 MMHG | OXYGEN SATURATION: 100 % | RESPIRATION RATE: 16 BRPM | HEART RATE: 83 BPM | WEIGHT: 178 LBS | DIASTOLIC BLOOD PRESSURE: 64 MMHG | BODY MASS INDEX: 27.94 KG/M2 | TEMPERATURE: 97.9 F | HEIGHT: 67 IN

## 2023-08-03 DIAGNOSIS — Z13.21 ENCOUNTER FOR VITAMIN DEFICIENCY SCREENING: ICD-10-CM

## 2023-08-03 DIAGNOSIS — Z13.0 SCREENING FOR BLOOD DISEASE: ICD-10-CM

## 2023-08-03 DIAGNOSIS — Z99.2 STAGE 5 CHRONIC KIDNEY DISEASE ON CHRONIC DIALYSIS: ICD-10-CM

## 2023-08-03 DIAGNOSIS — Z13.29 THYROID DISORDER SCREENING: ICD-10-CM

## 2023-08-03 DIAGNOSIS — K72.10 CHRONIC LIVER FAILURE WITHOUT HEPATIC COMA: ICD-10-CM

## 2023-08-03 DIAGNOSIS — E11.65 TYPE 2 DIABETES MELLITUS WITH HYPERGLYCEMIA, WITH LONG-TERM CURRENT USE OF INSULIN: Primary | ICD-10-CM

## 2023-08-03 DIAGNOSIS — E55.9 VITAMIN D DEFICIENCY: ICD-10-CM

## 2023-08-03 DIAGNOSIS — E78.2 MIXED HYPERLIPIDEMIA: ICD-10-CM

## 2023-08-03 DIAGNOSIS — N18.6 STAGE 5 CHRONIC KIDNEY DISEASE ON CHRONIC DIALYSIS: ICD-10-CM

## 2023-08-03 DIAGNOSIS — Z13.220 LIPID SCREENING: ICD-10-CM

## 2023-08-03 DIAGNOSIS — Z79.4 TYPE 2 DIABETES MELLITUS WITH HYPERGLYCEMIA, WITH LONG-TERM CURRENT USE OF INSULIN: Primary | ICD-10-CM

## 2023-08-03 DIAGNOSIS — Z13.1 SCREENING FOR DIABETES MELLITUS: ICD-10-CM

## 2023-08-03 LAB — HBA1C MFR BLD: 7.9 %

## 2023-08-03 PROCEDURE — 3074F SYST BP LT 130 MM HG: CPT | Performed by: NURSE PRACTITIONER

## 2023-08-03 PROCEDURE — 1159F MED LIST DOCD IN RCRD: CPT | Performed by: NURSE PRACTITIONER

## 2023-08-03 PROCEDURE — 83036 HEMOGLOBIN GLYCOSYLATED A1C: CPT | Performed by: NURSE PRACTITIONER

## 2023-08-03 PROCEDURE — 99214 OFFICE O/P EST MOD 30 MIN: CPT | Performed by: NURSE PRACTITIONER

## 2023-08-03 PROCEDURE — 3051F HG A1C>EQUAL 7.0%<8.0%: CPT | Performed by: NURSE PRACTITIONER

## 2023-08-03 PROCEDURE — 1160F RVW MEDS BY RX/DR IN RCRD: CPT | Performed by: NURSE PRACTITIONER

## 2023-08-03 PROCEDURE — 3078F DIAST BP <80 MM HG: CPT | Performed by: NURSE PRACTITIONER

## 2023-08-03 RX ORDER — INSULIN DEGLUDEC 200 U/ML
170 INJECTION, SOLUTION SUBCUTANEOUS NIGHTLY
Qty: 27 ML | Refills: 3 | Status: SHIPPED | OUTPATIENT
Start: 2023-08-03

## 2023-08-03 RX ORDER — AMOXICILLIN 250 MG
1 CAPSULE ORAL DAILY
COMMUNITY

## 2023-08-03 NOTE — PROGRESS NOTES
Subjective   Chief Complaint   Patient presents with    Diabetes      Lyndsey Luna is a 62 y.o. female.     HPI  The patient is here today for a follow-up. The patient is in liver and kidney failure and on [facility] transplant list. She is diabetic and her hemoglobin A1c has went up today. She is accompanied by her .    The patient's blood glucose went up slightly, but it is still manageable. She drank a half of [employer] on the way to her appointment. She drinks zero Gatorade, lemon lime, and Powerade. She feels like her head swims a lot. She gets the fluid pulled off every week, but not bridging since this happened to her on 06/23/2023. She came home from dialysis and rolled over on her back, and something just happened. It started hurting. They called EMTs and they made her go to hospital.    The patient's kidney number was 2.7, and she needed to be at 5 to be on dialysis. She has not had a dialysis treatment since. They do her blood work every 2 weeks. She saw her liver doctor on 07/26/2023. Her liver number had dropped from 23 to 21. He wants her to go to [facility]    She gets nausea constantly. She has not been taking her nausea pills.        I have reviewed the following portions of the patient's history and confirmed they are accurate: allergies, current medications, past family history, past medical history, past social history, past surgical history, and problem list    Review of Systems  Pertinent items are noted in HPI.     Current Outpatient Medications on File Prior to Visit   Medication Sig    clobetasol prop emollient base (TEMOVATE) 0.05 % emollient cream Apply 1 application topically to the appropriate area as directed 2 (Two) Times a Day.    ezetimibe (ZETIA) 10 MG tablet Take 1 tablet by mouth Daily.    gabapentin (NEURONTIN) 100 MG capsule Take 1 capsule by mouth 4 (Four) Times a Day As Needed (foot/leg pain).    insulin aspart (novoLOG FLEXPEN) 100 UNIT/ML solution  "pen-injector sc pen Inject into skin 3 times daily with meals as directed per sliding scale provided. Do not exceed 40 units in 24 hours.    Insulin Lispro, 1 Unit Dial, (HUMALOG) 100 UNIT/ML solution pen-injector Inject into skin 3 times daily with meals as directed per sliding scale provided. Do not exceed 40 units in 24 hours.    Insulin Pen Needle (BD Pen Needle Bisi 2nd Gen) 32G X 4 MM misc Inject 1 each under the skin into the appropriate area as directed Daily. use as directed    loratadine (CLARITIN) 10 MG tablet TAKE ONE TABLET BY MOUTH DAILY    midodrine (PROAMATINE) 5 MG tablet Take 1 tablet by mouth 3 (Three) Times a Day Before Meals.    nitroglycerin (NITROSTAT) 0.4 MG SL tablet Take 1 tablet by mouth As Needed for Chest Pain.    ondansetron ODT (Zofran ODT) 4 MG disintegrating tablet Place 1 tablet on the tongue 2 (Two) Times a Day As Needed for Nausea or Vomiting.    pantoprazole (PROTONIX) 40 MG EC tablet Take 1 tablet by mouth Daily.    polyethylene glycol (MIRALAX) 17 GM/SCOOP powder Take 17 g by mouth Daily.    rifAXIMin (XIFAXAN) 200 MG tablet Take 1 tablet by mouth 2 (Two) Times a Day.    rosuvastatin (Crestor) 20 MG tablet Take 1 tablet by mouth Daily.    sennosides-docusate (senna-docusate sodium) 8.6-50 MG per tablet Take 1 tablet by mouth Daily.    triamcinolone (KENALOG) 0.1 % ointment APPLY TO AFFECTED AREA(S) TWO TIMES A DAY     No current facility-administered medications on file prior to visit.       Objective   Vitals:    08/03/23 1105   BP: 128/64   Pulse: 83   Resp: 16   Temp: 97.9 øF (36.6 øC)   TempSrc: Tympanic   SpO2: 100%   Weight: 80.7 kg (178 lb)   Height: 170.2 cm (67\")     Body mass index is 27.88 kg/mý.    Physical Exam  Vitals reviewed.   Constitutional:       Appearance: Normal appearance. She is well-developed.   HENT:      Head: Normocephalic and atraumatic.      Nose: Nose normal.   Eyes:      General: Lids are normal.      Conjunctiva/sclera: Conjunctivae normal.      " Pupils: Pupils are equal, round, and reactive to light.   Neck:      Thyroid: No thyromegaly.      Trachea: Trachea normal.   Cardiovascular:      Rate and Rhythm: Normal rate and regular rhythm.      Heart sounds: Murmur heard.   Pulmonary:      Effort: Pulmonary effort is normal. No respiratory distress.      Breath sounds: Normal breath sounds.   Abdominal:      General: There is distension.   Skin:     General: Skin is warm and dry.      Coloration: Skin is jaundiced.      Findings: Bruising present.   Neurological:      Mental Status: She is alert and oriented to person, place, and time.      GCS: GCS eye subscore is 4. GCS verbal subscore is 5. GCS motor subscore is 6.   Psychiatric:         Attention and Perception: Attention normal.         Mood and Affect: Mood normal.         Speech: Speech normal.         Behavior: Behavior normal. Behavior is cooperative.         Thought Content: Thought content normal.       Assessment & Plan   Problem List Items Addressed This Visit          Cardiac and Vasculature    Mixed hyperlipidemia       Endocrine and Metabolic    Type 2 diabetes mellitus, with long-term current use of insulin - Primary (Chronic)    Relevant Medications    Insulin Degludec (Tresiba FlexTouch) 200 UNIT/ML solution pen-injector pen injection    Other Relevant Orders    POCT glycated hemoglobin, total (Completed)    Vitamin D deficiency    Relevant Orders    Vitamin D,25-Hydroxy       Genitourinary and Reproductive     Stage 5 chronic kidney disease on chronic dialysis     Other Visit Diagnoses       Chronic liver failure without hepatic coma        Screening for blood disease        Relevant Orders    Comprehensive Metabolic Panel    Lipid Panel    TSH Rfx On Abnormal To Free T4    Vitamin B12 & Folate    Vitamin D,25-Hydroxy    CBC Auto Differential    Thyroid disorder screening        Relevant Orders    TSH Rfx On Abnormal To Free T4    Lipid screening        Relevant Orders    Lipid Panel     Encounter for vitamin deficiency screening        Relevant Orders    Vitamin B12 & Folate    Vitamin D,25-Hydroxy    Screening for diabetes mellitus                   Current Outpatient Medications:     clobetasol prop emollient base (TEMOVATE) 0.05 % emollient cream, Apply 1 application topically to the appropriate area as directed 2 (Two) Times a Day., Disp: 60 g, Rfl: 2    ezetimibe (ZETIA) 10 MG tablet, Take 1 tablet by mouth Daily., Disp: 90 tablet, Rfl: 3    gabapentin (NEURONTIN) 100 MG capsule, Take 1 capsule by mouth 4 (Four) Times a Day As Needed (foot/leg pain)., Disp: 120 capsule, Rfl: 2    insulin aspart (novoLOG FLEXPEN) 100 UNIT/ML solution pen-injector sc pen, Inject into skin 3 times daily with meals as directed per sliding scale provided. Do not exceed 40 units in 24 hours., Disp: 15 mL, Rfl: 3    Insulin Degludec (Tresiba FlexTouch) 200 UNIT/ML solution pen-injector pen injection, Inject 170 Units under the skin into the appropriate area as directed Every Night., Disp: 27 mL, Rfl: 3    Insulin Lispro, 1 Unit Dial, (HUMALOG) 100 UNIT/ML solution pen-injector, Inject into skin 3 times daily with meals as directed per sliding scale provided. Do not exceed 40 units in 24 hours., Disp: 15 mL, Rfl: 5    Insulin Pen Needle (BD Pen Needle Bisi 2nd Gen) 32G X 4 MM misc, Inject 1 each under the skin into the appropriate area as directed Daily. use as directed, Disp: 100 each, Rfl: 5    loratadine (CLARITIN) 10 MG tablet, TAKE ONE TABLET BY MOUTH DAILY, Disp: 30 tablet, Rfl: 1    midodrine (PROAMATINE) 5 MG tablet, Take 1 tablet by mouth 3 (Three) Times a Day Before Meals., Disp: 270 tablet, Rfl: 1    nitroglycerin (NITROSTAT) 0.4 MG SL tablet, Take 1 tablet by mouth As Needed for Chest Pain., Disp: 100 tablet, Rfl: 1    ondansetron ODT (Zofran ODT) 4 MG disintegrating tablet, Place 1 tablet on the tongue 2 (Two) Times a Day As Needed for Nausea or Vomiting., Disp: 30 tablet, Rfl: 1    pantoprazole  (PROTONIX) 40 MG EC tablet, Take 1 tablet by mouth Daily., Disp: 90 tablet, Rfl: 3    polyethylene glycol (MIRALAX) 17 GM/SCOOP powder, Take 17 g by mouth Daily., Disp: 850 g, Rfl: 5    rifAXIMin (XIFAXAN) 200 MG tablet, Take 1 tablet by mouth 2 (Two) Times a Day., Disp: , Rfl:     rosuvastatin (Crestor) 20 MG tablet, Take 1 tablet by mouth Daily., Disp: 90 tablet, Rfl: 3    sennosides-docusate (senna-docusate sodium) 8.6-50 MG per tablet, Take 1 tablet by mouth Daily., Disp: , Rfl:     triamcinolone (KENALOG) 0.1 % ointment, APPLY TO AFFECTED AREA(S) TWO TIMES A DAY, Disp: 454 g, Rfl: 0       1. Type 2 diabetes.  - Chronic, unstable.  - Increase Tresiba to 170 units at bedtime.  - Continue NovoLog sliding scale.  - Patient will focus on improving diet.  - Follow a diabetic diet.    2. Hyperlipidemia.  - Chronic, unstable due to elevated triglycerides.  - Patient will come by for fasting lipid panel.  - Continue Crestor and Zetia.  - Due to well controlled LDL, consider possibly decreasing Crestor, possibly add on a fenofibrate or Lovaza for triglycerides.  - She will follow a heart healthy, low cholesterol diet.    3. Stage 5 chronic kidney disease.  - Chronic, stable.  - Completing CMP.  - Patient will continue follow-ups with nephrology every 2 weeks to determine if she needs dialysis.    4. Chronic liver failure.  - Chronic, unstable.  - Patient will continue paracentesis weekly.  - She will continue follow-ups with gastroenterology and monitor liver function.  - Determine if she needs to be placed on the transplant list.  - Continue Xifaxan.        Plan of care reviewed with the patient at the conclusion of today's visit.  Education was provided regarding diagnosis, management, and any prescribed or recommended OTC medications.  Patient verbalized understanding of and agreement with management plan.     Return in about 3 months (around 11/3/2023), or if symptoms worsen or fail to improve, for  Follow-up.      Transcribed from ambient dictation for JOSE D Zaragoza by Colleen Camarena.  08/03/23   14:36 EDT    Patient or patient representative verbalized consent to the visit recording.  I have personally performed the services described in this document as transcribed by the above individual, and it is both accurate and complete.

## 2023-08-07 ENCOUNTER — LAB (OUTPATIENT)
Dept: INTERNAL MEDICINE | Facility: CLINIC | Age: 63
End: 2023-08-07
Payer: MEDICARE

## 2023-08-07 DIAGNOSIS — I10 ESSENTIAL HYPERTENSION: Primary | ICD-10-CM

## 2023-08-07 DIAGNOSIS — G62.9 NEUROPATHY: ICD-10-CM

## 2023-08-07 LAB
25(OH)D3 SERPL-MCNC: 7.1 NG/ML (ref 30–100)
BASOPHILS # BLD AUTO: 0.03 10*3/MM3 (ref 0–0.2)
BASOPHILS NFR BLD AUTO: 1 % (ref 0–1.5)
DEPRECATED RDW RBC AUTO: 53.5 FL (ref 37–54)
EOSINOPHIL # BLD AUTO: 0.03 10*3/MM3 (ref 0–0.4)
EOSINOPHIL NFR BLD AUTO: 1 % (ref 0.3–6.2)
ERYTHROCYTE [DISTWIDTH] IN BLOOD BY AUTOMATED COUNT: 15.8 % (ref 12.3–15.4)
FOLATE SERPL-MCNC: 10.8 NG/ML (ref 4.78–24.2)
HCT VFR BLD AUTO: 25.7 % (ref 34–46.6)
HGB BLD-MCNC: 8.4 G/DL (ref 12–15.9)
LYMPHOCYTES # BLD AUTO: 0.37 10*3/MM3 (ref 0.7–3.1)
LYMPHOCYTES NFR BLD AUTO: 12.9 % (ref 19.6–45.3)
MCH RBC QN AUTO: 30.5 PG (ref 26.6–33)
MCHC RBC AUTO-ENTMCNC: 32.7 G/DL (ref 31.5–35.7)
MCV RBC AUTO: 93.5 FL (ref 79–97)
MONOCYTES # BLD AUTO: 0.28 10*3/MM3 (ref 0.1–0.9)
MONOCYTES NFR BLD AUTO: 9.8 % (ref 5–12)
NEUTROPHILS NFR BLD AUTO: 2.12 10*3/MM3 (ref 1.7–7)
NEUTROPHILS NFR BLD AUTO: 73.9 % (ref 42.7–76)
PLATELET # BLD AUTO: 26 10*3/MM3 (ref 140–450)
PMV BLD AUTO: 11.8 FL (ref 6–12)
RBC # BLD AUTO: 2.75 10*6/MM3 (ref 3.77–5.28)
VIT B12 BLD-MCNC: 386 PG/ML (ref 211–946)
WBC NRBC COR # BLD: 2.87 10*3/MM3 (ref 3.4–10.8)

## 2023-08-07 PROCEDURE — 82306 VITAMIN D 25 HYDROXY: CPT | Performed by: NURSE PRACTITIONER

## 2023-08-07 PROCEDURE — 82607 VITAMIN B-12: CPT | Performed by: NURSE PRACTITIONER

## 2023-08-07 PROCEDURE — 84443 ASSAY THYROID STIM HORMONE: CPT | Performed by: NURSE PRACTITIONER

## 2023-08-07 PROCEDURE — 85025 COMPLETE CBC W/AUTO DIFF WBC: CPT | Performed by: NURSE PRACTITIONER

## 2023-08-07 PROCEDURE — 36415 COLL VENOUS BLD VENIPUNCTURE: CPT | Performed by: NURSE PRACTITIONER

## 2023-08-07 PROCEDURE — 80053 COMPREHEN METABOLIC PANEL: CPT | Performed by: NURSE PRACTITIONER

## 2023-08-07 PROCEDURE — 82746 ASSAY OF FOLIC ACID SERUM: CPT | Performed by: NURSE PRACTITIONER

## 2023-08-07 PROCEDURE — 80061 LIPID PANEL: CPT | Performed by: NURSE PRACTITIONER

## 2023-08-08 LAB
ALBUMIN SERPL-MCNC: 3.8 G/DL (ref 3.5–5.2)
ALBUMIN/GLOB SERPL: 1.7 G/DL
ALP SERPL-CCNC: 65 U/L (ref 39–117)
ALT SERPL W P-5'-P-CCNC: 61 U/L (ref 1–33)
ANION GAP SERPL CALCULATED.3IONS-SCNC: 12.4 MMOL/L (ref 5–15)
AST SERPL-CCNC: 45 U/L (ref 1–32)
BILIRUB SERPL-MCNC: 0.7 MG/DL (ref 0–1.2)
BUN SERPL-MCNC: 39 MG/DL (ref 8–23)
BUN/CREAT SERPL: 13.5 (ref 7–25)
CALCIUM SPEC-SCNC: 9 MG/DL (ref 8.6–10.5)
CHLORIDE SERPL-SCNC: 109 MMOL/L (ref 98–107)
CHOLEST SERPL-MCNC: 110 MG/DL (ref 0–200)
CO2 SERPL-SCNC: 18.6 MMOL/L (ref 22–29)
CREAT SERPL-MCNC: 2.89 MG/DL (ref 0.57–1)
EGFRCR SERPLBLD CKD-EPI 2021: 17.9 ML/MIN/1.73
GLOBULIN UR ELPH-MCNC: 2.2 GM/DL
GLUCOSE SERPL-MCNC: 234 MG/DL (ref 65–99)
HDLC SERPL-MCNC: 51 MG/DL (ref 40–60)
LDLC SERPL CALC-MCNC: 44 MG/DL (ref 0–100)
LDLC/HDLC SERPL: 0.88 {RATIO}
POTASSIUM SERPL-SCNC: 3.5 MMOL/L (ref 3.5–5.2)
PROT SERPL-MCNC: 6 G/DL (ref 6–8.5)
SODIUM SERPL-SCNC: 140 MMOL/L (ref 136–145)
TRIGL SERPL-MCNC: 71 MG/DL (ref 0–150)
TSH SERPL DL<=0.05 MIU/L-ACNC: 2.38 UIU/ML (ref 0.27–4.2)
VLDLC SERPL-MCNC: 15 MG/DL (ref 5–40)

## 2023-08-08 RX ORDER — GABAPENTIN 100 MG/1
CAPSULE ORAL
Qty: 120 CAPSULE | Refills: 5 | Status: SHIPPED | OUTPATIENT
Start: 2023-08-08

## 2023-08-15 ENCOUNTER — OFFICE VISIT (OUTPATIENT)
Dept: GASTROENTEROLOGY | Facility: CLINIC | Age: 63
End: 2023-08-15
Payer: MEDICARE

## 2023-08-15 VITALS
DIASTOLIC BLOOD PRESSURE: 69 MMHG | SYSTOLIC BLOOD PRESSURE: 147 MMHG | HEART RATE: 73 BPM | HEIGHT: 67 IN | TEMPERATURE: 97.7 F | BODY MASS INDEX: 27.88 KG/M2

## 2023-08-15 DIAGNOSIS — I85.11 SECONDARY ESOPHAGEAL VARICES WITH BLEEDING: ICD-10-CM

## 2023-08-15 DIAGNOSIS — K74.60 LIVER CIRRHOSIS SECONDARY TO NASH: ICD-10-CM

## 2023-08-15 DIAGNOSIS — K75.81 LIVER CIRRHOSIS SECONDARY TO NASH: ICD-10-CM

## 2023-08-15 DIAGNOSIS — D69.6 THROMBOCYTOPENIA: Primary | ICD-10-CM

## 2023-08-15 PROCEDURE — 1160F RVW MEDS BY RX/DR IN RCRD: CPT | Performed by: NURSE PRACTITIONER

## 2023-08-15 PROCEDURE — 3077F SYST BP >= 140 MM HG: CPT | Performed by: NURSE PRACTITIONER

## 2023-08-15 PROCEDURE — 1159F MED LIST DOCD IN RCRD: CPT | Performed by: NURSE PRACTITIONER

## 2023-08-15 PROCEDURE — 3078F DIAST BP <80 MM HG: CPT | Performed by: NURSE PRACTITIONER

## 2023-08-15 PROCEDURE — 99214 OFFICE O/P EST MOD 30 MIN: CPT | Performed by: NURSE PRACTITIONER

## 2023-08-15 RX ORDER — METHOCARBAMOL 500 MG/1
500 TABLET, FILM COATED ORAL 4 TIMES DAILY
COMMUNITY
Start: 2023-06-30

## 2023-08-15 RX ORDER — BLOOD SUGAR DIAGNOSTIC
STRIP MISCELLANEOUS SEE ADMIN INSTRUCTIONS
COMMUNITY
Start: 2023-06-29 | End: 2024-06-28

## 2023-08-15 RX ORDER — CIPROFLOXACIN 250 MG/1
250 TABLET, FILM COATED ORAL DAILY
COMMUNITY
Start: 2023-03-24

## 2023-08-15 RX ORDER — DIAPER,BRIEF,INFANT-TODD,DISP
1 EACH MISCELLANEOUS
COMMUNITY
Start: 2023-06-09

## 2023-08-15 RX ORDER — PROMETHAZINE HYDROCHLORIDE 12.5 MG/1
TABLET ORAL
COMMUNITY
Start: 2023-08-14

## 2023-08-15 NOTE — PROGRESS NOTES
GASTROENTEROLOGY OFFICE NOTE  Lyndsey Luna  [id number]  1960    CARE TEAM  Patient Care Team:  Taryn Olivas APRN as PCP - General (Nurse Practitioner)    Referring Provider: Taryn Olivas APRN    Chief Complaint   Patient presents with    Hepatic Disease        HISTORY OF PRESENT ILLNESS:  Patient is 62-year-old female with BONILLA cirrhosis decompensated with ascites and variceal bleed, status post EBL.  She is currently following with [facility] transplant and is listed for simultaneous liver kidney transplant.    She returns to our practice today needing a repeat EGD.  Although she is following with [facility] transplant, she prefers to have her EGD with Dr. Astudillo.  In February 2020 she had bleeding esophageal varices with 7 bands placed.  Repeat EGD in April 2020 found grade 1 esophageal varices, residual.  Her last EGD was in June 2021 showing residual grade 1 esophageal varices in the distal half of the esophagus.    She has previously been on a nonselective beta-blocker, Coreg 6.25 mg twice daily but experienced hypotension with this and thus the dosage was decreased to 3.125 mg twice daily but again was not able to tolerate due to hypotension.  She is currently on midodrine 15 mg 3 times daily.    PAST MEDICAL HISTORY  Past Medical History:   Diagnosis Date    Arthritis     Diabetes mellitus     Gastric ulcer     Hypertension     Kidney stone     Splenomegaly     Type 2 diabetes mellitus, uncontrolled         PAST SURGICAL HISTORY  Past Surgical History:   Procedure Laterality Date    CARDIAC CATHETERIZATION      CARDIAC CATHETERIZATION N/A 8/14/2020    Procedure: Left Heart Cath;  Surgeon: Yoshi Wilson MD;  Location: [facility] INVAS[facility] LOC[facility];  Service: Cardiovascular;  Laterality: N/A;    CYSTOSCOPY, URETEROSCOPY, RETROGRADE PYELOGRAM, STONE EXTRACTION, STENT INSERTION Left 2/23/2020    Procedure: CYSTOSCOPY URETEROSCOPY STONE EXTRACTION STENT INSERTION;  Surgeon:  Elmer eWiss MD;  Location:  [facility];  Service: Urology;  Laterality: Left;    ENDOSCOPY N/A 2/14/2020    Procedure: ESOPHAGOGASTRODUODENOSCOPY;  Surgeon: Brant Finch MD;  Location:  [facility];  Service: Gastroenterology;  Laterality: N/A;  Esophageal banding for a total of 7 bands    ENDOSCOPY N/A 4/15/2020    Procedure: ESOPHAGOGASTRODUODENOSCOPY;  Surgeon: Jayme Astudillo MD;  Location:  [facility];  Service: Gastroenterology;  Laterality: N/A;    FRACTURE SURGERY      LEFT ANKLE    KIDNEY STONE SURGERY          MEDICATIONS:    Current Outpatient Medications:     ciprofloxacin (CIPRO) 250 MG tablet, Take 1 tablet by mouth Daily., Disp: , Rfl:     ezetimibe (ZETIA) 10 MG tablet, Take 1 tablet by mouth Daily., Disp: 90 tablet, Rfl: 3    gabapentin (NEURONTIN) 100 MG capsule, TAKE ONE CAPSULE BY MOUTH FOUR TIMES A DAY AS NEEDED (FOOT/LEG PAINN), Disp: 120 capsule, Rfl: 5    glucose blood (FreeStyle Precision Gerhard Test) test strip, See Admin Instructions., Disp: , Rfl:     glucose blood test strip, 3 (Three) Times a Day., Disp: , Rfl:     insulin aspart (novoLOG FLEXPEN) 100 UNIT/ML solution pen-injector sc pen, Inject into skin 3 times daily with meals as directed per sliding scale provided. Do not exceed 40 units in 24 hours., Disp: 15 mL, Rfl: 3    Insulin Degludec (Tresiba FlexTouch) 200 UNIT/ML solution pen-injector pen injection, Inject 170 Units under the skin into the appropriate area as directed Every Night., Disp: 27 mL, Rfl: 3    Insulin Pen Needle (BD Pen Needle Bisi 2nd Gen) 32G X 4 MM misc, Inject 1 each under the skin into the appropriate area as directed Daily. use as directed, Disp: 100 each, Rfl: 5    loratadine (CLARITIN) 10 MG tablet, TAKE ONE TABLET BY MOUTH DAILY, Disp: 30 tablet, Rfl: 1    methocarbamol (ROBAXIN) 500 MG tablet, Take 1 tablet by mouth 4 (Four) Times a Day., Disp: , Rfl:     midodrine (PROAMATINE) 5 MG tablet, Take 1 tablet by mouth 3  (Three) Times a Day Before Meals., Disp: 270 tablet, Rfl: 1    nitroglycerin (NITROSTAT) 0.4 MG SL tablet, Take 1 tablet by mouth As Needed for Chest Pain., Disp: 100 tablet, Rfl: 1    ondansetron ODT (Zofran ODT) 4 MG disintegrating tablet, Place 1 tablet on the tongue 2 (Two) Times a Day As Needed for Nausea or Vomiting., Disp: 30 tablet, Rfl: 1    pantoprazole (PROTONIX) 40 MG EC tablet, Take 1 tablet by mouth Daily., Disp: 90 tablet, Rfl: 3    promethazine (PHENERGAN) 12.5 MG tablet, , Disp: , Rfl:     rosuvastatin (Crestor) 20 MG tablet, Take 1 tablet by mouth Daily., Disp: 90 tablet, Rfl: 3    sennosides-docusate (PERICOLACE) 8.6-50 MG per tablet, Take 1 tablet by mouth Daily., Disp: , Rfl:     clobetasol prop emollient base (TEMOVATE) 0.05 % emollient cream, Apply 1 application topically to the appropriate area as directed 2 (Two) Times a Day. (Patient not taking: Reported on 8/15/2023), Disp: 60 g, Rfl: 2    hydrocortisone 1 % ointment, Apply 1 application  topically to the appropriate area as directed. (Patient not taking: Reported on 8/15/2023), Disp: , Rfl:     Insulin Lispro, 1 Unit Dial, (HUMALOG) 100 UNIT/ML solution pen-injector, Inject into skin 3 times daily with meals as directed per sliding scale provided. Do not exceed 40 units in 24 hours. (Patient not taking: Reported on 8/15/2023), Disp: 15 mL, Rfl: 5    polyethylene glycol (MIRALAX) 17 GM/SCOOP powder, Take 17 g by mouth Daily. (Patient not taking: Reported on 8/15/2023), Disp: 850 g, Rfl: 5    rifAXIMin (XIFAXAN) 200 MG tablet, Take 1 tablet by mouth 2 (Two) Times a Day. (Patient not taking: Reported on 8/15/2023), Disp: , Rfl:     triamcinolone (KENALOG) 0.1 % ointment, APPLY TO AFFECTED AREA(S) TWO TIMES A DAY (Patient not taking: Reported on 8/15/2023), Disp: 454 g, Rfl: 0    ALLERGIES  Allergies   Allergen Reactions    Penicillins Itching       FAMILY HISTORY:  Family History   Problem Relation Age of Onset    Diabetes Father     Colon  "cancer Neg Hx     Colon polyps Neg Hx        SOCIAL HISTORY  Social History     Socioeconomic History    Marital status:    Tobacco Use    Smoking status: Never    Smokeless tobacco: Never   Vaping Use    Vaping Use: Never used   Substance and Sexual Activity    Alcohol use: Never    Drug use: Never    Sexual activity: Defer         PHYSICAL EXAM   /69 (BP Location: Right arm, Patient Position: Sitting, Cuff Size: Adult)   Pulse 73   Temp 97.7 øF (36.5 øC) (Temporal)   Ht 170.2 cm (67\")   BMI 27.88 kg/mý   Physical Exam  Constitutional:       Appearance: Normal appearance.   HENT:      Head: Normocephalic and atraumatic.   Cardiovascular:      Rate and Rhythm: Normal rate.   Pulmonary:      Effort: Pulmonary effort is normal.      Breath sounds: Normal breath sounds.   Abdominal:      General: There is distension.      Tenderness: There is no abdominal tenderness.   Neurological:      Mental Status: She is alert and oriented to person, place, and time.   Psychiatric:         Mood and Affect: Mood normal.         Thought Content: Thought content normal.     Results Reviewed   Latest Reference Range & Units 06/28/23 09:06 07/07/23 13:41 07/25/23 09:46 08/01/23 09:48 08/07/23 10:45 08/14/23 07:25   WBC 3.70 - 10.30 10*3/uL 5.22 (E) 5.41 (E) 3.21 (L) (E) 4.29 (E) 2.87 (L) 3.34 (L) (E)   RBC 3.90 - 5.20 10*6/uL 3.69 (L) (E) 3.77 (L) (E) 3.10 (L) (E) 3.19 (L) (E) 2.75 (L) 2.97 (L) (E)   Hemoglobin 11.2 - 15.7 g/dL 11.0 (L) (E) 11.0 (L) (E) 9.4 (L) (E) 9.6 (L) (E) 8.4 (L) 9.1 (L) (E)   Hematocrit 34.0 - 45.0 % 33.2 (L) (E) 34.6 (E) 28.1 (L) (E) 29.1 (L) (E) 25.7 (L) 27.9 (L) (E)   Platelets 155 - 369 10*3/uL 33 (L) (E) 16 (C) (E) 17 (C) (E) 31 (L) (E) 26 (C) 27 (L) (E)   RDW 11.5 - 14.5 % 15.6 (H) (E) 16.4 (H) (E) 17.0 (H) (E) 16.9 (H) (E) 15.8 (H) 17.5 (H) (E)   MCV 79 - 98 fL 90 (E) 92 (E) 91 (E) 91 (E) 93.5 94 (E)   MCH 26.0 - 32.0 pg 29.8 (E) 29.2 (E) 30.3 (E) 30.1 (E) 30.5 30.6 (E)   MCHC 30.7 - 35.5 " g/dL 33.1 (E) 31.8 (E) 33.5 (E) 33.0 (E) 32.7 32.6 (E)   MPV 8.8 - 12.5 fL 11.5 (E) See Comment (E) 11.3 (E) 11.9 (E) 11.8 11.1 (E)   (C): Data is critically low  (L): Data is abnormally low  (H): Data is abnormally high  (E): External lab result    ASSESSMENT / PLAN  Diagnoses and all orders for this visit:    1. Thrombocytopenia (Primary)  -     CBC (No Diff)    2. Secondary esophageal varices with bleeding  -     Ambulatory referral for Screening EGD    3. Liver cirrhosis secondary to BONILLA      # BONILLA cirrhosis, decompensated with esophageal variceal bleed and ascites  H/O CKD, DM, CAD s/p stent in 2018, hx of ischemic cardiomyopathy-   Listed for [facility] at   >> EGD for variceal surveillance   >>Obtain CBC prior to EGD; last platelet count 27 on 8/14/2023  >> Continue to follow with [facility] transplant for cirrhosis; next follow-up scheduled 11/1/2023      I discussed the patients findings and my recommendations with patient    JOSE D Sood

## 2023-10-24 DIAGNOSIS — E78.2 MIXED HYPERLIPIDEMIA: ICD-10-CM

## 2023-10-24 RX ORDER — ROSUVASTATIN CALCIUM 20 MG/1
20 TABLET, COATED ORAL DAILY
Qty: 90 TABLET | Refills: 3 | Status: SHIPPED | OUTPATIENT
Start: 2023-10-24

## 2023-11-07 ENCOUNTER — OFFICE VISIT (OUTPATIENT)
Dept: INTERNAL MEDICINE | Facility: CLINIC | Age: 63
End: 2023-11-07
Payer: MEDICARE

## 2023-11-07 VITALS
BODY MASS INDEX: 30.07 KG/M2 | OXYGEN SATURATION: 99 % | WEIGHT: 192 LBS | DIASTOLIC BLOOD PRESSURE: 60 MMHG | RESPIRATION RATE: 18 BRPM | SYSTOLIC BLOOD PRESSURE: 140 MMHG | HEART RATE: 77 BPM | TEMPERATURE: 97.3 F

## 2023-11-07 DIAGNOSIS — F41.8 DEPRESSION WITH ANXIETY: ICD-10-CM

## 2023-11-07 DIAGNOSIS — R21 RASH: ICD-10-CM

## 2023-11-07 DIAGNOSIS — E11.42 TYPE 2 DIABETES MELLITUS WITH DIABETIC POLYNEUROPATHY, WITH LONG-TERM CURRENT USE OF INSULIN: Primary | Chronic | ICD-10-CM

## 2023-11-07 DIAGNOSIS — Z12.31 ENCOUNTER FOR SCREENING MAMMOGRAM FOR MALIGNANT NEOPLASM OF BREAST: ICD-10-CM

## 2023-11-07 DIAGNOSIS — J06.9 UPPER RESPIRATORY TRACT INFECTION, UNSPECIFIED TYPE: ICD-10-CM

## 2023-11-07 DIAGNOSIS — Z79.4 TYPE 2 DIABETES MELLITUS WITH DIABETIC POLYNEUROPATHY, WITH LONG-TERM CURRENT USE OF INSULIN: Primary | Chronic | ICD-10-CM

## 2023-11-07 PROCEDURE — 82043 UR ALBUMIN QUANTITATIVE: CPT | Performed by: NURSE PRACTITIONER

## 2023-11-07 PROCEDURE — 82570 ASSAY OF URINE CREATININE: CPT | Performed by: NURSE PRACTITIONER

## 2023-11-07 RX ORDER — FLUOXETINE 10 MG/1
10 CAPSULE ORAL DAILY
Qty: 30 CAPSULE | Refills: 1 | Status: SHIPPED | OUTPATIENT
Start: 2023-11-07

## 2023-11-07 RX ORDER — LANCETS 30 GAUGE
EACH MISCELLANEOUS
Qty: 100 EACH | Refills: 5 | Status: SHIPPED | OUTPATIENT
Start: 2023-11-07

## 2023-11-07 RX ORDER — LORATADINE 10 MG/1
10 TABLET ORAL DAILY
Qty: 90 TABLET | Refills: 1 | Status: SHIPPED | OUTPATIENT
Start: 2023-11-07

## 2023-11-07 RX ORDER — BLOOD-GLUCOSE METER
1 KIT MISCELLANEOUS AS NEEDED
Qty: 1 EACH | Refills: 0 | Status: SHIPPED | OUTPATIENT
Start: 2023-11-07

## 2023-11-07 RX ORDER — CLOBETASOL PROPIONATE 0.5 MG/G
1 CREAM TOPICAL 2 TIMES DAILY
Qty: 60 G | Refills: 1 | Status: SHIPPED | OUTPATIENT
Start: 2023-11-07

## 2023-11-07 RX ORDER — CEFDINIR 300 MG/1
300 CAPSULE ORAL 2 TIMES DAILY
Qty: 20 CAPSULE | Refills: 0 | Status: SHIPPED | OUTPATIENT
Start: 2023-11-07

## 2023-11-07 RX ORDER — BLOOD SUGAR DIAGNOSTIC
STRIP MISCELLANEOUS
Qty: 100 EACH | Refills: 5 | Status: SHIPPED | OUTPATIENT
Start: 2023-11-07

## 2023-11-07 NOTE — PROGRESS NOTES
Subjective   Chief Complaint   Patient presents with    Follow-up     3 month follow up       Lyndsey Luna is a 63 y.o. female.     The patient is here today for follow-up. The patient is on dialysis and liver failure. She is accompanied by an adult male.    The patient is doing well. The adult male reports the patient's platelets were low. The patient is feeling better today. She went to [facility].    The patient's hemoglobin A1c was 7.3 percent 1 month ago. She states her blood glucose levels have been running high at home. The patient takes NovoLog; however, her new insurance will not cover NovoLog. Humalog made her arm numb. She takes Tresiba.     The patient has been experiencing watery eyes and ear pain. She had a cough last night. The patient has a scab on her ear. She uses Q-tips to clean her ears. She is allergic to PENICILLIN.    The patient has been on Prozac in the past and denies remembering if it was effective. She mentions that she is crying more lately.     The patient has a rash on her lower extremities and neck. She denies taking Claritin anymore.  I have reviewed the following portions of the patient's history and confirmed they are accurate: allergies, current medications, past family history, past medical history, past social history, past surgical history, and problem list    Review of Systems  Pertinent items are noted in HPI.     Current Outpatient Medications on File Prior to Visit   Medication Sig    ciprofloxacin (CIPRO) 250 MG tablet Take 1 tablet by mouth Daily.    ezetimibe (ZETIA) 10 MG tablet Take 1 tablet by mouth Daily.    gabapentin (NEURONTIN) 100 MG capsule TAKE ONE CAPSULE BY MOUTH FOUR TIMES A DAY AS NEEDED (FOOT/LEG PAINN)    Insulin Pen Needle (BD Pen Needle Bisi 2nd Gen) 32G X 4 MM misc Inject 1 each under the skin into the appropriate area as directed Daily. use as directed    methocarbamol (ROBAXIN) 500 MG tablet Take 1 tablet by mouth 4 (Four) Times a  Day.    midodrine (PROAMATINE) 5 MG tablet Take 1 tablet by mouth 3 (Three) Times a Day Before Meals.    nitroglycerin (NITROSTAT) 0.4 MG SL tablet Take 1 tablet by mouth As Needed for Chest Pain.    ondansetron ODT (Zofran ODT) 4 MG disintegrating tablet Place 1 tablet on the tongue 2 (Two) Times a Day As Needed for Nausea or Vomiting.    promethazine (PHENERGAN) 12.5 MG tablet     rosuvastatin (CRESTOR) 20 MG tablet TAKE ONE TABLET BY MOUTH DAILY    sennosides-docusate (PERICOLACE) 8.6-50 MG per tablet Take 1 tablet by mouth Daily.    Insulin Degludec (Tresiba FlexTouch) 200 UNIT/ML solution pen-injector pen injection Inject 170 Units under the skin into the appropriate area as directed Every Night.    rifAXIMin (XIFAXAN) 200 MG tablet Take 1 tablet by mouth 2 (Two) Times a Day. (Patient not taking: Reported on 8/15/2023)     No current facility-administered medications on file prior to visit.       Objective   Vitals:    11/07/23 1359   BP: 140/60   BP Location: Right arm   Patient Position: Sitting   Cuff Size: Adult   Pulse: 77   Resp: 18   Temp: 97.3 °F (36.3 °C)   TempSrc: Temporal   SpO2: 99%   Weight: 87.1 kg (192 lb)   PainSc: 0-No pain     Body mass index is 30.07 kg/m².    Physical Exam  Vitals reviewed.   Constitutional:       Appearance: Normal appearance. She is well-developed.   HENT:      Head: Normocephalic and atraumatic.      Right Ear: Tympanic membrane is erythematous.      Left Ear: Tympanic membrane is erythematous.      Nose: Nasal tenderness, mucosal edema and congestion present.      Right Sinus: Maxillary sinus tenderness and frontal sinus tenderness present.      Left Sinus: Maxillary sinus tenderness and frontal sinus tenderness present.      Mouth/Throat:      Pharynx: Posterior oropharyngeal erythema present.   Eyes:      General: Lids are normal.      Conjunctiva/sclera: Conjunctivae normal.      Pupils: Pupils are equal, round, and reactive to light.   Neck:      Thyroid: No  thyromegaly.      Trachea: Trachea normal.   Cardiovascular:      Rate and Rhythm: Normal rate and regular rhythm.      Heart sounds: Normal heart sounds.   Pulmonary:      Effort: Pulmonary effort is normal. No respiratory distress.      Breath sounds: Normal breath sounds.   Musculoskeletal:      Comments: In transport/wheel chair - BLE weakness.    Skin:     General: Skin is warm and dry.      Findings: Erythema present.      Comments: Arm bruising. Irritated erythema on back of neck.    Neurological:      Mental Status: She is alert and oriented to person, place, and time.      GCS: GCS eye subscore is 4. GCS verbal subscore is 5. GCS motor subscore is 6.   Psychiatric:         Attention and Perception: Attention normal.         Mood and Affect: Mood normal.         Speech: Speech normal.         Behavior: Behavior normal. Behavior is cooperative.         Thought Content: Thought content normal.         Assessment & Plan   Problem List Items Addressed This Visit       Type 2 diabetes mellitus, with long-term current use of insulin - Primary (Chronic)    Relevant Medications    insulin aspart (novoLOG FLEXPEN) 100 UNIT/ML solution pen-injector sc pen    Lancets misc    glucose blood (Glucose Meter Test) test strip    glucose monitor monitoring kit    Other Relevant Orders    Microalbumin / Creatinine Urine Ratio - Urine, Clean Catch (Completed)    Depression with anxiety    Relevant Medications    FLUoxetine (PROzac) 10 MG capsule     Other Visit Diagnoses       Upper respiratory tract infection, unspecified type        Relevant Medications    cefdinir (OMNICEF) 300 MG capsule    Rash        Relevant Medications    loratadine (CLARITIN) 10 MG tablet    clobetasol (TEMOVATE) 0.05 % cream    Encounter for screening mammogram for malignant neoplasm of breast        Relevant Orders    Mammo Screening Digital Tomosynthesis Bilateral With CAD         1.Type 2 diabetes mellitus  - Chronic, unstable.  - Continue  Tresiba.  - We will attempt to get NovoLog preauthorized.  - Follow diabetic diet.  - Recheck hemoglobin A1c in 3 months.    2. Upper respiratory infection  - New, unstable.  - Start cefdinir.    3. Depression with anxiety  - Chronic, unstable.  - Start Prozac.    4. Rash  - New, unstable.  - Start Claritin and clobetasol cream.      Current Outpatient Medications:     ciprofloxacin (CIPRO) 250 MG tablet, Take 1 tablet by mouth Daily., Disp: , Rfl:     ezetimibe (ZETIA) 10 MG tablet, Take 1 tablet by mouth Daily., Disp: 90 tablet, Rfl: 3    gabapentin (NEURONTIN) 100 MG capsule, TAKE ONE CAPSULE BY MOUTH FOUR TIMES A DAY AS NEEDED (FOOT/LEG PAINN), Disp: 120 capsule, Rfl: 5    insulin aspart (novoLOG FLEXPEN) 100 UNIT/ML solution pen-injector sc pen, Inject into skin 3 times daily with meals as directed per sliding scale provided. Do not exceed 40 units in 24 hours., Disp: 15 mL, Rfl: 3    Insulin Pen Needle (BD Pen Needle Bisi 2nd Gen) 32G X 4 MM misc, Inject 1 each under the skin into the appropriate area as directed Daily. use as directed, Disp: 100 each, Rfl: 5    loratadine (CLARITIN) 10 MG tablet, Take 1 tablet by mouth Daily., Disp: 90 tablet, Rfl: 1    methocarbamol (ROBAXIN) 500 MG tablet, Take 1 tablet by mouth 4 (Four) Times a Day., Disp: , Rfl:     midodrine (PROAMATINE) 5 MG tablet, Take 1 tablet by mouth 3 (Three) Times a Day Before Meals., Disp: 270 tablet, Rfl: 1    nitroglycerin (NITROSTAT) 0.4 MG SL tablet, Take 1 tablet by mouth As Needed for Chest Pain., Disp: 100 tablet, Rfl: 1    ondansetron ODT (Zofran ODT) 4 MG disintegrating tablet, Place 1 tablet on the tongue 2 (Two) Times a Day As Needed for Nausea or Vomiting., Disp: 30 tablet, Rfl: 1    promethazine (PHENERGAN) 12.5 MG tablet, , Disp: , Rfl:     rosuvastatin (CRESTOR) 20 MG tablet, TAKE ONE TABLET BY MOUTH DAILY, Disp: 90 tablet, Rfl: 3    sennosides-docusate (PERICOLACE) 8.6-50 MG per tablet, Take 1 tablet by mouth Daily., Disp: , Rfl:      cefdinir (OMNICEF) 300 MG capsule, Take 1 capsule by mouth 2 (Two) Times a Day., Disp: 20 capsule, Rfl: 0    clobetasol (TEMOVATE) 0.05 % cream, Apply 1 application  topically to the appropriate area as directed 2 (Two) Times a Day., Disp: 60 g, Rfl: 1    FLUoxetine (PROzac) 10 MG capsule, Take 1 capsule by mouth Daily., Disp: 30 capsule, Rfl: 1    glucose blood (Glucose Meter Test) test strip, Check blood sugars 3 times daily as needed., Disp: 100 each, Rfl: 5    glucose monitor monitoring kit, Use 1 each As Needed (Check blood sugars 3 times daily as needed.)., Disp: 1 each, Rfl: 0    Insulin Degludec (Tresiba FlexTouch) 200 UNIT/ML solution pen-injector pen injection, Inject 170 Units under the skin into the appropriate area as directed Every Night., Disp: 27 mL, Rfl: 3    Lancets misc, Check blood sugars 3 times daily as needed., Disp: 100 each, Rfl: 5    pantoprazole (PROTONIX) 40 MG EC tablet, TAKE ONE TABLET BY MOUTH DAILY, Disp: 90 tablet, Rfl: 3    rifAXIMin (XIFAXAN) 200 MG tablet, Take 1 tablet by mouth 2 (Two) Times a Day. (Patient not taking: Reported on 8/15/2023), Disp: , Rfl:        Plan of care reviewed with the patient at the conclusion of today's visit.  Education was provided regarding diagnosis, management, and any prescribed or recommended OTC medications.  Patient verbalized understanding of and agreement with management plan.     Return in about 3 months (around 2/7/2024), or if symptoms worsen or fail to improve, for Follow-up.      Transcribed from ambient dictation for JOSE D Zaragoza by JOSE D Zaragoza.  11/07/23   14:44 EST    Patient or patient representative verbalized consent to the visit recording.  I have personally performed the services described in this document as transcribed by the above individual, and it is both accurate and complete.    Transcribed from ambient dictation for JOSE D Zaragoza by Tesfaye Simpson.  11/07/23   16:46  EST    Patient or patient representative verbalized consent to the visit recording.  I have personally performed the services described in this document as transcribed by the above individual, and it is both accurate and complete.

## 2023-11-08 LAB
ALBUMIN UR-MCNC: 17.8 MG/DL
CREAT UR-MCNC: 110.2 MG/DL
MICROALBUMIN/CREAT UR: 161.5 MG/G (ref 0–29)

## 2023-11-23 DIAGNOSIS — K72.10 CHRONIC LIVER FAILURE WITHOUT HEPATIC COMA: ICD-10-CM

## 2023-11-24 RX ORDER — PANTOPRAZOLE SODIUM 40 MG/1
40 TABLET, DELAYED RELEASE ORAL DAILY
Qty: 90 TABLET | Refills: 3 | Status: SHIPPED | OUTPATIENT
Start: 2023-11-24

## 2023-12-01 DIAGNOSIS — E78.2 MIXED HYPERLIPIDEMIA: ICD-10-CM

## 2023-12-01 RX ORDER — EZETIMIBE 10 MG/1
10 TABLET ORAL DAILY
Qty: 90 TABLET | Refills: 3 | Status: SHIPPED | OUTPATIENT
Start: 2023-12-01

## 2023-12-05 DIAGNOSIS — Z79.4 TYPE 2 DIABETES MELLITUS WITH HYPERGLYCEMIA, WITH LONG-TERM CURRENT USE OF INSULIN: ICD-10-CM

## 2023-12-05 DIAGNOSIS — E11.65 TYPE 2 DIABETES MELLITUS WITH HYPERGLYCEMIA, WITH LONG-TERM CURRENT USE OF INSULIN: ICD-10-CM

## 2023-12-05 RX ORDER — INSULIN DEGLUDEC 200 U/ML
INJECTION, SOLUTION SUBCUTANEOUS
Qty: 27 ML | Refills: 3 | Status: SHIPPED | OUTPATIENT
Start: 2023-12-05

## 2023-12-06 ENCOUNTER — TELEPHONE (OUTPATIENT)
Dept: GASTROENTEROLOGY | Facility: CLINIC | Age: 63
End: 2023-12-06

## 2023-12-06 NOTE — TELEPHONE ENCOUNTER
Caller: Lyndsey Luna    Relationship to patient: Self    Best call back number: [phone]    Patient is needing: PATIENT NEEDS PAPERWORK FOR PROCEDURE REMAILED TO HER PLEASE. SHE IS NOT FINDING IT IN THE LETTERS IN HER MY CHART

## 2024-02-20 ENCOUNTER — OFFICE VISIT (OUTPATIENT)
Dept: INTERNAL MEDICINE | Facility: CLINIC | Age: 64
End: 2024-02-20
Payer: MEDICARE

## 2024-02-20 VITALS
BODY MASS INDEX: 30.76 KG/M2 | TEMPERATURE: 97.1 F | SYSTOLIC BLOOD PRESSURE: 152 MMHG | DIASTOLIC BLOOD PRESSURE: 56 MMHG | HEIGHT: 67 IN | OXYGEN SATURATION: 100 % | HEART RATE: 82 BPM | WEIGHT: 196 LBS

## 2024-02-20 DIAGNOSIS — I10 ESSENTIAL HYPERTENSION: ICD-10-CM

## 2024-02-20 DIAGNOSIS — E11.42 TYPE 2 DIABETES MELLITUS WITH DIABETIC POLYNEUROPATHY, WITH LONG-TERM CURRENT USE OF INSULIN: Primary | Chronic | ICD-10-CM

## 2024-02-20 DIAGNOSIS — E78.2 MIXED HYPERLIPIDEMIA: ICD-10-CM

## 2024-02-20 DIAGNOSIS — K74.60 LIVER CIRRHOSIS SECONDARY TO NASH: ICD-10-CM

## 2024-02-20 DIAGNOSIS — K75.81 LIVER CIRRHOSIS SECONDARY TO NASH: ICD-10-CM

## 2024-02-20 DIAGNOSIS — N18.5 STAGE 5 CHRONIC KIDNEY DISEASE: ICD-10-CM

## 2024-02-20 DIAGNOSIS — F51.01 PRIMARY INSOMNIA: ICD-10-CM

## 2024-02-20 DIAGNOSIS — Z79.4 TYPE 2 DIABETES MELLITUS WITH DIABETIC POLYNEUROPATHY, WITH LONG-TERM CURRENT USE OF INSULIN: Primary | Chronic | ICD-10-CM

## 2024-02-20 LAB
EXPIRATION DATE: NORMAL
HBA1C MFR BLD: 5.6 % (ref 4.5–5.7)
Lab: NORMAL

## 2024-02-20 RX ORDER — HYDROXYZINE PAMOATE 50 MG/1
50 CAPSULE ORAL NIGHTLY PRN
Qty: 90 CAPSULE | Refills: 0 | Status: SHIPPED | OUTPATIENT
Start: 2024-02-20

## 2024-02-20 NOTE — PROGRESS NOTES
Subjective   Chief Complaint   Patient presents with    Hyperlipidemia    Hypertension    Diabetes      Lyndsey Luna is a 63 y.o. female.     The patient is here today for a follow-up on diabetes. She is accompanied by an adult female.    The patient states that she does not feel well, but she feels well on some days. She had a blood transfusion 2 weeks ago through her hepatologist. She was told that her blood count was 8 on 02/19/2024 and it was 6.5 when they did the blood transfusion. She was taken off her list until she had her colonoscopy because they keep saying she is losing her blood. She is scheduled to have a colonoscopy on 03/04/2024. She has been paying for the NovoLog because her insurance does not pay for it. She can not take Humalog because it made her sick. She was administered the Humalog in the hospital. She was hospitalized for her hernia ruptured in the end of 06/2023. She was taking Ozempic. She had tried Humalog in 2020 and then in 05/2023 without any improvement. Her  told her that she needs a preauthorization from her provider to get her Novolog approved. She switched her insurance to [employer] in 02/2024. She picked up Tresiba in $70 few days ago. Her finger stick test was not done today. She mentioned that at the evening of last night when she left at , she ate a pinch of roll, 2-3 bites of baked potato and chicken fritters and her glucose level goes up as 471 mg/dL. She injected 30 Novolog and she injected another 30 Novolog to get her glucose level down. Her nephrologist told her to take 30 Novolog a day up to 3 times a day. She was told to take Tresiba 100 unit in the morning and then 70 unit at bed time. Too much use of Novolog is bothering her.    Her blood pressure is elevated today. She takes 9 tablets a day to keep it up. She does not follow up with her nephrologist anymore. She consulted her cardiologist  (@09:04) in 01/2024 who prescribed her  midodrine. She told him that she has been experiencing shortness of breath. She wore the monitor and everything came back normal. She was told that her heart is doing good. She would have to call her cardiologist to schedule an appointment. At that time they checks her blood pressure which runs normal. She does not check her blood pressure at home. When she sits at night, she feels extreme cold. So she has to keep a heater besides her. She uses electric blanket.    She followed with her nephrologist, (20:47-20:48) for dialysis. She does not do dialysis now. Her nephrologist told her that she do not need to follow back. She does her paracentesis twice a week. The albumin was administrated on the same day when paracentesis was done. When she visited her nephrologist last time, she was doing dialysis then her nephrologist told  to lower down the machine because she was getting sick on it. She had a hernia and was told that she does not need to be on dialysis that long. She was told that her kidneys are working.    The patient sees a hepatologist. Her platelets were checked and they are low. She had blood transfusion on same day.    The patient has been taking Prozac.    The patient is taking gabapentin 2 times a day.    The patient is still taking her cholesterol medication.    The patient had an echocardiogram in 01/2024. Her cardiologist mentioned that she has heart murmur.    She had blood work done on 02/16/24. Yesterday she had protein checked.    Paracenstisis - twice weekly and monitors BP, potassium and platelets.   Cardiology, dr. brasher - once a year, had holter monitor, did recent carotid dopp and echo  Not following up with nephrology  - dr. Aguilar    I have reviewed the following portions of the patient's history and confirmed they are accurate: allergies, current medications, past family history, past medical history, past social history, past surgical history, and problem list    Review of  "Systems  Pertinent items are noted in HPI.     Current Outpatient Medications on File Prior to Visit   Medication Sig    clobetasol (TEMOVATE) 0.05 % cream Apply 1 application  topically to the appropriate area as directed 2 (Two) Times a Day.    ezetimibe (ZETIA) 10 MG tablet TAKE ONE TABLET BY MOUTH DAILY    glucose blood (Glucose Meter Test) test strip Check blood sugars 3 times daily as needed.    glucose monitor monitoring kit Use 1 each As Needed (Check blood sugars 3 times daily as needed.).    loratadine (CLARITIN) 10 MG tablet Take 1 tablet by mouth Daily.    methocarbamol (ROBAXIN) 500 MG tablet Take 1 tablet by mouth 4 (Four) Times a Day.    nitroglycerin (NITROSTAT) 0.4 MG SL tablet Take 1 tablet by mouth As Needed for Chest Pain.    pantoprazole (PROTONIX) 40 MG EC tablet TAKE ONE TABLET BY MOUTH DAILY    promethazine (PHENERGAN) 12.5 MG tablet     rifAXIMin (XIFAXAN) 200 MG tablet Take 1 tablet by mouth 2 (Two) Times a Day. (Patient not taking: Reported on 8/15/2023)    rosuvastatin (CRESTOR) 20 MG tablet TAKE ONE TABLET BY MOUTH DAILY    sennosides-docusate (PERICOLACE) 8.6-50 MG per tablet Take 1 tablet by mouth Daily.    Tresiba FlexTouch 200 UNIT/ML solution pen-injector pen injection INJECT 170 UNITS UNDER THE SKIN (INTO THE APPROPRIATE AREA) EVERY NIGHT     No current facility-administered medications on file prior to visit.       Objective   Vitals:    02/20/24 1639   BP: 152/56   BP Location: Right arm   Patient Position: Sitting   Pulse: 82   Temp: 97.1 °F (36.2 °C)   TempSrc: Temporal   SpO2: 100%   Weight: 88.9 kg (196 lb)   Height: 170.2 cm (67.01\")     Body mass index is 30.69 kg/m².    Physical Exam  Vitals reviewed.   Constitutional:       Appearance: Normal appearance. She is well-developed.   HENT:      Head: Normocephalic and atraumatic.      Nose: Nose normal.   Eyes:      General: Lids are normal.      Conjunctiva/sclera: Conjunctivae normal.      Pupils: Pupils are equal, round, " and reactive to light.   Neck:      Thyroid: No thyromegaly.      Trachea: Trachea normal.   Cardiovascular:      Rate and Rhythm: Normal rate and regular rhythm.      Heart sounds: Murmur heard.   Pulmonary:      Effort: Pulmonary effort is normal. No respiratory distress.      Breath sounds: Normal breath sounds.   Skin:     General: Skin is warm and dry.   Neurological:      Mental Status: She is alert and oriented to person, place, and time.      GCS: GCS eye subscore is 4. GCS verbal subscore is 5. GCS motor subscore is 6.   Psychiatric:         Attention and Perception: Attention normal.         Mood and Affect: Mood normal.         Speech: Speech normal.         Behavior: Behavior normal. Behavior is cooperative.         Thought Content: Thought content normal.         Assessment & Plan   Problem List Items Addressed This Visit       Type 2 diabetes mellitus, with long-term current use of insulin - Primary (Chronic)    Relevant Orders    POC Glycosylated Hemoglobin (Hb A1C) (Completed)    Liver cirrhosis secondary to BONILLA    Mixed hyperlipidemia    Essential hypertension     Other Visit Diagnoses       Stage 5 chronic kidney disease        Primary insomnia        Relevant Medications    hydrOXYzine pamoate (VISTARIL) 50 MG capsule           Type 2 diabetes  Chronic, unstable.   Her A1c is 5.6 percent. Concern for hypoglycemic events.   Start NovoLog sliding scale 3 times a day with meals.   Discontinue novolog 30 units with meals.   Continue Tresiba.   Follow diabetic diet.   Follow up in 2 weeks for recheck of how blood sugars have been doing.   Patient will keep a log of this at home.   She will follow up sooner with any reports of hypoglycemia.    Hypertension with hypotension during paracentesis treatments  Chronic, unstable.   Continue midodrine.   Discussed with patient that the midodrine may be keeping her blood pressure stable during paracentesis treatments, but she may be running high on other days,  she will keep a log of blood pressure at home.   Follow up in 2 weeks.   Consider following up with cardiology sooner.   Patient will come back for fasting lipid panel and CMP.    Stage 5 chronic kidney disease  Chronic, unstable.   Her potassium level stays low.   Encouraged patient to schedule appointment with nephrologist for follow-up.   Patient will come back for CMP for kidney function and potassium check.    Mixed hyperlipidemia  Chronic, stable.   Patient's last fasting lipid panel in 6 months ago was normal and at goal.  Continue Crestor and Zetia.   Patient will come back for fasting lipid panel and CMP.   Follow heart healthy, low cholesterol, high fiber diet.    Liver cirrhosis secondary to BONILLA  Chronic, unstable.   Continue follow-ups with [facility] gastroenterology and liver team checking.   Patient will come back for CMP.    Insomnia  Chronic, unstable.   Start hydroxyzine.   Reviewed multiple medication side effects for insomnia and many of these are not appropriate due to patient's liver and kidney failure.  Hydroxyzine does not have any contraindications for liver and kidney failure.        Current Outpatient Medications:     Cholecalciferol (Vitamin D) 50 MCG (2000 UT) tablet, Take 1 tablet by mouth Daily., Disp: 90 tablet, Rfl: 1    clobetasol (TEMOVATE) 0.05 % cream, Apply 1 application  topically to the appropriate area as directed 2 (Two) Times a Day., Disp: 60 g, Rfl: 1    ezetimibe (ZETIA) 10 MG tablet, TAKE ONE TABLET BY MOUTH DAILY, Disp: 90 tablet, Rfl: 3    gabapentin (NEURONTIN) 100 MG capsule, TAKE 1 CAPSULE BY MOUTH 4 TIMES A DAY AS NEEDED FOR FOOT/LEG PAIN, Disp: 120 capsule, Rfl: 2    glucose blood (Glucose Meter Test) test strip, Check blood sugars 3 times daily as needed., Disp: 100 each, Rfl: 5    glucose blood (Glucose Meter Test) test strip, Check blood sugars 5 times daily as needed., Disp: 200 each, Rfl: 5    glucose monitor monitoring kit, Use 1 each As Needed  (Check blood sugars 3 times daily as needed.)., Disp: 1 each, Rfl: 0    glucose monitor monitoring kit, Use 1 each As Needed (Check blood sugars 3 times daily as needed.)., Disp: 1 each, Rfl: 0    hydrOXYzine pamoate (VISTARIL) 50 MG capsule, Take 1 capsule by mouth At Night As Needed for Anxiety., Disp: 90 capsule, Rfl: 0    insulin aspart (novoLOG FLEXPEN) 100 UNIT/ML solution pen-injector sc pen, Inject into skin 3 times daily with meals as directed per sliding scale provided. Do not exceed 40 units in 24 hours., Disp: 15 mL, Rfl: 3    Insulin Pen Needle (Pen Needles) 32G X 4 MM misc, Use 1 each Every Night., Disp: 30 each, Rfl: 1    Lancets misc, Check blood sugars 5 times daily as needed., Disp: 200 each, Rfl: 5    loratadine (CLARITIN) 10 MG tablet, Take 1 tablet by mouth Daily., Disp: 90 tablet, Rfl: 1    methocarbamol (ROBAXIN) 500 MG tablet, Take 1 tablet by mouth 4 (Four) Times a Day., Disp: , Rfl:     midodrine (PROAMATINE) 5 MG tablet, Take 1 tablet by mouth 2 (Two) Times a Day., Disp: 180 tablet, Rfl: 1    multivitamin (Multiple Vitamin) tablet tablet, Take 1 tablet by mouth Daily., Disp: 90 tablet, Rfl: 1    nitroglycerin (NITROSTAT) 0.4 MG SL tablet, Take 1 tablet by mouth As Needed for Chest Pain., Disp: 100 tablet, Rfl: 1    ondansetron ODT (ZOFRAN-ODT) 4 MG disintegrating tablet, Place 1 tablet on the tongue Every 8 (Eight) Hours As Needed for Nausea or Vomiting., Disp: 30 tablet, Rfl: 2    pantoprazole (PROTONIX) 40 MG EC tablet, TAKE ONE TABLET BY MOUTH DAILY, Disp: 90 tablet, Rfl: 3    promethazine (PHENERGAN) 12.5 MG tablet, , Disp: , Rfl:     rifAXIMin (XIFAXAN) 200 MG tablet, Take 1 tablet by mouth 2 (Two) Times a Day. (Patient not taking: Reported on 8/15/2023), Disp: , Rfl:     rosuvastatin (CRESTOR) 20 MG tablet, TAKE ONE TABLET BY MOUTH DAILY, Disp: 90 tablet, Rfl: 3    sennosides-docusate (PERICOLACE) 8.6-50 MG per tablet, Take 1 tablet by mouth Daily., Disp: , Rfl:     Tresiba FlexTouch  200 UNIT/ML solution pen-injector pen injection, INJECT 170 UNITS UNDER THE SKIN (INTO THE APPROPRIATE AREA) EVERY NIGHT, Disp: 27 mL, Rfl: 3       Plan of care reviewed with the patient at the conclusion of today's visit.  Education was provided regarding diagnosis, management, and any prescribed or recommended OTC medications.  Patient verbalized understanding of and agreement with management plan.     Return in about 2 weeks (around 3/5/2024), or if symptoms worsen or fail to improve, for Follow-up.  The patient will follow up via video visit on 03/02/24.      Transcribed from ambient dictation for JOSE D Zaragoza by Skylar.  02/20/24   17:12 EST    Patient or patient representative verbalized consent to the visit recording.  I have personally performed the services described in this document as transcribed by the above individual, and it is both accurate and complete.

## 2024-02-22 DIAGNOSIS — G62.9 NEUROPATHY: ICD-10-CM

## 2024-02-22 RX ORDER — GABAPENTIN 100 MG/1
CAPSULE ORAL
Qty: 120 CAPSULE | Refills: 2 | Status: SHIPPED | OUTPATIENT
Start: 2024-02-22

## 2024-02-26 ENCOUNTER — LAB (OUTPATIENT)
Dept: INTERNAL MEDICINE | Facility: CLINIC | Age: 64
End: 2024-02-26
Payer: MEDICARE

## 2024-02-26 DIAGNOSIS — R74.8 ELEVATED LIPASE: ICD-10-CM

## 2024-02-26 DIAGNOSIS — E55.9 VITAMIN D DEFICIENCY: ICD-10-CM

## 2024-02-26 DIAGNOSIS — R06.09 DYSPNEA ON EXERTION: ICD-10-CM

## 2024-02-26 DIAGNOSIS — Z13.21 ENCOUNTER FOR VITAMIN DEFICIENCY SCREENING: ICD-10-CM

## 2024-02-26 DIAGNOSIS — Z13.0 SCREENING FOR BLOOD DISEASE: ICD-10-CM

## 2024-02-26 DIAGNOSIS — N18.5 STAGE 5 CHRONIC KIDNEY DISEASE: ICD-10-CM

## 2024-02-26 DIAGNOSIS — Z13.29 THYROID DISORDER SCREENING: ICD-10-CM

## 2024-02-26 DIAGNOSIS — Z79.4 TYPE 2 DIABETES MELLITUS WITH DIABETIC POLYNEUROPATHY, WITH LONG-TERM CURRENT USE OF INSULIN: ICD-10-CM

## 2024-02-26 DIAGNOSIS — G62.9 NEUROPATHY: ICD-10-CM

## 2024-02-26 DIAGNOSIS — K75.81 LIVER CIRRHOSIS SECONDARY TO NASH: ICD-10-CM

## 2024-02-26 DIAGNOSIS — I10 ESSENTIAL HYPERTENSION: Primary | ICD-10-CM

## 2024-02-26 DIAGNOSIS — E11.42 TYPE 2 DIABETES MELLITUS WITH DIABETIC POLYNEUROPATHY, WITH LONG-TERM CURRENT USE OF INSULIN: ICD-10-CM

## 2024-02-26 DIAGNOSIS — R79.89 ELEVATED BRAIN NATRIURETIC PEPTIDE (BNP) LEVEL: ICD-10-CM

## 2024-02-26 DIAGNOSIS — Z79.4 TYPE 2 DIABETES MELLITUS WITH DIABETIC POLYNEUROPATHY, WITH LONG-TERM CURRENT USE OF INSULIN: Primary | Chronic | ICD-10-CM

## 2024-02-26 DIAGNOSIS — E78.2 MIXED HYPERLIPIDEMIA: ICD-10-CM

## 2024-02-26 DIAGNOSIS — K74.60 LIVER CIRRHOSIS SECONDARY TO NASH: ICD-10-CM

## 2024-02-26 DIAGNOSIS — E11.42 TYPE 2 DIABETES MELLITUS WITH DIABETIC POLYNEUROPATHY, WITH LONG-TERM CURRENT USE OF INSULIN: Primary | Chronic | ICD-10-CM

## 2024-02-26 PROCEDURE — 36415 COLL VENOUS BLD VENIPUNCTURE: CPT | Performed by: NURSE PRACTITIONER

## 2024-02-27 ENCOUNTER — TELEPHONE (OUTPATIENT)
Dept: INTERNAL MEDICINE | Facility: CLINIC | Age: 64
End: 2024-02-27
Payer: MEDICARE

## 2024-02-27 ENCOUNTER — TELEPHONE (OUTPATIENT)
Dept: GASTROENTEROLOGY | Facility: CLINIC | Age: 64
End: 2024-02-27
Payer: MEDICARE

## 2024-02-27 LAB
25(OH)D3 SERPL-MCNC: <6 NG/ML (ref 30–100)
ALBUMIN SERPL-MCNC: 4 G/DL (ref 3.5–5.2)
ALBUMIN/GLOB SERPL: 2.5 G/DL
ALP SERPL-CCNC: 92 U/L (ref 39–117)
ALT SERPL W P-5'-P-CCNC: 19 U/L (ref 1–33)
ANION GAP SERPL CALCULATED.3IONS-SCNC: 16.8 MMOL/L (ref 5–15)
AST SERPL-CCNC: 21 U/L (ref 1–32)
BASOPHILS # BLD AUTO: 0.03 10*3/MM3 (ref 0–0.2)
BASOPHILS NFR BLD AUTO: 1 % (ref 0–1.5)
BILIRUB SERPL-MCNC: 0.8 MG/DL (ref 0–1.2)
BUN SERPL-MCNC: 37 MG/DL (ref 8–23)
BUN/CREAT SERPL: 10.8 (ref 7–25)
CALCIUM SPEC-SCNC: 8.6 MG/DL (ref 8.6–10.5)
CHLORIDE SERPL-SCNC: 110 MMOL/L (ref 98–107)
CHOLEST SERPL-MCNC: 91 MG/DL (ref 0–200)
CO2 SERPL-SCNC: 15.2 MMOL/L (ref 22–29)
CREAT SERPL-MCNC: 3.44 MG/DL (ref 0.57–1)
DEPRECATED RDW RBC AUTO: 50.6 FL (ref 37–54)
EGFRCR SERPLBLD CKD-EPI 2021: 14.4 ML/MIN/1.73
EOSINOPHIL # BLD AUTO: 0.16 10*3/MM3 (ref 0–0.4)
EOSINOPHIL NFR BLD AUTO: 5.1 % (ref 0.3–6.2)
ERYTHROCYTE [DISTWIDTH] IN BLOOD BY AUTOMATED COUNT: 15.4 % (ref 12.3–15.4)
FOLATE SERPL-MCNC: 8.25 NG/ML (ref 4.78–24.2)
GLOBULIN UR ELPH-MCNC: 1.6 GM/DL
GLUCOSE SERPL-MCNC: 79 MG/DL (ref 65–99)
HCT VFR BLD AUTO: 23.3 % (ref 34–46.6)
HDLC SERPL-MCNC: 30 MG/DL (ref 40–60)
HGB BLD-MCNC: 7.4 G/DL (ref 12–15.9)
LDLC SERPL CALC-MCNC: 40 MG/DL (ref 0–100)
LDLC/HDLC SERPL: 1.25 {RATIO}
LIPASE SERPL-CCNC: 82 U/L (ref 13–60)
LYMPHOCYTES # BLD AUTO: 0.31 10*3/MM3 (ref 0.7–3.1)
LYMPHOCYTES NFR BLD AUTO: 9.8 % (ref 19.6–45.3)
MCH RBC QN AUTO: 28.2 PG (ref 26.6–33)
MCHC RBC AUTO-ENTMCNC: 31.8 G/DL (ref 31.5–35.7)
MCV RBC AUTO: 88.9 FL (ref 79–97)
MONOCYTES # BLD AUTO: 0.33 10*3/MM3 (ref 0.1–0.9)
MONOCYTES NFR BLD AUTO: 10.5 % (ref 5–12)
NEUTROPHILS NFR BLD AUTO: 2.3 10*3/MM3 (ref 1.7–7)
NEUTROPHILS NFR BLD AUTO: 73 % (ref 42.7–76)
NT-PROBNP SERPL-MCNC: 690.2 PG/ML (ref 0–900)
PLATELET # BLD AUTO: 24 10*3/MM3 (ref 140–450)
POTASSIUM SERPL-SCNC: 3.8 MMOL/L (ref 3.5–5.2)
PROT SERPL-MCNC: 5.6 G/DL (ref 6–8.5)
RBC # BLD AUTO: 2.62 10*6/MM3 (ref 3.77–5.28)
SODIUM SERPL-SCNC: 142 MMOL/L (ref 136–145)
TRIGL SERPL-MCNC: 117 MG/DL (ref 0–150)
TSH SERPL DL<=0.05 MIU/L-ACNC: 2.57 UIU/ML (ref 0.27–4.2)
VIT B12 BLD-MCNC: 344 PG/ML (ref 211–946)
VLDLC SERPL-MCNC: 21 MG/DL (ref 5–40)
WBC NRBC COR # BLD AUTO: 3.15 10*3/MM3 (ref 3.4–10.8)

## 2024-02-27 PROCEDURE — 82746 ASSAY OF FOLIC ACID SERUM: CPT | Performed by: NURSE PRACTITIONER

## 2024-02-27 PROCEDURE — 82607 VITAMIN B-12: CPT | Performed by: NURSE PRACTITIONER

## 2024-02-27 PROCEDURE — 80061 LIPID PANEL: CPT | Performed by: NURSE PRACTITIONER

## 2024-02-27 PROCEDURE — 82306 VITAMIN D 25 HYDROXY: CPT | Performed by: NURSE PRACTITIONER

## 2024-02-27 PROCEDURE — 84443 ASSAY THYROID STIM HORMONE: CPT | Performed by: NURSE PRACTITIONER

## 2024-02-27 PROCEDURE — 80053 COMPREHEN METABOLIC PANEL: CPT | Performed by: NURSE PRACTITIONER

## 2024-02-27 PROCEDURE — 85025 COMPLETE CBC W/AUTO DIFF WBC: CPT | Performed by: NURSE PRACTITIONER

## 2024-02-27 PROCEDURE — 83690 ASSAY OF LIPASE: CPT | Performed by: NURSE PRACTITIONER

## 2024-02-27 PROCEDURE — 83880 ASSAY OF NATRIURETIC PEPTIDE: CPT | Performed by: NURSE PRACTITIONER

## 2024-02-27 NOTE — TELEPHONE ENCOUNTER
I called patient and no answer. After several attempts I was able to contact patient's . I informed him Ezekiel JEFFERY received a critical  lab result  Hemoglobin of 7.4. Ezekiel JEFFERY advises patient to go to the [facility] ER to be evaluated. Patient's  verbalized understanding and asked me to send patient a my chart message. I did send her a message and was able to reach her on her mobile phone. I advised patient of results and instructions again.  Patient denied any lethargy or fatigue. Patient verbalized understanding and stated she would contact her [facility] liver coordinator.

## 2024-03-05 ENCOUNTER — TELEMEDICINE (OUTPATIENT)
Dept: INTERNAL MEDICINE | Facility: CLINIC | Age: 64
End: 2024-03-05
Payer: MEDICARE

## 2024-03-05 DIAGNOSIS — E11.42 TYPE 2 DIABETES MELLITUS WITH DIABETIC POLYNEUROPATHY, WITH LONG-TERM CURRENT USE OF INSULIN: Primary | Chronic | ICD-10-CM

## 2024-03-05 DIAGNOSIS — F51.01 PRIMARY INSOMNIA: ICD-10-CM

## 2024-03-05 DIAGNOSIS — E55.9 VITAMIN D DEFICIENCY: ICD-10-CM

## 2024-03-05 DIAGNOSIS — K76.6 PORTAL HYPERTENSION: ICD-10-CM

## 2024-03-05 DIAGNOSIS — Z79.4 TYPE 2 DIABETES MELLITUS WITH DIABETIC POLYNEUROPATHY, WITH LONG-TERM CURRENT USE OF INSULIN: Primary | Chronic | ICD-10-CM

## 2024-03-05 PROCEDURE — 99214 OFFICE O/P EST MOD 30 MIN: CPT | Performed by: NURSE PRACTITIONER

## 2024-03-05 PROCEDURE — 3044F HG A1C LEVEL LT 7.0%: CPT | Performed by: NURSE PRACTITIONER

## 2024-03-05 RX ORDER — BLOOD SUGAR DIAGNOSTIC
STRIP MISCELLANEOUS
Qty: 200 EACH | Refills: 5 | Status: SHIPPED | OUTPATIENT
Start: 2024-03-05 | End: 2024-03-19

## 2024-03-05 RX ORDER — LANCETS 30 GAUGE
EACH MISCELLANEOUS
Qty: 200 EACH | Refills: 5 | Status: SHIPPED | OUTPATIENT
Start: 2024-03-05

## 2024-03-05 RX ORDER — MIDODRINE HYDROCHLORIDE 5 MG/1
5 TABLET ORAL 2 TIMES DAILY
Qty: 270 TABLET | Refills: 1 | Status: SHIPPED | OUTPATIENT
Start: 2024-03-05 | End: 2024-03-07 | Stop reason: SDUPTHER

## 2024-03-05 RX ORDER — BLOOD-GLUCOSE METER
1 KIT MISCELLANEOUS AS NEEDED
Qty: 1 EACH | Refills: 0 | Status: SHIPPED | OUTPATIENT
Start: 2024-03-05

## 2024-03-05 RX ORDER — ONDANSETRON 4 MG/1
4 TABLET, ORALLY DISINTEGRATING ORAL EVERY 8 HOURS PRN
Qty: 30 TABLET | Refills: 2 | Status: SHIPPED | OUTPATIENT
Start: 2024-03-05

## 2024-03-05 RX ORDER — DIPHENOXYLATE HYDROCHLORIDE AND ATROPINE SULFATE 2.5; .025 MG/1; MG/1
1 TABLET ORAL DAILY
Qty: 90 TABLET | Refills: 1 | Status: SHIPPED | OUTPATIENT
Start: 2024-03-05

## 2024-03-05 RX ORDER — PEN NEEDLE, DIABETIC 30 GX3/16"
1 NEEDLE, DISPOSABLE MISCELLANEOUS NIGHTLY
Qty: 30 EACH | Refills: 1 | Status: SHIPPED | OUTPATIENT
Start: 2024-03-05

## 2024-03-05 RX ORDER — CHOLECALCIFEROL (VITAMIN D3) 50 MCG
2000 TABLET ORAL DAILY
Qty: 90 TABLET | Refills: 1 | Status: SHIPPED | OUTPATIENT
Start: 2024-03-05

## 2024-03-05 NOTE — PROGRESS NOTES
Subjective   Chief Complaint   Patient presents with    Diabetes    Hypertension      This is video visit.  You have chosen to receive care through a video visit today. Do you consent to use a video visit for your medical care today? Yes    Lynsdey Luna is a 63 y.o. female here today for hypertension and diabetes. Patient has portal hypertension but getting paracentesis twice weekly and taking midodrine to keep blood pressure stable. BP was running high on days she did not have paracentesis. She decreased the Midodrine to twice daily and BP has been more stable. Lowest is 127/40 and highest is 146/50. BP is closely monitored during paracentesis. Blood sugars have been running high at night. She decreased her novolog to just sliding scale. She takes 170 units tresiba daily. Has been giving herself 70 in morning and 100 in evening. Blood sugar running around 140 in morning and 300 in evening. A1C on 2/20 was 5.6. She was prescribed hydroxyzine during last visit for sleep but she was unsure of what medication was for when picking up from pharmacy. Vitamin D was low on labs. She has not scheduled with kidney doctor for follow up yet but will contact his office.     I have reviewed the following portions of the patient's history and confirmed they are accurate: allergies, current medications, past family history, past medical history, past social history, past surgical history, and problem list    Review of Systems  Pertinent items are noted in HPI.           Current Outpatient Medications on File Prior to Visit   Medication Sig    clobetasol (TEMOVATE) 0.05 % cream Apply 1 application  topically to the appropriate area as directed 2 (Two) Times a Day.    ezetimibe (ZETIA) 10 MG tablet TAKE ONE TABLET BY MOUTH DAILY    gabapentin (NEURONTIN) 100 MG capsule TAKE 1 CAPSULE BY MOUTH 4 TIMES A DAY AS NEEDED FOR FOOT/LEG PAIN    glucose monitor monitoring kit Use 1 each As Needed (Check blood sugars 3 times daily as  needed.).    hydrOXYzine pamoate (VISTARIL) 50 MG capsule Take 1 capsule by mouth At Night As Needed for Anxiety.    loratadine (CLARITIN) 10 MG tablet Take 1 tablet by mouth Daily.    methocarbamol (ROBAXIN) 500 MG tablet Take 1 tablet by mouth 4 (Four) Times a Day.    nitroglycerin (NITROSTAT) 0.4 MG SL tablet Take 1 tablet by mouth As Needed for Chest Pain.    pantoprazole (PROTONIX) 40 MG EC tablet TAKE ONE TABLET BY MOUTH DAILY    promethazine (PHENERGAN) 12.5 MG tablet     rifAXIMin (XIFAXAN) 200 MG tablet Take 1 tablet by mouth 2 (Two) Times a Day. (Patient not taking: Reported on 8/15/2023)    rosuvastatin (CRESTOR) 20 MG tablet TAKE ONE TABLET BY MOUTH DAILY    sennosides-docusate (PERICOLACE) 8.6-50 MG per tablet Take 1 tablet by mouth Daily.    Tresiba FlexTouch 200 UNIT/ML solution pen-injector pen injection INJECT 170 UNITS UNDER THE SKIN (INTO THE APPROPRIATE AREA) EVERY NIGHT     No current facility-administered medications on file prior to visit.       Objective   There were no vitals filed for this visit.  There is no height or weight on file to calculate BMI.    Physical Exam  Constitutional:       Appearance: Normal appearance. She is well-developed.   HENT:      Head: Normocephalic and atraumatic.      Nose: Nose normal.   Eyes:      General: Lids are normal.      Conjunctiva/sclera: Conjunctivae normal.   Neck:      Trachea: Trachea normal.   Pulmonary:      Effort: No respiratory distress.   Neurological:      Mental Status: She is alert and oriented to person, place, and time.      GCS: GCS eye subscore is 4. GCS verbal subscore is 5. GCS motor subscore is 6.   Psychiatric:         Attention and Perception: Attention normal.         Mood and Affect: Mood normal.         Speech: Speech normal.         Behavior: Behavior normal. Behavior is cooperative.         Thought Content: Thought content normal.         Assessment & Plan   Problem List Items Addressed This Visit       Portal  hypertension  Chronic stable. Continue midodrine and continue monitoring BP closely. Paracentesis staff will continue to monitor during treatments. Patient will continue follow ups with [facility] and will schedule appt with nephrology.     Overview     Added automatically from request for surgery [id number]         Type 2 diabetes mellitus, with long-term current use of insulin - Primary (Chronic)  Chronic unstable. She will move tresiba units to 80 in morning and 90 at bedtime (continuing total of 170) to see if this will help better control night time blood sugar. Continue sliding scale novolog.     Relevant Medications    Insulin Pen Needle (Pen Needles) 32G X 4 MM misc    Lancets misc    glucose monitor monitoring kit    insulin aspart (novoLOG FLEXPEN) 100 UNIT/ML solution pen-injector sc pen    Vitamin D deficiency  Chronic unstable. Start multivitamin and vitamin D supplement.     Relevant Medications    multivitamin (Multiple Vitamin) tablet tablet    Cholecalciferol (Vitamin D) 50 MCG (2000 UT) tablet     Other Visit Diagnoses       Primary insomnia                   Current Outpatient Medications:     insulin aspart (novoLOG FLEXPEN) 100 UNIT/ML solution pen-injector sc pen, Inject into skin 3 times daily with meals as directed per sliding scale provided. Do not exceed 40 units in 24 hours., Disp: 15 mL, Rfl: 3    albuterol sulfate  (90 Base) MCG/ACT inhaler, Inhale 2 puffs Every 4 (Four) Hours As Needed for Wheezing or Shortness of Air., Disp: 18 g, Rfl: 0    cefdinir (OMNICEF) 300 MG capsule, Take 1 capsule by mouth Daily for 10 doses., Disp: 10 capsule, Rfl: 0    Cholecalciferol (Vitamin D) 50 MCG (2000 UT) tablet, Take 1 tablet by mouth Daily., Disp: 90 tablet, Rfl: 1    clobetasol (TEMOVATE) 0.05 % cream, Apply 1 application  topically to the appropriate area as directed 2 (Two) Times a Day., Disp: 60 g, Rfl: 1    ezetimibe (ZETIA) 10 MG tablet, TAKE ONE TABLET BY MOUTH DAILY, Disp: 90 tablet, Rfl:  3    gabapentin (NEURONTIN) 100 MG capsule, TAKE 1 CAPSULE BY MOUTH 4 TIMES A DAY AS NEEDED FOR FOOT/LEG PAIN, Disp: 120 capsule, Rfl: 2    glucose blood (Glucose Meter Test) test strip, Check blood sugars 3 times daily as needed., Disp: 100 each, Rfl: 5    glucose monitor monitoring kit, Use 1 each As Needed (Check blood sugars 3 times daily as needed.)., Disp: 1 each, Rfl: 0    glucose monitor monitoring kit, Use 1 each As Needed (Check blood sugars 3 times daily as needed.)., Disp: 1 each, Rfl: 0    guaiFENesin (Mucinex) 600 MG 12 hr tablet, Take 1 tablet by mouth 2 (Two) Times a Day As Needed for Cough or Congestion., Disp: 60 tablet, Rfl: 0    hydrOXYzine pamoate (VISTARIL) 50 MG capsule, Take 1 capsule by mouth At Night As Needed for Anxiety., Disp: 90 capsule, Rfl: 0    Insulin Pen Needle (Pen Needles) 32G X 4 MM misc, Use 1 each Every Night., Disp: 30 each, Rfl: 1    Lancets misc, Check blood sugars 5 times daily as needed., Disp: 200 each, Rfl: 5    loratadine (CLARITIN) 10 MG tablet, Take 1 tablet by mouth Daily., Disp: 90 tablet, Rfl: 1    methocarbamol (ROBAXIN) 500 MG tablet, Take 1 tablet by mouth 4 (Four) Times a Day., Disp: , Rfl:     midodrine (PROAMATINE) 5 MG tablet, Take 1 tablet by mouth 2 (Two) Times a Day., Disp: 180 tablet, Rfl: 1    multivitamin (Multiple Vitamin) tablet tablet, Take 1 tablet by mouth Daily., Disp: 90 tablet, Rfl: 1    mupirocin (BACTROBAN) 2 % ointment, Apply 1 Application topically to the appropriate area as directed 3 (Three) Times a Day., Disp: 30 g, Rfl: 0    nitroglycerin (NITROSTAT) 0.4 MG SL tablet, Take 1 tablet by mouth As Needed for Chest Pain., Disp: 100 tablet, Rfl: 1    ondansetron ODT (ZOFRAN-ODT) 4 MG disintegrating tablet, Place 1 tablet on the tongue Every 8 (Eight) Hours As Needed for Nausea or Vomiting., Disp: 30 tablet, Rfl: 2    pantoprazole (PROTONIX) 40 MG EC tablet, TAKE ONE TABLET BY MOUTH DAILY, Disp: 90 tablet, Rfl: 3    promethazine (PHENERGAN)  12.5 MG tablet, , Disp: , Rfl:     rifAXIMin (XIFAXAN) 200 MG tablet, Take 1 tablet by mouth 2 (Two) Times a Day. (Patient not taking: Reported on 8/15/2023), Disp: , Rfl:     rosuvastatin (CRESTOR) 20 MG tablet, TAKE ONE TABLET BY MOUTH DAILY, Disp: 90 tablet, Rfl: 3    sennosides-docusate (PERICOLACE) 8.6-50 MG per tablet, Take 1 tablet by mouth Daily., Disp: , Rfl:     Tresiba FlexTouch 200 UNIT/ML solution pen-injector pen injection, INJECT 170 UNITS UNDER THE SKIN (INTO THE APPROPRIATE AREA) EVERY NIGHT, Disp: 27 mL, Rfl: 3       Plan of care reviewed with the patient at the conclusion of today's visit.  Education was provided regarding diagnosis, management, and any prescribed or recommended OTC medications.  Patient verbalized understanding of and agreement with management plan.     Return in about 2 weeks (around 3/19/2024), or if symptoms worsen or fail to improve, for Follow-up.     [place]. I spent 25 minutes in medical discussion with patient during this visit.     Taryn Chavez, APRN

## 2024-03-06 ENCOUNTER — TELEPHONE (OUTPATIENT)
Dept: INTERNAL MEDICINE | Facility: CLINIC | Age: 64
End: 2024-03-06
Payer: MEDICARE

## 2024-03-06 DIAGNOSIS — K72.10 CHRONIC LIVER FAILURE WITHOUT HEPATIC COMA: ICD-10-CM

## 2024-03-06 DIAGNOSIS — N18.6 STAGE 5 CHRONIC KIDNEY DISEASE ON CHRONIC DIALYSIS: ICD-10-CM

## 2024-03-06 DIAGNOSIS — Z99.2 STAGE 5 CHRONIC KIDNEY DISEASE ON CHRONIC DIALYSIS: ICD-10-CM

## 2024-03-06 NOTE — TELEPHONE ENCOUNTER
Caller: [facility] [id number] - [place]YLE[place] HERNANDES - [address] - [phone]  - [phone]    Relationship: Pharmacy    Best call back number: [phone]    Which medication are you concerned about: midodrine (PROAMATINE) 5 MG tablet     What are your concerns: IT IS USUALLY THREE TIMES DAILY. PLEASE CALL AND VERIFY

## 2024-03-07 RX ORDER — MIDODRINE HYDROCHLORIDE 5 MG/1
5 TABLET ORAL 2 TIMES DAILY
Qty: 180 TABLET | Refills: 1 | Status: SHIPPED | OUTPATIENT
Start: 2024-03-07

## 2024-03-19 ENCOUNTER — TELEMEDICINE (OUTPATIENT)
Dept: INTERNAL MEDICINE | Facility: CLINIC | Age: 64
End: 2024-03-19
Payer: MEDICARE

## 2024-03-19 DIAGNOSIS — E11.42 TYPE 2 DIABETES MELLITUS WITH DIABETIC POLYNEUROPATHY, WITH LONG-TERM CURRENT USE OF INSULIN: ICD-10-CM

## 2024-03-19 DIAGNOSIS — Z79.4 TYPE 2 DIABETES MELLITUS WITH DIABETIC POLYNEUROPATHY, WITH LONG-TERM CURRENT USE OF INSULIN: ICD-10-CM

## 2024-03-19 DIAGNOSIS — I10 ESSENTIAL HYPERTENSION: ICD-10-CM

## 2024-03-19 DIAGNOSIS — J98.8 RESPIRATORY INFECTION: Primary | ICD-10-CM

## 2024-03-19 PROCEDURE — 3044F HG A1C LEVEL LT 7.0%: CPT | Performed by: NURSE PRACTITIONER

## 2024-03-19 PROCEDURE — 99214 OFFICE O/P EST MOD 30 MIN: CPT | Performed by: NURSE PRACTITIONER

## 2024-03-19 RX ORDER — BLOOD SUGAR DIAGNOSTIC
STRIP MISCELLANEOUS
Qty: 100 EACH | Refills: 5 | Status: SHIPPED | OUTPATIENT
Start: 2024-03-19

## 2024-03-19 RX ORDER — ALBUTEROL SULFATE 90 UG/1
2 AEROSOL, METERED RESPIRATORY (INHALATION) EVERY 4 HOURS PRN
Qty: 18 G | Refills: 0 | Status: SHIPPED | OUTPATIENT
Start: 2024-03-19

## 2024-03-19 RX ORDER — GUAIFENESIN 600 MG/1
600 TABLET, EXTENDED RELEASE ORAL 2 TIMES DAILY PRN
Qty: 60 TABLET | Refills: 0 | Status: SHIPPED | OUTPATIENT
Start: 2024-03-19

## 2024-03-19 RX ORDER — CEFDINIR 300 MG/1
300 CAPSULE ORAL DAILY
Qty: 10 CAPSULE | Refills: 0 | Status: SHIPPED | OUTPATIENT
Start: 2024-03-19 | End: 2024-03-29

## 2024-03-19 NOTE — PROGRESS NOTES
CHIEF COMPLAINT:  Follow-up (med check) and Imm/Inj (Flu vaccine)       HISTORY OF PRESENT ILLNESS:   Alesia Morales is a 61year old female who presents for evaluation of hypertension. She developed lip edema and presented to the ED. This has resolved. Dx with angioedema and stopped lisinopril. I spoke with her daughter in law Patti Christy who is a RN in cardiology. Tanesha Farmer is brought in by her son Gisele Schumacher. She indicates that she is feeling well and denies any symptoms referable to her elevated blood pressure. Denies chest pain, palpitations, dyspnea, dizziness, lightheadedness, headaches, blurry vision, orthopnea, fatigue, paroxysmal nocturnal dyspnea or peripheral edema. Current medication regimen is as listed below. Patient denies any side effects of medication. Outpatient Medications Marked as Taking for the 9/27/19 encounter (Office Visit) with Cameron Flowers NP   Medication Sig Dispense Refill   â¢ glyBURIDE (DIABETA) 5 MG tablet Take 1 tablet by mouth daily (with breakfast). 90 tablet 1   â¢ memantine (NAMENDA) 10 MG tablet Take 1 tablet by mouth daily. 90 tablet 1   â¢ memantine (NAMENDA) 10 MG tablet Take 1 tablet by mouth daily. 90 tablet 4   â¢ donepezil (ARICEPT) 10 MG tablet Take 1 tablet by mouth nightly. 90 tablet 4   â¢ atorvastatin (LIPITOR) 40 MG tablet Take 1 tablet by mouth daily. 90 tablet 1   â¢ Coenzyme Q10 (COQ10 PO) Take by mouth daily. â¢ lisinopril (ZESTRIL) 20 MG tablet Take 1 tablet by mouth daily. 90 tablet 3   â¢ levothyroxine (SYNTHROID, LEVOTHROID) 50 MCG tablet Take 1 tablet by mouth daily. 90 tablet 3   â¢ Cholecalciferol (VITAMIN D) 2000 units capsule Take 1 capsule by mouth daily. 90 capsule 3   â¢ Ferrous Sulfate 27 MG Tab Take 1 tablet by mouth daily. â¢ fish oil-omega-3 fatty acids 1000 MG CAPS Take  by mouth. â¢ aspirin 81 MG chewable tablet Chew 81 mg by mouth daily.           ALLERGIES:   Allergen Reactions   â¢ Lisinopril SWELLING     Angioedema, lip     Social History Subjective   Chief Complaint   Patient presents with    Diabetes    Hypertension      This is video visit.  You have chosen to receive care through a video visit today. Do you consent to use a video visit for your medical care today? Yes    Lyndsey Luna is a 63 y.o. female here today for diabetes and hypertension. Blood sugars are running high especially at night. She is taking 70 units of long acting insulin in the morning and 100 at night. Blood sugar is running 200 in the morning and 300 or greater at night. She is following diabetic diet and using sliding scale insulin. Blood pressure is stable and staying below 140/90. BP gets checked several times weekly prior and after her paracentesis. She is taking midodrine TID to help keep BP stable due to paracentesis but BP was elevated and midodrine was decreased to BID. She reports having over a week of nasal congesting and blowing out thick green mucus. Has tried OTC meds with no improvement. Having headaches and facial pain. She's been wheezing and coughing. She's had albuterol inhaler in the past that helped when she was sick. Due to renal and liver failure she is limited to what she can take.         I have reviewed the following portions of the patient's history and confirmed they are accurate: allergies, current medications, past family history, past medical history, past social history, past surgical history, and problem list    Review of Systems  Pertinent items are noted in HPI.       Current Outpatient Medications on File Prior to Visit   Medication Sig    Cholecalciferol (Vitamin D) 50 MCG (2000 UT) tablet Take 1 tablet by mouth Daily.    clobetasol (TEMOVATE) 0.05 % cream Apply 1 application  topically to the appropriate area as directed 2 (Two) Times a Day.    ezetimibe (ZETIA) 10 MG tablet TAKE ONE TABLET BY MOUTH DAILY    gabapentin (NEURONTIN) 100 MG capsule TAKE 1 CAPSULE BY MOUTH 4 TIMES A DAY AS NEEDED FOR FOOT/LEG PAIN    glucose monitor  Tobacco Use   â¢ Smoking status: Passive Smoke Exposure - Never Smoker   â¢ Smokeless tobacco: Never Used   Substance Use Topics   â¢ Alcohol use: Yes     Comment: 1/week   â¢ Drug use: No        REVIEW OF SYSTEMS:   Negative for HEENT, cardiac, pulmonary, gastrointestinal, genitourinary, endocrine, psychiatric and extremities except as noted above. PHYSICAL EXAMINATION:  Visit Vitals  /80 (BP Location: RUE - Right upper extremity, Patient Position: Sitting, Cuff Size: Large Adult)   Pulse 64   Temp 97.2 Â°F (36.2 Â°C) (Temporal)   Wt 106.3 kg   SpO2 97%   BMI 41.51 kg/mÂ²     General: Well developed, well nourished, 61year old female in no acute distress. Lip edema is absent. Eyes: Pupils equal, round, reactive to light and accommodation, conjunctivae/sclerae clear. Neck: Supple, no adenopathy, no jugular venous distention, no bruits or thyromegaly. Lungs: Clear to auscultation bilaterally, no rales, rhonchi, or wheezes. No retraction. Heart: Regular rate and rhythm, no murmurs, clicks, or gallops, no heave or thrill. Peripheral pulses: Intact. Extremities: No cyanosis, clubbing or edema. L  ASSESSMENT:  1. DM type 2, goal HbA1c < 7% (CMS/HCC)    2. HTN, goal below 130/80    3. Early onset Alzheimer's dementia without behavioral disturbance (CMS/HCC)    4. Acquired hypothyroidism    5. Body mass index (BMI) 40.0-44.9, adult (CMS/HCC)    6. Angioedema of lips, subsequent encounter    7. Need for vaccination        PLAN:  Â· Recheck in 1 month for BP check and 3 months to see me, sooner should new symptoms or problems arise.     Orders Placed This Encounter   â¢ INFLUENZA QUADRIVALENT SPLIT PRES FREE 0.5 ML VACC, IM   â¢ CBC No Differential   â¢ Comprehensive Metabolic Panel   â¢ Thyroid Stimulating Hormone   â¢ triamterene-hydroCHLOROthiazide (MAXZIDE-25) 37.5-25 MG per tablet monitoring kit Use 1 each As Needed (Check blood sugars 3 times daily as needed.).    glucose monitor monitoring kit Use 1 each As Needed (Check blood sugars 3 times daily as needed.).    hydrOXYzine pamoate (VISTARIL) 50 MG capsule Take 1 capsule by mouth At Night As Needed for Anxiety.    insulin aspart (novoLOG FLEXPEN) 100 UNIT/ML solution pen-injector sc pen Inject into skin 3 times daily with meals as directed per sliding scale provided. Do not exceed 40 units in 24 hours.    Insulin Pen Needle (Pen Needles) 32G X 4 MM misc Use 1 each Every Night.    Lancets misc Check blood sugars 5 times daily as needed.    loratadine (CLARITIN) 10 MG tablet Take 1 tablet by mouth Daily.    methocarbamol (ROBAXIN) 500 MG tablet Take 1 tablet by mouth 4 (Four) Times a Day.    midodrine (PROAMATINE) 5 MG tablet Take 1 tablet by mouth 2 (Two) Times a Day.    multivitamin (Multiple Vitamin) tablet tablet Take 1 tablet by mouth Daily.    nitroglycerin (NITROSTAT) 0.4 MG SL tablet Take 1 tablet by mouth As Needed for Chest Pain.    ondansetron ODT (ZOFRAN-ODT) 4 MG disintegrating tablet Place 1 tablet on the tongue Every 8 (Eight) Hours As Needed for Nausea or Vomiting.    pantoprazole (PROTONIX) 40 MG EC tablet TAKE ONE TABLET BY MOUTH DAILY    promethazine (PHENERGAN) 12.5 MG tablet     rifAXIMin (XIFAXAN) 200 MG tablet Take 1 tablet by mouth 2 (Two) Times a Day. (Patient not taking: Reported on 8/15/2023)    rosuvastatin (CRESTOR) 20 MG tablet TAKE ONE TABLET BY MOUTH DAILY    sennosides-docusate (PERICOLACE) 8.6-50 MG per tablet Take 1 tablet by mouth Daily.    Tresiba FlexTouch 200 UNIT/ML solution pen-injector pen injection INJECT 170 UNITS UNDER THE SKIN (INTO THE APPROPRIATE AREA) EVERY NIGHT     No current facility-administered medications on file prior to visit.       Objective   There were no vitals filed for this visit.  There is no height or weight on file to calculate BMI.    Physical Exam  Constitutional:        Appearance: Normal appearance. She is well-developed.   HENT:      Head: Normocephalic and atraumatic.      Nose: Nose normal.   Eyes:      General: Lids are normal.      Conjunctiva/sclera: Conjunctivae normal.   Neck:      Trachea: Trachea normal.   Pulmonary:      Effort: No respiratory distress.   Neurological:      Mental Status: She is alert and oriented to person, place, and time.      GCS: GCS eye subscore is 4. GCS verbal subscore is 5. GCS motor subscore is 6.   Psychiatric:         Attention and Perception: Attention normal.         Mood and Affect: Mood normal.         Speech: Speech normal.         Behavior: Behavior normal. Behavior is cooperative.         Thought Content: Thought content normal.         Assessment & Plan   Problem List Items Addressed This Visit       Type 2 diabetes mellitus, with long-term current use of insulin (Chronic) - primary  Chronic unstable. Move Tresiba units to 90 in the morning and 80 at night. Continue novolog sliding scale. She will keep log of blood sugars and how much novolog she is using. Follow diabetic diet.       Hypertension  Chronic stable. Continue midodrine BID. Continue monitoring BP      Other Visit Diagnoses       Respiratory infection   New unstable. Start cefdinir and mucinex. Renal and hepatic adjustments made to dosing. Start albuterol PRN.       Relevant Medications    albuterol sulfate  (90 Base) MCG/ACT inhaler               Current Outpatient Medications:     albuterol sulfate  (90 Base) MCG/ACT inhaler, Inhale 2 puffs Every 4 (Four) Hours As Needed for Wheezing or Shortness of Air., Disp: 18 g, Rfl: 0    cefdinir (OMNICEF) 300 MG capsule, Take 1 capsule by mouth Daily for 7 days., Disp: 7 capsule, Rfl: 0    Cholecalciferol (Vitamin D) 50 MCG (2000 UT) tablet, Take 1 tablet by mouth Daily., Disp: 90 tablet, Rfl: 1    clobetasol (TEMOVATE) 0.05 % cream, Apply 1 application  topically to the appropriate area as directed 2 (Two) Times a  Day., Disp: 60 g, Rfl: 1    ezetimibe (ZETIA) 10 MG tablet, TAKE ONE TABLET BY MOUTH DAILY, Disp: 90 tablet, Rfl: 3    gabapentin (NEURONTIN) 100 MG capsule, TAKE 1 CAPSULE BY MOUTH 4 TIMES A DAY AS NEEDED FOR FOOT/LEG PAIN, Disp: 120 capsule, Rfl: 2    glucose blood (Glucose Meter Test) test strip, Check blood sugars 3 times daily as needed., Disp: 100 each, Rfl: 5    glucose monitor monitoring kit, Use 1 each As Needed (Check blood sugars 3 times daily as needed.)., Disp: 1 each, Rfl: 0    glucose monitor monitoring kit, Use 1 each As Needed (Check blood sugars 3 times daily as needed.)., Disp: 1 each, Rfl: 0    guaiFENesin (Mucinex) 600 MG 12 hr tablet, Take 1 tablet by mouth 2 (Two) Times a Day As Needed for Cough or Congestion., Disp: 60 tablet, Rfl: 0    HYDROcodone-acetaminophen (NORCO) 5-325 MG per tablet, Take 1 tablet by mouth Every 8 (Eight) Hours As Needed for Moderate Pain (kidney stone)., Disp: 12 tablet, Rfl: 0    hydrOXYzine pamoate (VISTARIL) 50 MG capsule, Take 1 capsule by mouth At Night As Needed for Anxiety., Disp: 90 capsule, Rfl: 0    insulin aspart (novoLOG FLEXPEN) 100 UNIT/ML solution pen-injector sc pen, Inject into skin 3 times daily with meals as directed per sliding scale provided. Do not exceed 40 units in 24 hours., Disp: 15 mL, Rfl: 3    Insulin Aspart (novoLOG) 100 UNIT/ML injection, Inject 25 Units under the skin into the appropriate area as directed 3 (Three) Times a Day Before Meals., Disp: 30 mL, Rfl: 5    Insulin Pen Needle (Pen Needles) 32G X 4 MM misc, Use 1 each Every Night., Disp: 30 each, Rfl: 1    Lancets misc, Check blood sugars 5 times daily as needed., Disp: 200 each, Rfl: 5    loratadine (CLARITIN) 10 MG tablet, Take 1 tablet by mouth Daily., Disp: 90 tablet, Rfl: 1    methocarbamol (ROBAXIN) 500 MG tablet, Take 1 tablet by mouth 4 (Four) Times a Day., Disp: , Rfl:     midodrine (PROAMATINE) 5 MG tablet, Take 1 tablet by mouth 2 (Two) Times a Day., Disp: 180 tablet,  Rfl: 1    multivitamin (Multiple Vitamin) tablet tablet, Take 1 tablet by mouth Daily., Disp: 90 tablet, Rfl: 1    mupirocin (BACTROBAN) 2 % ointment, Apply 1 Application topically to the appropriate area as directed 3 (Three) Times a Day., Disp: 30 g, Rfl: 0    nitroglycerin (NITROSTAT) 0.4 MG SL tablet, Take 1 tablet by mouth As Needed for Chest Pain., Disp: 100 tablet, Rfl: 1    ondansetron ODT (ZOFRAN-ODT) 4 MG disintegrating tablet, Place 1 tablet on the tongue Every 8 (Eight) Hours As Needed for Nausea or Vomiting., Disp: 30 tablet, Rfl: 2    pantoprazole (PROTONIX) 40 MG EC tablet, TAKE ONE TABLET BY MOUTH DAILY, Disp: 90 tablet, Rfl: 3    promethazine (PHENERGAN) 12.5 MG tablet, , Disp: , Rfl:     rifAXIMin (XIFAXAN) 200 MG tablet, Take 1 tablet by mouth 2 (Two) Times a Day. (Patient not taking: Reported on 8/15/2023), Disp: , Rfl:     rosuvastatin (CRESTOR) 20 MG tablet, TAKE ONE TABLET BY MOUTH DAILY, Disp: 90 tablet, Rfl: 3    sennosides-docusate (PERICOLACE) 8.6-50 MG per tablet, Take 1 tablet by mouth Daily., Disp: , Rfl:     tamsulosin (FLOMAX) 0.4 MG capsule 24 hr capsule, Take 1 capsule by mouth Daily As Needed (kidney stones)., Disp: 30 capsule, Rfl: 0    Tresiba FlexTouch 200 UNIT/ML solution pen-injector pen injection, INJECT 170 UNITS UNDER THE SKIN (INTO THE APPROPRIATE AREA) EVERY NIGHT, Disp: 27 mL, Rfl: 3       Plan of care reviewed with the patient at the conclusion of today's visit.  Education was provided regarding diagnosis, management, and any prescribed or recommended OTC medications.  Patient verbalized understanding of and agreement with management plan.     Return in about 2 weeks (around 4/2/2024), or if symptoms worsen or fail to improve, for Follow-up.     [place] in [place] in [place]. I spent 25 minutes in medical discussion with patient during this visit.     JOSE D Zaragoza

## 2024-03-19 NOTE — PROGRESS NOTES
Subjective   No chief complaint on file.     Lyndsey Luna is a 63 y.o. female.     The patient is here today for ***.    I have reviewed the following portions of the patient's history and confirmed they are accurate: allergies, current medications, past family history, past medical history, past social history, past surgical history, and problem list    Review of Systems  Pertinent items are noted in HPI.     Current Outpatient Medications on File Prior to Visit   Medication Sig    Cholecalciferol (Vitamin D) 50 MCG (2000 UT) tablet Take 1 tablet by mouth Daily.    clobetasol (TEMOVATE) 0.05 % cream Apply 1 application  topically to the appropriate area as directed 2 (Two) Times a Day.    ezetimibe (ZETIA) 10 MG tablet TAKE ONE TABLET BY MOUTH DAILY    gabapentin (NEURONTIN) 100 MG capsule TAKE 1 CAPSULE BY MOUTH 4 TIMES A DAY AS NEEDED FOR FOOT/LEG PAIN    glucose blood (Glucose Meter Test) test strip Check blood sugars 3 times daily as needed.    glucose blood (Glucose Meter Test) test strip Check blood sugars 5 times daily as needed.    glucose monitor monitoring kit Use 1 each As Needed (Check blood sugars 3 times daily as needed.).    glucose monitor monitoring kit Use 1 each As Needed (Check blood sugars 3 times daily as needed.).    hydrOXYzine pamoate (VISTARIL) 50 MG capsule Take 1 capsule by mouth At Night As Needed for Anxiety.    insulin aspart (novoLOG FLEXPEN) 100 UNIT/ML solution pen-injector sc pen Inject into skin 3 times daily with meals as directed per sliding scale provided. Do not exceed 40 units in 24 hours.    Insulin Pen Needle (Pen Needles) 32G X 4 MM misc Use 1 each Every Night.    Lancets misc Check blood sugars 5 times daily as needed.    loratadine (CLARITIN) 10 MG tablet Take 1 tablet by mouth Daily.    methocarbamol (ROBAXIN) 500 MG tablet Take 1 tablet by mouth 4 (Four) Times a Day.    midodrine (PROAMATINE) 5 MG tablet Take 1 tablet by mouth 2 (Two) Times a Day.     multivitamin (Multiple Vitamin) tablet tablet Take 1 tablet by mouth Daily.    nitroglycerin (NITROSTAT) 0.4 MG SL tablet Take 1 tablet by mouth As Needed for Chest Pain.    ondansetron ODT (ZOFRAN-ODT) 4 MG disintegrating tablet Place 1 tablet on the tongue Every 8 (Eight) Hours As Needed for Nausea or Vomiting.    pantoprazole (PROTONIX) 40 MG EC tablet TAKE ONE TABLET BY MOUTH DAILY    promethazine (PHENERGAN) 12.5 MG tablet     rifAXIMin (XIFAXAN) 200 MG tablet Take 1 tablet by mouth 2 (Two) Times a Day. (Patient not taking: Reported on 8/15/2023)    rosuvastatin (CRESTOR) 20 MG tablet TAKE ONE TABLET BY MOUTH DAILY    sennosides-docusate (PERICOLACE) 8.6-50 MG per tablet Take 1 tablet by mouth Daily.    Tresiba FlexTouch 200 UNIT/ML solution pen-injector pen injection INJECT 170 UNITS UNDER THE SKIN (INTO THE APPROPRIATE AREA) EVERY NIGHT     No current facility-administered medications on file prior to visit.       Objective   There were no vitals filed for this visit.  There is no height or weight on file to calculate BMI.    Physical Exam    Assessment & Plan   Problem List Items Addressed This Visit    None         Current Outpatient Medications:     Cholecalciferol (Vitamin D) 50 MCG (2000 UT) tablet, Take 1 tablet by mouth Daily., Disp: 90 tablet, Rfl: 1    clobetasol (TEMOVATE) 0.05 % cream, Apply 1 application  topically to the appropriate area as directed 2 (Two) Times a Day., Disp: 60 g, Rfl: 1    ezetimibe (ZETIA) 10 MG tablet, TAKE ONE TABLET BY MOUTH DAILY, Disp: 90 tablet, Rfl: 3    gabapentin (NEURONTIN) 100 MG capsule, TAKE 1 CAPSULE BY MOUTH 4 TIMES A DAY AS NEEDED FOR FOOT/LEG PAIN, Disp: 120 capsule, Rfl: 2    glucose blood (Glucose Meter Test) test strip, Check blood sugars 3 times daily as needed., Disp: 100 each, Rfl: 5    glucose blood (Glucose Meter Test) test strip, Check blood sugars 5 times daily as needed., Disp: 200 each, Rfl: 5    glucose monitor monitoring kit, Use 1 each As  Needed (Check blood sugars 3 times daily as needed.)., Disp: 1 each, Rfl: 0    glucose monitor monitoring kit, Use 1 each As Needed (Check blood sugars 3 times daily as needed.)., Disp: 1 each, Rfl: 0    hydrOXYzine pamoate (VISTARIL) 50 MG capsule, Take 1 capsule by mouth At Night As Needed for Anxiety., Disp: 90 capsule, Rfl: 0    insulin aspart (novoLOG FLEXPEN) 100 UNIT/ML solution pen-injector sc pen, Inject into skin 3 times daily with meals as directed per sliding scale provided. Do not exceed 40 units in 24 hours., Disp: 15 mL, Rfl: 3    Insulin Pen Needle (Pen Needles) 32G X 4 MM misc, Use 1 each Every Night., Disp: 30 each, Rfl: 1    Lancets misc, Check blood sugars 5 times daily as needed., Disp: 200 each, Rfl: 5    loratadine (CLARITIN) 10 MG tablet, Take 1 tablet by mouth Daily., Disp: 90 tablet, Rfl: 1    methocarbamol (ROBAXIN) 500 MG tablet, Take 1 tablet by mouth 4 (Four) Times a Day., Disp: , Rfl:     midodrine (PROAMATINE) 5 MG tablet, Take 1 tablet by mouth 2 (Two) Times a Day., Disp: 180 tablet, Rfl: 1    multivitamin (Multiple Vitamin) tablet tablet, Take 1 tablet by mouth Daily., Disp: 90 tablet, Rfl: 1    nitroglycerin (NITROSTAT) 0.4 MG SL tablet, Take 1 tablet by mouth As Needed for Chest Pain., Disp: 100 tablet, Rfl: 1    ondansetron ODT (ZOFRAN-ODT) 4 MG disintegrating tablet, Place 1 tablet on the tongue Every 8 (Eight) Hours As Needed for Nausea or Vomiting., Disp: 30 tablet, Rfl: 2    pantoprazole (PROTONIX) 40 MG EC tablet, TAKE ONE TABLET BY MOUTH DAILY, Disp: 90 tablet, Rfl: 3    promethazine (PHENERGAN) 12.5 MG tablet, , Disp: , Rfl:     rifAXIMin (XIFAXAN) 200 MG tablet, Take 1 tablet by mouth 2 (Two) Times a Day. (Patient not taking: Reported on 8/15/2023), Disp: , Rfl:     rosuvastatin (CRESTOR) 20 MG tablet, TAKE ONE TABLET BY MOUTH DAILY, Disp: 90 tablet, Rfl: 3    sennosides-docusate (PERICOLACE) 8.6-50 MG per tablet, Take 1 tablet by mouth Daily., Disp: , Rfl:     Tresiba  "FlexTouch 200 UNIT/ML solution pen-injector pen injection, INJECT 170 UNITS UNDER THE SKIN (INTO THE APPROPRIATE AREA) EVERY NIGHT, Disp: 27 mL, Rfl: 3       Plan of care reviewed with the patient at the conclusion of today's visit.  Education was provided regarding diagnosis, management, and any prescribed or recommended OTC medications.  Patient verbalized understanding of and agreement with management plan.     No follow-ups on file.      Transcribed from ambient dictation for JOSE D Zaragoza by JOSE D Zaragoza.  03/19/24   16:39 EDT    {DESHAUN Provider Statement:[id number]::\"Patient or patient representative verbalized consent to the visit recording.\",\"I have personally performed the services described in this document as transcribed by the above individual, and it is both accurate and complete.\"}    "

## 2024-03-25 ENCOUNTER — TELEPHONE (OUTPATIENT)
Dept: INTERNAL MEDICINE | Facility: CLINIC | Age: 64
End: 2024-03-25
Payer: MEDICARE

## 2024-03-25 NOTE — TELEPHONE ENCOUNTER
Please contact patient and explain we have submitted urgent PA and not heard back. Please ask her to call the pharmacy to see if discount card will help or call insurance and ask if there is anything else we can do. Insurance wants to pay for humalog but patient had reaction and unable to tolerate this.

## 2024-03-25 NOTE — TELEPHONE ENCOUNTER
I called her insurance that we have on file. We do not have correct insurance for her.   Called pharmacy to get correct info    PCN: [id number]  BIN [id number]

## 2024-03-25 NOTE — TELEPHONE ENCOUNTER
Caller: Lyndsey Luna    Relationship: Self    Best call back number: [phone]     What is the best time to reach you: ANY    Who are you requesting to speak with (clinical staff, provider,  specific staff member): CLINICAL       What was the call regarding: PATIENT STATED THAT SOMEONE IN THE OFFICE WAS SUPPOSED TO BE HELPING HER WORK WITH THE INSURANCE TO GET THE NOVOLOG CHEAPER. PLEASE CALL   PATIENT BACK AS SHE IS UNABLE TO GET THIS MEDICATION DUE TO THE COST.   PATIENT ADVISED THAT SHE MIGHT HAVE ENOUGH TO LAST HER UNTIL THURSDAY BUT THEN  SHE WILL BE OUT.

## 2024-03-26 ENCOUNTER — TELEPHONE (OUTPATIENT)
Dept: INTERNAL MEDICINE | Facility: CLINIC | Age: 64
End: 2024-03-26
Payer: MEDICARE

## 2024-03-26 NOTE — TELEPHONE ENCOUNTER
Caller: Lyndsey Luna    Relationship: Self    Best call back number: [phone]     What is the best time to reach you: ANYTIME    Who are you requesting to speak with (clinical staff, provider,  specific staff member): SHELL REQUESTED    Do you know the name of the person who called: LYNDSEY    What was the call regarding: PATIENT CALLED IN TO LET US KNOW THAT THE INSULIN THAT WAS RECENTLY SENT IN TO THEIR PHARMACY IS STILL A BIT EXPENSIVE FOR THEM AT $105, AND SHE WOULD LIKE TO DISCUSS WHAT CAN BE DONE TO LOWER THAT PRICE    PATIENT ALSO CHECKED WITH THE PHARMACY YESTERDAY AND WAS NOTIFIED THAT THEY DO NOT HAVE DISCOUNT CARDS    PATIENT STATES THAT SHE WILL BE OUT THIS COMING THURSDAY 3.28.24    PLEASE ADVISE

## 2024-03-28 NOTE — TELEPHONE ENCOUNTER
I sent her a message yesterday that PA was approved on novolog. This message is from two days ago so price may have changed. Have her follow back up with pharmacy to see if costs improved after PA was approved. thanks

## 2024-04-04 ENCOUNTER — TELEMEDICINE (OUTPATIENT)
Dept: INTERNAL MEDICINE | Facility: CLINIC | Age: 64
End: 2024-04-04
Payer: MEDICARE

## 2024-04-04 DIAGNOSIS — Z79.4 TYPE 2 DIABETES MELLITUS WITH DIABETIC POLYNEUROPATHY, WITH LONG-TERM CURRENT USE OF INSULIN: Primary | ICD-10-CM

## 2024-04-04 DIAGNOSIS — I10 PRIMARY HYPERTENSION: ICD-10-CM

## 2024-04-04 DIAGNOSIS — N20.0 KIDNEY STONE: ICD-10-CM

## 2024-04-04 DIAGNOSIS — E11.42 TYPE 2 DIABETES MELLITUS WITH DIABETIC POLYNEUROPATHY, WITH LONG-TERM CURRENT USE OF INSULIN: Primary | ICD-10-CM

## 2024-04-04 DIAGNOSIS — R30.0 DYSURIA: ICD-10-CM

## 2024-04-04 DIAGNOSIS — N18.5 STAGE 5 CHRONIC KIDNEY DISEASE NOT ON CHRONIC DIALYSIS: ICD-10-CM

## 2024-04-04 RX ORDER — HYDROCODONE BITARTRATE AND ACETAMINOPHEN 5; 325 MG/1; MG/1
1 TABLET ORAL EVERY 8 HOURS PRN
Qty: 12 TABLET | Refills: 0 | Status: SHIPPED | OUTPATIENT
Start: 2024-04-04

## 2024-04-04 RX ORDER — INSULIN ASPART 100 [IU]/ML
25 INJECTION, SOLUTION INTRAVENOUS; SUBCUTANEOUS
Qty: 30 ML | Refills: 5 | Status: SHIPPED | OUTPATIENT
Start: 2024-04-04 | End: 2024-04-08

## 2024-04-04 RX ORDER — TAMSULOSIN HYDROCHLORIDE 0.4 MG/1
1 CAPSULE ORAL DAILY PRN
Qty: 30 CAPSULE | Refills: 0 | Status: SHIPPED | OUTPATIENT
Start: 2024-04-04

## 2024-04-04 NOTE — PROGRESS NOTES
Subjective   Chief Complaint   Patient presents with    Diabetes    Hypertension      Lyndsey Luna is a 63 y.o. female.     The patient is here today for diabetes, hypertension, dysuria and kidney stones.     Starting novlog back at 25 units with meals. Continue 70am, 100pm and needs tresiba preauth. Am 218, 199, 247  Pm 467, 382k, 481.   Bs was 95 and ate then up to 427    Seen dr. Longo years ago for kidney stones. Passed large one and now small ones. Having dysuria.     I have reviewed the following portions of the patient's history and confirmed they are accurate: allergies, current medications, past family history, past medical history, past social history, past surgical history, and problem list    Review of Systems  Pertinent items are noted in HPI.     Current Outpatient Medications on File Prior to Visit   Medication Sig    albuterol sulfate  (90 Base) MCG/ACT inhaler Inhale 2 puffs Every 4 (Four) Hours As Needed for Wheezing or Shortness of Air.    Cholecalciferol (Vitamin D) 50 MCG (2000 UT) tablet Take 1 tablet by mouth Daily.    clobetasol (TEMOVATE) 0.05 % cream Apply 1 application  topically to the appropriate area as directed 2 (Two) Times a Day.    ezetimibe (ZETIA) 10 MG tablet TAKE ONE TABLET BY MOUTH DAILY    gabapentin (NEURONTIN) 100 MG capsule TAKE 1 CAPSULE BY MOUTH 4 TIMES A DAY AS NEEDED FOR FOOT/LEG PAIN    glucose blood (Glucose Meter Test) test strip Check blood sugars 3 times daily as needed.    glucose monitor monitoring kit Use 1 each As Needed (Check blood sugars 3 times daily as needed.).    glucose monitor monitoring kit Use 1 each As Needed (Check blood sugars 3 times daily as needed.).    guaiFENesin (Mucinex) 600 MG 12 hr tablet Take 1 tablet by mouth 2 (Two) Times a Day As Needed for Cough or Congestion.    hydrOXYzine pamoate (VISTARIL) 50 MG capsule Take 1 capsule by mouth At Night As Needed for Anxiety.    Insulin Pen Needle (Pen Needles) 32G X 4 MM misc Use  1 each Every Night.    Lancets misc Check blood sugars 5 times daily as needed.    loratadine (CLARITIN) 10 MG tablet Take 1 tablet by mouth Daily.    methocarbamol (ROBAXIN) 500 MG tablet Take 1 tablet by mouth 4 (Four) Times a Day.    midodrine (PROAMATINE) 5 MG tablet Take 1 tablet by mouth 2 (Two) Times a Day.    multivitamin (Multiple Vitamin) tablet tablet Take 1 tablet by mouth Daily.    mupirocin (BACTROBAN) 2 % ointment Apply 1 Application topically to the appropriate area as directed 3 (Three) Times a Day.    nitroglycerin (NITROSTAT) 0.4 MG SL tablet Take 1 tablet by mouth As Needed for Chest Pain.    ondansetron ODT (ZOFRAN-ODT) 4 MG disintegrating tablet Place 1 tablet on the tongue Every 8 (Eight) Hours As Needed for Nausea or Vomiting.    pantoprazole (PROTONIX) 40 MG EC tablet TAKE ONE TABLET BY MOUTH DAILY    promethazine (PHENERGAN) 12.5 MG tablet     rosuvastatin (CRESTOR) 20 MG tablet TAKE ONE TABLET BY MOUTH DAILY    sennosides-docusate (PERICOLACE) 8.6-50 MG per tablet Take 1 tablet by mouth Daily.     No current facility-administered medications on file prior to visit.       Objective   There were no vitals filed for this visit.  There is no height or weight on file to calculate BMI.    Physical Exam  Constitutional:       Appearance: Normal appearance. She is well-developed.   HENT:      Head: Normocephalic and atraumatic.      Nose: Nose normal.   Eyes:      General: Lids are normal.      Conjunctiva/sclera: Conjunctivae normal.   Neck:      Trachea: Trachea normal.   Pulmonary:      Effort: No respiratory distress.   Neurological:      Mental Status: She is alert and oriented to person, place, and time.      GCS: GCS eye subscore is 4. GCS verbal subscore is 5. GCS motor subscore is 6.   Psychiatric:         Attention and Perception: Attention normal.         Mood and Affect: Mood normal.         Speech: Speech normal.         Behavior: Behavior normal. Behavior is cooperative.          Thought Content: Thought content normal.         Assessment & Plan   Problem List Items Addressed This Visit       Type 2 diabetes mellitus, with long-term current use of insulin - Primary (Chronic)    Stage 5 chronic kidney disease on chronic dialysis     Other Visit Diagnoses       Primary hypertension        Kidney stone        Relevant Medications    HYDROcodone-acetaminophen (NORCO) 5-325 MG per tablet    tamsulosin (FLOMAX) 0.4 MG capsule 24 hr capsule    Dysuria                   Current Outpatient Medications:     albuterol sulfate  (90 Base) MCG/ACT inhaler, Inhale 2 puffs Every 4 (Four) Hours As Needed for Wheezing or Shortness of Air., Disp: 18 g, Rfl: 0    Cholecalciferol (Vitamin D) 50 MCG (2000 UT) tablet, Take 1 tablet by mouth Daily., Disp: 90 tablet, Rfl: 1    clobetasol (TEMOVATE) 0.05 % cream, Apply 1 application  topically to the appropriate area as directed 2 (Two) Times a Day., Disp: 60 g, Rfl: 1    ezetimibe (ZETIA) 10 MG tablet, TAKE ONE TABLET BY MOUTH DAILY, Disp: 90 tablet, Rfl: 3    gabapentin (NEURONTIN) 100 MG capsule, TAKE 1 CAPSULE BY MOUTH 4 TIMES A DAY AS NEEDED FOR FOOT/LEG PAIN, Disp: 120 capsule, Rfl: 2    glucose blood (Glucose Meter Test) test strip, Check blood sugars 3 times daily as needed., Disp: 100 each, Rfl: 5    glucose monitor monitoring kit, Use 1 each As Needed (Check blood sugars 3 times daily as needed.)., Disp: 1 each, Rfl: 0    glucose monitor monitoring kit, Use 1 each As Needed (Check blood sugars 3 times daily as needed.)., Disp: 1 each, Rfl: 0    guaiFENesin (Mucinex) 600 MG 12 hr tablet, Take 1 tablet by mouth 2 (Two) Times a Day As Needed for Cough or Congestion., Disp: 60 tablet, Rfl: 0    HYDROcodone-acetaminophen (NORCO) 5-325 MG per tablet, Take 1 tablet by mouth Every 8 (Eight) Hours As Needed for Moderate Pain (kidney stone)., Disp: 12 tablet, Rfl: 0    hydrOXYzine pamoate (VISTARIL) 50 MG capsule, Take 1 capsule by mouth At Night As Needed for  Anxiety., Disp: 90 capsule, Rfl: 0    insulin aspart (novoLOG FLEXPEN) 100 UNIT/ML solution pen-injector sc pen, Inject 25 Units under the skin into the appropriate area as directed 3 (Three) Times a Day With Meals., Disp: 30 mL, Rfl: 5    Insulin Degludec (Tresiba FlexTouch) 200 UNIT/ML solution pen-injector pen injection, Inject 170 Units under the skin into the appropriate area as directed Every Night., Disp: 27 mL, Rfl: 3    Insulin Pen Needle (Pen Needles) 32G X 4 MM misc, Use 1 each Every Night., Disp: 30 each, Rfl: 1    Lancets misc, Check blood sugars 5 times daily as needed., Disp: 200 each, Rfl: 5    loratadine (CLARITIN) 10 MG tablet, Take 1 tablet by mouth Daily., Disp: 90 tablet, Rfl: 1    methocarbamol (ROBAXIN) 500 MG tablet, Take 1 tablet by mouth 4 (Four) Times a Day., Disp: , Rfl:     midodrine (PROAMATINE) 5 MG tablet, Take 1 tablet by mouth 2 (Two) Times a Day., Disp: 180 tablet, Rfl: 1    multivitamin (Multiple Vitamin) tablet tablet, Take 1 tablet by mouth Daily., Disp: 90 tablet, Rfl: 1    mupirocin (BACTROBAN) 2 % ointment, Apply 1 Application topically to the appropriate area as directed 3 (Three) Times a Day., Disp: 30 g, Rfl: 0    nitroglycerin (NITROSTAT) 0.4 MG SL tablet, Take 1 tablet by mouth As Needed for Chest Pain., Disp: 100 tablet, Rfl: 1    ondansetron ODT (ZOFRAN-ODT) 4 MG disintegrating tablet, Place 1 tablet on the tongue Every 8 (Eight) Hours As Needed for Nausea or Vomiting., Disp: 30 tablet, Rfl: 2    pantoprazole (PROTONIX) 40 MG EC tablet, TAKE ONE TABLET BY MOUTH DAILY, Disp: 90 tablet, Rfl: 3    promethazine (PHENERGAN) 12.5 MG tablet, , Disp: , Rfl:     rosuvastatin (CRESTOR) 20 MG tablet, TAKE ONE TABLET BY MOUTH DAILY, Disp: 90 tablet, Rfl: 3    sennosides-docusate (PERICOLACE) 8.6-50 MG per tablet, Take 1 tablet by mouth Daily., Disp: , Rfl:     tamsulosin (FLOMAX) 0.4 MG capsule 24 hr capsule, Take 1 capsule by mouth Daily As Needed (kidney stones)., Disp: 30  capsule, Rfl: 0       Plan of care reviewed with the patient at the conclusion of today's visit.  Education was provided regarding diagnosis, management, and any prescribed or recommended OTC medications.  Patient verbalized understanding of and agreement with management plan.     Return in about 2 weeks (around 4/18/2024), or if symptoms worsen or fail to improve, for Follow-up.      Transcribed from ambient dictation for JOSE D Zaragoza by JOSE D Zaragoza.  04/04/24   16:58 EDT    Patient or patient representative verbalized consent to the visit recording.  I have personally performed the services described in this document as transcribed by the above individual, and it is both accurate and complete.

## 2024-04-05 ENCOUNTER — LAB (OUTPATIENT)
Dept: INTERNAL MEDICINE | Facility: CLINIC | Age: 64
End: 2024-04-05
Payer: MEDICARE

## 2024-04-05 DIAGNOSIS — N30.01 ACUTE CYSTITIS WITH HEMATURIA: Primary | ICD-10-CM

## 2024-04-05 DIAGNOSIS — N30.00 ACUTE CYSTITIS WITHOUT HEMATURIA: Primary | ICD-10-CM

## 2024-04-05 PROCEDURE — 87086 URINE CULTURE/COLONY COUNT: CPT | Performed by: NURSE PRACTITIONER

## 2024-04-05 RX ORDER — CEFDINIR 300 MG/1
300 CAPSULE ORAL DAILY
Qty: 7 CAPSULE | Refills: 0 | Status: SHIPPED | OUTPATIENT
Start: 2024-04-05 | End: 2024-04-12

## 2024-04-06 LAB — BACTERIA SPEC AEROBE CULT: NORMAL

## 2024-04-08 DIAGNOSIS — Z79.4 TYPE 2 DIABETES MELLITUS WITH DIABETIC POLYNEUROPATHY, WITH LONG-TERM CURRENT USE OF INSULIN: ICD-10-CM

## 2024-04-08 DIAGNOSIS — E11.42 TYPE 2 DIABETES MELLITUS WITH DIABETIC POLYNEUROPATHY, WITH LONG-TERM CURRENT USE OF INSULIN: ICD-10-CM

## 2024-04-08 RX ORDER — INSULIN ASPART 100 [IU]/ML
25 INJECTION, SOLUTION INTRAVENOUS; SUBCUTANEOUS
Qty: 30 ML | Refills: 5 | Status: SHIPPED | OUTPATIENT
Start: 2024-04-08 | End: 2024-04-09

## 2024-04-09 DIAGNOSIS — Z79.4 TYPE 2 DIABETES MELLITUS WITH DIABETIC POLYNEUROPATHY, WITH LONG-TERM CURRENT USE OF INSULIN: Primary | ICD-10-CM

## 2024-04-09 DIAGNOSIS — E11.42 TYPE 2 DIABETES MELLITUS WITH DIABETIC POLYNEUROPATHY, WITH LONG-TERM CURRENT USE OF INSULIN: Primary | ICD-10-CM

## 2024-04-16 ENCOUNTER — TELEMEDICINE (OUTPATIENT)
Dept: INTERNAL MEDICINE | Facility: CLINIC | Age: 64
End: 2024-04-16
Payer: MEDICARE

## 2024-04-16 DIAGNOSIS — N18.5 STAGE 5 CHRONIC KIDNEY DISEASE: ICD-10-CM

## 2024-04-16 DIAGNOSIS — Z79.4 TYPE 2 DIABETES MELLITUS WITH HYPERGLYCEMIA, WITH LONG-TERM CURRENT USE OF INSULIN: Primary | ICD-10-CM

## 2024-04-16 DIAGNOSIS — E11.65 TYPE 2 DIABETES MELLITUS WITH HYPERGLYCEMIA, WITH LONG-TERM CURRENT USE OF INSULIN: Primary | ICD-10-CM

## 2024-04-16 RX ORDER — INSULIN DEGLUDEC 200 U/ML
170 INJECTION, SOLUTION SUBCUTANEOUS NIGHTLY
Qty: 27 ML | Refills: 3 | Status: SHIPPED | OUTPATIENT
Start: 2024-04-16

## 2024-04-16 NOTE — PROGRESS NOTES
Subjective   No chief complaint on file.     Lyndsey Luna is a 63 y.o. female.     The patient is here today for ***.    80 90    Kidney stone better, uti better, has not called kidndy doctor yet.     No butos - closer to home. Refer to dr. Aguilar. [facility] need Tuesday appt        I have reviewed the following portions of the patient's history and confirmed they are accurate: allergies, current medications, past family history, past medical history, past social history, past surgical history, and problem list    Review of Systems  Pertinent items are noted in HPI.     Current Outpatient Medications on File Prior to Visit   Medication Sig    albuterol sulfate  (90 Base) MCG/ACT inhaler Inhale 2 puffs Every 4 (Four) Hours As Needed for Wheezing or Shortness of Air.    Cholecalciferol (Vitamin D) 50 MCG (2000 UT) tablet Take 1 tablet by mouth Daily.    clobetasol (TEMOVATE) 0.05 % cream Apply 1 application  topically to the appropriate area as directed 2 (Two) Times a Day.    ezetimibe (ZETIA) 10 MG tablet TAKE ONE TABLET BY MOUTH DAILY    gabapentin (NEURONTIN) 100 MG capsule TAKE 1 CAPSULE BY MOUTH 4 TIMES A DAY AS NEEDED FOR FOOT/LEG PAIN    glucose blood (Glucose Meter Test) test strip Check blood sugars 3 times daily as needed.    glucose monitor monitoring kit Use 1 each As Needed (Check blood sugars 3 times daily as needed.).    glucose monitor monitoring kit Use 1 each As Needed (Check blood sugars 3 times daily as needed.).    guaiFENesin (Mucinex) 600 MG 12 hr tablet Take 1 tablet by mouth 2 (Two) Times a Day As Needed for Cough or Congestion.    HYDROcodone-acetaminophen (NORCO) 5-325 MG per tablet Take 1 tablet by mouth Every 8 (Eight) Hours As Needed for Moderate Pain (kidney stone).    hydrOXYzine pamoate (VISTARIL) 50 MG capsule Take 1 capsule by mouth At Night As Needed for Anxiety.    insulin aspart (novoLOG FLEXPEN) 100 UNIT/ML solution pen-injector sc pen Inject 25 Units under the  skin into the appropriate area as directed 3 (Three) Times a Day With Meals.    Insulin Pen Needle (Pen Needles) 32G X 4 MM misc Use 1 each Every Night.    Lancets misc Check blood sugars 5 times daily as needed.    loratadine (CLARITIN) 10 MG tablet Take 1 tablet by mouth Daily.    methocarbamol (ROBAXIN) 500 MG tablet Take 1 tablet by mouth 4 (Four) Times a Day.    midodrine (PROAMATINE) 5 MG tablet Take 1 tablet by mouth 2 (Two) Times a Day.    multivitamin (Multiple Vitamin) tablet tablet Take 1 tablet by mouth Daily.    mupirocin (BACTROBAN) 2 % ointment Apply 1 Application topically to the appropriate area as directed 3 (Three) Times a Day.    nitroglycerin (NITROSTAT) 0.4 MG SL tablet Take 1 tablet by mouth As Needed for Chest Pain.    ondansetron ODT (ZOFRAN-ODT) 4 MG disintegrating tablet Place 1 tablet on the tongue Every 8 (Eight) Hours As Needed for Nausea or Vomiting.    pantoprazole (PROTONIX) 40 MG EC tablet TAKE ONE TABLET BY MOUTH DAILY    promethazine (PHENERGAN) 12.5 MG tablet     rifAXIMin (XIFAXAN) 200 MG tablet Take 1 tablet by mouth 2 (Two) Times a Day. (Patient not taking: Reported on 8/15/2023)    rosuvastatin (CRESTOR) 20 MG tablet TAKE ONE TABLET BY MOUTH DAILY    sennosides-docusate (PERICOLACE) 8.6-50 MG per tablet Take 1 tablet by mouth Daily.    tamsulosin (FLOMAX) 0.4 MG capsule 24 hr capsule Take 1 capsule by mouth Daily As Needed (kidney stones).    Tresiba FlexTouch 200 UNIT/ML solution pen-injector pen injection INJECT 170 UNITS UNDER THE SKIN (INTO THE APPROPRIATE AREA) EVERY NIGHT     No current facility-administered medications on file prior to visit.       Objective   There were no vitals filed for this visit.  There is no height or weight on file to calculate BMI.    Physical Exam    Assessment & Plan   Problem List Items Addressed This Visit    None         Current Outpatient Medications:     albuterol sulfate  (90 Base) MCG/ACT inhaler, Inhale 2 puffs Every 4 (Four)  Hours As Needed for Wheezing or Shortness of Air., Disp: 18 g, Rfl: 0    Cholecalciferol (Vitamin D) 50 MCG (2000 UT) tablet, Take 1 tablet by mouth Daily., Disp: 90 tablet, Rfl: 1    clobetasol (TEMOVATE) 0.05 % cream, Apply 1 application  topically to the appropriate area as directed 2 (Two) Times a Day., Disp: 60 g, Rfl: 1    ezetimibe (ZETIA) 10 MG tablet, TAKE ONE TABLET BY MOUTH DAILY, Disp: 90 tablet, Rfl: 3    gabapentin (NEURONTIN) 100 MG capsule, TAKE 1 CAPSULE BY MOUTH 4 TIMES A DAY AS NEEDED FOR FOOT/LEG PAIN, Disp: 120 capsule, Rfl: 2    glucose blood (Glucose Meter Test) test strip, Check blood sugars 3 times daily as needed., Disp: 100 each, Rfl: 5    glucose monitor monitoring kit, Use 1 each As Needed (Check blood sugars 3 times daily as needed.)., Disp: 1 each, Rfl: 0    glucose monitor monitoring kit, Use 1 each As Needed (Check blood sugars 3 times daily as needed.)., Disp: 1 each, Rfl: 0    guaiFENesin (Mucinex) 600 MG 12 hr tablet, Take 1 tablet by mouth 2 (Two) Times a Day As Needed for Cough or Congestion., Disp: 60 tablet, Rfl: 0    HYDROcodone-acetaminophen (NORCO) 5-325 MG per tablet, Take 1 tablet by mouth Every 8 (Eight) Hours As Needed for Moderate Pain (kidney stone)., Disp: 12 tablet, Rfl: 0    hydrOXYzine pamoate (VISTARIL) 50 MG capsule, Take 1 capsule by mouth At Night As Needed for Anxiety., Disp: 90 capsule, Rfl: 0    insulin aspart (novoLOG FLEXPEN) 100 UNIT/ML solution pen-injector sc pen, Inject 25 Units under the skin into the appropriate area as directed 3 (Three) Times a Day With Meals., Disp: 30 mL, Rfl: 5    Insulin Pen Needle (Pen Needles) 32G X 4 MM misc, Use 1 each Every Night., Disp: 30 each, Rfl: 1    Lancets misc, Check blood sugars 5 times daily as needed., Disp: 200 each, Rfl: 5    loratadine (CLARITIN) 10 MG tablet, Take 1 tablet by mouth Daily., Disp: 90 tablet, Rfl: 1    methocarbamol (ROBAXIN) 500 MG tablet, Take 1 tablet by mouth 4 (Four) Times a Day., Disp:  ", Rfl:     midodrine (PROAMATINE) 5 MG tablet, Take 1 tablet by mouth 2 (Two) Times a Day., Disp: 180 tablet, Rfl: 1    multivitamin (Multiple Vitamin) tablet tablet, Take 1 tablet by mouth Daily., Disp: 90 tablet, Rfl: 1    mupirocin (BACTROBAN) 2 % ointment, Apply 1 Application topically to the appropriate area as directed 3 (Three) Times a Day., Disp: 30 g, Rfl: 0    nitroglycerin (NITROSTAT) 0.4 MG SL tablet, Take 1 tablet by mouth As Needed for Chest Pain., Disp: 100 tablet, Rfl: 1    ondansetron ODT (ZOFRAN-ODT) 4 MG disintegrating tablet, Place 1 tablet on the tongue Every 8 (Eight) Hours As Needed for Nausea or Vomiting., Disp: 30 tablet, Rfl: 2    pantoprazole (PROTONIX) 40 MG EC tablet, TAKE ONE TABLET BY MOUTH DAILY, Disp: 90 tablet, Rfl: 3    promethazine (PHENERGAN) 12.5 MG tablet, , Disp: , Rfl:     rifAXIMin (XIFAXAN) 200 MG tablet, Take 1 tablet by mouth 2 (Two) Times a Day. (Patient not taking: Reported on 8/15/2023), Disp: , Rfl:     rosuvastatin (CRESTOR) 20 MG tablet, TAKE ONE TABLET BY MOUTH DAILY, Disp: 90 tablet, Rfl: 3    sennosides-docusate (PERICOLACE) 8.6-50 MG per tablet, Take 1 tablet by mouth Daily., Disp: , Rfl:     tamsulosin (FLOMAX) 0.4 MG capsule 24 hr capsule, Take 1 capsule by mouth Daily As Needed (kidney stones)., Disp: 30 capsule, Rfl: 0    Tresiba FlexTouch 200 UNIT/ML solution pen-injector pen injection, INJECT 170 UNITS UNDER THE SKIN (INTO THE APPROPRIATE AREA) EVERY NIGHT, Disp: 27 mL, Rfl: 3       Plan of care reviewed with the patient at the conclusion of today's visit.  Education was provided regarding diagnosis, management, and any prescribed or recommended OTC medications.  Patient verbalized understanding of and agreement with management plan.     No follow-ups on file.      Transcribed from ambient dictation for JOSE D Zaragoza by JOSE D Zaragoza.  04/16/24   16:42 EDT    {DESHAUN Provider Statement:05411::\"Patient or patient representative " "verbalized consent to the visit recording.\",\"I have personally performed the services described in this document as transcribed by the above individual, and it is both accurate and complete.\"}    "

## 2024-04-30 ENCOUNTER — DOCUMENTATION (OUTPATIENT)
Dept: INTERNAL MEDICINE | Facility: CLINIC | Age: 64
End: 2024-04-30
Payer: MEDICARE

## 2024-04-30 DIAGNOSIS — E11.65 TYPE 2 DIABETES MELLITUS WITH HYPERGLYCEMIA, WITH LONG-TERM CURRENT USE OF INSULIN: Primary | ICD-10-CM

## 2024-04-30 DIAGNOSIS — Z79.4 TYPE 2 DIABETES MELLITUS WITH HYPERGLYCEMIA, WITH LONG-TERM CURRENT USE OF INSULIN: Primary | ICD-10-CM

## 2024-05-02 ENCOUNTER — TELEPHONE (OUTPATIENT)
Dept: INTERNAL MEDICINE | Facility: CLINIC | Age: 64
End: 2024-05-02
Payer: MEDICARE

## 2024-05-02 NOTE — TELEPHONE ENCOUNTER
Spoke with patient and pharmacy is only giving her 5 pens of novolog a month and she is about to run out. They told her it cannot be filled again until 5/21. I have requested she get 10 pens (30 ml) a month on last script. Please contact pharmacy and ask if there is anything I can change to fix this and why she cannot get 10 pens per month.

## 2024-05-05 NOTE — PROGRESS NOTES
Subjective   Chief Complaint   Patient presents with    Diabetes      This is video visit.  You have chosen to receive care through a video visit today. Do you consent to use a video visit for your medical care today? Yes    Lyndsey Luna is a 63 y.o. female here today for diabetes and kidney failure. Blood sugar has went up and not stable. She struggles with keeping blood sugar controlled when eating. She is trying to not drink soda and eat a better diet. Taking Tresiba 80 units in morning and 90 in evening. Has been using 20 units novlog with meals. She declines to see endocrinology. She denies any further pain or issues with kidney stones. UTI symptoms have resolved. She has stage 5 kidney disease and has not followed up with nephrology. She keeps forgetting to make appt with Dr. Aguilar in [facility] she saw previously.     I have reviewed the following portions of the patient's history and confirmed they are accurate: allergies, current medications, past family history, past medical history, past social history, past surgical history, and problem list    Review of Systems  Pertinent items are noted in HPI.       Current Outpatient Medications on File Prior to Visit   Medication Sig    albuterol sulfate  (90 Base) MCG/ACT inhaler Inhale 2 puffs Every 4 (Four) Hours As Needed for Wheezing or Shortness of Air.    Cholecalciferol (Vitamin D) 50 MCG (2000 UT) tablet Take 1 tablet by mouth Daily.    clobetasol (TEMOVATE) 0.05 % cream Apply 1 application  topically to the appropriate area as directed 2 (Two) Times a Day.    ezetimibe (ZETIA) 10 MG tablet TAKE ONE TABLET BY MOUTH DAILY    gabapentin (NEURONTIN) 100 MG capsule TAKE 1 CAPSULE BY MOUTH 4 TIMES A DAY AS NEEDED FOR FOOT/LEG PAIN    glucose blood (Glucose Meter Test) test strip Check blood sugars 3 times daily as needed.    glucose monitor monitoring kit Use 1 each As Needed (Check blood sugars 3 times daily as needed.).    glucose monitor  monitoring kit Use 1 each As Needed (Check blood sugars 3 times daily as needed.).    guaiFENesin (Mucinex) 600 MG 12 hr tablet Take 1 tablet by mouth 2 (Two) Times a Day As Needed for Cough or Congestion.    HYDROcodone-acetaminophen (NORCO) 5-325 MG per tablet Take 1 tablet by mouth Every 8 (Eight) Hours As Needed for Moderate Pain (kidney stone).    hydrOXYzine pamoate (VISTARIL) 50 MG capsule Take 1 capsule by mouth At Night As Needed for Anxiety.    Insulin Pen Needle (Pen Needles) 32G X 4 MM misc Use 1 each Every Night.    Lancets misc Check blood sugars 5 times daily as needed.    loratadine (CLARITIN) 10 MG tablet Take 1 tablet by mouth Daily.    methocarbamol (ROBAXIN) 500 MG tablet Take 1 tablet by mouth 4 (Four) Times a Day.    midodrine (PROAMATINE) 5 MG tablet Take 1 tablet by mouth 2 (Two) Times a Day.    multivitamin (Multiple Vitamin) tablet tablet Take 1 tablet by mouth Daily.    mupirocin (BACTROBAN) 2 % ointment Apply 1 Application topically to the appropriate area as directed 3 (Three) Times a Day.    nitroglycerin (NITROSTAT) 0.4 MG SL tablet Take 1 tablet by mouth As Needed for Chest Pain.    ondansetron ODT (ZOFRAN-ODT) 4 MG disintegrating tablet Place 1 tablet on the tongue Every 8 (Eight) Hours As Needed for Nausea or Vomiting.    pantoprazole (PROTONIX) 40 MG EC tablet TAKE ONE TABLET BY MOUTH DAILY    promethazine (PHENERGAN) 12.5 MG tablet     rosuvastatin (CRESTOR) 20 MG tablet TAKE ONE TABLET BY MOUTH DAILY    sennosides-docusate (PERICOLACE) 8.6-50 MG per tablet Take 1 tablet by mouth Daily.    tamsulosin (FLOMAX) 0.4 MG capsule 24 hr capsule Take 1 capsule by mouth Daily As Needed (kidney stones).     No current facility-administered medications on file prior to visit.       Objective   There were no vitals filed for this visit.  There is no height or weight on file to calculate BMI.    Physical Exam  Constitutional:       Appearance: Normal appearance. She is well-developed.   HENT:       Head: Normocephalic and atraumatic.      Nose: Nose normal.   Eyes:      General: Lids are normal.      Conjunctiva/sclera: Conjunctivae normal.   Neck:      Trachea: Trachea normal.   Pulmonary:      Effort: No respiratory distress.   Neurological:      Mental Status: She is alert and oriented to person, place, and time.      GCS: GCS eye subscore is 4. GCS verbal subscore is 5. GCS motor subscore is 6.   Psychiatric:         Attention and Perception: Attention normal.         Mood and Affect: Mood normal.         Speech: Speech normal.         Behavior: Behavior normal. Behavior is cooperative.         Thought Content: Thought content normal.         Assessment & Plan   Problem List Items Addressed This Visit       Type 2 diabetes mellitus, with long-term current use of insulin - Primary (Chronic)  Chronic unstable. Increase novolog to 25 units with meals up to 3 times daily. Continue with tresiba. Patient declines endocrinology referral. Continue with diabetic diet. Repeat A1c in one month      Relevant Medications    insulin aspart (novoLOG FLEXPEN) 100 UNIT/ML solution pen-injector sc pen    Insulin Degludec (Tresiba FlexTouch) 200 UNIT/ML solution pen-injector pen injection     Other Visit Diagnoses       Stage 5 chronic kidney disease  Chronic unstable. Referring to nephrology for consult.         Relevant Orders    Ambulatory Referral to Nephrology (Completed)               Current Outpatient Medications:     insulin aspart (novoLOG FLEXPEN) 100 UNIT/ML solution pen-injector sc pen, Inject 25 Units under the skin into the appropriate area as directed 3 (Three) Times a Day With Meals., Disp: 30 mL, Rfl: 5    Insulin Degludec (Tresiba FlexTouch) 200 UNIT/ML solution pen-injector pen injection, Inject 170 Units under the skin into the appropriate area as directed Every Night., Disp: 27 mL, Rfl: 3    albuterol sulfate  (90 Base) MCG/ACT inhaler, Inhale 2 puffs Every 4 (Four) Hours As Needed for  Wheezing or Shortness of Air., Disp: 18 g, Rfl: 0    Cholecalciferol (Vitamin D) 50 MCG (2000 UT) tablet, Take 1 tablet by mouth Daily., Disp: 90 tablet, Rfl: 1    clobetasol (TEMOVATE) 0.05 % cream, Apply 1 application  topically to the appropriate area as directed 2 (Two) Times a Day., Disp: 60 g, Rfl: 1    ezetimibe (ZETIA) 10 MG tablet, TAKE ONE TABLET BY MOUTH DAILY, Disp: 90 tablet, Rfl: 3    gabapentin (NEURONTIN) 100 MG capsule, TAKE 1 CAPSULE BY MOUTH 4 TIMES A DAY AS NEEDED FOR FOOT/LEG PAIN, Disp: 120 capsule, Rfl: 2    glucose blood (Glucose Meter Test) test strip, Check blood sugars 3 times daily as needed., Disp: 100 each, Rfl: 5    glucose monitor monitoring kit, Use 1 each As Needed (Check blood sugars 3 times daily as needed.)., Disp: 1 each, Rfl: 0    glucose monitor monitoring kit, Use 1 each As Needed (Check blood sugars 3 times daily as needed.)., Disp: 1 each, Rfl: 0    guaiFENesin (Mucinex) 600 MG 12 hr tablet, Take 1 tablet by mouth 2 (Two) Times a Day As Needed for Cough or Congestion., Disp: 60 tablet, Rfl: 0    HYDROcodone-acetaminophen (NORCO) 5-325 MG per tablet, Take 1 tablet by mouth Every 8 (Eight) Hours As Needed for Moderate Pain (kidney stone)., Disp: 12 tablet, Rfl: 0    hydrOXYzine pamoate (VISTARIL) 50 MG capsule, Take 1 capsule by mouth At Night As Needed for Anxiety., Disp: 90 capsule, Rfl: 0    Insulin Pen Needle (Pen Needles) 32G X 4 MM misc, Use 1 each Every Night., Disp: 30 each, Rfl: 1    Lancets misc, Check blood sugars 5 times daily as needed., Disp: 200 each, Rfl: 5    loratadine (CLARITIN) 10 MG tablet, Take 1 tablet by mouth Daily., Disp: 90 tablet, Rfl: 1    methocarbamol (ROBAXIN) 500 MG tablet, Take 1 tablet by mouth 4 (Four) Times a Day., Disp: , Rfl:     midodrine (PROAMATINE) 5 MG tablet, Take 1 tablet by mouth 2 (Two) Times a Day., Disp: 180 tablet, Rfl: 1    multivitamin (Multiple Vitamin) tablet tablet, Take 1 tablet by mouth Daily., Disp: 90 tablet, Rfl:  1    mupirocin (BACTROBAN) 2 % ointment, Apply 1 Application topically to the appropriate area as directed 3 (Three) Times a Day., Disp: 30 g, Rfl: 0    nitroglycerin (NITROSTAT) 0.4 MG SL tablet, Take 1 tablet by mouth As Needed for Chest Pain., Disp: 100 tablet, Rfl: 1    ondansetron ODT (ZOFRAN-ODT) 4 MG disintegrating tablet, Place 1 tablet on the tongue Every 8 (Eight) Hours As Needed for Nausea or Vomiting., Disp: 30 tablet, Rfl: 2    pantoprazole (PROTONIX) 40 MG EC tablet, TAKE ONE TABLET BY MOUTH DAILY, Disp: 90 tablet, Rfl: 3    promethazine (PHENERGAN) 12.5 MG tablet, , Disp: , Rfl:     rosuvastatin (CRESTOR) 20 MG tablet, TAKE ONE TABLET BY MOUTH DAILY, Disp: 90 tablet, Rfl: 3    sennosides-docusate (PERICOLACE) 8.6-50 MG per tablet, Take 1 tablet by mouth Daily., Disp: , Rfl:     tamsulosin (FLOMAX) 0.4 MG capsule 24 hr capsule, Take 1 capsule by mouth Daily As Needed (kidney stones)., Disp: 30 capsule, Rfl: 0       Plan of care reviewed with the patient at the conclusion of today's visit.  Education was provided regarding diagnosis, management, and any prescribed or recommended OTC medications.  Patient verbalized understanding of and agreement with management plan.     Return in about 1 month (around 5/16/2024), or if symptoms worsen or fail to improve, for Follow-up.     [place] in [place], [place] in [place]. I spent 25 minutes in medical discussion with patient during this visit.     Taryn Chavez, APRN

## 2024-05-06 ENCOUNTER — TELEPHONE (OUTPATIENT)
Dept: INTERNAL MEDICINE | Facility: CLINIC | Age: 64
End: 2024-05-06
Payer: MEDICARE

## 2024-05-08 DIAGNOSIS — Z79.4 TYPE 2 DIABETES MELLITUS WITH HYPERGLYCEMIA, WITH LONG-TERM CURRENT USE OF INSULIN: ICD-10-CM

## 2024-05-08 DIAGNOSIS — E11.65 TYPE 2 DIABETES MELLITUS WITH HYPERGLYCEMIA, WITH LONG-TERM CURRENT USE OF INSULIN: ICD-10-CM

## 2024-05-09 DIAGNOSIS — E11.65 TYPE 2 DIABETES MELLITUS WITH HYPERGLYCEMIA, WITH LONG-TERM CURRENT USE OF INSULIN: Primary | ICD-10-CM

## 2024-05-09 DIAGNOSIS — Z79.4 TYPE 2 DIABETES MELLITUS WITH HYPERGLYCEMIA, WITH LONG-TERM CURRENT USE OF INSULIN: Primary | ICD-10-CM

## 2024-05-14 ENCOUNTER — TELEPHONE (OUTPATIENT)
Dept: INTERNAL MEDICINE | Facility: CLINIC | Age: 64
End: 2024-05-14
Payer: MEDICARE

## 2024-05-14 ENCOUNTER — READMISSION MANAGEMENT (OUTPATIENT)
Dept: CALL CENTER | Facility: HOSPITAL | Age: 64
End: 2024-05-14
Payer: MEDICARE

## 2024-05-14 NOTE — OUTREACH NOTE
Prep Survey      Flowsheet Row Responses   [facility] facility patient discharged from? Non-[facility]   Is LACE score < 7 ? Non-BH Discharge   Eligibility [facility]   Date of Admission 05/13/24   Date of Discharge 05/14/24   Discharge Disposition Home or Self Care   Discharge diagnosis Anemia, unspecified type  Hemorrhagic ascites   Does the patient have one of the following disease processes/diagnoses(primary or secondary)? Other   Does the patient have Home health ordered? No   Prep survey completed? Yes            LOY ARMAS - Registered Nurse

## 2024-05-14 NOTE — TELEPHONE ENCOUNTER
Caller: Lyndsey Luna    Relationship: Self    Best call back number: [phone]     PATIENT WOULD LIKE TO SEE HER PROVIDER FOR HER LEG SWELLING HOWEVER SHE IS NOT AVAILABLE FOR A FEW DAYS.  IS THERE ANY WAY SHE CAN BE WORKED IN FOR A LATE AFTERNOON THIS WEEK OR BY VIDEO.    PLEASE CALL TO SCHEDULE

## 2024-05-15 ENCOUNTER — TRANSITIONAL CARE MANAGEMENT TELEPHONE ENCOUNTER (OUTPATIENT)
Dept: CALL CENTER | Facility: HOSPITAL | Age: 64
End: 2024-05-15
Payer: MEDICARE

## 2024-05-15 NOTE — TELEPHONE ENCOUNTER
I have multiple spots open on Friday. Try and get her into one of these spots asap. Ok to do video.

## 2024-05-15 NOTE — OUTREACH NOTE
Call Center TCM Note      Flowsheet Row Responses   [facility] patient discharged from? Non-   Does the patient have one of the following disease processes/diagnoses(primary or secondary)? Other   TCM attempt successful? Yes   Call start time 1430   Call end time 1433   Discharge diagnosis Anemia, unspecified type  Hemorrhagic ascites   Meds reviewed with patient/caregiver? Yes   Is the patient having any side effects they believe may be caused by any medication additions or changes? No   Does the patient have all medications ordered at discharge? No   Nursing Interventions No intervention needed   Prescription comments Pharmacy will have ready for  5/17/24-pt states her doctors are aware   Is the patient taking all medications as directed (includes completed medication regime)? Yes   Comments Pt has a 15 min office visit scheduled for 5/16/24.  This apt was scheduled prior to call.  Will route message to group.   Does the patient have an appointment with their PCP within 7-14 days of discharge? Other   Has home health visited the patient within 72 hours of discharge? N/A   Psychosocial issues? No   Did the patient receive a copy of their discharge instructions? Yes   Nursing interventions Reviewed instructions with patient   What is the patient's perception of their health status since discharge? Improving   Is the patient/caregiver able to teach back signs and symptoms related to disease process for when to call PCP? Yes   Is the patient/caregiver able to teach back signs and symptoms related to disease process for when to call 911? Yes   Is the patient/caregiver able to teach back the hierarchy of who to call/visit for symptoms/problems? PCP, Specialist, Home health nurse, Urgent Care, ED, 911 Yes   If the patient is a current smoker, are they able to teach back resources for cessation? Not a smoker   TCM call completed? Yes   Call end time 1433            Ricarda Cook RN    5/15/2024, 14:35  EDT

## 2024-05-16 ENCOUNTER — OFFICE VISIT (OUTPATIENT)
Dept: INTERNAL MEDICINE | Facility: CLINIC | Age: 64
End: 2024-05-16
Payer: MEDICARE

## 2024-05-16 VITALS
DIASTOLIC BLOOD PRESSURE: 62 MMHG | WEIGHT: 194 LBS | HEART RATE: 70 BPM | HEIGHT: 67 IN | SYSTOLIC BLOOD PRESSURE: 136 MMHG | OXYGEN SATURATION: 97 % | TEMPERATURE: 97.8 F | BODY MASS INDEX: 30.45 KG/M2

## 2024-05-16 DIAGNOSIS — R41.3 MEMORY LOSS: ICD-10-CM

## 2024-05-16 DIAGNOSIS — E11.65 TYPE 2 DIABETES MELLITUS WITH HYPERGLYCEMIA, WITH LONG-TERM CURRENT USE OF INSULIN: Primary | ICD-10-CM

## 2024-05-16 DIAGNOSIS — K04.7 ABSCESSED TOOTH: ICD-10-CM

## 2024-05-16 DIAGNOSIS — R22.42 MASS OF LEFT LOWER EXTREMITY: ICD-10-CM

## 2024-05-16 DIAGNOSIS — K72.10 CHRONIC LIVER FAILURE WITHOUT HEPATIC COMA: ICD-10-CM

## 2024-05-16 DIAGNOSIS — Z79.4 TYPE 2 DIABETES MELLITUS WITH HYPERGLYCEMIA, WITH LONG-TERM CURRENT USE OF INSULIN: Primary | ICD-10-CM

## 2024-05-16 DIAGNOSIS — D63.8 CHRONIC DISEASE ANEMIA: ICD-10-CM

## 2024-05-16 DIAGNOSIS — E87.6 HYPOKALEMIA: ICD-10-CM

## 2024-05-16 DIAGNOSIS — N18.5 STAGE 5 CHRONIC KIDNEY DISEASE: ICD-10-CM

## 2024-05-16 LAB
EXPIRATION DATE: NORMAL
HBA1C MFR BLD: 5.6 % (ref 4.5–5.7)
Lab: NORMAL

## 2024-05-16 PROCEDURE — 3044F HG A1C LEVEL LT 7.0%: CPT | Performed by: NURSE PRACTITIONER

## 2024-05-16 PROCEDURE — 1159F MED LIST DOCD IN RCRD: CPT | Performed by: NURSE PRACTITIONER

## 2024-05-16 PROCEDURE — 99214 OFFICE O/P EST MOD 30 MIN: CPT | Performed by: NURSE PRACTITIONER

## 2024-05-16 PROCEDURE — 3078F DIAST BP <80 MM HG: CPT | Performed by: NURSE PRACTITIONER

## 2024-05-16 PROCEDURE — 1160F RVW MEDS BY RX/DR IN RCRD: CPT | Performed by: NURSE PRACTITIONER

## 2024-05-16 PROCEDURE — 1126F AMNT PAIN NOTED NONE PRSNT: CPT | Performed by: NURSE PRACTITIONER

## 2024-05-16 PROCEDURE — 3075F SYST BP GE 130 - 139MM HG: CPT | Performed by: NURSE PRACTITIONER

## 2024-05-16 PROCEDURE — 83036 HEMOGLOBIN GLYCOSYLATED A1C: CPT | Performed by: NURSE PRACTITIONER

## 2024-05-16 RX ORDER — BLOOD-GLUCOSE METER
EACH MISCELLANEOUS
COMMUNITY
Start: 2024-03-07

## 2024-05-16 RX ORDER — POTASSIUM CHLORIDE 20 MEQ/1
20 TABLET, EXTENDED RELEASE ORAL DAILY
Qty: 30 TABLET | Refills: 2 | Status: SHIPPED | OUTPATIENT
Start: 2024-05-16

## 2024-05-16 RX ORDER — CLINDAMYCIN HYDROCHLORIDE 150 MG/1
CAPSULE ORAL
COMMUNITY
Start: 2024-05-08 | End: 2024-05-16

## 2024-05-16 RX ORDER — ISOPROPYL ALCOHOL 0.75 G/1
SWAB TOPICAL
COMMUNITY
Start: 2024-03-04

## 2024-05-16 RX ORDER — INSULIN LISPRO 100 [IU]/ML
25 INJECTION, SOLUTION INTRAVENOUS; SUBCUTANEOUS
COMMUNITY
Start: 2024-05-14 | End: 2024-05-16

## 2024-05-16 RX ORDER — FERROUS GLUCONATE 324(38)MG
324 TABLET ORAL
COMMUNITY
Start: 2024-05-14

## 2024-05-16 RX ORDER — LACTULOSE 10 G/15ML
SOLUTION ORAL
COMMUNITY
Start: 2024-05-14

## 2024-05-16 RX ORDER — CEFDINIR 300 MG/1
300 CAPSULE ORAL DAILY
Qty: 10 CAPSULE | Refills: 0 | Status: SHIPPED | OUTPATIENT
Start: 2024-05-16 | End: 2024-05-26

## 2024-05-16 NOTE — PROGRESS NOTES
Subjective   Chief Complaint   Patient presents with    Hospital Follow Up Visit     And leg swelling      Lyndsey Luna is a 63 y.o. female.     The patient is here today for hospital follow-up. An adult male accompanies her.    The patient was admitted to the hospital on Monday, 05/13/2024, and underwent paracentesis, during which approximately 3.4 liters of blood was drained. She was informed that it was dried old blood. A process she undergoes biweekly, with the most recent session occurring on Friday,05/10/2024 before the hospitalization. She received 2 units of blood until her fluid accumulation was excessive. Her potassium levels were found to be low during her paracentesis, and she was administered 3 bags of albumin at each transfusion. She was not prescribed potassium at the time of discharge. Her blood count will not be checked until her paracentesis appointment on Monday, 05/20/2024. She is not currently taking any blood thinners. Her platelet count is low.    She was prescribed 150 mg of Clindamycin daily by her dentist, which she believes may have caused bleeding. She continues to experience an infection in her wisdom tooth and is scheduled to have her wisdom tooth extracted on Monday, 05/20/2024. She reports bilateral ear discomfort, which she attributes to her tooth.   The patient has been experiencing leg swelling for the past 2 weeks, accompanied by a large knot, and bruising down the back of her leg. She has been wrapping her legs to maintain circulation. She has a metal plate in her ankle and has had a knot for several years. She is curious about the cause of her ankle swelling.    She has a scheduled appointment with her liver specialist at the [facility] on 05/28/2024. She was scheduled for a colonoscopy and EGD but had to cancel due to illness. She was informed that her insurance required a colonoscopy to stay on the liver transplant list.  She has not had an EGD in a while. She  wants her colonoscopy and EGD done through [facility].    She has been experiencing forgetfulness. She has not had her ammonia level checked. She is not currently taking lactulose. She has never been confused.    The patient has been off Tresiba 170 units for 1.5 weeks, but her blood sugar levels have not increased. Her blood sugar this morning was 114 mg/dL but increased to 200 mg/dL after breakfast at 11:30 AM. She is unsure of the highest her blood sugar has been in the past 1.5 weeks. She was not given insulin during her hospital stay. She was given a stomach pill and an infection pill on Monday 05/13/2024 afternoon at 3:30PM. She has an upcoming appointment with an endocrinology nurse practitioner. She has discontinued carbonated beverages and [facility].    She does not have any appointment with nephrologist.    I have reviewed the following portions of the patient's history and confirmed they are accurate: allergies, current medications, past family history, past medical history, past social history, past surgical history, and problem list    Review of Systems  Pertinent items are noted in HPI.     Current Outpatient Medications on File Prior to Visit   Medication Sig    Alcohol Swabs (B-D SINGLE USE SWABS REGULAR) pads     Blood Glucose Monitoring Suppl (ONE TOUCH ULTRA 2) w/Device kit     ezetimibe (ZETIA) 10 MG tablet TAKE ONE TABLET BY MOUTH DAILY    ferrous gluconate (FERGON) 324 MG tablet Take 1 tablet by mouth Every Other Day.    gabapentin (NEURONTIN) 100 MG capsule TAKE 1 CAPSULE BY MOUTH 4 TIMES A DAY AS NEEDED FOR FOOT/LEG PAIN    glucose blood (Glucose Meter Test) test strip Check blood sugars 3 times daily as needed.    glucose monitor monitoring kit Use 1 each As Needed (Check blood sugars 3 times daily as needed.).    insulin aspart (novoLOG FLEXPEN) 100 UNIT/ML solution pen-injector sc pen Inject 25 Units under the skin into the appropriate area as directed 3 (Three) Times a Day With Meals.    Insulin  Degludec (Tresiba FlexTouch) 200 UNIT/ML solution pen-injector pen injection Inject 170 Units under the skin into the appropriate area as directed Every Night.    Insulin Lispro, 1 Unit Dial, (HUMALOG) 100 UNIT/ML solution pen-injector Inject 25 Units under the skin into the appropriate area as directed.    Insulin Pen Needle (Pen Needles) 32G X 4 MM misc Use 1 each Every Night.    lactulose (CHRONULAC) 10 GM/15ML solution     Lancets misc Check blood sugars 5 times daily as needed.    loratadine (CLARITIN) 10 MG tablet Take 1 tablet by mouth Daily.    methocarbamol (ROBAXIN) 500 MG tablet Take 1 tablet by mouth 4 (Four) Times a Day.    midodrine (PROAMATINE) 5 MG tablet Take 1 tablet by mouth 2 (Two) Times a Day.    multivitamin (Multiple Vitamin) tablet tablet Take 1 tablet by mouth Daily.    mupirocin (BACTROBAN) 2 % ointment Apply 1 Application topically to the appropriate area as directed 3 (Three) Times a Day.    nitroglycerin (NITROSTAT) 0.4 MG SL tablet Take 1 tablet by mouth As Needed for Chest Pain.    ondansetron ODT (ZOFRAN-ODT) 4 MG disintegrating tablet Place 1 tablet on the tongue Every 8 (Eight) Hours As Needed for Nausea or Vomiting.    pantoprazole (PROTONIX) 40 MG EC tablet TAKE ONE TABLET BY MOUTH DAILY    promethazine (PHENERGAN) 12.5 MG tablet     rifAXIMin (XIFAXAN) 200 MG tablet Take 1 tablet by mouth 3 (Three) Times a Day.    rosuvastatin (CRESTOR) 20 MG tablet TAKE ONE TABLET BY MOUTH DAILY    sennosides-docusate (PERICOLACE) 8.6-50 MG per tablet Take 1 tablet by mouth Daily.    tamsulosin (FLOMAX) 0.4 MG capsule 24 hr capsule Take 1 capsule by mouth Daily As Needed (kidney stones).    [DISCONTINUED] clindamycin (CLEOCIN) 150 MG capsule     albuterol sulfate  (90 Base) MCG/ACT inhaler Inhale 2 puffs Every 4 (Four) Hours As Needed for Wheezing or Shortness of Air. (Patient not taking: Reported on 5/16/2024)    Cholecalciferol (Vitamin D) 50 MCG (2000 UT) tablet Take 1 tablet by mouth  "Daily. (Patient not taking: Reported on 5/16/2024)    clobetasol (TEMOVATE) 0.05 % cream Apply 1 application  topically to the appropriate area as directed 2 (Two) Times a Day. (Patient not taking: Reported on 5/16/2024)    guaiFENesin (Mucinex) 600 MG 12 hr tablet Take 1 tablet by mouth 2 (Two) Times a Day As Needed for Cough or Congestion. (Patient not taking: Reported on 5/16/2024)    HYDROcodone-acetaminophen (NORCO) 5-325 MG per tablet Take 1 tablet by mouth Every 8 (Eight) Hours As Needed for Moderate Pain (kidney stone). (Patient not taking: Reported on 5/16/2024)    hydrOXYzine pamoate (VISTARIL) 50 MG capsule Take 1 capsule by mouth At Night As Needed for Anxiety. (Patient not taking: Reported on 5/16/2024)    [DISCONTINUED] glucose monitor monitoring kit Use 1 each As Needed (Check blood sugars 3 times daily as needed.).     No current facility-administered medications on file prior to visit.       Objective   Vitals:    05/16/24 1517   BP: 136/62   Pulse: 70   Temp: 97.8 °F (36.6 °C)   TempSrc: Infrared   SpO2: 97%   Weight: 88 kg (194 lb)   Height: 170 cm (66.93\")   PainSc: 0-No pain     Body mass index is 30.45 kg/m².    Physical Exam  Vitals reviewed.   Constitutional:       Appearance: Normal appearance. She is well-developed.   HENT:      Head: Normocephalic and atraumatic.      Nose: Nose normal.   Eyes:      General: Lids are normal.      Conjunctiva/sclera: Conjunctivae normal.      Pupils: Pupils are equal, round, and reactive to light.   Neck:      Thyroid: No thyromegaly.      Trachea: Trachea normal.   Cardiovascular:      Rate and Rhythm: Normal rate and regular rhythm.      Heart sounds: Normal heart sounds.   Pulmonary:      Effort: Pulmonary effort is normal. No respiratory distress.      Breath sounds: Normal breath sounds.   Abdominal:      General: There is distension.   Skin:     General: Skin is warm and dry.          Neurological:      Mental Status: She is alert and oriented to " person, place, and time.      GCS: GCS eye subscore is 4. GCS verbal subscore is 5. GCS motor subscore is 6.   Psychiatric:         Attention and Perception: Attention normal.         Mood and Affect: Mood normal.         Speech: Speech normal.         Behavior: Behavior normal. Behavior is cooperative.         Thought Content: Thought content normal.       Assessment & Plan   Problem List Items Addressed This Visit       Type 2 diabetes mellitus, with long-term current use of insulin - Primary (Chronic)    Relevant Medications    Insulin Lispro, 1 Unit Dial, (HUMALOG) 100 UNIT/ML solution pen-injector    Other Relevant Orders    POC Glycosylated Hemoglobin (Hb A1C) (Completed)     Other Visit Diagnoses       Mass of left lower extremity        Relevant Orders    US Nonvascular Extremity Limited    Hypokalemia        Relevant Medications    potassium chloride (KLOR-CON M20) 20 MEQ CR tablet           Type 2 diabetes mellitus, chronic, stable  This provider has concerns due to patient stopping her Tresiba long-acting insulin; concern that her blood sugar will start increasing.   Patient will continue NovoLog three times daily with meals.  If blood sugars start becoming elevated, the patient will contact this provider, to be restarted back on long acting.   Have referred patient to endocrinology for a consultation.    Mass left lower extremity, new unstable   Concerning for gastrocsoleus tear versus superficial blood clot.   Ultrasound ordered.   The patient will go to ER for any worsening of symptoms.    Hypokalemia, chronic unstable   Start potassium supplement.   The patient will recheck combined potassium and magnesium levels in 2 weeks.    Abscessed tooth chronic unstable   Start cefdinir.   Patient has upcoming follow up with a dentist.     Chronic liver failure with memory loss, chronic unstable  Ordering EGD and colonoscopy to help prepare patient for [facility] liver transplant work-up, the  patient has upcoming appointment with GI transplant team in 2 weeks.   Ordering CBC, CMP, and ammonia level.   Continue Xifaxan.     Chronic anemia, chronic unstable  Continue iron supplement.   Follow a high iron diet.   Will be rechecking anemia panel in 2 weeks.   The patient will also continue to be monitored by the paracentesis group.    Stage V chronic kidney disease, chronic unstable  Highly encouraging the patient to keep appointment with the nephrologist patient has been referred.  Recheck CMP and CBC in 2 weeks.       Current Outpatient Medications:     Alcohol Swabs (B-D SINGLE USE SWABS REGULAR) pads, , Disp: , Rfl:     Blood Glucose Monitoring Suppl (ONE TOUCH ULTRA 2) w/Device kit, , Disp: , Rfl:     ezetimibe (ZETIA) 10 MG tablet, TAKE ONE TABLET BY MOUTH DAILY, Disp: 90 tablet, Rfl: 3    ferrous gluconate (FERGON) 324 MG tablet, Take 1 tablet by mouth Every Other Day., Disp: , Rfl:     gabapentin (NEURONTIN) 100 MG capsule, TAKE 1 CAPSULE BY MOUTH 4 TIMES A DAY AS NEEDED FOR FOOT/LEG PAIN, Disp: 120 capsule, Rfl: 2    glucose blood (Glucose Meter Test) test strip, Check blood sugars 3 times daily as needed., Disp: 100 each, Rfl: 5    glucose monitor monitoring kit, Use 1 each As Needed (Check blood sugars 3 times daily as needed.)., Disp: 1 each, Rfl: 0    insulin aspart (novoLOG FLEXPEN) 100 UNIT/ML solution pen-injector sc pen, Inject 25 Units under the skin into the appropriate area as directed 3 (Three) Times a Day With Meals., Disp: 30 mL, Rfl: 5    Insulin Degludec (Tresiba FlexTouch) 200 UNIT/ML solution pen-injector pen injection, Inject 170 Units under the skin into the appropriate area as directed Every Night., Disp: 27 mL, Rfl: 3    Insulin Lispro, 1 Unit Dial, (HUMALOG) 100 UNIT/ML solution pen-injector, Inject 25 Units under the skin into the appropriate area as directed., Disp: , Rfl:     Insulin Pen Needle (Pen Needles) 32G X 4 MM misc, Use 1 each Every Night., Disp: 30 each, Rfl: 1     lactulose (CHRONULAC) 10 GM/15ML solution, , Disp: , Rfl:     Lancets misc, Check blood sugars 5 times daily as needed., Disp: 200 each, Rfl: 5    loratadine (CLARITIN) 10 MG tablet, Take 1 tablet by mouth Daily., Disp: 90 tablet, Rfl: 1    methocarbamol (ROBAXIN) 500 MG tablet, Take 1 tablet by mouth 4 (Four) Times a Day., Disp: , Rfl:     midodrine (PROAMATINE) 5 MG tablet, Take 1 tablet by mouth 2 (Two) Times a Day., Disp: 180 tablet, Rfl: 1    multivitamin (Multiple Vitamin) tablet tablet, Take 1 tablet by mouth Daily., Disp: 90 tablet, Rfl: 1    mupirocin (BACTROBAN) 2 % ointment, Apply 1 Application topically to the appropriate area as directed 3 (Three) Times a Day., Disp: 30 g, Rfl: 0    nitroglycerin (NITROSTAT) 0.4 MG SL tablet, Take 1 tablet by mouth As Needed for Chest Pain., Disp: 100 tablet, Rfl: 1    ondansetron ODT (ZOFRAN-ODT) 4 MG disintegrating tablet, Place 1 tablet on the tongue Every 8 (Eight) Hours As Needed for Nausea or Vomiting., Disp: 30 tablet, Rfl: 2    pantoprazole (PROTONIX) 40 MG EC tablet, TAKE ONE TABLET BY MOUTH DAILY, Disp: 90 tablet, Rfl: 3    promethazine (PHENERGAN) 12.5 MG tablet, , Disp: , Rfl:     rifAXIMin (XIFAXAN) 200 MG tablet, Take 1 tablet by mouth 3 (Three) Times a Day., Disp: , Rfl:     rosuvastatin (CRESTOR) 20 MG tablet, TAKE ONE TABLET BY MOUTH DAILY, Disp: 90 tablet, Rfl: 3    sennosides-docusate (PERICOLACE) 8.6-50 MG per tablet, Take 1 tablet by mouth Daily., Disp: , Rfl:     tamsulosin (FLOMAX) 0.4 MG capsule 24 hr capsule, Take 1 capsule by mouth Daily As Needed (kidney stones)., Disp: 30 capsule, Rfl: 0    albuterol sulfate  (90 Base) MCG/ACT inhaler, Inhale 2 puffs Every 4 (Four) Hours As Needed for Wheezing or Shortness of Air. (Patient not taking: Reported on 5/16/2024), Disp: 18 g, Rfl: 0    Cholecalciferol (Vitamin D) 50 MCG (2000 UT) tablet, Take 1 tablet by mouth Daily. (Patient not taking: Reported on 5/16/2024), Disp: 90 tablet, Rfl: 1     clobetasol (TEMOVATE) 0.05 % cream, Apply 1 application  topically to the appropriate area as directed 2 (Two) Times a Day. (Patient not taking: Reported on 5/16/2024), Disp: 60 g, Rfl: 1    guaiFENesin (Mucinex) 600 MG 12 hr tablet, Take 1 tablet by mouth 2 (Two) Times a Day As Needed for Cough or Congestion. (Patient not taking: Reported on 5/16/2024), Disp: 60 tablet, Rfl: 0    HYDROcodone-acetaminophen (NORCO) 5-325 MG per tablet, Take 1 tablet by mouth Every 8 (Eight) Hours As Needed for Moderate Pain (kidney stone). (Patient not taking: Reported on 5/16/2024), Disp: 12 tablet, Rfl: 0    hydrOXYzine pamoate (VISTARIL) 50 MG capsule, Take 1 capsule by mouth At Night As Needed for Anxiety. (Patient not taking: Reported on 5/16/2024), Disp: 90 capsule, Rfl: 0    potassium chloride (KLOR-CON M20) 20 MEQ CR tablet, Take 1 tablet by mouth Daily., Disp: 30 tablet, Rfl: 2       Plan of care reviewed with the patient at the conclusion of today's visit.  Education was provided regarding diagnosis, management, and any prescribed or recommended OTC medications.  Patient verbalized understanding of and agreement with management plan.     Return in about 2 weeks (around 5/30/2024), or if symptoms worsen or fail to improve, for Follow-up.      Transcribed from ambient dictation for JOSE D Zaragoza by Gilbert Glover.  05/16/24   17:02 EDT    Patient or patient representative verbalized consent to the visit recording.  I have personally performed the services described in this document as transcribed by the above individual, and it is both accurate and complete.

## 2024-05-26 ENCOUNTER — READMISSION MANAGEMENT (OUTPATIENT)
Dept: CALL CENTER | Facility: HOSPITAL | Age: 64
End: 2024-05-26
Payer: MEDICARE

## 2024-05-26 NOTE — OUTREACH NOTE
Prep Survey      Flowsheet Row Responses   [facility]? [facility]   Is LACE score < 7 ? Non-BH Discharge   Eligibility [facility]   Date of Admission 05/24/24   Date of Discharge 05/26/24   Discharge Disposition Home or Self Care   Discharge diagnosis Acute cystitis with hematuria   Does the patient have one of the following disease processes/diagnoses(primary or secondary)? Other   Does the patient have Home health ordered? No   Prep survey completed? Yes            Alva HUIZAR - Registered Nurse

## 2024-05-28 ENCOUNTER — TRANSITIONAL CARE MANAGEMENT TELEPHONE ENCOUNTER (OUTPATIENT)
Dept: CALL CENTER | Facility: HOSPITAL | Age: 64
End: 2024-05-28
Payer: MEDICARE

## 2024-05-28 NOTE — OUTREACH NOTE
Call Center TCM Note      Flowsheet Row Responses   [facility] patient discharged from? Non-   Does the patient have one of the following disease processes/diagnoses(primary or secondary)? Other   TCM attempt successful? Yes   Call start time 1547   Call end time 1549   Discharge diagnosis Acute cystitis with hematuria   Meds reviewed with patient/caregiver? Yes   Is the patient taking all medications as directed (includes completed medication regime)? Yes   Does the patient have an appointment with their PCP within 7-14 days of discharge? Yes   Has home health visited the patient within 72 hours of discharge? N/A   Psychosocial issues? No   What is the patient's perception of their health status since discharge? Improving   Is the patient/caregiver able to teach back signs and symptoms related to disease process for when to call PCP? Yes   Is the patient/caregiver able to teach back signs and symptoms related to disease process for when to call 911? Yes   Is the patient/caregiver able to teach back the hierarchy of who to call/visit for symptoms/problems? PCP, Specialist, Home health nurse, Urgent Care, ED, 911 Yes   If the patient is a current smoker, are they able to teach back resources for cessation? Not a smoker   TCM call completed? Yes   Call end time 1549   Would this patient benefit from a Referral to Amb Social Work? No   Is the patient interested in additional calls from an ambulatory ? No            Emilie Tineo LPN    5/28/2024, 15:53 EDT

## 2024-05-28 NOTE — OUTREACH NOTE
Call Center TCM Note      Flowsheet Row Responses   [facility] patient discharged from? Non-BH   Does the patient have one of the following disease processes/diagnoses(primary or secondary)? Other   TCM attempt successful? No   Unsuccessful attempts Attempt 1            Emilie Tineo LPN    5/28/2024, 11:55 EDT

## 2024-05-30 ENCOUNTER — TELEMEDICINE (OUTPATIENT)
Dept: INTERNAL MEDICINE | Facility: CLINIC | Age: 64
End: 2024-05-30
Payer: MEDICARE

## 2024-05-30 DIAGNOSIS — Z79.4 TYPE 2 DIABETES MELLITUS WITH DIABETIC POLYNEUROPATHY, WITH LONG-TERM CURRENT USE OF INSULIN: Primary | Chronic | ICD-10-CM

## 2024-05-30 DIAGNOSIS — Z12.11 SCREENING FOR COLON CANCER: ICD-10-CM

## 2024-05-30 DIAGNOSIS — N30.90 CYSTITIS: ICD-10-CM

## 2024-05-30 DIAGNOSIS — N18.5 STAGE 5 CHRONIC KIDNEY DISEASE: ICD-10-CM

## 2024-05-30 DIAGNOSIS — K72.10 CHRONIC LIVER FAILURE WITHOUT HEPATIC COMA: ICD-10-CM

## 2024-05-30 DIAGNOSIS — E11.42 TYPE 2 DIABETES MELLITUS WITH DIABETIC POLYNEUROPATHY, WITH LONG-TERM CURRENT USE OF INSULIN: Primary | Chronic | ICD-10-CM

## 2024-05-30 PROCEDURE — 99214 OFFICE O/P EST MOD 30 MIN: CPT | Performed by: NURSE PRACTITIONER

## 2024-05-30 PROCEDURE — 1125F AMNT PAIN NOTED PAIN PRSNT: CPT | Performed by: NURSE PRACTITIONER

## 2024-05-30 PROCEDURE — 3044F HG A1C LEVEL LT 7.0%: CPT | Performed by: NURSE PRACTITIONER

## 2024-05-30 NOTE — PROGRESS NOTES
Subjective   Chief Complaint   Patient presents with    Diabetes    Cystitis      Lyndsey Luna is a 63 y.o. female.     This is video visit.  You have chosen to receive care through a video visit today. Do you consent to use a video visit for your medical care today? Yes    The patient is here today for follow up on diabetes and abdominal pain.     Blood sugars have been averaging between 88 and 147. She has stopped drinking estevan-8 and stopped eating bread. Blood sugars are more stable and doing well.     Got paracentesis on Friday and abdominal and flank pain were severe. Went to ER and dx with kidney stone and UTI. Admitted and discharge Sunday night. Went back for paracentesis and did better. She passed a kidney stone on Tuesday and pain has improved.     She is was on transplant list at  for kidney and liver. She was taken off due to failure to get colonoscopy. Needs to get this scheduled to get back on list.     I have reviewed the following portions of the patient's history and confirmed they are accurate: allergies, current medications, past family history, past medical history, past social history, past surgical history, and problem list    Review of Systems  Pertinent items are noted in HPI.     Current Outpatient Medications on File Prior to Visit   Medication Sig    Alcohol Swabs (B-D SINGLE USE SWABS REGULAR) pads     Blood Glucose Monitoring Suppl (ONE TOUCH ULTRA 2) w/Device kit     Cholecalciferol (Vitamin D) 50 MCG (2000 UT) tablet Take 1 tablet by mouth Daily. (Patient not taking: Reported on 5/16/2024)    clobetasol (TEMOVATE) 0.05 % cream Apply 1 application  topically to the appropriate area as directed 2 (Two) Times a Day. (Patient not taking: Reported on 5/16/2024)    ezetimibe (ZETIA) 10 MG tablet TAKE ONE TABLET BY MOUTH DAILY    ferrous gluconate (FERGON) 324 MG tablet Take 1 tablet by mouth Every Other Day. (Patient not taking: Reported on 6/10/2024)    gabapentin (NEURONTIN) 100  MG capsule TAKE 1 CAPSULE BY MOUTH 4 TIMES A DAY AS NEEDED FOR FOOT/LEG PAIN (Patient not taking: Reported on 6/10/2024)    glucose blood (Glucose Meter Test) test strip Check blood sugars 3 times daily as needed.    glucose monitor monitoring kit Use 1 each As Needed (Check blood sugars 3 times daily as needed.).    guaiFENesin (Mucinex) 600 MG 12 hr tablet Take 1 tablet by mouth 2 (Two) Times a Day As Needed for Cough or Congestion. (Patient not taking: Reported on 5/16/2024)    insulin aspart (novoLOG FLEXPEN) 100 UNIT/ML solution pen-injector sc pen Inject 25 Units under the skin into the appropriate area as directed 3 (Three) Times a Day With Meals.    Insulin Degludec (Tresiba FlexTouch) 200 UNIT/ML solution pen-injector pen injection Inject 170 Units under the skin into the appropriate area as directed Every Night.    Insulin Pen Needle (Pen Needles) 32G X 4 MM misc Use 1 each Every Night.    lactulose (CHRONULAC) 10 GM/15ML solution  (Patient not taking: Reported on 6/10/2024)    Lancets misc Check blood sugars 5 times daily as needed.    loratadine (CLARITIN) 10 MG tablet Take 1 tablet by mouth Daily. (Patient not taking: Reported on 6/10/2024)    midodrine (PROAMATINE) 5 MG tablet Take 1 tablet by mouth 2 (Two) Times a Day. (Patient not taking: Reported on 6/10/2024)    mupirocin (BACTROBAN) 2 % ointment Apply 1 Application topically to the appropriate area as directed 3 (Three) Times a Day. (Patient not taking: Reported on 6/10/2024)    pantoprazole (PROTONIX) 40 MG EC tablet TAKE ONE TABLET BY MOUTH DAILY (Patient not taking: Reported on 6/10/2024)    promethazine (PHENERGAN) 12.5 MG tablet  (Patient not taking: Reported on 6/10/2024)    rifAXIMin (XIFAXAN) 200 MG tablet Take 1 tablet by mouth 3 (Three) Times a Day.    rosuvastatin (CRESTOR) 20 MG tablet TAKE ONE TABLET BY MOUTH DAILY    sennosides-docusate (PERICOLACE) 8.6-50 MG per tablet Take 1 tablet by mouth Daily.    tamsulosin (FLOMAX) 0.4 MG  capsule 24 hr capsule Take 1 capsule by mouth Daily As Needed (kidney stones). (Patient not taking: Reported on 6/10/2024)     No current facility-administered medications on file prior to visit.       Objective   There were no vitals filed for this visit.  There is no height or weight on file to calculate BMI.    Physical Exam  Constitutional:       Appearance: Normal appearance. She is well-developed.   HENT:      Head: Normocephalic and atraumatic.      Nose: Nose normal.   Eyes:      General: Lids are normal.      Conjunctiva/sclera: Conjunctivae normal.   Neck:      Trachea: Trachea normal.   Pulmonary:      Effort: No respiratory distress.   Neurological:      Mental Status: She is alert and oriented to person, place, and time.      GCS: GCS eye subscore is 4. GCS verbal subscore is 5. GCS motor subscore is 6.   Psychiatric:         Attention and Perception: Attention normal.         Mood and Affect: Mood normal.         Speech: Speech normal.         Behavior: Behavior normal. Behavior is cooperative.         Thought Content: Thought content normal.         Assessment & Plan   Problem List Items Addressed This Visit       Type 2 diabetes mellitus, with long-term current use of insulin - Primary (Chronic)  Chronic stable  Continue novolog sliding scale, tresiba, and diabetic diet. Repeat A1c in 3 months.      Other Visit Diagnoses       Chronic liver failure without hepatic coma      Chronic unstable. Continue paracentesis treatments and follow ups with [facility]. Continue lactulose. Colonoscopy ordered.     Relevant Orders    Ambulatory Referral For Screening Colonoscopy    Stage 5 chronic kidney disease      Chronic unstable. Continue follow ups with [facility] transplant. Encouraging patient to follow up with urologist. Drink adequate fluid intake.     Relevant Orders    Ambulatory Referral For Screening Colonoscopy    Screening for colon cancer        Relevant Orders    Ambulatory Referral For Screening Colonoscopy     Cystitis      New stable. Continue with adequate fluid intake.                Current Outpatient Medications:     Alcohol Swabs (B-D SINGLE USE SWABS REGULAR) pads, , Disp: , Rfl:     Blood Glucose Monitoring Suppl (ONE TOUCH ULTRA 2) w/Device kit, , Disp: , Rfl:     Cholecalciferol (Vitamin D) 50 MCG (2000 UT) tablet, Take 1 tablet by mouth Daily. (Patient not taking: Reported on 5/16/2024), Disp: 90 tablet, Rfl: 1    clobetasol (TEMOVATE) 0.05 % cream, Apply 1 application  topically to the appropriate area as directed 2 (Two) Times a Day. (Patient not taking: Reported on 5/16/2024), Disp: 60 g, Rfl: 1    ezetimibe (ZETIA) 10 MG tablet, TAKE ONE TABLET BY MOUTH DAILY, Disp: 90 tablet, Rfl: 3    ferrous gluconate (FERGON) 324 MG tablet, Take 1 tablet by mouth Every Other Day. (Patient not taking: Reported on 6/10/2024), Disp: , Rfl:     gabapentin (NEURONTIN) 100 MG capsule, TAKE 1 CAPSULE BY MOUTH 4 TIMES A DAY AS NEEDED FOR FOOT/LEG PAIN (Patient not taking: Reported on 6/10/2024), Disp: 120 capsule, Rfl: 2    glucose blood (Glucose Meter Test) test strip, Check blood sugars 3 times daily as needed., Disp: 100 each, Rfl: 5    glucose monitor monitoring kit, Use 1 each As Needed (Check blood sugars 3 times daily as needed.)., Disp: 1 each, Rfl: 0    guaiFENesin (Mucinex) 600 MG 12 hr tablet, Take 1 tablet by mouth 2 (Two) Times a Day As Needed for Cough or Congestion. (Patient not taking: Reported on 5/16/2024), Disp: 60 tablet, Rfl: 0    insulin aspart (novoLOG FLEXPEN) 100 UNIT/ML solution pen-injector sc pen, Inject 25 Units under the skin into the appropriate area as directed 3 (Three) Times a Day With Meals., Disp: 30 mL, Rfl: 5    Insulin Degludec (Tresiba FlexTouch) 200 UNIT/ML solution pen-injector pen injection, Inject 170 Units under the skin into the appropriate area as directed Every Night., Disp: 27 mL, Rfl: 3    Insulin Pen Needle (Pen Needles) 32G X 4 MM misc, Use 1 each Every Night., Disp: 30 each,  Rfl: 1    lactulose (CHRONULAC) 10 GM/15ML solution, , Disp: , Rfl:     Lancets misc, Check blood sugars 5 times daily as needed., Disp: 200 each, Rfl: 5    Lantus SoloStar 100 UNIT/ML injection pen, Inject 10 Units under the skin into the appropriate area as directed., Disp: , Rfl:     loratadine (CLARITIN) 10 MG tablet, Take 1 tablet by mouth Daily. (Patient not taking: Reported on 6/10/2024), Disp: 90 tablet, Rfl: 1    midodrine (PROAMATINE) 5 MG tablet, Take 1 tablet by mouth 2 (Two) Times a Day. (Patient not taking: Reported on 6/10/2024), Disp: 180 tablet, Rfl: 1    mupirocin (BACTROBAN) 2 % ointment, Apply 1 Application topically to the appropriate area as directed 3 (Three) Times a Day. (Patient not taking: Reported on 6/10/2024), Disp: 30 g, Rfl: 0    ondansetron ODT (ZOFRAN-ODT) 4 MG disintegrating tablet, Place 1 tablet on the tongue Every 8 (Eight) Hours As Needed for Nausea or Vomiting., Disp: 30 tablet, Rfl: 2    oxyCODONE (ROXICODONE) 5 MG immediate release tablet, , Disp: , Rfl:     pantoprazole (PROTONIX) 40 MG EC tablet, TAKE ONE TABLET BY MOUTH DAILY (Patient not taking: Reported on 6/10/2024), Disp: 90 tablet, Rfl: 3    promethazine (PHENERGAN) 12.5 MG tablet, , Disp: , Rfl:     rifAXIMin (XIFAXAN) 200 MG tablet, Take 1 tablet by mouth 3 (Three) Times a Day., Disp: , Rfl:     rosuvastatin (CRESTOR) 20 MG tablet, TAKE ONE TABLET BY MOUTH DAILY, Disp: 90 tablet, Rfl: 3    sennosides-docusate (PERICOLACE) 8.6-50 MG per tablet, Take 1 tablet by mouth Daily., Disp: , Rfl:     tamsulosin (FLOMAX) 0.4 MG capsule 24 hr capsule, Take 1 capsule by mouth Daily As Needed (kidney stones). (Patient not taking: Reported on 6/10/2024), Disp: 30 capsule, Rfl: 0       Plan of care reviewed with the patient at the conclusion of today's visit.  Education was provided regarding diagnosis, management, and any prescribed or recommended OTC medications.  Patient verbalized understanding of and agreement with management  plan.     Return in about 3 months (around 8/30/2024), or if symptoms worsen or fail to improve.    [place] in [place], [place] in [place]. I spent 25 minutes in medical discussion with patient during this visit.

## 2024-06-03 ENCOUNTER — TELEPHONE (OUTPATIENT)
Dept: INTERNAL MEDICINE | Facility: CLINIC | Age: 64
End: 2024-06-03
Payer: MEDICARE

## 2024-06-03 DIAGNOSIS — R10.30 LOWER ABDOMINAL PAIN: Primary | ICD-10-CM

## 2024-06-03 DIAGNOSIS — N20.0 KIDNEY STONE: ICD-10-CM

## 2024-06-03 PROBLEM — M51.9 SCHMORL'S NODES: Status: ACTIVE | Noted: 2024-06-03

## 2024-06-03 RX ORDER — OXYCODONE HYDROCHLORIDE AND ACETAMINOPHEN 5; 325 MG/1; MG/1
1 TABLET ORAL EVERY 6 HOURS PRN
Qty: 12 TABLET | Refills: 0 | Status: SHIPPED | OUTPATIENT
Start: 2024-06-03 | End: 2024-06-10

## 2024-06-03 NOTE — TELEPHONE ENCOUNTER
Patient called reporting severe pain of lower abdomen and low back. She was previously diagnosed with a kidney stone and given oxycodone 5mg PRN that helped. She has only been taking 1/2 tablet as needed. She is appt with urology tomorrow and is asking for refill of oxycodone. She has concern that pain could also be coming form umbilical hernia. Had CT scan of abdomen on 5/24/24 showing redemonstrated umbilical hernia containing ascitic fluid and uncomplicated small bowel. Discussed with patient if urology does not find kidney stone to be the cause of her pain to contact office back and referral will be made to general surgery for consult.

## 2024-06-10 ENCOUNTER — OFFICE VISIT (OUTPATIENT)
Dept: INTERNAL MEDICINE | Facility: CLINIC | Age: 64
End: 2024-06-10
Payer: MEDICARE

## 2024-06-10 VITALS
TEMPERATURE: 98.2 F | SYSTOLIC BLOOD PRESSURE: 126 MMHG | BODY MASS INDEX: 30.45 KG/M2 | OXYGEN SATURATION: 100 % | HEART RATE: 78 BPM | DIASTOLIC BLOOD PRESSURE: 58 MMHG | HEIGHT: 67 IN | WEIGHT: 194 LBS

## 2024-06-10 DIAGNOSIS — K40.91 UNILATERAL RECURRENT INGUINAL HERNIA WITHOUT OBSTRUCTION OR GANGRENE: ICD-10-CM

## 2024-06-10 DIAGNOSIS — R10.84 GENERALIZED ABDOMINAL PAIN: ICD-10-CM

## 2024-06-10 DIAGNOSIS — K42.9 UMBILICAL HERNIA WITHOUT OBSTRUCTION AND WITHOUT GANGRENE: ICD-10-CM

## 2024-06-10 DIAGNOSIS — K59.00 CONSTIPATION, UNSPECIFIED CONSTIPATION TYPE: Primary | ICD-10-CM

## 2024-06-10 PROCEDURE — 3078F DIAST BP <80 MM HG: CPT | Performed by: NURSE PRACTITIONER

## 2024-06-10 PROCEDURE — G2211 COMPLEX E/M VISIT ADD ON: HCPCS | Performed by: NURSE PRACTITIONER

## 2024-06-10 PROCEDURE — 3074F SYST BP LT 130 MM HG: CPT | Performed by: NURSE PRACTITIONER

## 2024-06-10 PROCEDURE — 99214 OFFICE O/P EST MOD 30 MIN: CPT | Performed by: NURSE PRACTITIONER

## 2024-06-10 PROCEDURE — 1160F RVW MEDS BY RX/DR IN RCRD: CPT | Performed by: NURSE PRACTITIONER

## 2024-06-10 PROCEDURE — 1125F AMNT PAIN NOTED PAIN PRSNT: CPT | Performed by: NURSE PRACTITIONER

## 2024-06-10 PROCEDURE — 1159F MED LIST DOCD IN RCRD: CPT | Performed by: NURSE PRACTITIONER

## 2024-06-10 PROCEDURE — 3044F HG A1C LEVEL LT 7.0%: CPT | Performed by: NURSE PRACTITIONER

## 2024-06-10 RX ORDER — OXYCODONE HYDROCHLORIDE 5 MG/1
TABLET ORAL
COMMUNITY
Start: 2024-05-26

## 2024-06-10 RX ORDER — ONDANSETRON 4 MG/1
4 TABLET, ORALLY DISINTEGRATING ORAL EVERY 8 HOURS PRN
Qty: 30 TABLET | Refills: 2 | Status: SHIPPED | OUTPATIENT
Start: 2024-06-10

## 2024-06-10 RX ORDER — OXYCODONE AND ACETAMINOPHEN 10; 325 MG/1; MG/1
1 TABLET ORAL EVERY 6 HOURS PRN
Qty: 12 TABLET | Refills: 0 | Status: SHIPPED | OUTPATIENT
Start: 2024-06-10 | End: 2024-06-13

## 2024-06-10 RX ORDER — INSULIN GLARGINE 100 [IU]/ML
10 INJECTION, SOLUTION SUBCUTANEOUS
COMMUNITY
Start: 2024-05-26

## 2024-06-10 NOTE — PROGRESS NOTES
Subjective   Chief Complaint   Patient presents with    Hernia     Having severe pain and looking to get pain medication      Lyndsey Luna is a 63 y.o. female.     History of Present Illness  The patient presents for evaluation of multiple medical concerns.     The patient sought consultation with a general surgeon at  last Tuesday due to severe groin pain. The hernia, located at the umbilical region, has been progressively enlarging. Had paracentesis with 2.7 L drained today. She has been managing the itching by applying an ice pack. The patient suspects the hernia may be due to irritation, constipation, or pressure. She acknowledges inadequate hydration over the past few days. Her medication regimen was discontinued 3 weeks ago during her hospital stay, with the exception of antibiotics, cholesterol medication, and an antibiotic for a vaginal infection. She has discontinued gabapentin, which she reports as not beneficial. She requests Zofran for nausea. A colonoscopy is scheduled for 07/15/2024 or 07/16/2024. An ultrasound of her left leg is scheduled for Wednesday. Hydrocodone was ineffective for her.    The patient reports irregular bowel movements since Saturday night, accompanied by nausea and discomfort. She expresses a desire to discontinue lactulose, which was previously effective in maintaining bowel movements and keeping ammonia levels down.    The patient's blood glucose level was recorded as 177 at 3:30 p.m. yesterday, and 257 this morning. She attributes this to consuming diet Mountain Dew the previous night.      I have reviewed the following portions of the patient's history and confirmed they are accurate: allergies, current medications, past family history, past medical history, past social history, past surgical history, and problem list    Review of Systems  Pertinent items are noted in HPI.     Current Outpatient Medications on File Prior to Visit   Medication Sig    Alcohol Swabs (B-D  SINGLE USE SWABS REGULAR) pads     Blood Glucose Monitoring Suppl (ONE TOUCH ULTRA 2) w/Device kit     ezetimibe (ZETIA) 10 MG tablet TAKE ONE TABLET BY MOUTH DAILY    glucose blood (Glucose Meter Test) test strip Check blood sugars 3 times daily as needed.    glucose monitor monitoring kit Use 1 each As Needed (Check blood sugars 3 times daily as needed.).    insulin aspart (novoLOG FLEXPEN) 100 UNIT/ML solution pen-injector sc pen Inject 25 Units under the skin into the appropriate area as directed 3 (Three) Times a Day With Meals.    Insulin Degludec (Tresiba FlexTouch) 200 UNIT/ML solution pen-injector pen injection Inject 170 Units under the skin into the appropriate area as directed Every Night.    Insulin Pen Needle (Pen Needles) 32G X 4 MM misc Use 1 each Every Night.    Lancets misc Check blood sugars 5 times daily as needed.    Lantus SoloStar 100 UNIT/ML injection pen Inject 10 Units under the skin into the appropriate area as directed.    oxyCODONE (ROXICODONE) 5 MG immediate release tablet     rifAXIMin (XIFAXAN) 200 MG tablet Take 1 tablet by mouth 3 (Three) Times a Day.    rosuvastatin (CRESTOR) 20 MG tablet TAKE ONE TABLET BY MOUTH DAILY    sennosides-docusate (PERICOLACE) 8.6-50 MG per tablet Take 1 tablet by mouth Daily.    Cholecalciferol (Vitamin D) 50 MCG (2000 UT) tablet Take 1 tablet by mouth Daily. (Patient not taking: Reported on 5/16/2024)    clobetasol (TEMOVATE) 0.05 % cream Apply 1 application  topically to the appropriate area as directed 2 (Two) Times a Day. (Patient not taking: Reported on 5/16/2024)    ferrous gluconate (FERGON) 324 MG tablet Take 1 tablet by mouth Every Other Day. (Patient not taking: Reported on 6/10/2024)    gabapentin (NEURONTIN) 100 MG capsule TAKE 1 CAPSULE BY MOUTH 4 TIMES A DAY AS NEEDED FOR FOOT/LEG PAIN (Patient not taking: Reported on 6/10/2024)    guaiFENesin (Mucinex) 600 MG 12 hr tablet Take 1 tablet by mouth 2 (Two) Times a Day As Needed for Cough or  "Congestion. (Patient not taking: Reported on 5/16/2024)    lactulose (CHRONULAC) 10 GM/15ML solution  (Patient not taking: Reported on 6/10/2024)    loratadine (CLARITIN) 10 MG tablet Take 1 tablet by mouth Daily. (Patient not taking: Reported on 6/10/2024)    midodrine (PROAMATINE) 5 MG tablet Take 1 tablet by mouth 2 (Two) Times a Day. (Patient not taking: Reported on 6/10/2024)    mupirocin (BACTROBAN) 2 % ointment Apply 1 Application topically to the appropriate area as directed 3 (Three) Times a Day. (Patient not taking: Reported on 6/10/2024)    pantoprazole (PROTONIX) 40 MG EC tablet TAKE ONE TABLET BY MOUTH DAILY (Patient not taking: Reported on 6/10/2024)    promethazine (PHENERGAN) 12.5 MG tablet  (Patient not taking: Reported on 6/10/2024)    tamsulosin (FLOMAX) 0.4 MG capsule 24 hr capsule Take 1 capsule by mouth Daily As Needed (kidney stones). (Patient not taking: Reported on 6/10/2024)     No current facility-administered medications on file prior to visit.       Objective   Vitals:    06/10/24 1626   BP: 126/58   Pulse: 78   Temp: 98.2 °F (36.8 °C)   TempSrc: Temporal   SpO2: 100%   Weight: 88 kg (194 lb)   Height: 170.2 cm (67\")   PainSc:   8   PainLoc: Groin     Body mass index is 30.38 kg/m².    Physical Exam  Vitals reviewed.   Constitutional:       Appearance: Normal appearance. She is well-developed.   HENT:      Head: Normocephalic and atraumatic.      Nose: Nose normal.   Eyes:      General: Lids are normal.      Conjunctiva/sclera: Conjunctivae normal.      Pupils: Pupils are equal, round, and reactive to light.   Neck:      Thyroid: No thyromegaly.      Trachea: Trachea normal.   Cardiovascular:      Rate and Rhythm: Normal rate and regular rhythm.      Heart sounds: Normal heart sounds.   Pulmonary:      Effort: Pulmonary effort is normal. No respiratory distress.      Breath sounds: Normal breath sounds.   Abdominal:      General: There is distension.      Tenderness: There is abdominal " tenderness.      Hernia: Hernia is present in the umbilical area and left inguinal area.   Skin:     General: Skin is warm and dry.   Neurological:      Mental Status: She is alert and oriented to person, place, and time.      GCS: GCS eye subscore is 4. GCS verbal subscore is 5. GCS motor subscore is 6.   Psychiatric:         Attention and Perception: Attention normal.         Mood and Affect: Mood normal.         Speech: Speech normal.         Behavior: Behavior normal. Behavior is cooperative.         Thought Content: Thought content normal.         Results  Laboratory Studies  A1c was 5.3 two weeks ago. Blood sugar was 257 this morning.    Assessment & Plan   Problem List Items Addressed This Visit    None  Visit Diagnoses       Constipation, unspecified constipation type    -  Primary    Umbilical hernia without obstruction and without gangrene        Unilateral recurrent inguinal hernia without obstruction or gangrene        Generalized abdominal pain                   Current Outpatient Medications:     Alcohol Swabs (B-D SINGLE USE SWABS REGULAR) pads, , Disp: , Rfl:     Blood Glucose Monitoring Suppl (ONE TOUCH ULTRA 2) w/Device kit, , Disp: , Rfl:     ezetimibe (ZETIA) 10 MG tablet, TAKE ONE TABLET BY MOUTH DAILY, Disp: 90 tablet, Rfl: 3    glucose blood (Glucose Meter Test) test strip, Check blood sugars 3 times daily as needed., Disp: 100 each, Rfl: 5    glucose monitor monitoring kit, Use 1 each As Needed (Check blood sugars 3 times daily as needed.)., Disp: 1 each, Rfl: 0    insulin aspart (novoLOG FLEXPEN) 100 UNIT/ML solution pen-injector sc pen, Inject 25 Units under the skin into the appropriate area as directed 3 (Three) Times a Day With Meals., Disp: 30 mL, Rfl: 5    Insulin Degludec (Tresiba FlexTouch) 200 UNIT/ML solution pen-injector pen injection, Inject 170 Units under the skin into the appropriate area as directed Every Night., Disp: 27 mL, Rfl: 3    Insulin Pen Needle (Pen Needles) 32G X 4  MM misc, Use 1 each Every Night., Disp: 30 each, Rfl: 1    Lancets misc, Check blood sugars 5 times daily as needed., Disp: 200 each, Rfl: 5    Lantus SoloStar 100 UNIT/ML injection pen, Inject 10 Units under the skin into the appropriate area as directed., Disp: , Rfl:     ondansetron ODT (ZOFRAN-ODT) 4 MG disintegrating tablet, Place 1 tablet on the tongue Every 8 (Eight) Hours As Needed for Nausea or Vomiting., Disp: 30 tablet, Rfl: 2    oxyCODONE (ROXICODONE) 5 MG immediate release tablet, , Disp: , Rfl:     rifAXIMin (XIFAXAN) 200 MG tablet, Take 1 tablet by mouth 3 (Three) Times a Day., Disp: , Rfl:     rosuvastatin (CRESTOR) 20 MG tablet, TAKE ONE TABLET BY MOUTH DAILY, Disp: 90 tablet, Rfl: 3    sennosides-docusate (PERICOLACE) 8.6-50 MG per tablet, Take 1 tablet by mouth Daily., Disp: , Rfl:     Cholecalciferol (Vitamin D) 50 MCG (2000 UT) tablet, Take 1 tablet by mouth Daily. (Patient not taking: Reported on 5/16/2024), Disp: 90 tablet, Rfl: 1    clobetasol (TEMOVATE) 0.05 % cream, Apply 1 application  topically to the appropriate area as directed 2 (Two) Times a Day. (Patient not taking: Reported on 5/16/2024), Disp: 60 g, Rfl: 1    ferrous gluconate (FERGON) 324 MG tablet, Take 1 tablet by mouth Every Other Day. (Patient not taking: Reported on 6/10/2024), Disp: , Rfl:     gabapentin (NEURONTIN) 100 MG capsule, TAKE 1 CAPSULE BY MOUTH 4 TIMES A DAY AS NEEDED FOR FOOT/LEG PAIN (Patient not taking: Reported on 6/10/2024), Disp: 120 capsule, Rfl: 2    guaiFENesin (Mucinex) 600 MG 12 hr tablet, Take 1 tablet by mouth 2 (Two) Times a Day As Needed for Cough or Congestion. (Patient not taking: Reported on 5/16/2024), Disp: 60 tablet, Rfl: 0    lactulose (CHRONULAC) 10 GM/15ML solution, , Disp: , Rfl:     loratadine (CLARITIN) 10 MG tablet, Take 1 tablet by mouth Daily. (Patient not taking: Reported on 6/10/2024), Disp: 90 tablet, Rfl: 1    midodrine (PROAMATINE) 5 MG tablet, Take 1 tablet by mouth 2 (Two)  Times a Day. (Patient not taking: Reported on 6/10/2024), Disp: 180 tablet, Rfl: 1    mupirocin (BACTROBAN) 2 % ointment, Apply 1 Application topically to the appropriate area as directed 3 (Three) Times a Day. (Patient not taking: Reported on 6/10/2024), Disp: 30 g, Rfl: 0    pantoprazole (PROTONIX) 40 MG EC tablet, TAKE ONE TABLET BY MOUTH DAILY (Patient not taking: Reported on 6/10/2024), Disp: 90 tablet, Rfl: 3    promethazine (PHENERGAN) 12.5 MG tablet, , Disp: , Rfl:     tamsulosin (FLOMAX) 0.4 MG capsule 24 hr capsule, Take 1 capsule by mouth Daily As Needed (kidney stones). (Patient not taking: Reported on 6/10/2024), Disp: 30 capsule, Rfl: 0    Assessment & Plan  1. Hernia. Chronic unstable  A prescription for oxycodone 10 mg, with 12 tablets dispensed, has been issued. The patient has been advised to maintain adequate hydration. If symptoms worsen will go to ER. Patient unable to have surgery until after liver/kidney transplant.     2. Constipation. Chronic unstable  The patient was educated about the potential impact of diabetes on her pancreas, particularly in relation to her liver failure. A prescription for Linzess has been provided, to be taken once daily on an empty stomach.    3. Nausea. Recurrent unstable.   Zofran has been prescribed for nausea. Follow a well balanced diet low in acidity and citrus.     Follow-up  The patient is scheduled for a follow-up visit in 3 months, or earlier if necessary.         Plan of care reviewed with the patient at the conclusion of today's visit.  Education was provided regarding diagnosis, management, and any prescribed or recommended OTC medications.  Patient verbalized understanding of and agreement with management plan.     Return in about 3 months (around 9/10/2024), or if symptoms worsen or fail to improve.      Transcribed from ambient dictation for JOSE D Zaragoza by JOSE D Zaragoza.  06/10/24   16:43 EDT    Patient or patient  representative verbalized consent for the use of Ambient Listening during the visit with  JOSE D Zaragoza for chart documentation. 6/30/2024  17:09 EDT

## 2024-06-11 ENCOUNTER — TELEPHONE (OUTPATIENT)
Dept: INTERNAL MEDICINE | Facility: CLINIC | Age: 64
End: 2024-06-11

## 2024-06-11 NOTE — TELEPHONE ENCOUNTER
Caller: [facility]    Relationship:     Best call back number: [phone]        What specialty or service is being requested: US NON VASCULAR EXTREMITY        Any additional details: [facility] STATED THE PATIENT HAS AN APPT TO HAVE THE ABOVE TOMORROW 6/12/24 AT 4:45PM. THEY CAN NOT FIND THE ORDER ANYWHERE. NEED FAXED [phone] ASAP TODAY

## 2024-06-12 ENCOUNTER — HOSPITAL ENCOUNTER (OUTPATIENT)
Facility: HOSPITAL | Age: 64
Discharge: HOME | End: 2024-06-12
Admitting: NURSE PRACTITIONER
Payer: MEDICARE

## 2024-06-12 DIAGNOSIS — R22.42 MASS OF LEFT LOWER EXTREMITY: ICD-10-CM

## 2024-06-12 PROCEDURE — 76882 US LMTD JT/FCL EVL NVASC XTR: CPT

## 2024-07-05 RX ORDER — SODIUM, POTASSIUM,MAG SULFATES 17.5-3.13G
2 SOLUTION, RECONSTITUTED, ORAL ORAL TAKE AS DIRECTED
Qty: 354 ML | Refills: 0 | Status: SHIPPED | OUTPATIENT
Start: 2024-07-05

## 2024-07-17 ENCOUNTER — ANESTHESIA EVENT (OUTPATIENT)
Dept: GASTROENTEROLOGY | Facility: HOSPITAL | Age: 64
End: 2024-07-17
Payer: MEDICARE

## 2024-07-17 ENCOUNTER — OUTSIDE FACILITY SERVICE (OUTPATIENT)
Dept: GASTROENTEROLOGY | Facility: CLINIC | Age: 64
End: 2024-07-17
Payer: MEDICARE

## 2024-07-17 ENCOUNTER — PREP FOR SURGERY (OUTPATIENT)
Dept: OTHER | Facility: HOSPITAL | Age: 64
End: 2024-07-17
Payer: MEDICARE

## 2024-07-17 ENCOUNTER — ANESTHESIA (OUTPATIENT)
Dept: GASTROENTEROLOGY | Facility: HOSPITAL | Age: 64
End: 2024-07-17
Payer: MEDICARE

## 2024-07-17 ENCOUNTER — HOSPITAL ENCOUNTER (OUTPATIENT)
Facility: HOSPITAL | Age: 64
Setting detail: HOSPITAL OUTPATIENT SURGERY
Discharge: HOME | End: 2024-07-17
Attending: INTERNAL MEDICINE | Admitting: INTERNAL MEDICINE
Payer: MEDICARE

## 2024-07-17 VITALS
WEIGHT: 184 LBS | TEMPERATURE: 97.1 F | DIASTOLIC BLOOD PRESSURE: 80 MMHG | OXYGEN SATURATION: 100 % | RESPIRATION RATE: 18 BRPM | BODY MASS INDEX: 28.88 KG/M2 | HEIGHT: 67 IN | SYSTOLIC BLOOD PRESSURE: 174 MMHG | HEART RATE: 74 BPM

## 2024-07-17 DIAGNOSIS — K72.90 LIVER FAILURE: ICD-10-CM

## 2024-07-17 DIAGNOSIS — Z12.11 SPECIAL SCREENING FOR MALIGNANT NEOPLASMS, COLON: Primary | ICD-10-CM

## 2024-07-17 DIAGNOSIS — N18.5 CHRONIC KIDNEY DISEASE, STAGE V: ICD-10-CM

## 2024-07-17 LAB
ABO GROUP BLD: NORMAL
ANION GAP SERPL CALCULATED.3IONS-SCNC: 15 MMOL/L (ref 5–15)
BLD GP AB SCN SERPL QL: NEGATIVE
BUN SERPL-MCNC: 34 MG/DL (ref 8–23)
BUN/CREAT SERPL: 11.8 (ref 7–25)
CALCIUM SPEC-SCNC: 9 MG/DL (ref 8.6–10.5)
CHLORIDE SERPL-SCNC: 109 MMOL/L (ref 98–107)
CO2 SERPL-SCNC: 17 MMOL/L (ref 22–29)
CREAT SERPL-MCNC: 2.87 MG/DL (ref 0.57–1)
DEPRECATED RDW RBC AUTO: 51.6 FL (ref 37–54)
EGFRCR SERPLBLD CKD-EPI 2021: 17.9 ML/MIN/1.73
ERYTHROCYTE [DISTWIDTH] IN BLOOD BY AUTOMATED COUNT: 15 % (ref 12.3–15.4)
GLUCOSE SERPL-MCNC: 148 MG/DL (ref 65–99)
HCT VFR BLD AUTO: 28.9 % (ref 34–46.6)
HGB BLD-MCNC: 9 G/DL (ref 12–15.9)
MCH RBC QN AUTO: 28.9 PG (ref 26.6–33)
MCHC RBC AUTO-ENTMCNC: 31.1 G/DL (ref 31.5–35.7)
MCV RBC AUTO: 92.9 FL (ref 79–97)
PLATELET # BLD AUTO: 21 10*3/MM3 (ref 140–450)
POTASSIUM SERPL-SCNC: 4.1 MMOL/L (ref 3.5–5.2)
RBC # BLD AUTO: 3.11 10*6/MM3 (ref 3.77–5.28)
RH BLD: POSITIVE
SODIUM SERPL-SCNC: 141 MMOL/L (ref 136–145)
T&S EXPIRATION DATE: NORMAL
WBC NRBC COR # BLD AUTO: 2.47 10*3/MM3 (ref 3.4–10.8)

## 2024-07-17 PROCEDURE — 93005 ELECTROCARDIOGRAM TRACING: CPT | Performed by: INTERNAL MEDICINE

## 2024-07-17 PROCEDURE — 88305 TISSUE EXAM BY PATHOLOGIST: CPT | Performed by: INTERNAL MEDICINE

## 2024-07-17 PROCEDURE — 25010000002 PROPOFOL 10 MG/ML EMULSION: Performed by: ANESTHESIOLOGY

## 2024-07-17 PROCEDURE — 25010000002 ONDANSETRON PER 1 MG: Performed by: NURSE ANESTHETIST, CERTIFIED REGISTERED

## 2024-07-17 PROCEDURE — P9035 PLATELET PHERES LEUKOREDUCED: HCPCS

## 2024-07-17 PROCEDURE — 86900 BLOOD TYPING SEROLOGIC ABO: CPT | Performed by: INTERNAL MEDICINE

## 2024-07-17 PROCEDURE — 43235 EGD DIAGNOSTIC BRUSH WASH: CPT | Performed by: INTERNAL MEDICINE

## 2024-07-17 PROCEDURE — 36430 TRANSFUSION BLD/BLD COMPNT: CPT

## 2024-07-17 PROCEDURE — 85027 COMPLETE CBC AUTOMATED: CPT | Performed by: ANESTHESIOLOGY

## 2024-07-17 PROCEDURE — P9100 PATHOGEN TEST FOR PLATELETS: HCPCS

## 2024-07-17 PROCEDURE — 80048 BASIC METABOLIC PNL TOTAL CA: CPT | Performed by: ANESTHESIOLOGY

## 2024-07-17 PROCEDURE — 86850 RBC ANTIBODY SCREEN: CPT | Performed by: INTERNAL MEDICINE

## 2024-07-17 PROCEDURE — 86901 BLOOD TYPING SEROLOGIC RH(D): CPT | Performed by: INTERNAL MEDICINE

## 2024-07-17 PROCEDURE — 45385 COLONOSCOPY W/LESION REMOVAL: CPT | Performed by: INTERNAL MEDICINE

## 2024-07-17 DEVICE — DEV CLIP ENDO RESOLUTION360 CONTRL ROT 235CM: Type: IMPLANTABLE DEVICE | Site: TRANSVERSE COLON | Status: FUNCTIONAL

## 2024-07-17 RX ORDER — IPRATROPIUM BROMIDE AND ALBUTEROL SULFATE 2.5; .5 MG/3ML; MG/3ML
3 SOLUTION RESPIRATORY (INHALATION) ONCE AS NEEDED
Status: DISCONTINUED | OUTPATIENT
Start: 2024-07-17 | End: 2024-07-17 | Stop reason: HOSPADM

## 2024-07-17 RX ORDER — SODIUM CHLORIDE 9 MG/ML
50 INJECTION, SOLUTION INTRAVENOUS CONTINUOUS
Status: DISCONTINUED | OUTPATIENT
Start: 2024-07-17 | End: 2024-07-17 | Stop reason: HOSPADM

## 2024-07-17 RX ORDER — ONDANSETRON 2 MG/ML
4 INJECTION INTRAMUSCULAR; INTRAVENOUS ONCE AS NEEDED
Status: COMPLETED | OUTPATIENT
Start: 2024-07-17 | End: 2024-07-17

## 2024-07-17 RX ADMIN — PROPOFOL 50 MCG/KG/MIN: 10 INJECTION, EMULSION INTRAVENOUS at 17:08

## 2024-07-17 RX ADMIN — ONDANSETRON 4 MG: 2 INJECTION INTRAMUSCULAR; INTRAVENOUS at 18:10

## 2024-07-17 NOTE — ANESTHESIA PREPROCEDURE EVALUATION
Anesthesia Evaluation     Patient summary reviewed and Nursing notes reviewed   NPO Solid Status: > 8 hours  NPO Liquid Status: > 2 hours           Airway   Mallampati: I  TM distance: >3 FB  Neck ROM: full  No difficulty expected  Dental    (+) edentulous, upper dentures and lower dentures    Pulmonary    (-) asthma, shortness of breath, recent URI, sleep apnea, not a smoker, no home oxygen  Cardiovascular     ECG reviewed    (+) hypertension, CAD, cardiac stents (2018 Wilson) , hyperlipidemia  (-) angina    ROS comment: ECG NSR   ECHO 2021  EF >55%/normal   MPS 2021  normal nuclear stress test.  Cath 2020 LAD stent 2 other vessels blocked but too small for intervention     Neuro/Psych  (+) psychiatric history  (-) seizures, CVA  GI/Hepatic/Renal/Endo    (+) PUD, GI bleeding , hepatitis, liver disease fatty liver disease cirrhosis, renal disease- CRI, ESRD and dialysis, diabetes mellitus    Musculoskeletal     Abdominal    Substance History      OB/GYN          Other   arthritis, blood dyscrasia anemia thrombocytopenia,     ROS/Med Hx Other: Screening EGD colonoscopy   Hx Varices portal  HTN  Incarcerated hernia [facility] 2023     Plts decreased 21K  Hct 28  K normal     Gets irregular dialysis- last was June 23rd                 Anesthesia Plan    ASA 4     general and MAC     (Transferred from OPT due to risk factors   CAD /Hepatorenal failure )  intravenous induction     Anesthetic plan, risks, benefits, and alternatives have been provided, discussed and informed consent has been obtained with: patient.    Plan discussed with CRNA.      CODE STATUS:

## 2024-07-17 NOTE — H&P
GASTROENTEROLOGY OFFICE NOTE  Lyndsey Luna  [id number]  1960      CHIEF COMPLAINT  Colonoscopy for screening purposes.  Pretransplant workup    HISTORY OF PRESENT ILLNESS:  63-year-old white female who is being considered for orthoptic liver transplantation referred for colonoscopy to complete the checklist and necessary items before being listed.  From a GI standpoint she presents with of course evidence of her underlying liver disease with ascites, peripheral edema, portal hypertension, splenomegaly and thrombocytopenia.  She presented earlier today to the [facility] and was redirected to the hospital outpatient department given her comorbidities.    From a GI standpoint she is here today without dysphagia to solids, odynophagia, early satiety, unexplained weight loss, melena or bright red blood per rectum.    PAST MEDICAL HISTORY  Past Medical History:    Arthritis    Diabetes mellitus    Gastric ulcer    Hypertension    Kidney stone    Splenomegaly    Type 2 diabetes mellitus, uncontrolled        PAST SURGICAL HISTORY  Past Surgical History:    CARDIAC CATHETERIZATION    CARDIAC CATHETERIZATION    Procedure: Left Heart Cath;  Surgeon: Yoshi Wilson MD;  Location: [facility] CATH INVASIVE LOCATION;  Service: Cardiovascular;  Laterality: N/A;    CYSTOSCOPY, URETEROSCOPY, RETROGRADE PYELOGRAM, STONE EXTRACTION, STENT INSERTION    Procedure: CYSTOSCOPY URETEROSCOPY STONE EXTRACTION STENT INSERTION;  Surgeon: Elmer Weiss MD;  Location: [facility] OR;  Service: Urology;  Laterality: Left;    ENDOSCOPY    Procedure: ESOPHAGOGASTRODUODENOSCOPY;  Surgeon: Brant Finch MD;  Location:  [facility];  Service: Gastroenterology;  Laterality: N/A;  Esophageal banding for a total of 7 bands    ENDOSCOPY    Procedure: ESOPHAGOGASTRODUODENOSCOPY;  Surgeon: Jayme Astudillo MD;  Location: [facility] ENDOSCOPY;  Service: Gastroenterology;  Laterality: N/A;    FRACTURE  SURGERY    LEFT ANKLE    KIDNEY STONE SURGERY        MEDICATIONS:  Prior to Admission medications    Medication Sig Start Date End Date Taking? Authorizing Provider   oxyCODONE (ROXICODONE) 5 MG immediate release tablet  5/26/24  Yes Lexy Lundberg MD   pantoprazole (PROTONIX) 40 MG EC tablet TAKE ONE TABLET BY MOUTH DAILY 11/24/23  Yes Paddy Montiel APRN   rosuvastatin (CRESTOR) 20 MG tablet TAKE ONE TABLET BY MOUTH DAILY 10/24/23  Yes Taryn Olivas APRN   sodium-potassium-magnesium sulfates (Suprep Bowel Prep Kit) 17.5-3.13-1.6 GM/177ML solution oral solution Take 2 bottles by mouth Take As Directed. Do not eat the day before your procedure. If you didn't receive instructions call [phone]. 7/5/24  Yes Jayme Astudillo MD   Alcohol Swabs (B-D SINGLE USE SWABS REGULAR) pads  3/4/24   Lexy Lundberg MD   Blood Glucose Monitoring Suppl (ONE TOUCH ULTRA 2) w/Device kit  3/7/24   eLxy Lundberg MD   Cholecalciferol (Vitamin D) 50 MCG (2000 UT) tablet Take 1 tablet by mouth Daily.  Patient not taking: Reported on 5/16/2024 3/5/24   Taryn Olivas APRN   clobetasol (TEMOVATE) 0.05 % cream Apply 1 application  topically to the appropriate area as directed 2 (Two) Times a Day.  Patient not taking: Reported on 5/16/2024 11/7/23   Taryn Olivas APRN   ezetimibe (ZETIA) 10 MG tablet TAKE ONE TABLET BY MOUTH DAILY 12/1/23   Paddy Montiel APRN   ferrous gluconate (FERGON) 324 MG tablet Take 1 tablet by mouth Every Other Day.  Patient not taking: Reported on 6/10/2024 5/14/24   Lexy Lundbegr MD   gabapentin (NEURONTIN) 100 MG capsule TAKE 1 CAPSULE BY MOUTH 4 TIMES A DAY AS NEEDED FOR FOOT/LEG PAIN  Patient not taking: Reported on 6/10/2024 2/22/24   Taryn Olivas APRN   glucose blood (Glucose Meter Test) test strip Check blood sugars 3 times daily as needed. 3/19/24   Taryn Olivas APRN   glucose monitor  monitoring kit Use 1 each As Needed (Check blood sugars 3 times daily as needed.). 3/5/24   Taryn Olivas APRN   guaiFENesin (Mucinex) 600 MG 12 hr tablet Take 1 tablet by mouth 2 (Two) Times a Day As Needed for Cough or Congestion.  Patient not taking: Reported on 5/16/2024 3/19/24   Taryn Olivas APRN   insulin aspart (novoLOG FLEXPEN) 100 UNIT/ML solution pen-injector sc pen Inject 25 Units under the skin into the appropriate area as directed 3 (Three) Times a Day With Meals. 5/9/24   Taryn Olivas APRN   Insulin Degludec (Tresiba FlexTouch) 200 UNIT/ML solution pen-injector pen injection Inject 170 Units under the skin into the appropriate area as directed Every Night. 4/16/24   Taryn Olivas APRN   Insulin Pen Needle (Pen Needles) 32G X 4 MM misc Use 1 each Every Night. 3/5/24   Taryn Olivas APRN   lactulose (CHRONULAC) 10 GM/15ML solution  5/14/24   Lexy Lundberg MD   Lancets misc Check blood sugars 5 times daily as needed. 3/5/24   Taryn Olivas APRN   Lantus SoloStar 100 UNIT/ML injection pen Inject 10 Units under the skin into the appropriate area as directed. 5/26/24   Lexy Lundberg MD   loratadine (CLARITIN) 10 MG tablet Take 1 tablet by mouth Daily.  Patient not taking: Reported on 6/10/2024 11/7/23   Taryn Olivas APRN   midodrine (PROAMATINE) 5 MG tablet Take 1 tablet by mouth 2 (Two) Times a Day.  Patient not taking: Reported on 6/10/2024 3/7/24   Taryn Olivas APRN   mupirocin (BACTROBAN) 2 % ointment Apply 1 Application topically to the appropriate area as directed 3 (Three) Times a Day.  Patient not taking: Reported on 6/10/2024 3/19/24   Taryn Olivas APRN   ondansetron ODT (ZOFRAN-ODT) 4 MG disintegrating tablet Place 1 tablet on the tongue Every 8 (Eight) Hours As Needed for Nausea or Vomiting. 6/10/24   Taryn Olivas APRN   promethazine (PHENERGAN) 12.5 MG  "tablet  8/14/23   ProviderLexy MD   rifAXIMin (XIFAXAN) 200 MG tablet Take 1 tablet by mouth 3 (Three) Times a Day.    ProviderLexy MD   sennosides-docusate (PERICOLACE) 8.6-50 MG per tablet Take 1 tablet by mouth Daily.    Lexy Lundberg MD   tamsulosin (FLOMAX) 0.4 MG capsule 24 hr capsule Take 1 capsule by mouth Daily As Needed (kidney stones).  Patient not taking: Reported on 6/10/2024 4/4/24   Taryn Olivas APRN        ALLERGIES  is allergic to penicillins and humalog [insulin lispro].    FAMILY HISTORY:  Cancer-related family history is negative for Colon cancer.  Colon Cancer-related family history is negative for Colon cancer and Colon polyps.    SOCIAL HISTORY  She  reports that she has never smoked. She has never been exposed to tobacco smoke. She has never used smokeless tobacco. She reports that she does not drink alcohol and does not use drugs.   She is .  She lives at home with her .  She has 3 children.  3 grandchildren.  She does not work outside the home.    REVIEW OF SYSTEMS  Cardiovascular, pulmonary and generalized review systems are pertinent as reviewed above    PHYSICAL EXAM   /63   Pulse 75   Temp 97.2 °F (36.2 °C)   Resp 22   Ht 170.2 cm (67\")   Wt 83.5 kg (184 lb)   SpO2 100%   BMI 28.82 kg/m²   General: Alert and oriented x 3. In no apparent or acute distress.  and No stigmata of chronic liver disease  HEENT: Anicteric sclerae. Normal oropharynx  Neck: Supple. Without lymphadenopathy  CV: Regular rate and rhythm, S1, S2  Lungs: Clear to ausculation. Without rales, rhonchi and wheezing  Abdomen: Obvious ascites.  Umbilical hernia.  Abdomen is soft without palpable masses  Extremeties: without clubbing, cyanosis or edema  Neurologic: 1-2+ pitting edema.  No asterixis.  Alert and oriented  Rectal exam: deferred     Results for orders placed or performed during the hospital encounter of 07/17/24   CBC (No Diff)    Specimen: Blood "   Result Value Ref Range    WBC 2.47 (L) 3.40 - 10.80 10*3/mm3    RBC 3.11 (L) 3.77 - 5.28 10*6/mm3    Hemoglobin 9.0 (L) 12.0 - 15.9 g/dL    Hematocrit 28.9 (L) 34.0 - 46.6 %    MCV 92.9 79.0 - 97.0 fL    MCH 28.9 26.6 - 33.0 pg    MCHC 31.1 (L) 31.5 - 35.7 g/dL    RDW 15.0 12.3 - 15.4 %    RDW-SD 51.6 37.0 - 54.0 fl    Platelets 21 (C) 140 - 450 10*3/mm3   Basic Metabolic Panel    Specimen: Blood   Result Value Ref Range    Glucose 148 (H) 65 - 99 mg/dL    BUN 34 (H) 8 - 23 mg/dL    Creatinine 2.87 (H) 0.57 - 1.00 mg/dL    Sodium 141 136 - 145 mmol/L    Potassium 4.1 3.5 - 5.2 mmol/L    Chloride 109 (H) 98 - 107 mmol/L    CO2 17.0 (L) 22.0 - 29.0 mmol/L    Calcium 9.0 8.6 - 10.5 mg/dL    BUN/Creatinine Ratio 11.8 7.0 - 25.0    Anion Gap 15.0 5.0 - 15.0 mmol/L    eGFR 17.9 (L) >60.0 mL/min/1.73   Prepare Platelet Pheresis, 1 Units   Result Value Ref Range    Product Code Y5461F51     Unit Number [id number]     UNIT  ABO A     UNIT  RH POS     Dispense Status IS     Blood Expiration Date 202407182359     Blood Type Barcode 6200    Type & Screen    Specimen: Blood   Result Value Ref Range    ABO Type A     RH type Positive     Antibody Screen Negative     T&S Expiration Date 7/20/2024 11:59:59 PM    Prepare Platelet Pheresis, 1 Units   Result Value Ref Range    Product Code H6615A71     Unit Number [id number]     UNIT  ABO A     UNIT  RH POS     Dispense Status IS     Blood Expiration Date 202407182359     Blood Type Barcode 6200         Results Review:  I reviewed the patient's new clinical results.      ASSESSMENT  1.-Patient with end-stage liver disease  2.-Ascites  3.-Umbilical hernia  4.-Portal hypertension with secondary pancytopenia    PLAN  1.-Proceed with EGD and colonoscopy.  Risks of perforation, bleeding, cardiorespiratory compromise and missed lesions reviewed in detail with patient.  Ample opportunity questions provided.  She wishes to proceed with further recommendations deferred pending  findings of today's colonoscopy and EGD      Jayme Astudillo MD  7/17/2024   17:02 EDT    Addendum:    EGD: Multiple large variceal columns.  No gastric varices.  Mild portal hypertensive gastropathy    Colon: Two 8 to 10 mm transverse colon polyp removed with snare/cautery with prophylactic clipping to prevent post polypectomy bleed.  Diminutive rectal polyp cauterized.  Left-sided diverticulosis noted excellent bowel prep.  Scope passed from rectum to cecum twice.  Repeat colonoscopy recommended in 3 years given size of polyps

## 2024-07-17 NOTE — ANESTHESIA POSTPROCEDURE EVALUATION
Patient: Lyndsey Luna    Procedure Summary       Date: 07/17/24 Room / Location:  [facility]    Anesthesia Start: 1705 Anesthesia Stop:     Procedures:       ESOPHAGOGASTRODUODENOSCOPY      COLONOSCOPY Diagnosis:     Surgeons: Jayme Astudillo MD Provider: Lele Gordillo MD    Anesthesia Type: general, MAC ASA Status: 4            Anesthesia Type: general, MAC    Vitals  No vitals data found for the desired time range.          Post Anesthesia Care and Evaluation    Patient location during evaluation: PACU  Patient participation: complete - patient participated  Level of consciousness: awake and alert  Pain management: adequate    Airway patency: patent  Anesthetic complications: No anesthetic complications  PONV Status: none  Cardiovascular status: hemodynamically stable and acceptable  Respiratory status: nonlabored ventilation, acceptable and nasal cannula  Hydration status: acceptable

## 2024-07-18 LAB
BH BB BLOOD EXPIRATION DATE: NORMAL
BH BB BLOOD EXPIRATION DATE: NORMAL
BH BB BLOOD TYPE BARCODE: 6200
BH BB BLOOD TYPE BARCODE: 6200
BH BB DISPENSE STATUS: NORMAL
BH BB DISPENSE STATUS: NORMAL
BH BB PRODUCT CODE: NORMAL
BH BB PRODUCT CODE: NORMAL
BH BB UNIT NUMBER: NORMAL
BH BB UNIT NUMBER: NORMAL
UNIT  ABO: NORMAL
UNIT  ABO: NORMAL
UNIT  RH: NORMAL
UNIT  RH: NORMAL

## 2024-07-19 LAB
CYTO UR: NORMAL
LAB AP CASE REPORT: NORMAL
LAB AP CLINICAL INFORMATION: NORMAL
LAB AP INTRADEPARTMENTAL CONSULT: NORMAL
PATH REPORT.FINAL DX SPEC: NORMAL
PATH REPORT.GROSS SPEC: NORMAL

## 2024-07-24 LAB
QT INTERVAL: 448 MS
QTC INTERVAL: 493 MS

## 2024-08-06 RX ORDER — CEFDINIR 300 MG/1
300 CAPSULE ORAL DAILY
Qty: 10 CAPSULE | Refills: 0 | Status: SHIPPED | OUTPATIENT
Start: 2024-08-06 | End: 2024-08-16

## 2024-08-25 ENCOUNTER — READMISSION MANAGEMENT (OUTPATIENT)
Dept: CALL CENTER | Facility: HOSPITAL | Age: 64
End: 2024-08-25
Payer: MEDICARE

## 2024-08-26 ENCOUNTER — TRANSITIONAL CARE MANAGEMENT TELEPHONE ENCOUNTER (OUTPATIENT)
Dept: CALL CENTER | Facility: HOSPITAL | Age: 64
End: 2024-08-26
Payer: MEDICARE

## 2024-08-26 NOTE — OUTREACH NOTE
Call Center TCM Note      Flowsheet Row Responses   [facility] patient discharged from? Non-   Does the patient have one of the following disease processes/diagnoses(primary or secondary)? Other   TCM attempt successful? Yes   Call start time 1132   Call end time 1136   Discharge diagnosis Umbilical hernia with obstruction   Person spoke with today (if not patient) and relationship pt   Meds reviewed with patient/caregiver? Yes   Is the patient having any side effects they believe may be caused by any medication additions or changes? No   Does the patient have all medications ordered at discharge? N/A   Is the patient taking all medications as directed (includes completed medication regime)? Yes   Comments Paracentesis Mondays/Fridays   Does the patient have an appointment with their PCP within 7-14 days of discharge? Yes  [9/3/2024  3:15 PM]   Psychosocial issues? No   What is the patient's perception of their health status since discharge? Same   Is the patient/caregiver able to teach back signs and symptoms related to disease process for when to call PCP? Yes   Is the patient/caregiver able to teach back signs and symptoms related to disease process for when to call 911? Yes   Is the patient/caregiver able to teach back the hierarchy of who to call/visit for symptoms/problems? PCP, Specialist, Home health nurse, Urgent Care, ED, 911 Yes   If the patient is a current smoker, are they able to teach back resources for cessation? Not a smoker   TCM call completed? Yes   Wrap up additional comments Pt reports cont abdominal pain,  pt has paracentesis today. Pt verified PCP fu appt.   Call end time 1136   Would this patient benefit from a Referral to Amb Social Work? No   Is the patient interested in additional calls from an ambulatory ? No            Katheryn Carmona RN    8/26/2024, 11:37 EDT

## 2024-08-26 NOTE — OUTREACH NOTE
Prep Survey      Flowsheet Row Responses   [facility] patient discharged from? Non-BH   Is LACE score < 7 ? Non-BH Discharge   Eligibility [facility]   Date of Admission 08/24/24   Date of Discharge 08/25/24   Discharge Disposition Home or Self Care   Discharge diagnosis Umbilical hernia with obstruction   Does the patient have one of the following disease processes/diagnoses(primary or secondary)? Other   Does the patient have Home health ordered? No   Prep survey completed? Yes            Alva HUIZAR - Registered Nurse

## 2024-09-18 ENCOUNTER — READMISSION MANAGEMENT (OUTPATIENT)
Dept: CALL CENTER | Facility: HOSPITAL | Age: 64
End: 2024-09-18
Payer: MEDICARE

## 2024-09-19 ENCOUNTER — TRANSITIONAL CARE MANAGEMENT TELEPHONE ENCOUNTER (OUTPATIENT)
Dept: CALL CENTER | Facility: HOSPITAL | Age: 64
End: 2024-09-19
Payer: MEDICARE

## 2024-09-24 ENCOUNTER — TRANSITIONAL CARE MANAGEMENT TELEPHONE ENCOUNTER (OUTPATIENT)
Dept: CALL CENTER | Facility: HOSPITAL | Age: 64
End: 2024-09-24
Payer: MEDICARE

## 2024-09-24 ENCOUNTER — READMISSION MANAGEMENT (OUTPATIENT)
Dept: CALL CENTER | Facility: HOSPITAL | Age: 64
End: 2024-09-24
Payer: MEDICARE

## 2024-10-02 ENCOUNTER — READMISSION MANAGEMENT (OUTPATIENT)
Dept: CALL CENTER | Facility: HOSPITAL | Age: 64
End: 2024-10-02
Payer: MEDICARE

## 2024-10-02 NOTE — OUTREACH NOTE
Prep Survey      Flowsheet Row Responses   [facility] facility patient discharged from? Non-BH   Is LACE score < 7 ? Non-BH Discharge   Eligibility [facility]   Date of Admission 09/23/24   Date of Discharge 10/02/24   Discharge diagnosis Spontaneous bacterial peritonitis   Does the patient have one of the following disease processes/diagnoses(primary or secondary)? Other   Prep survey completed? Yes            Suad MCKEON - Registered Nurse

## 2024-10-03 ENCOUNTER — TRANSITIONAL CARE MANAGEMENT TELEPHONE ENCOUNTER (OUTPATIENT)
Dept: CALL CENTER | Facility: HOSPITAL | Age: 64
End: 2024-10-03
Payer: MEDICARE

## 2024-10-03 NOTE — OUTREACH NOTE
Call Center TCM Note      Flowsheet Row Responses   [facility] patient discharged from? Non-  []   Does the patient have one of the following disease processes/diagnoses(primary or secondary)? Other   TCM attempt successful? Yes   Call start time 1301   Call end time 1303   Discharge diagnosis Spontaneous bacterial peritonitis   Is patient permission given to speak with other caregiver? Yes   List who call center can speak with spouse- Otoniel   Person spoke with today (if not patient) and relationship spouse   Is the patient taking all medications as directed (includes completed medication regime)? Yes   Does the patient have an appointment with their PCP within 7-14 days of discharge? No   Nursing Interventions Patient declined scheduling/rescheduling appointment at this time   Has home health visited the patient within 72 hours of discharge? N/A   Psychosocial issues? No   What is the patient's perception of their health status since discharge? Improving   Is the patient/caregiver able to teach back signs and symptoms related to disease process for when to call PCP? Yes   Is the patient/caregiver able to teach back signs and symptoms related to disease process for when to call 911? Yes   Is the patient/caregiver able to teach back the hierarchy of who to call/visit for symptoms/problems? PCP, Specialist, Home health nurse, Urgent Care, ED, 911 Yes   If the patient is a current smoker, are they able to teach back resources for cessation? Not a smoker   TCM call completed? Yes   Wrap up additional comments Per spouse, patient is doing well, no questions, spouse declined to schedule a hospital follow up appt with PCP, states he will have patient call.   Call end time 1303   Would this patient benefit from a Referral to Amb Social Work? No   Is the patient interested in additional calls from an ambulatory ? No            Kristin Saldivar RN    10/3/2024, 13:03 EDT

## 2024-10-13 RX ORDER — PHENOL 1.4 %
600 AEROSOL, SPRAY (ML) MUCOUS MEMBRANE DAILY
Qty: 90 TABLET | Refills: 1 | Status: SHIPPED | OUTPATIENT
Start: 2024-10-13

## 2024-10-13 RX ORDER — CHOLECALCIFEROL (VITAMIN D3) 50 MCG
2000 TABLET ORAL DAILY
Qty: 90 TABLET | Refills: 1 | Status: SHIPPED | OUTPATIENT
Start: 2024-10-13

## 2024-10-17 ENCOUNTER — TELEPHONE (OUTPATIENT)
Dept: INTERNAL MEDICINE | Facility: CLINIC | Age: 64
End: 2024-10-17

## 2024-11-14 ENCOUNTER — TELEPHONE (OUTPATIENT)
Dept: INTERNAL MEDICINE | Facility: CLINIC | Age: 64
End: 2024-11-14
Payer: MEDICARE

## 2024-11-15 NOTE — TELEPHONE ENCOUNTER
Patient admitted to [facility]. I have her labs but need an ER or hospital admit note. Please contact them for records.

## 2024-12-01 ENCOUNTER — READMISSION MANAGEMENT (OUTPATIENT)
Dept: CALL CENTER | Facility: HOSPITAL | Age: 64
End: 2024-12-01
Payer: MEDICARE

## 2024-12-01 NOTE — OUTREACH NOTE
Prep Survey      Flowsheet Row Responses   [facility] facility patient discharged from? Non-BH   Is LACE score < 7 ? Non-BH Discharge   Eligibility [facility]   Date of Admission 11/30/24   Date of Discharge 12/01/24   Discharge Disposition Home or Self Care   Discharge diagnosis Liver cirrhosis secondary to BONILLA, ESRD, end stage liver disease   Does the patient have one of the following disease processes/diagnoses(primary or secondary)? Other   Does the patient have Home health ordered? No   Prep survey completed? Yes            Alva HUIZAR - Registered Nurse

## 2024-12-02 ENCOUNTER — TRANSITIONAL CARE MANAGEMENT TELEPHONE ENCOUNTER (OUTPATIENT)
Dept: CALL CENTER | Facility: HOSPITAL | Age: 64
End: 2024-12-02
Payer: MEDICARE

## 2024-12-02 NOTE — OUTREACH NOTE
Call Center TCM Note      Flowsheet Row Responses   [facility] patient discharged from? Non-  []   Does the patient have one of the following disease processes/diagnoses(primary or secondary)? Other   TCM attempt successful? Yes   Call start time 0930   Call end time 0933   Discharge diagnosis Liver cirrhosis secondary to BONILLA, ESRD, end stage liver disease   Person spoke with today (if not patient) and relationship spouse   Meds reviewed with patient/caregiver? Yes   Is the patient having any side effects they believe may be caused by any medication additions or changes? No   Does the patient have all medications ordered at discharge? Yes   Is the patient taking all medications as directed (includes completed medication regime)? Yes   Comments Patient sees specialist transplant team at . Patient was discharged from [facility].   Does the patient have an appointment with their PCP within 7-14 days of discharge? Other   Nursing Interventions Patient desires to follow up with specialty only   Psychosocial issues? No   Did the patient receive a copy of their discharge instructions? Yes   Nursing interventions Reviewed instructions with patient   What is the patient's perception of their health status since discharge? Improving   Is the patient/caregiver able to teach back signs and symptoms related to disease process for when to call PCP? Yes   Is the patient/caregiver able to teach back signs and symptoms related to disease process for when to call 911? Yes   Is the patient/caregiver able to teach back the hierarchy of who to call/visit for symptoms/problems? PCP, Specialist, Home health nurse, Urgent Care, ED, 911 Yes   If the patient is a current smoker, are they able to teach back resources for cessation? Not a smoker   TCM call completed? Yes   Wrap up additional comments Per spouse wife is doing well.   Call end time 0933   Would this patient benefit from a Referral to [facility] Social Work? No   Is the  patient interested in additional calls from an ambulatory ? No            Osvaldo Elizalde RN    12/2/2024, 09:33 EST

## 2024-12-12 DIAGNOSIS — E78.2 MIXED HYPERLIPIDEMIA: ICD-10-CM

## 2024-12-12 RX ORDER — ROSUVASTATIN CALCIUM 20 MG/1
20 TABLET, COATED ORAL DAILY
Qty: 90 TABLET | Refills: 3 | Status: SHIPPED | OUTPATIENT
Start: 2024-12-12

## 2024-12-12 NOTE — TELEPHONE ENCOUNTER
Caller: [facility] [id number] - [place] - [address] - [phone]  - [phone] FX    Relationship: Pharmacy    Best call back number:[phone]     Requested Prescriptions:   Requested Prescriptions     Pending Prescriptions Disp Refills    rosuvastatin (CRESTOR) 20 MG tablet 90 tablet 3     Sig: Take 1 tablet by mouth Daily.        Pharmacy where request should be sent: [facility] [id number]  [place]  [address] - [phone]  - [phone] FX     Last office visit with prescribing clinician: 6/10/2024   Last telemedicine visit with prescribing clinician: Visit date not found   Next office visit with prescribing clinician: Visit date not found     Additional details provided by patient: REQUESTING REFILL - PATIENT IS GETTING LOW     Does the patient have less than a 3 day supply:  [] Yes  [x] No    Would you like a call back once the refill request has been completed: [] Yes [x] No    If the office needs to give you a call back, can they leave a voicemail: [] Yes [x] No    Ciarra Vasquez MA   12/12/24 12:51 EST

## 2025-02-03 DIAGNOSIS — K72.10 CHRONIC LIVER FAILURE WITHOUT HEPATIC COMA: ICD-10-CM

## 2025-02-03 RX ORDER — PANTOPRAZOLE SODIUM 40 MG/1
40 TABLET, DELAYED RELEASE ORAL DAILY
Qty: 90 TABLET | Refills: 3 | Status: SHIPPED | OUTPATIENT
Start: 2025-02-03

## 2025-02-26 ENCOUNTER — PRIOR AUTHORIZATION (OUTPATIENT)
Dept: INTERNAL MEDICINE | Facility: CLINIC | Age: 65
End: 2025-02-26
Payer: MEDICARE

## 2025-02-26 NOTE — TELEPHONE ENCOUNTER
PA for novolog sent to plan per covermymeds.     Available without authorization    (Key: [id number])

## 2025-04-22 ENCOUNTER — OFFICE VISIT (OUTPATIENT)
Dept: INTERNAL MEDICINE | Age: 65
End: 2025-04-22
Payer: MEDICARE

## 2025-04-22 VITALS
WEIGHT: 179 LBS | OXYGEN SATURATION: 99 % | SYSTOLIC BLOOD PRESSURE: 110 MMHG | DIASTOLIC BLOOD PRESSURE: 42 MMHG | HEIGHT: 67 IN | HEART RATE: 78 BPM | BODY MASS INDEX: 28.09 KG/M2 | TEMPERATURE: 98.6 F

## 2025-04-22 DIAGNOSIS — Z99.2 STAGE 5 CHRONIC KIDNEY DISEASE ON CHRONIC DIALYSIS: ICD-10-CM

## 2025-04-22 DIAGNOSIS — M79.605 PAIN IN BOTH LOWER EXTREMITIES: ICD-10-CM

## 2025-04-22 DIAGNOSIS — E78.2 MIXED HYPERLIPIDEMIA: ICD-10-CM

## 2025-04-22 DIAGNOSIS — M79.604 PAIN IN BOTH LOWER EXTREMITIES: ICD-10-CM

## 2025-04-22 DIAGNOSIS — B99.9 CHRONIC INFECTION: ICD-10-CM

## 2025-04-22 DIAGNOSIS — G47.01 INSOMNIA DUE TO MEDICAL CONDITION: ICD-10-CM

## 2025-04-22 DIAGNOSIS — E11.42 TYPE 2 DIABETES MELLITUS WITH DIABETIC POLYNEUROPATHY, WITH LONG-TERM CURRENT USE OF INSULIN: Primary | ICD-10-CM

## 2025-04-22 DIAGNOSIS — Z79.4 TYPE 2 DIABETES MELLITUS WITH DIABETIC POLYNEUROPATHY, WITH LONG-TERM CURRENT USE OF INSULIN: Primary | ICD-10-CM

## 2025-04-22 DIAGNOSIS — K72.10 CHRONIC LIVER FAILURE WITHOUT HEPATIC COMA: ICD-10-CM

## 2025-04-22 DIAGNOSIS — N18.6 STAGE 5 CHRONIC KIDNEY DISEASE ON CHRONIC DIALYSIS: ICD-10-CM

## 2025-04-22 LAB
EXPIRATION DATE: ABNORMAL
HBA1C MFR BLD: 6.3 % (ref 4.5–5.7)
Lab: ABNORMAL

## 2025-04-22 RX ORDER — BLOOD-GLUCOSE METER
1 KIT MISCELLANEOUS AS NEEDED
Qty: 1 EACH | Refills: 0 | Status: SHIPPED | OUTPATIENT
Start: 2025-04-22

## 2025-04-22 RX ORDER — BLOOD SUGAR DIAGNOSTIC
STRIP MISCELLANEOUS
Qty: 100 EACH | Refills: 5 | Status: SHIPPED | OUTPATIENT
Start: 2025-04-22

## 2025-04-22 RX ORDER — SODIUM BICARBONATE 650 MG/1
1300 TABLET ORAL 3 TIMES DAILY
Start: 2025-04-22

## 2025-04-22 RX ORDER — INSULIN DEGLUDEC 200 U/ML
170 INJECTION, SOLUTION SUBCUTANEOUS NIGHTLY
Qty: 30 ML | Refills: 3 | Status: SHIPPED | OUTPATIENT
Start: 2025-04-22

## 2025-04-22 RX ORDER — PEN NEEDLE, DIABETIC 30 GX3/16"
1 NEEDLE, DISPOSABLE MISCELLANEOUS 4 TIMES DAILY
Qty: 120 EACH | Refills: 5 | Status: SHIPPED | OUTPATIENT
Start: 2025-04-22

## 2025-04-22 RX ORDER — AVOBENZONE, HOMOSALATE, OCTISALATE, OCTOCRYLENE 30; 40; 45; 26 MG/ML; MG/ML; MG/ML; MG/ML
CREAM TOPICAL
Qty: 100 EACH | Refills: 5 | Status: SHIPPED | OUTPATIENT
Start: 2025-04-22

## 2025-04-22 RX ORDER — ISOPROPYL ALCOHOL 0.75 G/1
SWAB TOPICAL
Qty: 300 EACH | Refills: 5 | Status: SHIPPED | OUTPATIENT
Start: 2025-04-22

## 2025-04-22 RX ORDER — CIPROFLOXACIN 250 MG/1
250 TABLET, FILM COATED ORAL DAILY
Qty: 90 TABLET | Refills: 1 | Status: SHIPPED | OUTPATIENT
Start: 2025-04-22

## 2025-04-22 RX ORDER — HYDROXYZINE HYDROCHLORIDE 10 MG/1
10 TABLET, FILM COATED ORAL NIGHTLY PRN
Qty: 90 TABLET | Refills: 1 | Status: SHIPPED | OUTPATIENT
Start: 2025-04-22

## 2025-04-22 RX ORDER — LACTULOSE 10 G/15ML
10 SOLUTION ORAL 2 TIMES DAILY
Qty: 946 ML | Refills: 2 | Status: SHIPPED | OUTPATIENT
Start: 2025-04-22

## 2025-04-22 RX ORDER — TRAMADOL HYDROCHLORIDE 50 MG/1
50 TABLET ORAL 2 TIMES DAILY PRN
Qty: 60 TABLET | Refills: 0 | Status: SHIPPED | OUTPATIENT
Start: 2025-04-22

## 2025-04-22 RX ORDER — ROSUVASTATIN CALCIUM 20 MG/1
20 TABLET, COATED ORAL DAILY
Qty: 90 TABLET | Refills: 1 | Status: SHIPPED | OUTPATIENT
Start: 2025-04-22

## 2025-04-22 RX ORDER — ONDANSETRON 4 MG/1
4 TABLET, FILM COATED ORAL EVERY 8 HOURS PRN
Qty: 30 TABLET | Refills: 5 | Status: SHIPPED | OUTPATIENT
Start: 2025-04-22

## 2025-04-22 RX ORDER — MIDODRINE HYDROCHLORIDE 5 MG/1
TABLET ORAL
Start: 2025-04-22

## 2025-04-22 RX ORDER — MIDODRINE HYDROCHLORIDE 5 MG/1
TABLET ORAL
Start: 2025-04-22 | End: 2025-04-22 | Stop reason: SDUPTHER

## 2025-04-24 ENCOUNTER — LAB (OUTPATIENT)
Dept: INTERNAL MEDICINE | Facility: CLINIC | Age: 65
End: 2025-04-24
Payer: MEDICARE

## 2025-04-24 DIAGNOSIS — D63.8 CHRONIC DISEASE ANEMIA: ICD-10-CM

## 2025-04-24 DIAGNOSIS — K72.10 CHRONIC LIVER FAILURE WITHOUT HEPATIC COMA: ICD-10-CM

## 2025-04-24 DIAGNOSIS — E87.6 HYPOKALEMIA: ICD-10-CM

## 2025-04-24 DIAGNOSIS — E11.65 TYPE 2 DIABETES MELLITUS WITH HYPERGLYCEMIA, WITH LONG-TERM CURRENT USE OF INSULIN: ICD-10-CM

## 2025-04-24 DIAGNOSIS — Z79.4 TYPE 2 DIABETES MELLITUS WITH HYPERGLYCEMIA, WITH LONG-TERM CURRENT USE OF INSULIN: ICD-10-CM

## 2025-04-24 DIAGNOSIS — R41.3 MEMORY LOSS: ICD-10-CM

## 2025-04-24 DIAGNOSIS — K04.7 ABSCESSED TOOTH: ICD-10-CM

## 2025-04-24 LAB
ALBUMIN SERPL-MCNC: 3.9 G/DL (ref 3.5–5.2)
ALBUMIN/GLOB SERPL: 1.7 G/DL
ALP SERPL-CCNC: 84 U/L (ref 39–117)
ALT SERPL W P-5'-P-CCNC: 12 U/L (ref 1–33)
AMMONIA BLD-SCNC: 38 UMOL/L (ref 11–51)
ANION GAP SERPL CALCULATED.3IONS-SCNC: 14 MMOL/L (ref 5–15)
AST SERPL-CCNC: 14 U/L (ref 1–32)
BILIRUB SERPL-MCNC: 0.9 MG/DL (ref 0–1.2)
BUN SERPL-MCNC: 28 MG/DL (ref 8–23)
BUN/CREAT SERPL: 6.6 (ref 7–25)
CALCIUM SPEC-SCNC: 8.7 MG/DL (ref 8.6–10.5)
CHLORIDE SERPL-SCNC: 102 MMOL/L (ref 98–107)
CO2 SERPL-SCNC: 25 MMOL/L (ref 22–29)
CREAT SERPL-MCNC: 4.26 MG/DL (ref 0.57–1)
EGFRCR SERPLBLD CKD-EPI 2021: 11.1 ML/MIN/1.73
FERRITIN SERPL-MCNC: 230 NG/ML (ref 13–150)
GLOBULIN UR ELPH-MCNC: 2.3 GM/DL
GLUCOSE SERPL-MCNC: 210 MG/DL (ref 65–99)
IRON 24H UR-MRATE: 66 MCG/DL (ref 37–145)
IRON SATN MFR SERPL: 27 % (ref 20–50)
MAGNESIUM SERPL-MCNC: 1.9 MG/DL (ref 1.6–2.4)
POTASSIUM SERPL-SCNC: 3.3 MMOL/L (ref 3.5–5.2)
PROT SERPL-MCNC: 6.2 G/DL (ref 6–8.5)
SODIUM SERPL-SCNC: 141 MMOL/L (ref 136–145)
TIBC SERPL-MCNC: 243 MCG/DL (ref 298–536)
TRANSFERRIN SERPL-MCNC: 163 MG/DL (ref 200–360)

## 2025-04-24 PROCEDURE — 83735 ASSAY OF MAGNESIUM: CPT | Performed by: NURSE PRACTITIONER

## 2025-04-24 PROCEDURE — 84466 ASSAY OF TRANSFERRIN: CPT | Performed by: NURSE PRACTITIONER

## 2025-04-24 PROCEDURE — 85025 COMPLETE CBC W/AUTO DIFF WBC: CPT | Performed by: NURSE PRACTITIONER

## 2025-04-24 PROCEDURE — 83540 ASSAY OF IRON: CPT | Performed by: NURSE PRACTITIONER

## 2025-04-24 PROCEDURE — 80053 COMPREHEN METABOLIC PANEL: CPT | Performed by: NURSE PRACTITIONER

## 2025-04-24 PROCEDURE — 82140 ASSAY OF AMMONIA: CPT | Performed by: NURSE PRACTITIONER

## 2025-04-24 PROCEDURE — 82728 ASSAY OF FERRITIN: CPT | Performed by: NURSE PRACTITIONER

## 2025-04-24 PROCEDURE — 36415 COLL VENOUS BLD VENIPUNCTURE: CPT | Performed by: NURSE PRACTITIONER

## 2025-04-25 LAB
BASOPHILS # BLD AUTO: 0.01 10*3/MM3 (ref 0–0.2)
BASOPHILS NFR BLD AUTO: 0.6 % (ref 0–1.5)
DEPRECATED RDW RBC AUTO: 47 FL (ref 37–54)
EOSINOPHIL # BLD AUTO: 0.07 10*3/MM3 (ref 0–0.4)
EOSINOPHIL NFR BLD AUTO: 4.1 % (ref 0.3–6.2)
ERYTHROCYTE [DISTWIDTH] IN BLOOD BY AUTOMATED COUNT: 14.1 % (ref 12.3–15.4)
HCT VFR BLD AUTO: 20.9 % (ref 34–46.6)
HGB BLD-MCNC: 7 G/DL (ref 12–15.9)
IMM GRANULOCYTES # BLD AUTO: 0.01 10*3/MM3 (ref 0–0.05)
IMM GRANULOCYTES NFR BLD AUTO: 0.6 % (ref 0–0.5)
LYMPHOCYTES # BLD AUTO: 0.29 10*3/MM3 (ref 0.7–3.1)
LYMPHOCYTES NFR BLD AUTO: 17.2 % (ref 19.6–45.3)
MCH RBC QN AUTO: 31.3 PG (ref 26.6–33)
MCHC RBC AUTO-ENTMCNC: 33.5 G/DL (ref 31.5–35.7)
MCV RBC AUTO: 93.3 FL (ref 79–97)
MONOCYTES # BLD AUTO: 0.22 10*3/MM3 (ref 0.1–0.9)
MONOCYTES NFR BLD AUTO: 13 % (ref 5–12)
NEUTROPHILS NFR BLD AUTO: 1.09 10*3/MM3 (ref 1.7–7)
NEUTROPHILS NFR BLD AUTO: 64.5 % (ref 42.7–76)
PLATELET # BLD AUTO: 18 10*3/MM3 (ref 140–450)
PMV BLD AUTO: 11.6 FL (ref 6–12)
RBC # BLD AUTO: 2.24 10*6/MM3 (ref 3.77–5.28)
WBC NRBC COR # BLD AUTO: 1.69 10*3/MM3 (ref 3.4–10.8)

## 2025-05-02 NOTE — PROGRESS NOTES
Subjective   Chief Complaint   Patient presents with    Type 2 diabetes mellitus with hyperglycemia, with long-term        Lyndsey Farnaz Luna is a 64 y.o. female.    History of Present Illness  The patient presents for evaluation of diabetes management, leg pain, sleep disturbance, and blood pressure management.    Diabetes is managed with NovoLog 30 units three times a day and Tresiba 165 units daily. Refills for these medications are required. Her A1c level is reported to be 6.3. Previously, Dr. Braswell provided diabetes medication to avoid visits to the endocrinologist, but he is no longer available.     Severe leg pain is experienced at night, disrupting sleep. Gabapentin, which provided significant relief, was discontinued during her last hospital stay in 09/2024 due to potential renal or hepatic complications. An alternative medication for leg pain is being sought.    Sleep issues have been addressed with melatonin as advised by her hepatologist, but it has proven ineffective. Hydroxyzine 50 mg was previously prescribed for sleep but caused excessive drowsiness. Other options for sleep aids are being explored.    Blood pressure readings have been consistently low, with systolic values in the low 100s and diastolic values also low, resulting in dizziness, particularly when standing up quickly. Consideration is being given to taking a blood pressure medication daily to manage these symptoms. Midodrine 5 mg is taken three times a day on dialysis days.    Current medications include sodium bicarbonate 650 mg three times a day, lactulose syrup to manage ammonia levels, ciprofloxacin for abdominal infections taken daily, cholesterol medication, and Protonix. Refills for lactulose syrup are requested.    Surgery for two large hernias was performed in 09/2024 at [facility]. Dialysis is resumed, with weekly visits to  for fluid removal.    PAST SURGICAL HISTORY:   - Surgery for two large hernias in 09/2024 at   [facility]Hospital.    I have reviewed the following portions of the patient's history and confirmed they are accurate: allergies, current medications, past family history, past medical history, past social history, past surgical history, and problem list    Review of Systems  Pertinent items are noted in HPI.     Current Outpatient Medications on File Prior to Visit   Medication Sig    Blood Glucose Monitoring Suppl (ONE TOUCH ULTRA 2) w/Device kit     pantoprazole (PROTONIX) 40 MG EC tablet TAKE 1 TABLET BY MOUTH DAILY    sennosides-docusate (PERICOLACE) 8.6-50 MG per tablet Take 1 tablet by mouth Daily.     No current facility-administered medications on file prior to visit.       Objective   Vitals:    04/22/25 1556   BP: 110/42   BP Location: Right arm   Patient Position: Sitting   Cuff Size: Adult   Pulse: 78   Temp: 98.6 °F (37 °C)   SpO2: 99%   Weight: 81.2 kg (179 lb)   Height: 170.2 cm (67.01\")     Body mass index is 28.03 kg/m².    Physical Exam  Vitals reviewed.   Constitutional:       Appearance: Normal appearance. She is well-developed.   HENT:      Head: Normocephalic and atraumatic.      Nose: Nose normal.   Eyes:      General: Lids are normal.      Conjunctiva/sclera: Conjunctivae normal.      Pupils: Pupils are equal, round, and reactive to light.   Neck:      Thyroid: No thyromegaly.      Trachea: Trachea normal.   Cardiovascular:      Rate and Rhythm: Normal rate and regular rhythm.      Heart sounds: Normal heart sounds.   Pulmonary:      Effort: Pulmonary effort is normal. No respiratory distress.      Breath sounds: Normal breath sounds.   Abdominal:      General: There is distension.   Musculoskeletal:      Comments: BLE weakness - in transport chair.    Skin:     General: Skin is warm and dry.      Comments: Intermittent petechiae and bruising on arms.    Neurological:      Mental Status: She is alert and oriented to person, place, and time.      GCS: GCS eye subscore is 4. GCS verbal subscore is 5. " GCS motor subscore is 6.   Psychiatric:         Attention and Perception: Attention normal.         Mood and Affect: Mood normal.         Speech: Speech normal.         Behavior: Behavior normal. Behavior is cooperative.         Thought Content: Thought content normal.         Results  Labs   - A1c: 6.3%    Lab Results   Component Value Date    WBC 1.69 (C) 04/24/2025    HGB 7.0 (L) 04/24/2025    HCT 20.9 (C) 04/24/2025    MCV 93.3 04/24/2025    PLT 18 (C) 04/24/2025      Lab Results   Component Value Date    GLUCOSE 210 (H) 04/24/2025    BUN 28 (H) 04/24/2025    CREATININE 4.26 (H) 04/24/2025     04/24/2025    K 3.3 (L) 04/24/2025     04/24/2025    CALCIUM 8.7 04/24/2025    PROTEINTOT 6.2 04/24/2025    ALBUMIN 3.9 04/24/2025    ALT 12 04/24/2025    AST 14 04/24/2025    ALKPHOS 84 04/24/2025    BILITOT 0.9 04/24/2025    GLOB 2.3 04/24/2025    AGRATIO 1.7 04/24/2025    BCR 6.6 (L) 04/24/2025    ANIONGAP 14.0 04/24/2025    EGFR 11.1 (L) 04/24/2025      Lab Results   Component Value Date    HGBA1C 6.3 (A) 04/22/2025      Lab Results   Component Value Date    CHOL 91 02/27/2024    CHLPL 91 06/23/2021    TRIG 117 02/27/2024    HDL 30 (L) 02/27/2024    LDL 40 02/27/2024      Lab Results   Component Value Date    TSH 2.570 02/27/2024          Assessment & Plan   Problem List Items Addressed This Visit       Type 2 diabetes mellitus, with long-term current use of insulin - Primary (Chronic)    Relevant Medications    insulin aspart (novoLOG FLEXPEN) 100 UNIT/ML solution pen-injector sc pen    Insulin Degludec (Tresiba FlexTouch) 200 UNIT/ML solution pen-injector pen injection    Insulin Pen Needle (Pen Needles) 32G X 4 MM misc    Lancets misc    glucose blood (Glucose Meter Test) test strip    glucose monitor monitoring kit    Alcohol Swabs (B-D SINGLE USE SWABS REGULAR) pads    Other Relevant Orders    POC Glycosylated Hemoglobin (Hb A1C) (Completed)    Mixed hyperlipidemia    Relevant Medications     rosuvastatin (CRESTOR) 20 MG tablet    Stage 5 chronic kidney disease on chronic dialysis    Relevant Medications    sodium bicarbonate 650 MG tablet    midodrine (PROAMATINE) 5 MG tablet     Other Visit Diagnoses         Chronic liver failure without hepatic coma        Relevant Medications    ondansetron (Zofran) 4 MG tablet    midodrine (PROAMATINE) 5 MG tablet    Insulin Pen Needle (Pen Needles) 32G X 4 MM misc      Pain in both lower extremities        Relevant Medications    traMADol (ULTRAM) 50 MG tablet      Chronic infection        Relevant Medications    ciprofloxacin (CIPRO) 250 MG tablet      Insomnia due to medical condition        Relevant Medications    hydrOXYzine (ATARAX) 10 MG tablet               Current Outpatient Medications:     Alcohol Swabs (B-D SINGLE USE SWABS REGULAR) pads, Clean skin site 7 times daily before injections and finger sticks., Disp: 300 each, Rfl: 5    insulin aspart (novoLOG FLEXPEN) 100 UNIT/ML solution pen-injector sc pen, Inject 30 Units under the skin into the appropriate area as directed 3 (Three) Times a Day With Meals., Disp: 30 mL, Rfl: 5    Insulin Degludec (Tresiba FlexTouch) 200 UNIT/ML solution pen-injector pen injection, Inject 170 Units under the skin into the appropriate area as directed Every Night., Disp: 30 mL, Rfl: 3    lactulose (CHRONULAC) 10 GM/15ML solution, Take 15 mL by mouth 2 (Two) Times a Day., Disp: 946 mL, Rfl: 2    midodrine (PROAMATINE) 5 MG tablet, Take 1 tablet by mouth daily and 3 tablets during dialysis treatment (3 times weekly)., Disp: , Rfl:     rosuvastatin (CRESTOR) 20 MG tablet, Take 1 tablet by mouth Daily., Disp: 90 tablet, Rfl: 1    Blood Glucose Monitoring Suppl (ONE TOUCH ULTRA 2) w/Device kit, , Disp: , Rfl:     ciprofloxacin (CIPRO) 250 MG tablet, Take 1 tablet by mouth Daily., Disp: 90 tablet, Rfl: 1    glucose blood (Glucose Meter Test) test strip, Check blood sugars 3 times daily as needed., Disp: 100 each, Rfl: 5     glucose monitor monitoring kit, Use 1 each As Needed (Check blood sugars 3 times daily as needed.)., Disp: 1 each, Rfl: 0    hydrOXYzine (ATARAX) 10 MG tablet, Take 1 tablet by mouth At Night As Needed for Itching (SLEEP)., Disp: 90 tablet, Rfl: 1    Insulin Pen Needle (Pen Needles) 32G X 4 MM misc, Use 1 each 4 (Four) Times a Day., Disp: 120 each, Rfl: 5    Lancets misc, Check blood sugars 3 times daily as needed., Disp: 100 each, Rfl: 5    ondansetron (Zofran) 4 MG tablet, Take 1 tablet by mouth Every 8 (Eight) Hours As Needed for Nausea or Vomiting., Disp: 30 tablet, Rfl: 5    pantoprazole (PROTONIX) 40 MG EC tablet, TAKE 1 TABLET BY MOUTH DAILY, Disp: 90 tablet, Rfl: 3    sennosides-docusate (PERICOLACE) 8.6-50 MG per tablet, Take 1 tablet by mouth Daily., Disp: , Rfl:     sodium bicarbonate 650 MG tablet, Take 2 tablets by mouth 3 (Three) Times a Day., Disp: , Rfl:     traMADol (ULTRAM) 50 MG tablet, Take 1 tablet by mouth 2 (Two) Times a Day As Needed for Moderate Pain., Disp: 60 tablet, Rfl: 0    Assessment & Plan  1. Diabetes Mellitus.  - Her A1c level is commendably at 6.3.  - She will continue her current insulin regimen of Tresiba 165 units and NovoLog 30 units 3 times a day.  - Refills for these medications will be sent to her pharmacy.  - Dr. Baer previously managed her diabetes medication.    2. Leg Pain.  - Tramadol 50 mg will be prescribed twice daily as needed, with the option to reduce to once daily if tolerable.  - Consultation with Dr. Giron will be sought regarding the safety of tramadol for her condition.  - If the current dosage proves ineffective, an increase to 200 mg per day may be considered.  - Gabapentin was discontinued due to end-stage renal disease concerns.    3. Sleep Disturbance.  - Hydroxyzine 10 mg will be prescribed for sleep, with the option to halve the tablet if necessary.  - Previous trials of melatonin were ineffective.  - Hydroxyzine also aids with skin itching.    4.  Blood Pressure Management.  - She will take one tablet of midodrine daily and increase to three tablets on dialysis days.  - If her blood pressure becomes too high, the dosage can be adjusted accordingly.  - She experiences dizziness when her blood pressure is low.    5. Medication Management.  - Refills for Zofran 4 mg tablets, ciprofloxacin, and Protonix will be provided.  - She will continue her current regimen of sodium bicarbonate 650 mg three times a day and lactulose syrup.  - Xifaxan will be discussed with her liver doctor for ammonia level management.         Plan of care reviewed with the patient at the conclusion of today's visit.  Education was provided regarding diagnosis, management, and any prescribed or recommended OTC medications.  Patient verbalized understanding of and agreement with management plan.     Return in about 3 months (around 7/22/2025), or if symptoms worsen or fail to improve.      Transcribed from ambient dictation for JOSE D Zaragoza by JOSE D Zaragoza.  05/02/25   15:32 EDT    Patient or patient representative verbalized consent for the use of Ambient Listening during the visit with  JOSE D Zaragoza for chart documentation. 5/2/2025  15:39 EDT

## 2025-06-16 ENCOUNTER — PATIENT OUTREACH (OUTPATIENT)
Dept: CASE MANAGEMENT | Facility: OTHER | Age: 65
End: 2025-06-16
Payer: MEDICARE

## 2025-06-16 NOTE — OUTREACH NOTE
AMBULATORY CASE MANAGEMENT NOTE    Names and Relationships of Patient/Support Persons: Contact: Lyndsey Luna; Relationship: Self -     Patient Outreach    Spoke briefly with patient's . He stated patient was in dialysis. Explained ACM role.  denied needs.     Yessenia DIAZ  Ambulatory Case Management    6/16/2025, 11:25 EDT

## (undated) DEVICE — "MH-443 SUCTION VALVE F/EVIS140 EVIS160": Brand: SUCTION VALVE

## (undated) DEVICE — CONTN GRAD MEAS TRIANG 32OZ BLK

## (undated) DEVICE — MODEL AT P65, P/N 701554-001KIT CONTENTS: HAND CONTROLLER, 3-WAY HIGH-PRESSURE STOPCOCK WITH ROTATING END AND PREMIUM HIGH-PRESSURE TUBING: Brand: ANGIOTOUCH® KIT

## (undated) DEVICE — ADAPT CLN LUM OLYMP AIR/H20

## (undated) DEVICE — SPNG CVR STR 2S 4X4

## (undated) DEVICE — PK CATH CARD 10

## (undated) DEVICE — CATH DIAG EXPO M/ PK 5F FL4/FR4 PIG

## (undated) DEVICE — MODEL BT2000 P/N 700287-012KIT CONTENTS: MANIFOLD WITH SALINE AND CONTRAST PORTS, SALINE TUBING WITH SPIKE AND HAND SYRINGE, TRANSDUCER: Brand: BT2000 AUTOMATED MANIFOLD KIT

## (undated) DEVICE — WATER 50W MAX / AIR 8W MAX: Brand: FLEXIVA TRACTIP

## (undated) DEVICE — FIRST STEP BEDSIDE ADD WATER KIT - RESEALABLE STAND-UP POUCH, ENDOSCOPIC CLEANING PAD - 1 POUCH: Brand: FIRST STEP BEDSIDE ADD WATER KIT - RESEALABLE STAND-UP POUCH, ENDOSCOPIC CLEANIN

## (undated) DEVICE — GW INQWIRE FC PTFE STD J/1.5 .035 260

## (undated) DEVICE — THE BITE BLOCK MAXI, LATEX FREE STRAP IS USED TO PROTECT THE ENDOSCOPE INSERTION TUBE FROM BEING BITTEN BY THE PATIENT.

## (undated) DEVICE — KT ORCA ORCAPOD DISP STRL

## (undated) DEVICE — SYR LUERLOK 50ML

## (undated) DEVICE — SNAR POLYP CAPTIVATOR MICROHEX 13 240CM

## (undated) DEVICE — CATH DIAG EXPO .045 FL3  5F 100CM

## (undated) DEVICE — THE SINGLE USE ETRAP – POLYP TRAP IS USED FOR SUCTION RETRIEVAL OF ENDOSCOPICALLY REMOVED POLYPS.: Brand: ETRAP

## (undated) DEVICE — "MH-438 A/W VLVE F/140 EVIS-140": Brand: AIR/WATER VALVE

## (undated) DEVICE — ENDOGATOR HYBRID TUBING KIT FOR USE WITH ENDOGATOR IRRIGATION PUMP, OLYMPUS PUMP, GI4000 ESU, AND TORRENT IRRIGATION PUMP.: Brand: ENDOGATOR KIT

## (undated) DEVICE — DRSNG SURESITE WNDW 2.38X2.75

## (undated) DEVICE — SOLIDIFIER LIQ PREMISORB 1500CC

## (undated) DEVICE — TUBING,OXYGEN,CRUSH RES,7',CLEAR,UC: Brand: MEDLINE INDUSTRIES, INC.

## (undated) DEVICE — DEV COMP RAD PRELUDESYNC 24CM

## (undated) DEVICE — INTRO ACCSR BLNT TP

## (undated) DEVICE — ST LINER SAFECAP GRN RED CP STRL

## (undated) DEVICE — SKIN PREP TRAY W/CHG: Brand: MEDLINE INDUSTRIES, INC.

## (undated) DEVICE — LUBE JELLY FOIL PACK 1.4 OZ: Brand: MEDLINE INDUSTRIES, INC.

## (undated) DEVICE — PK CYSTO-TUR BASIC 10

## (undated) DEVICE — Device: Brand: AIR/WATER CHANNEL CLEANING ADAPTER

## (undated) DEVICE — HYBRID CO2 TUBING/CAP SET FOR OLYMPUS® SCOPES & CO2 SOURCE: Brand: ERBE

## (undated) DEVICE — Device: Brand: DEFENDO AIR/WATER/SUCTION AND BIOPSY VALVE

## (undated) DEVICE — INTRO SHEATH PRELUDE IDEAL SPRNG COIL 021 6F 23X80CM

## (undated) DEVICE — NITINOL STONE RETRIEVAL BASKET: Brand: ZERO TIP

## (undated) DEVICE — GLV SURG SENSICARE MICRO PF LF 7.5 STRL

## (undated) DEVICE — TUBING, SUCTION, 1/4" X 10', STRAIGHT: Brand: MEDLINE

## (undated) DEVICE — LIGATOR MULTIBAND SUPERVIEW 7 BX4

## (undated) DEVICE — SYR LUER/TIP MONOJECT RGD/PK 60CC STRL

## (undated) DEVICE — SAFELINER SUCTION CANISTER 1000CC: Brand: DEROYAL

## (undated) DEVICE — SOL IRR H2O BTL 1000ML STRL